# Patient Record
Sex: FEMALE | Race: WHITE | NOT HISPANIC OR LATINO | Employment: OTHER | URBAN - METROPOLITAN AREA
[De-identification: names, ages, dates, MRNs, and addresses within clinical notes are randomized per-mention and may not be internally consistent; named-entity substitution may affect disease eponyms.]

---

## 2019-11-03 ENCOUNTER — APPOINTMENT (OUTPATIENT)
Dept: RADIOLOGY | Facility: MEDICAL CENTER | Age: 84
DRG: 469 | End: 2019-11-03
Attending: EMERGENCY MEDICINE
Payer: MEDICARE

## 2019-11-03 ENCOUNTER — HOSPITAL ENCOUNTER (INPATIENT)
Facility: MEDICAL CENTER | Age: 84
LOS: 9 days | DRG: 469 | End: 2019-11-12
Attending: EMERGENCY MEDICINE | Admitting: INTERNAL MEDICINE
Payer: MEDICARE

## 2019-11-03 ENCOUNTER — OFFICE VISIT (OUTPATIENT)
Dept: URGENT CARE | Facility: MEDICAL CENTER | Age: 84
End: 2019-11-03
Payer: MEDICARE

## 2019-11-03 VITALS
TEMPERATURE: 97.9 F | HEART RATE: 95 BPM | OXYGEN SATURATION: 94 % | DIASTOLIC BLOOD PRESSURE: 80 MMHG | RESPIRATION RATE: 12 BRPM | SYSTOLIC BLOOD PRESSURE: 142 MMHG

## 2019-11-03 DIAGNOSIS — M25.551 HIP PAIN, CHRONIC, RIGHT: ICD-10-CM

## 2019-11-03 DIAGNOSIS — G89.29 HIP PAIN, CHRONIC, RIGHT: ICD-10-CM

## 2019-11-03 DIAGNOSIS — M25.551 RIGHT HIP PAIN: ICD-10-CM

## 2019-11-03 DIAGNOSIS — R60.0 BILATERAL LOWER EXTREMITY EDEMA: ICD-10-CM

## 2019-11-03 DIAGNOSIS — R60.9 PERIPHERAL EDEMA: ICD-10-CM

## 2019-11-03 PROBLEM — E86.0 DEHYDRATION: Status: ACTIVE | Noted: 2019-11-03

## 2019-11-03 PROBLEM — M16.11 DEGENERATIVE JOINT DISEASE OF RIGHT HIP: Status: ACTIVE | Noted: 2019-11-03

## 2019-11-03 PROBLEM — E87.1 HYPONATREMIA: Status: ACTIVE | Noted: 2019-11-03

## 2019-11-03 PROBLEM — N19 ACUTE PRERENAL AZOTEMIA: Status: ACTIVE | Noted: 2019-11-03

## 2019-11-03 PROBLEM — N39.0 UTI (URINARY TRACT INFECTION): Status: ACTIVE | Noted: 2019-11-03

## 2019-11-03 LAB
ALBUMIN SERPL BCP-MCNC: 4.4 G/DL (ref 3.2–4.9)
ALBUMIN/GLOB SERPL: 1.3 G/DL
ALP SERPL-CCNC: 107 U/L (ref 30–99)
ALT SERPL-CCNC: 11 U/L (ref 2–50)
ANION GAP SERPL CALC-SCNC: 13 MMOL/L (ref 0–11.9)
APPEARANCE UR: ABNORMAL
AST SERPL-CCNC: 15 U/L (ref 12–45)
BACTERIA #/AREA URNS HPF: ABNORMAL /HPF
BASOPHILS # BLD AUTO: 0.6 % (ref 0–1.8)
BASOPHILS # BLD: 0.03 K/UL (ref 0–0.12)
BILIRUB SERPL-MCNC: 0.6 MG/DL (ref 0.1–1.5)
BILIRUB UR QL STRIP.AUTO: NEGATIVE
BUN SERPL-MCNC: 40 MG/DL (ref 8–22)
CALCIUM SERPL-MCNC: 9.5 MG/DL (ref 8.4–10.2)
CHLORIDE SERPL-SCNC: 96 MMOL/L (ref 96–112)
CO2 SERPL-SCNC: 22 MMOL/L (ref 20–33)
COLOR UR: YELLOW
CREAT SERPL-MCNC: 1.1 MG/DL (ref 0.5–1.4)
EKG IMPRESSION: NORMAL
EOSINOPHIL # BLD AUTO: 0.16 K/UL (ref 0–0.51)
EOSINOPHIL NFR BLD: 3 % (ref 0–6.9)
EPI CELLS #/AREA URNS HPF: ABNORMAL /HPF
ERYTHROCYTE [DISTWIDTH] IN BLOOD BY AUTOMATED COUNT: 44.7 FL (ref 35.9–50)
GLOBULIN SER CALC-MCNC: 3.4 G/DL (ref 1.9–3.5)
GLUCOSE SERPL-MCNC: 104 MG/DL (ref 65–99)
GLUCOSE UR STRIP.AUTO-MCNC: NEGATIVE MG/DL
HCT VFR BLD AUTO: 39.2 % (ref 37–47)
HGB BLD-MCNC: 12.8 G/DL (ref 12–16)
IMM GRANULOCYTES # BLD AUTO: 0.02 K/UL (ref 0–0.11)
IMM GRANULOCYTES NFR BLD AUTO: 0.4 % (ref 0–0.9)
KETONES UR STRIP.AUTO-MCNC: NEGATIVE MG/DL
LEUKOCYTE ESTERASE UR QL STRIP.AUTO: ABNORMAL
LYMPHOCYTES # BLD AUTO: 1.08 K/UL (ref 1–4.8)
LYMPHOCYTES NFR BLD: 20.6 % (ref 22–41)
MCH RBC QN AUTO: 32 PG (ref 27–33)
MCHC RBC AUTO-ENTMCNC: 32.7 G/DL (ref 33.6–35)
MCV RBC AUTO: 98 FL (ref 81.4–97.8)
MICRO URNS: ABNORMAL
MONOCYTES # BLD AUTO: 0.53 K/UL (ref 0–0.85)
MONOCYTES NFR BLD AUTO: 10.1 % (ref 0–13.4)
MUCOUS THREADS #/AREA URNS HPF: ABNORMAL /HPF
NEUTROPHILS # BLD AUTO: 3.43 K/UL (ref 2–7.15)
NEUTROPHILS NFR BLD: 65.3 % (ref 44–72)
NITRITE UR QL STRIP.AUTO: NEGATIVE
NRBC # BLD AUTO: 0 K/UL
NRBC BLD-RTO: 0 /100 WBC
NT-PROBNP SERPL IA-MCNC: 114 PG/ML (ref 0–125)
PH UR STRIP.AUTO: 5 [PH] (ref 5–8)
PLATELET # BLD AUTO: 248 K/UL (ref 164–446)
PMV BLD AUTO: 9.6 FL (ref 9–12.9)
POTASSIUM SERPL-SCNC: 5.2 MMOL/L (ref 3.6–5.5)
PROT SERPL-MCNC: 7.8 G/DL (ref 6–8.2)
PROT UR QL STRIP: NEGATIVE MG/DL
RBC # BLD AUTO: 4 M/UL (ref 4.2–5.4)
RBC # URNS HPF: ABNORMAL /HPF
RBC UR QL AUTO: ABNORMAL
SODIUM SERPL-SCNC: 131 MMOL/L (ref 135–145)
SP GR UR STRIP.AUTO: 1.01
TROPONIN T SERPL-MCNC: 13 NG/L (ref 6–19)
TROPONIN T SERPL-MCNC: 16 NG/L (ref 6–19)
WBC # BLD AUTO: 5.3 K/UL (ref 4.8–10.8)
WBC #/AREA URNS HPF: ABNORMAL /HPF

## 2019-11-03 PROCEDURE — 93005 ELECTROCARDIOGRAM TRACING: CPT | Performed by: EMERGENCY MEDICINE

## 2019-11-03 PROCEDURE — 99358 PROLONG SERVICE W/O CONTACT: CPT | Performed by: INTERNAL MEDICINE

## 2019-11-03 PROCEDURE — 99223 1ST HOSP IP/OBS HIGH 75: CPT | Performed by: INTERNAL MEDICINE

## 2019-11-03 PROCEDURE — 81001 URINALYSIS AUTO W/SCOPE: CPT

## 2019-11-03 PROCEDURE — 99285 EMERGENCY DEPT VISIT HI MDM: CPT

## 2019-11-03 PROCEDURE — 770006 HCHG ROOM/CARE - MED/SURG/GYN SEMI*

## 2019-11-03 PROCEDURE — 85025 COMPLETE CBC W/AUTO DIFF WBC: CPT

## 2019-11-03 PROCEDURE — A9270 NON-COVERED ITEM OR SERVICE: HCPCS | Performed by: INTERNAL MEDICINE

## 2019-11-03 PROCEDURE — 700102 HCHG RX REV CODE 250 W/ 637 OVERRIDE(OP): Performed by: INTERNAL MEDICINE

## 2019-11-03 PROCEDURE — 99203 OFFICE O/P NEW LOW 30 MIN: CPT | Performed by: PHYSICIAN ASSISTANT

## 2019-11-03 PROCEDURE — 700111 HCHG RX REV CODE 636 W/ 250 OVERRIDE (IP): Performed by: EMERGENCY MEDICINE

## 2019-11-03 PROCEDURE — 93970 EXTREMITY STUDY: CPT

## 2019-11-03 PROCEDURE — 84484 ASSAY OF TROPONIN QUANT: CPT | Mod: 91

## 2019-11-03 PROCEDURE — 36415 COLL VENOUS BLD VENIPUNCTURE: CPT

## 2019-11-03 PROCEDURE — 73501 X-RAY EXAM HIP UNI 1 VIEW: CPT | Mod: RT

## 2019-11-03 PROCEDURE — 700105 HCHG RX REV CODE 258: Performed by: INTERNAL MEDICINE

## 2019-11-03 PROCEDURE — 96374 THER/PROPH/DIAG INJ IV PUSH: CPT

## 2019-11-03 PROCEDURE — 80053 COMPREHEN METABOLIC PANEL: CPT

## 2019-11-03 PROCEDURE — 83880 ASSAY OF NATRIURETIC PEPTIDE: CPT

## 2019-11-03 PROCEDURE — 700111 HCHG RX REV CODE 636 W/ 250 OVERRIDE (IP): Performed by: INTERNAL MEDICINE

## 2019-11-03 PROCEDURE — 71045 X-RAY EXAM CHEST 1 VIEW: CPT

## 2019-11-03 RX ORDER — OMEPRAZOLE 10 MG/1
10 CAPSULE, DELAYED RELEASE ORAL DAILY
Status: DISCONTINUED | OUTPATIENT
Start: 2019-11-04 | End: 2019-11-04

## 2019-11-03 RX ORDER — SODIUM CHLORIDE 9 MG/ML
INJECTION, SOLUTION INTRAVENOUS CONTINUOUS
Status: DISCONTINUED | OUTPATIENT
Start: 2019-11-03 | End: 2019-11-05

## 2019-11-03 RX ORDER — MORPHINE SULFATE 4 MG/ML
2 INJECTION, SOLUTION INTRAMUSCULAR; INTRAVENOUS
Status: DISCONTINUED | OUTPATIENT
Start: 2019-11-03 | End: 2019-11-12 | Stop reason: HOSPADM

## 2019-11-03 RX ORDER — AMOXICILLIN 250 MG
2 CAPSULE ORAL 2 TIMES DAILY
Status: DISCONTINUED | OUTPATIENT
Start: 2019-11-03 | End: 2019-11-12 | Stop reason: HOSPADM

## 2019-11-03 RX ORDER — CYCLOBENZAPRINE HCL 10 MG
5 TABLET ORAL 3 TIMES DAILY PRN
Status: DISCONTINUED | OUTPATIENT
Start: 2019-11-03 | End: 2019-11-12 | Stop reason: HOSPADM

## 2019-11-03 RX ORDER — CYCLOBENZAPRINE HCL 5 MG
5 TABLET ORAL 3 TIMES DAILY PRN
COMMUNITY
End: 2020-05-20

## 2019-11-03 RX ORDER — ONDANSETRON 4 MG/1
4 TABLET, ORALLY DISINTEGRATING ORAL EVERY 4 HOURS PRN
Status: DISCONTINUED | OUTPATIENT
Start: 2019-11-03 | End: 2019-11-12 | Stop reason: HOSPADM

## 2019-11-03 RX ORDER — CELECOXIB 200 MG/1
200 CAPSULE ORAL 2 TIMES DAILY
Status: DISCONTINUED | OUTPATIENT
Start: 2019-11-04 | End: 2019-11-12 | Stop reason: HOSPADM

## 2019-11-03 RX ORDER — MORPHINE SULFATE 4 MG/ML
4 INJECTION, SOLUTION INTRAMUSCULAR; INTRAVENOUS ONCE
Status: COMPLETED | OUTPATIENT
Start: 2019-11-03 | End: 2019-11-03

## 2019-11-03 RX ORDER — GABAPENTIN 100 MG/1
100 CAPSULE ORAL 3 TIMES DAILY
Status: DISCONTINUED | OUTPATIENT
Start: 2019-11-03 | End: 2019-11-12 | Stop reason: HOSPADM

## 2019-11-03 RX ORDER — IBUPROFEN 200 MG
600 TABLET ORAL EVERY 6 HOURS PRN
Status: ON HOLD | COMMUNITY
End: 2019-11-21

## 2019-11-03 RX ORDER — CELECOXIB 200 MG/1
200 CAPSULE ORAL 2 TIMES DAILY
Status: ON HOLD | COMMUNITY
End: 2019-11-21

## 2019-11-03 RX ORDER — POLYETHYLENE GLYCOL 3350 17 G/17G
1 POWDER, FOR SOLUTION ORAL
Status: DISCONTINUED | OUTPATIENT
Start: 2019-11-03 | End: 2019-11-12 | Stop reason: HOSPADM

## 2019-11-03 RX ORDER — ONDANSETRON 2 MG/ML
4 INJECTION INTRAMUSCULAR; INTRAVENOUS EVERY 4 HOURS PRN
Status: DISCONTINUED | OUTPATIENT
Start: 2019-11-03 | End: 2019-11-12 | Stop reason: HOSPADM

## 2019-11-03 RX ORDER — ACETAMINOPHEN 325 MG/1
650 TABLET ORAL EVERY 6 HOURS PRN
Status: DISCONTINUED | OUTPATIENT
Start: 2019-11-03 | End: 2019-11-12 | Stop reason: HOSPADM

## 2019-11-03 RX ORDER — LOSARTAN POTASSIUM 25 MG/1
25 TABLET ORAL DAILY
Status: DISCONTINUED | OUTPATIENT
Start: 2019-11-04 | End: 2019-11-12 | Stop reason: HOSPADM

## 2019-11-03 RX ORDER — ENALAPRILAT 1.25 MG/ML
1.25 INJECTION INTRAVENOUS EVERY 6 HOURS PRN
Status: DISCONTINUED | OUTPATIENT
Start: 2019-11-03 | End: 2019-11-12 | Stop reason: HOSPADM

## 2019-11-03 RX ORDER — BISACODYL 10 MG
10 SUPPOSITORY, RECTAL RECTAL
Status: DISCONTINUED | OUTPATIENT
Start: 2019-11-03 | End: 2019-11-12 | Stop reason: HOSPADM

## 2019-11-03 RX ORDER — LOSARTAN POTASSIUM 25 MG/1
25 TABLET ORAL EVERY MORNING
Status: ON HOLD | COMMUNITY
End: 2019-11-21

## 2019-11-03 RX ADMIN — CEFTRIAXONE SODIUM 2 G: 2 INJECTION, POWDER, FOR SOLUTION INTRAMUSCULAR; INTRAVENOUS at 23:55

## 2019-11-03 RX ADMIN — GABAPENTIN 100 MG: 100 CAPSULE ORAL at 23:55

## 2019-11-03 RX ADMIN — SODIUM CHLORIDE: 9 INJECTION, SOLUTION INTRAVENOUS at 23:55

## 2019-11-03 RX ADMIN — MORPHINE SULFATE 4 MG: 4 INJECTION INTRAVENOUS at 18:55

## 2019-11-03 RX ADMIN — POLYETHYLENE GLYCOL 3350 1 PACKET: 17 POWDER, FOR SOLUTION ORAL at 23:55

## 2019-11-03 SDOH — HEALTH STABILITY: MENTAL HEALTH: HOW OFTEN DO YOU HAVE 6 OR MORE DRINKS ON ONE OCCASION?: NEVER

## 2019-11-03 SDOH — HEALTH STABILITY: MENTAL HEALTH: HOW MANY STANDARD DRINKS CONTAINING ALCOHOL DO YOU HAVE ON A TYPICAL DAY?: 1 OR 2

## 2019-11-03 SDOH — ECONOMIC STABILITY: TRANSPORTATION INSECURITY
IN THE PAST 12 MONTHS, HAS THE LACK OF TRANSPORTATION KEPT YOU FROM MEDICAL APPOINTMENTS OR FROM GETTING MEDICATIONS?: NO

## 2019-11-03 SDOH — HEALTH STABILITY: MENTAL HEALTH: HOW OFTEN DO YOU HAVE A DRINK CONTAINING ALCOHOL?: 4 OR MORE TIMES A WEEK

## 2019-11-03 SDOH — ECONOMIC STABILITY: FOOD INSECURITY: WITHIN THE PAST 12 MONTHS, THE FOOD YOU BOUGHT JUST DIDN'T LAST AND YOU DIDN'T HAVE MONEY TO GET MORE.: NEVER TRUE

## 2019-11-03 SDOH — ECONOMIC STABILITY: FOOD INSECURITY: WITHIN THE PAST 12 MONTHS, YOU WORRIED THAT YOUR FOOD WOULD RUN OUT BEFORE YOU GOT MONEY TO BUY MORE.: NEVER TRUE

## 2019-11-03 SDOH — ECONOMIC STABILITY: TRANSPORTATION INSECURITY
IN THE PAST 12 MONTHS, HAS LACK OF TRANSPORTATION KEPT YOU FROM MEETINGS, WORK, OR FROM GETTING THINGS NEEDED FOR DAILY LIVING?: NO

## 2019-11-03 ASSESSMENT — ENCOUNTER SYMPTOMS
HEADACHES: 0
FLANK PAIN: 0
COUGH: 0
VOMITING: 0
PALPITATIONS: 0
SORE THROAT: 0
MYALGIAS: 1
EYE REDNESS: 0
STRIDOR: 0
DIAPHORESIS: 0
DIARRHEA: 0
CHILLS: 0
FALLS: 0
SHORTNESS OF BREATH: 0
LEG SWELLING: 1
EYE DISCHARGE: 0
NAUSEA: 0
ORTHOPNEA: 0
INSOMNIA: 1
FEVER: 0
FEVER: 0
CONSTIPATION: 1
NAUSEA: 0
WHEEZING: 0
HEADACHES: 0
DIZZINESS: 0
VOMITING: 0
COUGH: 0
SHORTNESS OF BREATH: 0

## 2019-11-03 ASSESSMENT — LIFESTYLE VARIABLES
TOTAL SCORE: 0
HOW MANY TIMES IN THE PAST YEAR HAVE YOU HAD 5 OR MORE DRINKS IN A DAY: 0
ON A TYPICAL DAY WHEN YOU DRINK ALCOHOL HOW MANY DRINKS DO YOU HAVE: 1
TOTAL SCORE: 0
HAVE YOU EVER FELT YOU SHOULD CUT DOWN ON YOUR DRINKING: NO
EVER_SMOKED: YES
CONSUMPTION TOTAL: NEGATIVE
EVER HAD A DRINK FIRST THING IN THE MORNING TO STEADY YOUR NERVES TO GET RID OF A HANGOVER: NO
HAVE PEOPLE ANNOYED YOU BY CRITICIZING YOUR DRINKING: NO
EVER FELT BAD OR GUILTY ABOUT YOUR DRINKING: NO
AVERAGE NUMBER OF DAYS PER WEEK YOU HAVE A DRINK CONTAINING ALCOHOL: 7
ALCOHOL_USE: YES
DOES PATIENT WANT TO STOP DRINKING: NO
TOTAL SCORE: 0

## 2019-11-03 NOTE — PROGRESS NOTES
Subjective:      Janessa Cain is a 84 y.o. female who presents with Leg Swelling (edema in both legs, x1week, worse in right leg)        Leg Swelling   This is a new problem. Episode onset: x 1 week. The problem occurs constantly. The problem has been gradually worsening. Pertinent negatives include no chest pain, congestion, coughing, fever, headaches, nausea, rash, sore throat or vomiting. The symptoms are aggravated by walking. She has tried NSAIDs and acetaminophen (Tylenol with Codeine and IBU) for the symptoms.     The patient presents to clinic c/o bilateral lower leg swelling, right > left, x 1 week. The patient states the swelling as continued to progress. The patient is also c/o right hip and knee pain. The patient reports a history of arthritis to her right hip. The patient states she is unable to walk at this time secondary to pain. The patient is requesting a refill of her pain medications at this time. The patient denies recent injury or trauma to her lower extremities. The patient has been taking Tylenol with Codeine and IBU for her current symptoms. The patient denies fever. No chest pain. No shortness of breath. No redness to her lower legs. No increased warmth.     The patient reports a sedentary lifestyle. The patient takes oral estrogen every 72 hours. The patient is a non-smoker.     PMH:  has no past medical history on file.  MEDS:   Current Outpatient Medications:   •  celecoxib (CELEBREX) 200 MG Cap, Take 200 mg by mouth 2 times a day., Disp: , Rfl:   •  cyclobenzaprine (FLEXERIL) 10 MG Tab, Take 5 mg by mouth 3 times a day as needed., Disp: , Rfl:   •  losartan (COZAAR) 25 MG Tab, Take 25 mg by mouth every day., Disp: , Rfl:   •  estrogens, conjugated (PREMARIN) 0.3 MG Tab, Take 0.3 mg by mouth every day., Disp: , Rfl:   •  ACETAMINOPHEN-CODEINE #3 PO, Take 300 mg by mouth 2 Times a Day., Disp: , Rfl:   •  ibuprofen (MOTRIN) 200 MG Tab, Take 200 mg by mouth every 6 hours as  needed., Disp: , Rfl:   •  omeprazole (PRILOSEC) 10 MG CAPSULE DELAYED RELEASE, Take 10 mg by mouth every day., Disp: , Rfl:   •  Calcium-Vitamin D 500-125 MG-UNIT Tab, Take 1 Tab by mouth every 24 hours., Disp: , Rfl:   ALLERGIES:   Allergies   Allergen Reactions   • Percocet [Apap-Fd&C Red #40 [place]-Oxycodone]    • Tramadol    • Vicodin [Hydrocodone-Acetaminophen]      SURGHX: History reviewed. No pertinent surgical history.  SOCHX:  reports that she has quit smoking. She smoked 0.00 packs per day. She has never used smokeless tobacco.  FH: Family history was reviewed, no pertinent findings to report        Review of Systems   Constitutional: Negative for fever.   HENT: Negative for congestion, ear pain and sore throat.    Eyes: Negative for discharge and redness.   Respiratory: Negative for cough and shortness of breath.    Cardiovascular: Positive for leg swelling. Negative for chest pain.   Gastrointestinal: Negative for nausea and vomiting.   Musculoskeletal: Positive for joint pain.        + right hip and knee   Skin: Negative for rash.   Neurological: Negative for headaches.   All other systems reviewed and are negative.         Objective:     /80 (BP Location: Left arm, Patient Position: Sitting, BP Cuff Size: Adult)   Pulse 95   Temp 36.6 °C (97.9 °F) (Temporal)   Resp 12   SpO2 94%      Physical Exam  Constitutional:       General: She is not in acute distress.     Appearance: Normal appearance.   HENT:      Head: Normocephalic and atraumatic.      Right Ear: External ear normal.      Left Ear: External ear normal.      Nose: Nose normal.   Eyes:      Extraocular Movements: Extraocular movements intact.      Conjunctiva/sclera: Conjunctivae normal.   Neck:      Musculoskeletal: Normal range of motion and neck supple.   Cardiovascular:      Rate and Rhythm: Normal rate and regular rhythm.      Heart sounds: Normal heart sounds.   Pulmonary:      Effort: Pulmonary effort is normal. No  respiratory distress.      Breath sounds: Normal breath sounds. No wheezing.   Musculoskeletal:         General: Swelling present.      Right lower leg: Edema present.      Left lower leg: Edema present.      Comments:   Right Hip:  Diffuse tenderness to the right lateral hip.  Decreased ROM - secondary to pain  Neurovascular intact  Gait: not assessed  Right Lower Extremity   Diffuse edema to the right lower extremity extending to the distal thigh. No tenderness to palpation. No erythema. No increased warmth. No skin changes.   No calf tenderness.   Left Lower Extremity:  Diffuse edema to the left lower extremity extending to the distal knee. No tenderness to palpation. No erythema. No increased warmth. No skin changes.   No calf tenderness.    Skin:     General: Skin is warm and dry.   Neurological:      Mental Status: She is alert and oriented to person, place, and time.            Progress:  Spoke with Dr. Huntley at the [facility] who agrees to see the patient.     Assessment/Plan:     1. Bilateral lower extremity edema    2. Hip pain, chronic, right    The patient's presenting symptoms and physical exam findings are consistent with bilateral lower extremity edema.  The patient also reports chronic right hip pain.  On physical exam, the patient had diffuse edema to her bilateral lower extremities, right greater than left, without tenderness to palpation, erythema, increased warmth, or skin changes.  No calf tenderness was appreciated.  Based on the patient's presenting symptoms and physical exam findings, I recommend the patient be transferred to the [facility] for further evaluation of her current symptoms.  The patient agrees to this plan.  The patient is stable for transfer via private vehicle at this time, and her family will take her to the ED.    Plan:  Transfer patient to the [facility] for further evaluation of her current symptoms.

## 2019-11-04 ENCOUNTER — APPOINTMENT (OUTPATIENT)
Dept: RADIOLOGY | Facility: MEDICAL CENTER | Age: 84
DRG: 469 | End: 2019-11-04
Attending: EMERGENCY MEDICINE
Payer: MEDICARE

## 2019-11-04 ENCOUNTER — APPOINTMENT (OUTPATIENT)
Dept: RADIOLOGY | Facility: MEDICAL CENTER | Age: 84
DRG: 469 | End: 2019-11-04
Attending: HOSPITALIST
Payer: MEDICARE

## 2019-11-04 ENCOUNTER — APPOINTMENT (OUTPATIENT)
Dept: RADIOLOGY | Facility: MEDICAL CENTER | Age: 84
DRG: 469 | End: 2019-11-04
Attending: ORTHOPAEDIC SURGERY
Payer: MEDICARE

## 2019-11-04 ENCOUNTER — APPOINTMENT (OUTPATIENT)
Dept: CARDIOLOGY | Facility: MEDICAL CENTER | Age: 84
DRG: 469 | End: 2019-11-04
Attending: INTERNAL MEDICINE
Payer: MEDICARE

## 2019-11-04 PROBLEM — M54.10 RADICULOPATHY: Status: ACTIVE | Noted: 2019-11-04

## 2019-11-04 LAB
ANION GAP SERPL CALC-SCNC: 12 MMOL/L (ref 0–11.9)
BASOPHILS # BLD AUTO: 0.6 % (ref 0–1.8)
BASOPHILS # BLD: 0.03 K/UL (ref 0–0.12)
BUN SERPL-MCNC: 35 MG/DL (ref 8–22)
CALCIUM SERPL-MCNC: 9.4 MG/DL (ref 8.4–10.2)
CHLORIDE SERPL-SCNC: 97 MMOL/L (ref 96–112)
CO2 SERPL-SCNC: 21 MMOL/L (ref 20–33)
CREAT SERPL-MCNC: 0.81 MG/DL (ref 0.5–1.4)
EOSINOPHIL # BLD AUTO: 0.17 K/UL (ref 0–0.51)
EOSINOPHIL NFR BLD: 3.3 % (ref 0–6.9)
ERYTHROCYTE [DISTWIDTH] IN BLOOD BY AUTOMATED COUNT: 45.8 FL (ref 35.9–50)
GLUCOSE SERPL-MCNC: 119 MG/DL (ref 65–99)
HCT VFR BLD AUTO: 39.8 % (ref 37–47)
HGB BLD-MCNC: 12.7 G/DL (ref 12–16)
IMM GRANULOCYTES # BLD AUTO: 0.01 K/UL (ref 0–0.11)
IMM GRANULOCYTES NFR BLD AUTO: 0.2 % (ref 0–0.9)
LV EJECT FRACT  99904: 65
LV EJECT FRACT MOD 2C 99903: 66.73
LV EJECT FRACT MOD 4C 99902: 51.96
LV EJECT FRACT MOD BP 99901: 52.67
LYMPHOCYTES # BLD AUTO: 0.79 K/UL (ref 1–4.8)
LYMPHOCYTES NFR BLD: 15.5 % (ref 22–41)
MCH RBC QN AUTO: 32.1 PG (ref 27–33)
MCHC RBC AUTO-ENTMCNC: 31.9 G/DL (ref 33.6–35)
MCV RBC AUTO: 100.5 FL (ref 81.4–97.8)
MONOCYTES # BLD AUTO: 0.59 K/UL (ref 0–0.85)
MONOCYTES NFR BLD AUTO: 11.6 % (ref 0–13.4)
NEUTROPHILS # BLD AUTO: 3.5 K/UL (ref 2–7.15)
NEUTROPHILS NFR BLD: 68.8 % (ref 44–72)
NRBC # BLD AUTO: 0 K/UL
NRBC BLD-RTO: 0 /100 WBC
PLATELET # BLD AUTO: 228 K/UL (ref 164–446)
PMV BLD AUTO: 10.1 FL (ref 9–12.9)
POTASSIUM SERPL-SCNC: 4.6 MMOL/L (ref 3.6–5.5)
RBC # BLD AUTO: 3.96 M/UL (ref 4.2–5.4)
SODIUM SERPL-SCNC: 130 MMOL/L (ref 135–145)
WBC # BLD AUTO: 5.1 K/UL (ref 4.8–10.8)

## 2019-11-04 PROCEDURE — 97161 PT EVAL LOW COMPLEX 20 MIN: CPT

## 2019-11-04 PROCEDURE — 73700 CT LOWER EXTREMITY W/O DYE: CPT | Mod: RT

## 2019-11-04 PROCEDURE — 93306 TTE W/DOPPLER COMPLETE: CPT | Mod: 26 | Performed by: INTERNAL MEDICINE

## 2019-11-04 PROCEDURE — A9270 NON-COVERED ITEM OR SERVICE: HCPCS

## 2019-11-04 PROCEDURE — 94760 N-INVAS EAR/PLS OXIMETRY 1: CPT

## 2019-11-04 PROCEDURE — 80048 BASIC METABOLIC PNL TOTAL CA: CPT

## 2019-11-04 PROCEDURE — C1751 CATH, INF, PER/CENT/MIDLINE: HCPCS

## 2019-11-04 PROCEDURE — 05HY33Z INSERTION OF INFUSION DEVICE INTO UPPER VEIN, PERCUTANEOUS APPROACH: ICD-10-PCS | Performed by: INTERNAL MEDICINE

## 2019-11-04 PROCEDURE — 36415 COLL VENOUS BLD VENIPUNCTURE: CPT

## 2019-11-04 PROCEDURE — 700102 HCHG RX REV CODE 250 W/ 637 OVERRIDE(OP)

## 2019-11-04 PROCEDURE — 93306 TTE W/DOPPLER COMPLETE: CPT

## 2019-11-04 PROCEDURE — 85025 COMPLETE CBC W/AUTO DIFF WBC: CPT

## 2019-11-04 PROCEDURE — 770006 HCHG ROOM/CARE - MED/SURG/GYN SEMI*

## 2019-11-04 PROCEDURE — 97165 OT EVAL LOW COMPLEX 30 MIN: CPT

## 2019-11-04 PROCEDURE — 700111 HCHG RX REV CODE 636 W/ 250 OVERRIDE (IP): Performed by: INTERNAL MEDICINE

## 2019-11-04 PROCEDURE — A9270 NON-COVERED ITEM OR SERVICE: HCPCS | Performed by: INTERNAL MEDICINE

## 2019-11-04 PROCEDURE — 99233 SBSQ HOSP IP/OBS HIGH 50: CPT | Performed by: HOSPITALIST

## 2019-11-04 PROCEDURE — 700102 HCHG RX REV CODE 250 W/ 637 OVERRIDE(OP): Performed by: INTERNAL MEDICINE

## 2019-11-04 RX ORDER — OMEPRAZOLE 20 MG/1
20 CAPSULE, DELAYED RELEASE ORAL DAILY
Status: DISCONTINUED | OUTPATIENT
Start: 2019-11-05 | End: 2019-11-12 | Stop reason: HOSPADM

## 2019-11-04 RX ADMIN — LOSARTAN POTASSIUM 25 MG: 25 TABLET ORAL at 06:04

## 2019-11-04 RX ADMIN — GABAPENTIN 100 MG: 100 CAPSULE ORAL at 06:03

## 2019-11-04 RX ADMIN — MAGNESIUM HYDROXIDE 30 ML: 400 SUSPENSION ORAL at 06:04

## 2019-11-04 RX ADMIN — MORPHINE SULFATE 2 MG: 4 INJECTION INTRAVENOUS at 18:36

## 2019-11-04 RX ADMIN — CALCIUM CARBONATE-CHOLECALCIFEROL TAB 250 MG-125 UNIT 1 TABLET: 250-125 TAB at 06:03

## 2019-11-04 RX ADMIN — SENNOSIDES, DOCUSATE SODIUM 2 TABLET: 50; 8.6 TABLET, FILM COATED ORAL at 18:35

## 2019-11-04 RX ADMIN — ENOXAPARIN SODIUM 40 MG: 100 INJECTION SUBCUTANEOUS at 06:04

## 2019-11-04 RX ADMIN — Medication 20 MG: at 06:14

## 2019-11-04 RX ADMIN — CELECOXIB 200 MG: 200 CAPSULE ORAL at 18:35

## 2019-11-04 RX ADMIN — GABAPENTIN 100 MG: 100 CAPSULE ORAL at 18:35

## 2019-11-04 RX ADMIN — CELECOXIB 200 MG: 200 CAPSULE ORAL at 06:03

## 2019-11-04 RX ADMIN — MORPHINE SULFATE 2 MG: 4 INJECTION INTRAVENOUS at 04:18

## 2019-11-04 RX ADMIN — MORPHINE SULFATE 2 MG: 4 INJECTION INTRAVENOUS at 00:41

## 2019-11-04 RX ADMIN — MORPHINE SULFATE 2 MG: 4 INJECTION INTRAVENOUS at 14:35

## 2019-11-04 RX ADMIN — GABAPENTIN 100 MG: 100 CAPSULE ORAL at 11:38

## 2019-11-04 RX ADMIN — OMEPRAZOLE 20 MG: KIT at 06:14

## 2019-11-04 RX ADMIN — MORPHINE SULFATE 2 MG: 4 INJECTION INTRAVENOUS at 08:26

## 2019-11-04 ASSESSMENT — COGNITIVE AND FUNCTIONAL STATUS - GENERAL
DRESSING REGULAR UPPER BODY CLOTHING: A LITTLE
SUGGESTED CMS G CODE MODIFIER DAILY ACTIVITY: CK
PERSONAL GROOMING: A LITTLE
EATING MEALS: A LITTLE
HELP NEEDED FOR BATHING: A LOT
DRESSING REGULAR LOWER BODY CLOTHING: A LOT
TOILETING: A LITTLE
DAILY ACTIVITIY SCORE: 16

## 2019-11-04 ASSESSMENT — PATIENT HEALTH QUESTIONNAIRE - PHQ9
1. LITTLE INTEREST OR PLEASURE IN DOING THINGS: NOT AT ALL
SUM OF ALL RESPONSES TO PHQ9 QUESTIONS 1 AND 2: 0
2. FEELING DOWN, DEPRESSED, IRRITABLE, OR HOPELESS: NOT AT ALL

## 2019-11-04 ASSESSMENT — ENCOUNTER SYMPTOMS
FEVER: 0
WEIGHT LOSS: 0
DIZZINESS: 0
COUGH: 0
VOMITING: 0
LOSS OF CONSCIOUSNESS: 0
ABDOMINAL PAIN: 0
PSYCHIATRIC NEGATIVE: 1
FLANK PAIN: 0
NAUSEA: 0
RESPIRATORY NEGATIVE: 1
NEUROLOGICAL NEGATIVE: 1
CHILLS: 0
SHORTNESS OF BREATH: 0
WEAKNESS: 0
GASTROINTESTINAL NEGATIVE: 1
BACK PAIN: 0
BRUISES/BLEEDS EASILY: 0
DEPRESSION: 0
NERVOUS/ANXIOUS: 0
MYALGIAS: 0

## 2019-11-04 ASSESSMENT — COPD QUESTIONNAIRES
DURING THE PAST 4 WEEKS HOW MUCH DID YOU FEEL SHORT OF BREATH: NONE/LITTLE OF THE TIME
HAVE YOU SMOKED AT LEAST 100 CIGARETTES IN YOUR ENTIRE LIFE: YES
COPD SCREENING SCORE: 4
DO YOU EVER COUGH UP ANY MUCUS OR PHLEGM?: NO/ONLY WITH OCCASIONAL COLDS OR INFECTIONS

## 2019-11-04 ASSESSMENT — GAIT ASSESSMENTS: GAIT LEVEL OF ASSIST: REFUSED

## 2019-11-04 ASSESSMENT — ACTIVITIES OF DAILY LIVING (ADL): TOILETING: INDEPENDENT

## 2019-11-04 NOTE — H&P
Hospital Medicine History & Physical Note    Date of Service  11/3/2019    Primary Care Physician  No primary care provider    Consultants  none    Code Status  full    Chief Complaint  Leg swelling    History of Presenting Illness  84 y.o. female who presented 11/3/2019 with degenerative disease of the hips and history of left hip surgery in 2008 who has known degenerative disease of the right hip and was seen by orthopedic surgery in [place] but wanted to wait until after she moved to [place] to have surgery. She moved to [place] one week ago and the last three days she is sleeping in a wheelchair at night as she cannot tolerate laying flat due to pain.  She uses a walker to ambulate during the day but is having increased difficulty ambulating due to pain. She describes the pain in her right leg as radiating down her right leg into the foot and burning in nature.  She has constipation and is not drinking much as it is difficult to get to the bathroom to void.    Review of Systems  Review of Systems   Constitutional: Positive for malaise/fatigue. Negative for chills, diaphoresis and fever.   HENT: Negative for congestion.    Respiratory: Negative for cough, shortness of breath, wheezing and stridor.    Cardiovascular: Positive for leg swelling. Negative for chest pain, palpitations and orthopnea.   Gastrointestinal: Positive for constipation. Negative for diarrhea, nausea and vomiting.   Genitourinary: Negative for dysuria, frequency and urgency.        Dark urine   Musculoskeletal: Positive for joint pain and myalgias.        Right hip pain  Pain radiating from upper right leg down to toes on right foot worse with laying flat   Skin: Negative for itching and rash.   Neurological: Negative for dizziness and headaches.   Psychiatric/Behavioral: The patient has insomnia.    All other systems reviewed and are negative.      Past Medical History   has a past medical history of Hypertension.degenerative disease of the hip  joints    Surgical History  Left hip resurfacing in 2008    Family History  Father had heart disease, aunt had ovarian cancer    Social History   reports that she has quit smoking. She smoked 0.00 packs per day. She has never used smokeless tobacco. She reports current alcohol use. She reports that she does not use drugs.she drinks one alcoholic drink nightly    Allergies  Allergies   Allergen Reactions   • Hydrocodone Vomiting   • Percocet [Apap-Fd&C Red #40 [place]-Oxycodone]    • Tramadol    • Vicodin [Hydrocodone-Acetaminophen]        Medications  Prior to Admission Medications   Prescriptions Last Dose Informant Patient Reported? Taking?   ACETAMINOPHEN-CODEINE #3 PO   Yes No   Sig: Take 300 mg by mouth 2 Times a Day.   Calcium-Vitamin D 500-125 MG-UNIT Tab   Yes No   Sig: Take 1 Tab by mouth every 24 hours.   celecoxib (CELEBREX) 200 MG Cap   Yes No   Sig: Take 200 mg by mouth 2 times a day.   cyclobenzaprine (FLEXERIL) 10 MG Tab   Yes No   Sig: Take 5 mg by mouth 3 times a day as needed.   estrogens, conjugated (PREMARIN) 0.3 MG Tab   Yes No   Sig: Take 0.3 mg by mouth every day.   ibuprofen (MOTRIN) 200 MG Tab   Yes No   Sig: Take 200 mg by mouth every 6 hours as needed.   losartan (COZAAR) 25 MG Tab   Yes No   Sig: Take 25 mg by mouth every day.   omeprazole (PRILOSEC) 10 MG CAPSULE DELAYED RELEASE   Yes No   Sig: Take 10 mg by mouth every day.      Facility-Administered Medications: None       Physical Exam  Temp:  [36.3 °C (97.4 °F)-36.5 °C (97.7 °F)] 36.5 °C (97.7 °F)  Pulse:  [91-98] 98  Resp:  [18] 18  BP: (144-147)/(87-98) 144/98  SpO2:  [91 %-93 %] 91 %    Physical Exam  Vitals signs and nursing note reviewed.   Constitutional:       General: She is not in acute distress.     Appearance: She is not diaphoretic.   HENT:      Head: Atraumatic.      Right Ear: External ear normal.      Left Ear: External ear normal.      Nose: No congestion or rhinorrhea.      Mouth/Throat:      Mouth: Mucous membranes  are moist.      Pharynx: No oropharyngeal exudate or posterior oropharyngeal erythema.   Eyes:      Extraocular Movements: Extraocular movements intact.      Pupils: Pupils are equal, round, and reactive to light.   Neck:      Musculoskeletal: Neck supple. No muscular tenderness.   Cardiovascular:      Rate and Rhythm: Normal rate and regular rhythm.      Pulses: Normal pulses.      Heart sounds: Murmur present.      Comments: Varicose veins over upper legs  Pulmonary:      Effort: No respiratory distress.      Breath sounds: No stridor. No rhonchi.   Musculoskeletal:         General: Swelling present. No tenderness.   Skin:     General: Skin is warm.      Capillary Refill: Capillary refill takes 2 to 3 seconds.      Coloration: Skin is not pale.      Findings: No erythema.   Neurological:      Mental Status: She is alert and oriented to person, place, and time.      Sensory: No sensory deficit.   Psychiatric:         Mood and Affect: Mood normal.         Behavior: Behavior normal.         Laboratory:  Recent Labs     11/03/19  1725   WBC 5.3   RBC 4.00*   HEMOGLOBIN 12.8   HEMATOCRIT 39.2   MCV 98.0*   MCH 32.0   MCHC 32.7*   RDW 44.7   PLATELETCT 248   MPV 9.6     Recent Labs     11/03/19  1725   SODIUM 131*   POTASSIUM 5.2   CHLORIDE 96   CO2 22   GLUCOSE 104*   BUN 40*   CREATININE 1.10   CALCIUM 9.5     Recent Labs     11/03/19  1725   ALTSGPT 11   ASTSGOT 15   ALKPHOSPHAT 107*   TBILIRUBIN 0.6   GLUCOSE 104*         Recent Labs     11/03/19  1725   NTPROBNP 114         Recent Labs     11/03/19  1725 11/03/19  1944   TROPONINT 16 13       Urinalysis:    Recent Labs     11/03/19  1911   SPECGRAVITY 1.010   GLUCOSEUR Negative   KETONES Negative   NITRITE Negative   LEUKESTERAS Large*   WBCURINE 10-20*   RBCURINE 2-5*   BACTERIA Many*   EPITHELCELL Many*        Imaging:  DX-HIP-UNILATERAL-WITH PELVIS-1 VIEW RIGHT   Final Result         1.  No radiographic evidence of acute traumatic injury.   2.  Severe  degenerative changes of the right hip with extensive bony remodeling of the femoral head and acetabula.      US-EXTREMITY VENOUS LOWER BILAT   Final Result      No gross deep venous thrombosis.      Severely limited evaluation due to subcutaneous fat edema, patient pain and mobility limitations      DX-CHEST-PORTABLE (1 VIEW)   Final Result      No acute cardiopulmonary findings.      CT-HIP W/O PLUS RECONS RIGHT    (Results Pending)         Assessment/Plan:  I anticipate this patient will require at least two midnights for appropriate medical management, necessitating inpatient admission.    Bilateral leg edema  Assessment & Plan  Due to legs down to gravity for prolonged periods of time  I will order for leg elevation as tolerated and compression stockings    Degenerative joint disease of right hip  Assessment & Plan  Patient was seen by an orthopedic surgeon in [place] but wanted to wait until she moved to [place] to have surgery, now she is having difficulty ambulating and laying flat due to pain  I will start gabapentin for nerve pain  Continue tylenol and motrin for pain and flexeril for muscle spasms    Acute prerenal azotemia  Assessment & Plan  I will treat with fluids and recheck her labs    Dehydration  Assessment & Plan  I will treat her with iv fluids and monitor her fluid intake      Hyponatremia  Assessment & Plan  Due to dehydration, I will recheck her labs after hydration    UTI (urinary tract infection)  Assessment & Plan  I will order urine culture and treat with iv rocephin    I spent an additional 52 minutes of time with the family and patient, from 8:58 until 9:50pm discussing insurance, the importance of primary care follow up, orthopedic surgery planning and follow up and physical therapy needs before and after surgery.  VTE prophylaxis: lovenox

## 2019-11-04 NOTE — ED NOTES
"Pt was evaluated by MD and orders rec'ed  SL placed and bld was drawn, sent to lab   Chest XR being done at [facility]   MD aware that pt's c/o hip pain and will not be able to \"lay flat\" for US of lower extremities because of c/o chronic pain from RA  MD aware, order for IV pain med rec'ed   "

## 2019-11-04 NOTE — ASSESSMENT & PLAN NOTE
Patient was seen by an orthopedic surgeon in [place] but wanted to wait until she moved to [place] to have surgery, now she is having difficulty ambulating and laying flat due to pain  S/p total hip arthoplasty 11/6/2019  Resume gabapentin  Continue with zanaflex.  Pain control perferably PO.

## 2019-11-04 NOTE — ED PROVIDER NOTES
"ED Provider Note    CHIEF COMPLAINT  Chief Complaint   Patient presents with   • Peripheral Edema       HPI  Janessa Cain is a 84 y.o. female with a history of hypertension and osteoarthritis. Patient reports that she has had right hip pain for \"a long time\" due to \"bone on bone\" osteoarthritis in the right hip.  Patient states that over the past 2 weeks she has noted significant swelling in the right leg.  She states that she has been slightly more immobile secondary to her pain in her hip and the swelling has been worsening over the past 2 weeks.  She noted that she began to have swelling in the left lower extremity approximately 5 days ago, which is not quite as severe as the right lower extremity.  Patient is currently sleeping in a wheelchair or at least sitting up secondary to pain in her lower extremities.  She states that she cannot lay down secondary to pain, but denies chest pain or shortness of breath.  Patient is currently taking ibuprofen, Tylenol with codeine, and Flexeril for her chronic pain without complete relief of her symptoms.  Patient does not currently have an appointment with orthopedic doctor to have the hip replaced, as she recently moved from [place] and is working to obtain a primary care doctor and orthopedic surgeon.  Patient denies any prior history of blood clots, and is not currently on blood thinners.  She reports that she has been unable to walk without a walker for quite some time and usually uses a wheelchair.    REVIEW OF SYSTEMS  Review of Systems   Constitutional: Negative for chills and fever.   Respiratory: Negative for cough and shortness of breath.    Cardiovascular: Positive for leg swelling. Negative for chest pain, palpitations and orthopnea.   Genitourinary: Negative for dysuria and flank pain.   Musculoskeletal: Positive for joint pain. Negative for falls.   Neurological: Negative for dizziness and headaches.   All other systems reviewed and are " "negative.      PAST MEDICAL HISTORY   has a past medical history of Hypertension.    SOCIAL HISTORY  Social History     Tobacco Use   • Smoking status: Former Smoker     Packs/day: 0.00   • Smokeless tobacco: Never Used   Substance and Sexual Activity   • Alcohol use: Yes     Comment: daily   • Drug use: Never   • Sexual activity: Not on file       SURGICAL HISTORY  patient denies any surgical history    CURRENT MEDICATIONS  Home Medications    **Home medications have not yet been reviewed for this encounter**         ALLERGIES  Allergies   Allergen Reactions   • Hydrocodone Vomiting   • Percocet [Apap-Fd&C Red #40 [place]-Oxycodone]    • Tramadol    • Vicodin [Hydrocodone-Acetaminophen]        PHYSICAL EXAM  VITAL SIGNS: /87   Pulse 91   Temp 36.3 °C (97.4 °F) (Temporal)   Resp 18   Ht 1.651 m (5' 5\")   Wt 90 kg (198 lb 6.6 oz)   SpO2 93%   BMI 33.02 kg/m²    Pulse ox interpretation: I interpret this pulse ox as low normal.    Physical Exam   Constitutional: She is oriented to person, place, and time and well-developed, well-nourished, and in no distress.   Sitting up on the side of the bed due to discomfort when laying down   HENT:   Head: Normocephalic and atraumatic.   Mouth/Throat: Oropharynx is clear and moist.   Eyes: Pupils are equal, round, and reactive to light. Conjunctivae are normal.   Neck: Normal range of motion. Neck supple.   Cardiovascular: Normal rate, regular rhythm and intact distal pulses.   Pulmonary/Chest: Effort normal. No respiratory distress. She has rales (bilateral bases).   Abdominal: Soft. Bowel sounds are normal. She exhibits no distension. There is no tenderness.   Musculoskeletal: Normal range of motion.         General: Edema present. No deformity.      Comments: 4+ pitting edema to bilateral lower extremities, Right>Left  Tender with palpation of the right hip and knee   Neurological: She is alert and oriented to person, place, and time. GCS score is 15.   Skin: Skin " is warm. She is not diaphoretic.   Psychiatric: Affect and judgment normal.   Nursing note and vitals reviewed.        DIAGNOSTIC STUDIES  Results for orders placed or performed during the hospital encounter of 11/03/19   CBC w/ Differential   Result Value Ref Range    WBC 5.3 4.8 - 10.8 K/uL    RBC 4.00 (L) 4.20 - 5.40 M/uL    Hemoglobin 12.8 12.0 - 16.0 g/dL    Hematocrit 39.2 37.0 - 47.0 %    MCV 98.0 (H) 81.4 - 97.8 fL    MCH 32.0 27.0 - 33.0 pg    MCHC 32.7 (L) 33.6 - 35.0 g/dL    RDW 44.7 35.9 - 50.0 fL    Platelet Count 248 164 - 446 K/uL    MPV 9.6 9.0 - 12.9 fL    Neutrophils-Polys 65.30 44.00 - 72.00 %    Lymphocytes 20.60 (L) 22.00 - 41.00 %    Monocytes 10.10 0.00 - 13.40 %    Eosinophils 3.00 0.00 - 6.90 %    Basophils 0.60 0.00 - 1.80 %    Immature Granulocytes 0.40 0.00 - 0.90 %    Nucleated RBC 0.00 /100 WBC    Neutrophils (Absolute) 3.43 2.00 - 7.15 K/uL    Lymphs (Absolute) 1.08 1.00 - 4.80 K/uL    Monos (Absolute) 0.53 0.00 - 0.85 K/uL    Eos (Absolute) 0.16 0.00 - 0.51 K/uL    Baso (Absolute) 0.03 0.00 - 0.12 K/uL    Immature Granulocytes (abs) 0.02 0.00 - 0.11 K/uL    NRBC (Absolute) 0.00 K/uL   Complete Metabolic Panel (CMP)   Result Value Ref Range    Sodium 131 (L) 135 - 145 mmol/L    Potassium 5.2 3.6 - 5.5 mmol/L    Chloride 96 96 - 112 mmol/L    Co2 22 20 - 33 mmol/L    Anion Gap 13.0 (H) 0.0 - 11.9    Glucose 104 (H) 65 - 99 mg/dL    Bun 40 (H) 8 - 22 mg/dL    Creatinine 1.10 0.50 - 1.40 mg/dL    Calcium 9.5 8.4 - 10.2 mg/dL    AST(SGOT) 15 12 - 45 U/L    ALT(SGPT) 11 2 - 50 U/L    Alkaline Phosphatase 107 (H) 30 - 99 U/L    Total Bilirubin 0.6 0.1 - 1.5 mg/dL    Albumin 4.4 3.2 - 4.9 g/dL    Total Protein 7.8 6.0 - 8.2 g/dL    Globulin 3.4 1.9 - 3.5 g/dL    A-G Ratio 1.3 g/dL   Troponin STAT   Result Value Ref Range    Troponin T 16 6 - 19 ng/L   proBrain Natriuretic Peptide, NT   Result Value Ref Range    NT-proBNP 114 0 - 125 pg/mL   Urinalysis, culture if indicated   Result Value Ref  Range    Color Yellow     Character Cloudy (A)     Specific Gravity 1.010 <1.035    Ph 5.0 5.0 - 8.0    Glucose Negative Negative mg/dL    Ketones Negative Negative mg/dL    Protein Negative Negative mg/dL    Bilirubin Negative Negative    Nitrite Negative Negative    Leukocyte Esterase Large (A) Negative    Occult Blood Trace (A) Negative    Micro Urine Req Microscopic    ESTIMATED GFR   Result Value Ref Range    GFR If  57 (A) >60 mL/min/1.73 m 2    GFR If Non  47 (A) >60 mL/min/1.73 m 2   TROPONIN   Result Value Ref Range    Troponin T 13 6 - 19 ng/L   URINE MICROSCOPIC (W/UA)   Result Value Ref Range    WBC 10-20 (A) /hpf    RBC 2-5 (A) /hpf    Bacteria Many (A) None /hpf    Epithelial Cells Many (A) Few /hpf    Mucous Threads Rare /hpf   EKG (NOW)   Result Value Ref Range    Report       [facility] Emergency Dept.    Test Date:  2019  Pt Name:    NIKOLAI MELÉNDEZ       Department: [facility]  MRN:        [id number]                      [facility]  Gender:     Female                       Technician: GT  :        [id number]                   Requested By:LIZBETH SHEPHERD  Order #:    [id number]                    Reading MD: Lizbeth Shepherd MD    Measurements  Intervals                                Axis  Rate:       97                           P:          71  IA:         176                          QRS:        -10  QRSD:       100                          T:          40  QT:         360  QTc:        458    Interpretive Statements  SINUS ARRHYTHMIA, RATE    LEFT VENTRICULAR HYPERTROPHY  BASELINE WANDER IN LEAD(S) V6  Left axis, normal intervals. No ectopy.  No ST elevation or T wave changes.  No previous ECG available for comparison  Electronically Signed On 11-3-2019 17:49:35 PST by Lizbeth Shepherd MD         DX-HIP-UNILATERAL-WITH PELVIS-1 VIEW RIGHT   Final Result         1.  No radiographic evidence of acute traumatic  injury.   2.  Severe degenerative changes of the right hip with extensive bony remodeling of the femoral head and acetabula.      US-EXTREMITY VENOUS LOWER BILAT   Final Result      No gross deep venous thrombosis.      Severely limited evaluation due to subcutaneous fat edema, patient pain and mobility limitations      DX-CHEST-PORTABLE (1 VIEW)   Final Result      No acute cardiopulmonary findings.      CT-HIP W/O PLUS RECONS RIGHT    (Results Pending)   EC-ECHOCARDIOGRAM COMPLETE W/O CONT    (Results Pending)         COURSE & MEDICAL DECISION MAKING  Pertinent Labs & Imaging studies reviewed. (See chart for details)  84-year-old female presents emergency department for evaluation of lower extremity edema.  On my examination, the patient had significant bilateral lower extremity edema, right greater than left.  Patient reported that she had severe osteoarthritis which had caused her to be unable to walk, worsening over the past 2-1/2 to 3 weeks.  Differential diagnosis includes but is not limited to DVT, dependent edema, renal insufficiency, electrolyte abnormality, dehydration, CHF, ACS, chronic pain exacerbation    EKG was obtained and was not concerning for acute ischemia or infarction, though did demonstrate left ventricular hypertrophy concerning for CHF as a cause of her edema.  IV access was obtained and laboratory studies were drawn.  These revealed no significant leukocytosis, anemia, or significant elevation in BNP.  Troponin testing was performed, and had no significant elevation with 2 hour delta testing being stable.  Patient had several minor electrolyte abnormalities including hyponatremia with a sodium of 131 and BUN of 40 concerning for dehydration.  Urinalysis showed no evidence of proteinuria, though did demonstrate pyuria and bacteria present concerning for an infection.  Ultrasound was obtained of the patient's bilateral lower extremities showing no evidence of deep vein thrombosis.    Chest  x-ray showed no acute cardiopulmonary findings.  On my repeat evaluation, the patient reported that her pain had been improved by morphine, but she was still unable to lay down secondary to pain.  Felt that her pain was causing her to have worsening dependent edema.  X-ray was obtained showing severe degenerative changes of the right hip with extensive bony remodeling of the femoral head and acetabulum.    Patient is currently unable to walk adequately secondary to severe worsening pain at the right hip.  I remain concerned that the patient may have developed a stress fracture in the right hip, and is in need of more immediate care.  Patient does not currently have a primary care doctor here in [facility], and feel it would be impossible for her to get into an orthopedic surgeon in the near future.  Given this I did discuss admission with the patient and her family and they were agreeable to this plan of care.    Case was discussed with Dr. Handley who kindly agreed to admit the patient. Patient will be admitted to the hospitalist service for further evaluation and observation. Patient was agreeable to the plan of care. Please see the admission, daily progress, and discharge notes for the ultimate disposition of this patient.     DISPOSITION  Patient will be admitted to the hospitalist service in guarded condition.     FINAL IMPRESSION  Visit Diagnoses     ICD-10-CM   1. Peripheral edema R60.9   2. Right hip pain M25.551              Electronically signed by: Lizbeth Redding, 11/3/2019 4:58 PM

## 2019-11-04 NOTE — PROGRESS NOTES
Vascular Access Team    Date of Insertion: 11/04/2019  Arm Circumference: 30.5  Internal length: 13  External Length: 0  Vein Occupancy: 8%  Reason for Midline: Venous access  Labs: Within midline parameters    Orders confirmed, vessel patency confirmed with ultrasound. Risks and benefits of procedure explained to patient and education regarding line associated bloodstream infections provided. Questions answered.     Power Midline placed in LUE per licensed provider order with ultrasound guidance. 4 Fr, single lumen Power Midline placed in Brachial vein after one attempt(s). 3 mL of 1% lidocaine injected intradermally, 21 gauge microintroducer needle and modified Seldinger technique used. 13 cm catheter inserted with good blood return. Secured at 0 cm marker. Each lumen flushed without resistance with 10 mL 0.9% normal saline. Midline secured with Biopatch and Tegaderm.     Midline placement is confirmed by nurse using ultrasound and ability to flush and draw blood. Midline is appropriate for use at this time.  No X-ray is needed for placement confirmation. Pt tolerated procedure well.  Patient condition relayed to unit RN or ordering physician via this post procedure note in the EMR.     Ultrasound images uploaded to PACS and viewable in the EMR - yes  Ultrasound imaged printed and placed in paper chart - no     BARD Power Midline ref #[id number] , Lot # [id number]

## 2019-11-04 NOTE — THERAPY
Occupational Therapy Evaluation completed.   Functional Status:  83 yo female admit with RLE swelling.  Pt reports that she and spouse have just sold their home and moved to [place], and are currently staying with daughter while they are having a house built.  She reports that she was initially walking with just a walking stick about 2 weeks ago, and R hip pain has progressed to the point where she has to use a FWW and she cannot tolerate laying flat so she has been sleeping sitting upright in her wheelchair with head leaning against wall.  When asked if she had access to a recliner, she said yes but felt that all the recliners were too low and she was afraid she'd not be able to get out of the chair by herself in the middle of the night.  Currently pt encountered seated in w/c at bedside, asking to use the bathroom.  She required cues to scoot to edge of chair, proper placement of hands, and Alyse to stand to walker.  Alyse to walk to bathroom, unsteady.  Alyse on and off of toilet, pt able to handle toilet hygiene supervised but RN cleansed coccyx further before placing Mepilex.  Alyse to walk back to EOB.  Pt sitting EOB until PICC nurse arrives to place specialized access line.  Pt was able to tolerate 8 min EOB talking to OT.  Currently d/c recs unclear- pt to have CT scan of hip and ortho consult.  Depending on progression of plan will determine d/c needs.  If pt is to have hip surgery, she may need SNF post acute as she has been deconditioned leading up to surgery.  If no surgery planned at this time, rec home with  nursing and therapy if family able to fill in to assist as needed as well.  OT will follow while in house.    Plan of Care: Will benefit from Occupational Therapy 3 times per week  Discharge Recommendations:  Equipment: Will Continue to Assess for Equipment Needs. Post-acute therapy Discharge to a transitional care facility for continued skilled therapy services VS Discharge to home with home health  "for additional skilled therapy services.    See \"Rehab Therapy-Acute\" Patient Summary Report for complete documentation.    "

## 2019-11-04 NOTE — ED NOTES
Med Rec complete per Pt at bedside    Allergies Reviewed and updated  Pt ok to have tylenol changed allergies from PERCOCET and VICODIN to HYDROCODONE and OXYCODONE      No ABX in the last 14 days    Confirmed pt has been taking CELEBREX and IBUPROFEN

## 2019-11-04 NOTE — ED TRIAGE NOTES
"This patient is referred to our facility by [facility] for further evaluation and management of bilateral leg swelling (edema is present on both legs, recurring for approximately a week, appearing to be worse in right leg).  She C/O moderate pain.   Chief Complaint   Patient presents with   • Peripheral Edema     /87   Pulse 91   Temp 36.3 °C (97.4 °F) (Temporal)   Resp 18   Ht 1.651 m (5' 5\")   Wt 90 kg (198 lb 6.6 oz)   SpO2 93%   BMI 33.02 kg/m²     "

## 2019-11-04 NOTE — PROGRESS NOTES
Ortho    Asked by Dr. Roberts to evaluate patient with severe destructive changes of right hip, pain radiating down to toes. Likely a candidate for a right SUNSHINE, but needs infection work up (ESR/CRP, and aspiration if elevated). Also could benefit from neurosurgical consultation given acute worsening of symptoms and radicular nature of complaints.   Ortho to continue to follow. CT of right hip was ordered yesterday, will follow up on imaging

## 2019-11-04 NOTE — PROGRESS NOTES
End stage arthritis right hip  Would benefit from arthroplasty  Have spoken with Dr Mo who will advise plan of care

## 2019-11-04 NOTE — CARE PLAN
Problem: Mobility  Goal: Risk for activity intolerance will decrease  Outcome: PROGRESSING AS EXPECTED  Note:   Pt educated on need to increase mobility d/t risk for pressure ulcers. Pt ambulated to bathroom this AM.     Problem: Skin Integrity  Goal: Risk for impaired skin integrity will decrease  Outcome: PROGRESSING AS EXPECTED  Note:   Pt educated on risk for skin breakdown. She agrees to try and increase activity as tolerated today.

## 2019-11-04 NOTE — ED NOTES
"Pt medicated for pain and is aware once she feels a little better we will need her laying down in bed with monitoring equipment applied. Pt had multiple questions and concerns with taking morphine due to hx of allergies. Pt thinks she may have had morphine in the past with a hysterectomy but cannot confidently state whether she tolerated or if she had morphine previously. No other IV opiate medications experience confirmed by pt. Common side effects and adverse reactions reviewed with pt. Pt encouraged to refuse medication if it is not her desired course of treatment. Pt states \"well I can't lay down with out it so I guess I have to take it.\"  Verified with pt that she wants to proceed with morphine to facilitate better pain control and ability of US to be done.    "

## 2019-11-04 NOTE — ASSESSMENT & PLAN NOTE
LE duplex neg for DVT  Echocardiogram did show preserved LVEF.   Changed IV lasix 20mg BID to PO 40mg, can re-evaluate to see if should go on further lasix.  Swelling overall regressed.

## 2019-11-04 NOTE — CARE PLAN
Problem: Safety  Goal: Will remain free from injury  Outcome: PROGRESSING AS EXPECTED    Non-skid socks in use. Call light in reach. Pt instructed to call for help. Hourly rounding complete. Bed locked and in low position. Pt verbalized understanding of safety care plan.

## 2019-11-05 ENCOUNTER — APPOINTMENT (OUTPATIENT)
Dept: RADIOLOGY | Facility: MEDICAL CENTER | Age: 84
DRG: 469 | End: 2019-11-05
Attending: HOSPITALIST
Payer: MEDICARE

## 2019-11-05 LAB
ANION GAP SERPL CALC-SCNC: 9 MMOL/L (ref 0–11.9)
BASOPHILS # BLD AUTO: 0.5 % (ref 0–1.8)
BASOPHILS # BLD: 0.02 K/UL (ref 0–0.12)
BUN SERPL-MCNC: 30 MG/DL (ref 8–22)
CALCIUM SERPL-MCNC: 9.1 MG/DL (ref 8.4–10.2)
CHLORIDE SERPL-SCNC: 101 MMOL/L (ref 96–112)
CO2 SERPL-SCNC: 23 MMOL/L (ref 20–33)
CREAT SERPL-MCNC: 0.63 MG/DL (ref 0.5–1.4)
CRP SERPL HS-MCNC: 0.63 MG/DL (ref 0–0.75)
EOSINOPHIL # BLD AUTO: 0.15 K/UL (ref 0–0.51)
EOSINOPHIL NFR BLD: 3.6 % (ref 0–6.9)
ERYTHROCYTE [DISTWIDTH] IN BLOOD BY AUTOMATED COUNT: 45 FL (ref 35.9–50)
ERYTHROCYTE [SEDIMENTATION RATE] IN BLOOD BY WESTERGREN METHOD: 12 MM/HOUR (ref 0–30)
GLUCOSE SERPL-MCNC: 114 MG/DL (ref 65–99)
HCT VFR BLD AUTO: 36 % (ref 37–47)
HGB BLD-MCNC: 12 G/DL (ref 12–16)
IMM GRANULOCYTES # BLD AUTO: 0.01 K/UL (ref 0–0.11)
IMM GRANULOCYTES NFR BLD AUTO: 0.2 % (ref 0–0.9)
LYMPHOCYTES # BLD AUTO: 0.85 K/UL (ref 1–4.8)
LYMPHOCYTES NFR BLD: 20.4 % (ref 22–41)
MCH RBC QN AUTO: 32.6 PG (ref 27–33)
MCHC RBC AUTO-ENTMCNC: 33.3 G/DL (ref 33.6–35)
MCV RBC AUTO: 97.8 FL (ref 81.4–97.8)
MONOCYTES # BLD AUTO: 0.39 K/UL (ref 0–0.85)
MONOCYTES NFR BLD AUTO: 9.4 % (ref 0–13.4)
NEUTROPHILS # BLD AUTO: 2.74 K/UL (ref 2–7.15)
NEUTROPHILS NFR BLD: 65.9 % (ref 44–72)
NRBC # BLD AUTO: 0 K/UL
NRBC BLD-RTO: 0 /100 WBC
PLATELET # BLD AUTO: 258 K/UL (ref 164–446)
PMV BLD AUTO: 9.7 FL (ref 9–12.9)
POTASSIUM SERPL-SCNC: 5.4 MMOL/L (ref 3.6–5.5)
RBC # BLD AUTO: 3.68 M/UL (ref 4.2–5.4)
SODIUM SERPL-SCNC: 133 MMOL/L (ref 135–145)
WBC # BLD AUTO: 4.2 K/UL (ref 4.8–10.8)

## 2019-11-05 PROCEDURE — 99232 SBSQ HOSP IP/OBS MODERATE 35: CPT | Performed by: HOSPITALIST

## 2019-11-05 PROCEDURE — 700111 HCHG RX REV CODE 636 W/ 250 OVERRIDE (IP): Performed by: HOSPITALIST

## 2019-11-05 PROCEDURE — 700111 HCHG RX REV CODE 636 W/ 250 OVERRIDE (IP): Performed by: INTERNAL MEDICINE

## 2019-11-05 PROCEDURE — 700105 HCHG RX REV CODE 258: Performed by: INTERNAL MEDICINE

## 2019-11-05 PROCEDURE — A9270 NON-COVERED ITEM OR SERVICE: HCPCS | Performed by: HOSPITALIST

## 2019-11-05 PROCEDURE — 700102 HCHG RX REV CODE 250 W/ 637 OVERRIDE(OP): Performed by: HOSPITALIST

## 2019-11-05 PROCEDURE — 700102 HCHG RX REV CODE 250 W/ 637 OVERRIDE(OP): Performed by: INTERNAL MEDICINE

## 2019-11-05 PROCEDURE — 86140 C-REACTIVE PROTEIN: CPT

## 2019-11-05 PROCEDURE — 770001 HCHG ROOM/CARE - MED/SURG/GYN PRIV*

## 2019-11-05 PROCEDURE — 85025 COMPLETE CBC W/AUTO DIFF WBC: CPT

## 2019-11-05 PROCEDURE — A9270 NON-COVERED ITEM OR SERVICE: HCPCS | Performed by: INTERNAL MEDICINE

## 2019-11-05 PROCEDURE — 80048 BASIC METABOLIC PNL TOTAL CA: CPT

## 2019-11-05 PROCEDURE — 36415 COLL VENOUS BLD VENIPUNCTURE: CPT

## 2019-11-05 PROCEDURE — 85652 RBC SED RATE AUTOMATED: CPT

## 2019-11-05 RX ORDER — FUROSEMIDE 10 MG/ML
20 INJECTION INTRAMUSCULAR; INTRAVENOUS
Status: DISCONTINUED | OUTPATIENT
Start: 2019-11-05 | End: 2019-11-11

## 2019-11-05 RX ADMIN — FUROSEMIDE 20 MG: 10 INJECTION, SOLUTION INTRAVENOUS at 15:58

## 2019-11-05 RX ADMIN — MORPHINE SULFATE 2 MG: 4 INJECTION INTRAVENOUS at 15:58

## 2019-11-05 RX ADMIN — LOSARTAN POTASSIUM 25 MG: 25 TABLET ORAL at 05:50

## 2019-11-05 RX ADMIN — SODIUM CHLORIDE: 9 INJECTION, SOLUTION INTRAVENOUS at 00:00

## 2019-11-05 RX ADMIN — ACETAMINOPHEN 650 MG: 325 TABLET, FILM COATED ORAL at 23:13

## 2019-11-05 RX ADMIN — MORPHINE SULFATE 2 MG: 4 INJECTION INTRAVENOUS at 23:08

## 2019-11-05 RX ADMIN — GABAPENTIN 100 MG: 100 CAPSULE ORAL at 18:26

## 2019-11-05 RX ADMIN — CYCLOBENZAPRINE HYDROCHLORIDE 5 MG: 10 TABLET, FILM COATED ORAL at 02:45

## 2019-11-05 RX ADMIN — MORPHINE SULFATE 2 MG: 4 INJECTION INTRAVENOUS at 03:47

## 2019-11-05 RX ADMIN — CELECOXIB 200 MG: 200 CAPSULE ORAL at 05:50

## 2019-11-05 RX ADMIN — CELECOXIB 200 MG: 200 CAPSULE ORAL at 18:26

## 2019-11-05 RX ADMIN — GABAPENTIN 100 MG: 100 CAPSULE ORAL at 12:28

## 2019-11-05 RX ADMIN — GABAPENTIN 100 MG: 100 CAPSULE ORAL at 05:50

## 2019-11-05 RX ADMIN — POLYETHYLENE GLYCOL 3350 1 PACKET: 17 POWDER, FOR SOLUTION ORAL at 14:53

## 2019-11-05 RX ADMIN — MAGNESIUM HYDROXIDE 30 ML: 400 SUSPENSION ORAL at 05:56

## 2019-11-05 RX ADMIN — MORPHINE SULFATE 2 MG: 4 INJECTION INTRAVENOUS at 10:07

## 2019-11-05 RX ADMIN — OMEPRAZOLE 20 MG: 20 CAPSULE, DELAYED RELEASE ORAL at 05:50

## 2019-11-05 RX ADMIN — SENNOSIDES, DOCUSATE SODIUM 2 TABLET: 50; 8.6 TABLET, FILM COATED ORAL at 18:27

## 2019-11-05 RX ADMIN — ENOXAPARIN SODIUM 40 MG: 100 INJECTION SUBCUTANEOUS at 05:50

## 2019-11-05 RX ADMIN — MORPHINE SULFATE 2 MG: 4 INJECTION INTRAVENOUS at 00:44

## 2019-11-05 RX ADMIN — CALCIUM CARBONATE-CHOLECALCIFEROL TAB 250 MG-125 UNIT 1 TABLET: 250-125 TAB at 05:50

## 2019-11-05 RX ADMIN — MORPHINE SULFATE 2 MG: 4 INJECTION INTRAVENOUS at 20:09

## 2019-11-05 ASSESSMENT — ENCOUNTER SYMPTOMS
DOUBLE VISION: 0
WEIGHT LOSS: 0
HEARTBURN: 0
PND: 0
CLAUDICATION: 0
VOMITING: 0
ABDOMINAL PAIN: 0
NECK PAIN: 0
GASTROINTESTINAL NEGATIVE: 1
NAUSEA: 0
HEADACHES: 0
BRUISES/BLEEDS EASILY: 0
SORE THROAT: 0
WEAKNESS: 0
BLOOD IN STOOL: 0
STRIDOR: 0
CHILLS: 0
NEUROLOGICAL NEGATIVE: 1
PHOTOPHOBIA: 0
ORTHOPNEA: 0
NERVOUS/ANXIOUS: 0
DEPRESSION: 0
DIZZINESS: 0
PALPITATIONS: 0
SPUTUM PRODUCTION: 0
HEMOPTYSIS: 0
FEVER: 0
EYE PAIN: 0
LOSS OF CONSCIOUSNESS: 0
MYALGIAS: 0
SHORTNESS OF BREATH: 0
BLURRED VISION: 0
COUGH: 0
RESPIRATORY NEGATIVE: 1
TREMORS: 0
MEMORY LOSS: 0
CONSTIPATION: 0
SENSORY CHANGE: 0
BACK PAIN: 0
TINGLING: 0
PSYCHIATRIC NEGATIVE: 1
FLANK PAIN: 0
SPEECH CHANGE: 0

## 2019-11-05 ASSESSMENT — COGNITIVE AND FUNCTIONAL STATUS - GENERAL
STANDING UP FROM CHAIR USING ARMS: A LITTLE
WALKING IN HOSPITAL ROOM: A LITTLE
TOILETING: A LITTLE
DAILY ACTIVITIY SCORE: 18
MOVING FROM LYING ON BACK TO SITTING ON SIDE OF FLAT BED: A LITTLE
MOVING TO AND FROM BED TO CHAIR: A LITTLE
CLIMB 3 TO 5 STEPS WITH RAILING: A LITTLE
PERSONAL GROOMING: A LITTLE
TURNING FROM BACK TO SIDE WHILE IN FLAT BAD: A LITTLE
SUGGESTED CMS G CODE MODIFIER MOBILITY: CK
DRESSING REGULAR UPPER BODY CLOTHING: A LITTLE
HELP NEEDED FOR BATHING: A LITTLE
DRESSING REGULAR LOWER BODY CLOTHING: A LITTLE
MOBILITY SCORE: 18
EATING MEALS: A LITTLE
SUGGESTED CMS G CODE MODIFIER DAILY ACTIVITY: CK

## 2019-11-05 NOTE — PROGRESS NOTES
Report received from AMARJIT Reardon. Pt denies needs at this time. Safety precautions in place. Call light within reach.

## 2019-11-05 NOTE — THERAPY
"Physical Therapy Evaluation completed.   Bed Mobility:  Supine to Sit: Moderate Assist  Transfers: Sit to Stand: Minimal Assist  Gait: Level Of Assist: Refused(due to pain)   Plan of Care: Will benefit from Physical Therapy 3 times per week  Discharge Recommendations: Equipment: Shower Chair. Post-acute therapy Discharge to home with outpatient or home health for additional skilled therapy services.    Pt is a 83 yo female with diagnosis of UTI and B LE swelling, progressive R hip pain that has worsened to the point where pt is minimally ambulatory with FWW, mostly using a w/c. Pt is presenting with R hip pain which is significantly impacting her mobility. Recommend continued acute PT to improve function, anticipate pt will be able to DC home with supportive family at DC.    See \"Rehab Therapy-Acute\" Patient Summary Report for complete documentation.     "

## 2019-11-05 NOTE — THERAPY
Occupational Therapy- Pt has been up in wheelchair with nursing, (unable to lie flat to sleep) and getting to bathroom as needed.  She is not tolerating much mobility at this time, and is scheduled for tentative hip replacement with Dr. Mo tomorrow 11/6.  OT will hold for now and resume therapy once post op orders rec'd.

## 2019-11-05 NOTE — CONSULTS
DATE OF SERVICE:  11/05/2019    INPATIENT ORTHOPEDIC CONSULTATION    REQUESTING PHYSICIAN:  Camille Mascorro MD and Keith Roberts MD    CHIEF COMPLAINT:  Severe right hip pain and inability to ambulate.    HISTORY OF PRESENT ILLNESS:  This patient is an 84-year-old female admitted   through the emergency department with complaints of progressive right hip pain   and inability to ambulate.  She was noted to have swelling distally and a DVT   study was negative.  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  Orthopedics   was consulted for evaluation of this issue.  I was asked by Dr. Roberts to   review her case for the possibility of moving forward with a right hip   replacement.  The patient reports she has a known history of right hip   arthritis and had been told she needed a hip replacement; however, she   recently moved to the [facility] and has not had time to establish   primary care physician or orthopedic followup.  She is currently interviewed   in her wheelchair seated, as she states that lying down for long periods of   time does cause pain in her groin that radiates down her leg and occasionally   into her back and buttocks and into her foot.  She denies fevers, chills,   nausea or vomiting.  She denies recent falls, but has recently increased her   activity secondary to the move.    PAST MEDICAL HISTORY:  Significant for hypertension, degenerative joint   disease, spinal stenosis.    PAST SURGICAL HISTORY:  The patient has had left hip resurfacing over 10 years   ago.    FAMILY HISTORY:  Reviewed and noncontributory to this admission.    SOCIAL HISTORY:  The patient used to smoke.  She drinks socially.  She denies   other illicit drugs.    ALLERGIES:  The patient's chart states an allergy to HYDROCODONE, PERCOCET,   TRAMADOL, AND VICODIN.  She has tolerated Tylenol No. 3 and oxycodone while in    inpatient.    REVIEW OF SYSTEMS:  Negative except for as mentioned with the addition of   malaise and fatigue and constipation.    PHYSICAL EXAMINATION:  GENERAL:  Reveals an 84-year-old female who appears her stated age.  She is   seated in a wheelchair.  She is alert.  She is oriented x3.  VITAL SIGNS:  She is afebrile.  Her vitals are within normal limits.  She is   satting 91% on room air.  MUSCULOSKELETAL:  Evaluation of her right hip shows pain with any attempted   range of motion of her hip.  She has weakness to hip flexion, weakness to hip   abduction, pain with active and passive motions throughout there.  She is able   to plantar and dorsiflex her ankles and extend her great toe.  Her sensation   is intact in L4, L5, and S1 distribution.  Her toes are warm and well   perfused.  Evaluation of her left side shows no significant pain with active   or passive range of motion of her hip.  She is able to plantar and dorsiflex   her ankle and extend her great toe.  Her sensation is intact to light touch in   the L4, L5, and S1 distribution.    X-RAYS:  Review of her x-rays from admission include an AP pelvis and an AP   and lateral of her right hip, which shows evidence of severe degenerative   changes including complete loss of joint space, fragmentation and fracture of   the femoral head.  I have also reviewed the images of a CT scan of her right   hip, which does not show any significant fluid collection, but similar   fragmentation of her femoral head.    LABORATORY VALUES:  She has a white blood cell count of 4.2, a normal sed rate   of 12, and a normal C-reactive protein.    IMAGING:  We have also reviewed the report from a vascular ultrasound that   shows no evidence of a DVT.    IMPRESSION AND PLAN:  This is an 84-year-old female with severe right hip   arthritis.  She has symptoms progressing to the point of being nonambulatory.    She has been admitted to the hospitalist service for pain control.  We  have   gone over various treatment options with her including the possibility of no   surgical intervention and palliative care and a wheelchair versus a steroid   injection, which I think would only offer slight temporary relief, versus a   surgical intervention, which for her would be a total hip arthroplasty.  We   have discussed the risks, benefits, and alternatives of a total hip   replacement including the risk of pain, bleeding, infection, need for further   surgery, lack of healing, fractures, dislocations, damage to surrounding   structures, heart attack, stroke, and death.  She does express an   understanding that these would be manmade implants with limited life span.    She would be at high-risk for dislocation given her history of spinal   arthritis and the radiographic appearance of her hip showing fragmentation of   her femoral head and shortening of her leg.  We will plan to have dual   mobility devices available.  We have gone over the risks of leg length   discrepancy, both apparent and actual, and she does express an understanding.    At this point, she states that she wishes to move forward with the surgical   intervention.  She is going to discuss with her family.  We tentatively have   her on the schedule for tomorrow for a right hip replacement.  There is a   possibility of requiring skilled nursing placement versus home health services   after the surgery.  We have recommended that she continue with her process of   finding a primary care provider.       ____________________________________     MD BROOKE Vilchis / DICK    DD:  11/05/2019 07:19:07  DT:  11/05/2019 09:53:43    D#:  [id number]  Job#:  [id number]

## 2019-11-05 NOTE — PROGRESS NOTES
[facility] Medicine Daily Progress Note    Date of Service  11/4/2019    Chief Complaint  84 y.o. female admitted 11/3/2019 with severe hip pain.    Hospital Course    History of Presenting Illness per Dr. Handley's H&P:  84 y.o. female who presented 11/3/2019 with degenerative disease of the hips and history of left hip surgery in 2008 who has known degenerative disease of the right hip and was seen by orthopedic surgery in [place] but wanted to wait until after she moved to [place] to have surgery. She moved to [place] one week ago and the last three days she is sleeping in a wheelchair at night as she cannot tolerate laying flat due to pain.  She uses a walker to ambulate during the day but is having increased difficulty ambulating due to pain. She describes the pain in her right leg as radiating down her right leg into the foot and burning in nature.  She has constipation and is not drinking much as it is difficult to get to the bathroom to void.      Interval Problem Update  11/4: she continues to sit up all day to alleviate the pain in her right hip as much as possible. She states pain is shooting down her leg as well. Up until 3 days ago she was ambulating with a cane. Denies fevers or chills.    Consultants/Specialty  Orthopedic surgery, Dr. Mo.    Code Status  Full    Disposition  TBD.    Review of Systems  Review of Systems   Constitutional: Positive for malaise/fatigue. Negative for chills, fever and weight loss.   HENT: Negative.    Respiratory: Negative.  Negative for cough and shortness of breath.    Cardiovascular: Positive for leg swelling. Negative for chest pain.   Gastrointestinal: Negative.  Negative for abdominal pain, nausea and vomiting.   Genitourinary: Negative.  Negative for dysuria and flank pain.   Musculoskeletal: Positive for joint pain. Negative for back pain and myalgias.   Neurological: Negative.  Negative for dizziness, loss of consciousness and weakness.   Endo/Heme/Allergies:  Negative.  Does not bruise/bleed easily.   Psychiatric/Behavioral: Negative.  Negative for depression. The patient is not nervous/anxious.    All other systems reviewed and are negative.       Physical Exam  Temp:  [36.3 °C (97.3 °F)-36.5 °C (97.7 °F)] 36.3 °C (97.4 °F)  Pulse:  [89-94] 93  Resp:  [16-18] 16  BP: (139-152)/(67-80) 139/80  SpO2:  [91 %-93 %] 92 %    Physical Exam  Vitals signs and nursing note reviewed.   Constitutional:       General: She is not in acute distress.     Appearance: She is well-developed. She is not diaphoretic.   HENT:      Right Ear: External ear normal.      Left Ear: External ear normal.      Nose: Nose normal.      Mouth/Throat:      Pharynx: No oropharyngeal exudate.   Eyes:      General: No scleral icterus.        Right eye: No discharge.         Left eye: No discharge.      Conjunctiva/sclera: Conjunctivae normal.   Neck:      Vascular: No JVD.      Trachea: No tracheal deviation.   Cardiovascular:      Rate and Rhythm: Normal rate and regular rhythm.      Heart sounds: Normal heart sounds.   Pulmonary:      Effort: Pulmonary effort is normal. No respiratory distress.      Breath sounds: Normal breath sounds. No stridor. No wheezing or rales.   Chest:      Chest wall: No tenderness.   Abdominal:      General: Bowel sounds are normal. There is no distension.      Palpations: Abdomen is soft.      Tenderness: There is no tenderness.   Musculoskeletal:         General: Tenderness (right hip) present.      Right lower leg: Edema present.      Left lower leg: Edema (greater than right) present.   Skin:     General: Skin is warm and dry.      Coloration: Skin is not pale.   Neurological:      Mental Status: She is alert and oriented to person, place, and time.      Cranial Nerves: No cranial nerve deficit.      Motor: No abnormal muscle tone.      Gait: Gait abnormal.   Psychiatric:         Mood and Affect: Mood normal.         Behavior: Behavior normal.         Thought Content:  Thought content normal.         Judgment: Judgment normal.         Fluids    Intake/Output Summary (Last 24 hours) at 11/4/2019 1844  Last data filed at 11/4/2019 0900  Gross per 24 hour   Intake 921.25 ml   Output --   Net 921.25 ml       Laboratory  Recent Labs     11/03/19  1725 11/04/19  0356   WBC 5.3 5.1   RBC 4.00* 3.96*   HEMOGLOBIN 12.8 12.7   HEMATOCRIT 39.2 39.8   MCV 98.0* 100.5*   MCH 32.0 32.1   MCHC 32.7* 31.9*   RDW 44.7 45.8   PLATELETCT 248 228   MPV 9.6 10.1     Recent Labs     11/03/19  1725 11/04/19  0356   SODIUM 131* 130*   POTASSIUM 5.2 4.6   CHLORIDE 96 97   CO2 22 21   GLUCOSE 104* 119*   BUN 40* 35*   CREATININE 1.10 0.81   CALCIUM 9.5 9.4                   Imaging  IR-MIDLINE CATHETER INSERTION WO GUIDANCE > AGE 3   Final Result                  Ultrasound-guided midline placement performed by qualified nursing staff    as above.          CT-HIP W/O PLUS RECONS RIGHT   Final Result      1.  Marked deformity of the right hip joint characterized by fragmentation of the femoral head with bony resorption and flattening of the femoral head. 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 Acuity of the fragmentation cannot be assessed. There is also secondary severe osteoarthritis present.      2.  Prior left hip resurfacing procedure.      EC-ECHOCARDIOGRAM COMPLETE W/O CONT   Final Result      DX-HIP-UNILATERAL-WITH PELVIS-1 VIEW RIGHT   Final Result         1.  No radiographic evidence of acute traumatic injury.   2.  Severe degenerative changes of the right hip with extensive bony remodeling of the femoral head and acetabula.      US-EXTREMITY VENOUS LOWER BILAT   Final Result      No gross deep venous thrombosis.      Severely limited evaluation due to subcutaneous fat edema, patient pain and mobility limitations      DX-CHEST-PORTABLE (1 VIEW)   Final Result      No acute cardiopulmonary findings.      MR-LUMBAR SPINE-W/O     (Results Pending)        Assessment/Plan  Radiculopathy  Assessment & Plan  Pain in right leg possibly radicular in nature?  Order MRI of lumbar spine advised by orthopedics    Bilateral leg edema  Assessment & Plan  Dependent edema by appearance due to immobility  SCDs  Ortho consult  No DVT on ultrasound    Degenerative joint disease of right hip  Assessment & Plan  Patient was seen by an orthopedic surgeon in [place] but wanted to wait until she moved to [place] to have surgery, now she is having difficulty ambulating and laying flat due to pain  Discussed with orthopedic surgery Dr. Roberts will consult  CT of hip pending  Continue gabapentin  Morphine IV 2mg q 3 hours for severe pain.  Continue tylenol and motrin for pain and flexeril for muscle spasms  ESR and CRP for evaluation of possible infectious cause advised by orthopedics. This is ordered.    Acute prerenal azotemia  Assessment & Plan  Improving continue fluids bmp in am      Dehydration  Assessment & Plan        Hyponatremia  Assessment & Plan  Still low continue fluids and monitor BMP  Appears to be due to dehydration.    UTI (urinary tract infection)  Assessment & Plan  Stop IV ceftriaxone, dirty catch  Repeat urinalysis with straight cath if patient has dysuria or fever.       VTE prophylaxis: lovenox.

## 2019-11-05 NOTE — PROGRESS NOTES
MRI called, asking if patient can be taken down.  Pt is refusing because she says she is claustrophobic.  MD Eugene called to ask for an order.

## 2019-11-05 NOTE — PROGRESS NOTES
[facility] Medicine Daily Progress Note    Date of Service  11/5/2019    Chief Complaint  84 y.o. female admitted 11/3/2019 with severe hip pain.    Hospital Course    History of Presenting Illness per Dr. Handley's H&P:  84 y.o. female who presented 11/3/2019 with degenerative disease of the hips and history of left hip surgery in 2008 who has known degenerative disease of the right hip and was seen by orthopedic surgery in [place] but wanted to wait until after she moved to [place] to have surgery. She moved to [place] one week ago and the last three days she is sleeping in a wheelchair at night as she cannot tolerate laying flat due to pain.  She uses a walker to ambulate during the day but is having increased difficulty ambulating due to pain. She describes the pain in her right leg as radiating down her right leg into the foot and burning in nature.  She has constipation and is not drinking much as it is difficult to get to the bathroom to void.      Interval Problem Update  11/4: she continues to sit up all day to alleviate the pain in her right hip as much as possible. She states pain is shooting down her leg as well. Up until 3 days ago she was ambulating with a cane. Denies fevers or chills.    11/5  Patient is still reporting 5-7/10 shooting pain in her right hip exacerbated by movement. Difficulty ambulating. Patient denies fevers/chills, chest pain, shortness of breath or nausea/vommiting.     Consultants/Specialty  Orthopedic surgery, Dr. Mo.    Code Status  Full Code    Disposition  TBD.    Review of Systems  Review of Systems   Constitutional: Positive for malaise/fatigue. Negative for chills, fever and weight loss.   HENT: Negative.  Negative for congestion, hearing loss, sore throat and tinnitus.    Eyes: Negative for blurred vision, double vision, photophobia and pain.   Respiratory: Negative.  Negative for cough, hemoptysis, sputum production, shortness of breath and stridor.    Cardiovascular:  Positive for leg swelling. Negative for chest pain, palpitations, orthopnea, claudication and PND.   Gastrointestinal: Negative.  Negative for abdominal pain, blood in stool, constipation, heartburn, melena, nausea and vomiting.   Genitourinary: Negative.  Negative for dysuria, flank pain, frequency and urgency.   Musculoskeletal: Positive for joint pain. Negative for back pain, myalgias and neck pain.   Neurological: Negative.  Negative for dizziness, tingling, tremors, sensory change, speech change, loss of consciousness, weakness and headaches.   Endo/Heme/Allergies: Negative.  Does not bruise/bleed easily.   Psychiatric/Behavioral: Negative.  Negative for depression, memory loss and suicidal ideas. The patient is not nervous/anxious.    All other systems reviewed and are negative.       Physical Exam  Temp:  [36.3 °C (97.3 °F)-36.4 °C (97.6 °F)] 36.4 °C (97.6 °F)  Pulse:  [82-95] 82  Resp:  [16-18] 16  BP: (139-155)/(63-80) 155/72  SpO2:  [92 %-94 %] 92 %    Physical Exam  Vitals signs and nursing note reviewed.   Constitutional:       General: She is not in acute distress.     Appearance: Normal appearance. She is well-developed. She is obese. She is not ill-appearing or diaphoretic.   HENT:      Head: Normocephalic and atraumatic.      Right Ear: External ear normal.      Left Ear: External ear normal.      Nose: Nose normal. No congestion.      Mouth/Throat:      Mouth: Mucous membranes are moist.      Pharynx: No oropharyngeal exudate or posterior oropharyngeal erythema.   Eyes:      General: No scleral icterus.        Right eye: No discharge.         Left eye: No discharge.      Extraocular Movements: Extraocular movements intact.      Conjunctiva/sclera: Conjunctivae normal.      Pupils: Pupils are equal, round, and reactive to light.   Neck:      Musculoskeletal: Normal range of motion and neck supple. No neck rigidity or muscular tenderness.      Vascular: No JVD.      Trachea: No tracheal deviation.    Cardiovascular:      Rate and Rhythm: Normal rate and regular rhythm.      Pulses: Normal pulses.      Heart sounds: Normal heart sounds. No murmur.   Pulmonary:      Effort: Pulmonary effort is normal. No respiratory distress.      Breath sounds: Normal breath sounds. No stridor. No wheezing or rales.   Chest:      Chest wall: No tenderness.   Abdominal:      General: Abdomen is flat. Bowel sounds are normal. There is no distension.      Palpations: Abdomen is soft. There is no mass.      Tenderness: There is no tenderness. There is no guarding or rebound.      Hernia: No hernia is present.   Musculoskeletal: Normal range of motion.         General: No swelling or tenderness (right hip).      Right lower leg: Edema (+3 pitting edema b/l L/E) present.      Left lower leg: Edema (greater than right) present.   Skin:     General: Skin is warm and dry.      Capillary Refill: Capillary refill takes less than 2 seconds.      Coloration: Skin is not pale.   Neurological:      General: No focal deficit present.      Mental Status: She is alert and oriented to person, place, and time. Mental status is at baseline.      Cranial Nerves: No cranial nerve deficit.      Motor: No abnormal muscle tone.      Gait: Gait abnormal.   Psychiatric:         Mood and Affect: Mood normal.         Behavior: Behavior normal.         Thought Content: Thought content normal.         Judgment: Judgment normal.         Fluids    Intake/Output Summary (Last 24 hours) at 11/5/2019 0734  Last data filed at 11/4/2019 1836  Gross per 24 hour   Intake 1202.5 ml   Output --   Net 1202.5 ml       Laboratory  Recent Labs     11/03/19  1725 11/04/19  0356 11/05/19  0324   WBC 5.3 5.1 4.2*   RBC 4.00* 3.96* 3.68*   HEMOGLOBIN 12.8 12.7 12.0   HEMATOCRIT 39.2 39.8 36.0*   MCV 98.0* 100.5* 97.8   MCH 32.0 32.1 32.6   MCHC 32.7* 31.9* 33.3*   RDW 44.7 45.8 45.0   PLATELETCT 248 228 258   MPV 9.6 10.1 9.7     Recent Labs     11/03/19  1725 11/04/19  0356  11/05/19  0324   SODIUM 131* 130* 133*   POTASSIUM 5.2 4.6 5.4   CHLORIDE 96 97 101   CO2 22 21 23   GLUCOSE 104* 119* 114*   BUN 40* 35* 30*   CREATININE 1.10 0.81 0.63   CALCIUM 9.5 9.4 9.1                   Imaging  IR-MIDLINE CATHETER INSERTION WO GUIDANCE > AGE 3   Final Result                  Ultrasound-guided midline placement performed by qualified nursing staff    as above.          CT-HIP W/O PLUS RECONS RIGHT   Final Result      1.  Marked deformity of the right hip joint characterized by fragmentation of the femoral head with bony resorption and flattening of the femoral head. 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 Acuity of the fragmentation cannot be assessed. There is also secondary severe osteoarthritis present.      2.  Prior left hip resurfacing procedure.      EC-ECHOCARDIOGRAM COMPLETE W/O CONT   Final Result      DX-HIP-UNILATERAL-WITH PELVIS-1 VIEW RIGHT   Final Result         1.  No radiographic evidence of acute traumatic injury.   2.  Severe degenerative changes of the right hip with extensive bony remodeling of the femoral head and acetabula.      US-EXTREMITY VENOUS LOWER BILAT   Final Result      No gross deep venous thrombosis.      Severely limited evaluation due to subcutaneous fat edema, patient pain and mobility limitations      DX-CHEST-PORTABLE (1 VIEW)   Final Result      No acute cardiopulmonary findings.      MR-LUMBAR SPINE-W/O    (Results Pending)        Assessment/Plan  * Degenerative joint disease of right hip  Assessment & Plan  Patient was seen by an orthopedic surgeon in [place] but wanted to wait until she moved to [place] to have surgery, now she is having difficulty ambulating and laying flat due to pain  Pain not improved        CT Hip shows Marked deformity of the right hip joint characterized by fragmentation of the femoral        head with bony resorption and flattening of the femoral head.  This appearance suggests presence      of chronic avascular necrosis with subchondral collapse of the   femoral head. Acuity of the fragmentation cannot be assessed. There is also secondary severe osteoarthritis present.  Awaiting for further recs from ortho. Most like total hip in the am.  Pain control IV/PO.  Resume gabapentin .  CRP WNL.  ESR pending.    Radiculopathy  Assessment & Plan  MRI lumbar spine pending.    Bilateral leg edema  Assessment & Plan  LE duplex neg for DVT  Elevated legs.  Echocardiogram shows preserved LVEF.     Acute prerenal azotemia  Assessment & Plan  Improved with IV fluids.     Dehydration  Assessment & Plan      Euvolemic currently    Hyponatremia  Assessment & Plan  2/2 to dehydration  Improving, Na 133<130    UTI (urinary tract infection)  Assessment & Plan  Repeat UA pending, abx held until confirmed.        Patient plan of care discussed at multidisplinary team rounds and with patient and R.N at beside.    VTE prophylaxis: lovenox.

## 2019-11-06 ENCOUNTER — ANESTHESIA EVENT (OUTPATIENT)
Dept: SURGERY | Facility: MEDICAL CENTER | Age: 84
DRG: 469 | End: 2019-11-06
Payer: MEDICARE

## 2019-11-06 ENCOUNTER — ANESTHESIA (OUTPATIENT)
Dept: SURGERY | Facility: MEDICAL CENTER | Age: 84
DRG: 469 | End: 2019-11-06
Payer: MEDICARE

## 2019-11-06 ENCOUNTER — APPOINTMENT (OUTPATIENT)
Dept: RADIOLOGY | Facility: MEDICAL CENTER | Age: 84
DRG: 469 | End: 2019-11-06
Attending: ORTHOPAEDIC SURGERY
Payer: MEDICARE

## 2019-11-06 LAB
ALBUMIN SERPL BCP-MCNC: 4 G/DL (ref 3.2–4.9)
ALBUMIN/GLOB SERPL: 1.2 G/DL
ALP SERPL-CCNC: 103 U/L (ref 30–99)
ALT SERPL-CCNC: 10 U/L (ref 2–50)
ANION GAP SERPL CALC-SCNC: 12 MMOL/L (ref 0–11.9)
AST SERPL-CCNC: 13 U/L (ref 12–45)
BILIRUB SERPL-MCNC: 0.6 MG/DL (ref 0.1–1.5)
BUN SERPL-MCNC: 29 MG/DL (ref 8–22)
CALCIUM SERPL-MCNC: 9.6 MG/DL (ref 8.4–10.2)
CHLORIDE SERPL-SCNC: 96 MMOL/L (ref 96–112)
CO2 SERPL-SCNC: 23 MMOL/L (ref 20–33)
CREAT SERPL-MCNC: 0.69 MG/DL (ref 0.5–1.4)
GLOBULIN SER CALC-MCNC: 3.3 G/DL (ref 1.9–3.5)
GLUCOSE SERPL-MCNC: 97 MG/DL (ref 65–99)
POTASSIUM SERPL-SCNC: 5 MMOL/L (ref 3.6–5.5)
PROT SERPL-MCNC: 7.3 G/DL (ref 6–8.2)
SODIUM SERPL-SCNC: 131 MMOL/L (ref 135–145)

## 2019-11-06 PROCEDURE — 700102 HCHG RX REV CODE 250 W/ 637 OVERRIDE(OP): Performed by: HOSPITALIST

## 2019-11-06 PROCEDURE — 87102 FUNGUS ISOLATION CULTURE: CPT

## 2019-11-06 PROCEDURE — 700111 HCHG RX REV CODE 636 W/ 250 OVERRIDE (IP): Performed by: ANESTHESIOLOGY

## 2019-11-06 PROCEDURE — 700101 HCHG RX REV CODE 250: Performed by: ANESTHESIOLOGY

## 2019-11-06 PROCEDURE — A9270 NON-COVERED ITEM OR SERVICE: HCPCS | Performed by: PHYSICIAN ASSISTANT

## 2019-11-06 PROCEDURE — 700105 HCHG RX REV CODE 258: Performed by: ORTHOPAEDIC SURGERY

## 2019-11-06 PROCEDURE — 700102 HCHG RX REV CODE 250 W/ 637 OVERRIDE(OP)

## 2019-11-06 PROCEDURE — 87076 CULTURE ANAEROBE IDENT EACH: CPT

## 2019-11-06 PROCEDURE — 160031 HCHG SURGERY MINUTES - 1ST 30 MINS LEVEL 5: Performed by: ORTHOPAEDIC SURGERY

## 2019-11-06 PROCEDURE — 502578 HCHG PACK, TOTAL HIP: Performed by: ORTHOPAEDIC SURGERY

## 2019-11-06 PROCEDURE — 700105 HCHG RX REV CODE 258: Performed by: ANESTHESIOLOGY

## 2019-11-06 PROCEDURE — 160036 HCHG PACU - EA ADDL 30 MINS PHASE I: Performed by: ORTHOPAEDIC SURGERY

## 2019-11-06 PROCEDURE — 160002 HCHG RECOVERY MINUTES (STAT): Performed by: ORTHOPAEDIC SURGERY

## 2019-11-06 PROCEDURE — A9270 NON-COVERED ITEM OR SERVICE: HCPCS | Performed by: INTERNAL MEDICINE

## 2019-11-06 PROCEDURE — 502000 HCHG MISC OR IMPLANTS RC 0278: Performed by: ORTHOPAEDIC SURGERY

## 2019-11-06 PROCEDURE — 700101 HCHG RX REV CODE 250: Performed by: ORTHOPAEDIC SURGERY

## 2019-11-06 PROCEDURE — 700111 HCHG RX REV CODE 636 W/ 250 OVERRIDE (IP): Performed by: ORTHOPAEDIC SURGERY

## 2019-11-06 PROCEDURE — 87015 SPECIMEN INFECT AGNT CONCNTJ: CPT | Mod: 91

## 2019-11-06 PROCEDURE — 700111 HCHG RX REV CODE 636 W/ 250 OVERRIDE (IP)

## 2019-11-06 PROCEDURE — 700111 HCHG RX REV CODE 636 W/ 250 OVERRIDE (IP): Performed by: HOSPITALIST

## 2019-11-06 PROCEDURE — 501838 HCHG SUTURE GENERAL: Performed by: ORTHOPAEDIC SURGERY

## 2019-11-06 PROCEDURE — 72170 X-RAY EXAM OF PELVIS: CPT

## 2019-11-06 PROCEDURE — 87205 SMEAR GRAM STAIN: CPT

## 2019-11-06 PROCEDURE — 36415 COLL VENOUS BLD VENIPUNCTURE: CPT

## 2019-11-06 PROCEDURE — 160009 HCHG ANES TIME/MIN: Performed by: ORTHOPAEDIC SURGERY

## 2019-11-06 PROCEDURE — 87206 SMEAR FLUORESCENT/ACID STAI: CPT

## 2019-11-06 PROCEDURE — 160048 HCHG OR STATISTICAL LEVEL 1-5: Performed by: ORTHOPAEDIC SURGERY

## 2019-11-06 PROCEDURE — 770001 HCHG ROOM/CARE - MED/SURG/GYN PRIV*

## 2019-11-06 PROCEDURE — 700102 HCHG RX REV CODE 250 W/ 637 OVERRIDE(OP): Performed by: PHYSICIAN ASSISTANT

## 2019-11-06 PROCEDURE — A9270 NON-COVERED ITEM OR SERVICE: HCPCS | Performed by: HOSPITALIST

## 2019-11-06 PROCEDURE — A9270 NON-COVERED ITEM OR SERVICE: HCPCS

## 2019-11-06 PROCEDURE — 160035 HCHG PACU - 1ST 60 MINS PHASE I: Performed by: ORTHOPAEDIC SURGERY

## 2019-11-06 PROCEDURE — 700111 HCHG RX REV CODE 636 W/ 250 OVERRIDE (IP): Performed by: INTERNAL MEDICINE

## 2019-11-06 PROCEDURE — 0SR90JA REPLACEMENT OF RIGHT HIP JOINT WITH SYNTHETIC SUBSTITUTE, UNCEMENTED, OPEN APPROACH: ICD-10-PCS | Performed by: ORTHOPAEDIC SURGERY

## 2019-11-06 PROCEDURE — 501411 HCHG SPONGE, BABY LAP W/O RINGS: Performed by: ORTHOPAEDIC SURGERY

## 2019-11-06 PROCEDURE — 87075 CULTR BACTERIA EXCEPT BLOOD: CPT

## 2019-11-06 PROCEDURE — 700102 HCHG RX REV CODE 250 W/ 637 OVERRIDE(OP): Performed by: INTERNAL MEDICINE

## 2019-11-06 PROCEDURE — 99232 SBSQ HOSP IP/OBS MODERATE 35: CPT | Performed by: HOSPITALIST

## 2019-11-06 PROCEDURE — 80053 COMPREHEN METABOLIC PANEL: CPT

## 2019-11-06 PROCEDURE — 87070 CULTURE OTHR SPECIMN AEROBIC: CPT

## 2019-11-06 PROCEDURE — 87116 MYCOBACTERIA CULTURE: CPT

## 2019-11-06 PROCEDURE — 160042 HCHG SURGERY MINUTES - EA ADDL 1 MIN LEVEL 5: Performed by: ORTHOPAEDIC SURGERY

## 2019-11-06 DEVICE — IMPLANTABLE DEVICE: Type: IMPLANTABLE DEVICE | Site: HIP | Status: FUNCTIONAL

## 2019-11-06 RX ORDER — PHENYLEPHRINE HCL IN 0.9% NACL 0.5 MG/5ML
SYRINGE (ML) INTRAVENOUS PRN
Status: DISCONTINUED | OUTPATIENT
Start: 2019-11-06 | End: 2019-11-06 | Stop reason: SURG

## 2019-11-06 RX ORDER — ROCURONIUM BROMIDE 10 MG/ML
INJECTION, SOLUTION INTRAVENOUS PRN
Status: DISCONTINUED | OUTPATIENT
Start: 2019-11-06 | End: 2019-11-06 | Stop reason: SURG

## 2019-11-06 RX ORDER — CELECOXIB 200 MG/1
200 CAPSULE ORAL ONCE
Status: COMPLETED | OUTPATIENT
Start: 2019-11-06 | End: 2019-11-06

## 2019-11-06 RX ORDER — MIDAZOLAM HYDROCHLORIDE 1 MG/ML
1 INJECTION INTRAMUSCULAR; INTRAVENOUS
Status: DISCONTINUED | OUTPATIENT
Start: 2019-11-06 | End: 2019-11-07

## 2019-11-06 RX ORDER — HYDRALAZINE HYDROCHLORIDE 20 MG/ML
5 INJECTION INTRAMUSCULAR; INTRAVENOUS
Status: DISCONTINUED | OUTPATIENT
Start: 2019-11-06 | End: 2019-11-07

## 2019-11-06 RX ORDER — HYDROMORPHONE HYDROCHLORIDE 1 MG/ML
0.5 INJECTION, SOLUTION INTRAMUSCULAR; INTRAVENOUS; SUBCUTANEOUS ONCE
Status: COMPLETED | OUTPATIENT
Start: 2019-11-06 | End: 2019-11-06

## 2019-11-06 RX ORDER — GABAPENTIN 100 MG/1
100 CAPSULE ORAL ONCE
Status: COMPLETED | OUTPATIENT
Start: 2019-11-06 | End: 2019-11-06

## 2019-11-06 RX ORDER — HYDROMORPHONE HYDROCHLORIDE 1 MG/ML
INJECTION, SOLUTION INTRAMUSCULAR; INTRAVENOUS; SUBCUTANEOUS
Status: COMPLETED
Start: 2019-11-06 | End: 2019-11-06

## 2019-11-06 RX ORDER — HYDROMORPHONE HYDROCHLORIDE 2 MG/ML
INJECTION, SOLUTION INTRAMUSCULAR; INTRAVENOUS; SUBCUTANEOUS PRN
Status: DISCONTINUED | OUTPATIENT
Start: 2019-11-06 | End: 2019-11-06 | Stop reason: SURG

## 2019-11-06 RX ORDER — ACETAMINOPHEN 500 MG
TABLET ORAL
Status: COMPLETED
Start: 2019-11-06 | End: 2019-11-06

## 2019-11-06 RX ORDER — GABAPENTIN 100 MG/1
CAPSULE ORAL
Status: COMPLETED
Start: 2019-11-06 | End: 2019-11-06

## 2019-11-06 RX ORDER — HALOPERIDOL 5 MG/ML
1 INJECTION INTRAMUSCULAR
Status: DISCONTINUED | OUTPATIENT
Start: 2019-11-06 | End: 2019-11-07

## 2019-11-06 RX ORDER — KETOROLAC TROMETHAMINE 30 MG/ML
INJECTION, SOLUTION INTRAMUSCULAR; INTRAVENOUS
Status: DISCONTINUED | OUTPATIENT
Start: 2019-11-06 | End: 2019-11-06 | Stop reason: HOSPADM

## 2019-11-06 RX ORDER — HYDROMORPHONE HYDROCHLORIDE 2 MG/ML
0.1 INJECTION, SOLUTION INTRAMUSCULAR; INTRAVENOUS; SUBCUTANEOUS
Status: DISCONTINUED | OUTPATIENT
Start: 2019-11-06 | End: 2019-11-07

## 2019-11-06 RX ORDER — DEXAMETHASONE SODIUM PHOSPHATE 4 MG/ML
INJECTION, SOLUTION INTRA-ARTICULAR; INTRALESIONAL; INTRAMUSCULAR; INTRAVENOUS; SOFT TISSUE PRN
Status: DISCONTINUED | OUTPATIENT
Start: 2019-11-06 | End: 2019-11-06 | Stop reason: SURG

## 2019-11-06 RX ORDER — BUPIVACAINE HYDROCHLORIDE AND EPINEPHRINE 2.5; 5 MG/ML; UG/ML
INJECTION, SOLUTION EPIDURAL; INFILTRATION; INTRACAUDAL; PERINEURAL
Status: DISCONTINUED | OUTPATIENT
Start: 2019-11-06 | End: 2019-11-06 | Stop reason: HOSPADM

## 2019-11-06 RX ORDER — ASPIRIN 81 MG/1
81 TABLET, CHEWABLE ORAL 2 TIMES DAILY
Status: DISCONTINUED | OUTPATIENT
Start: 2019-11-06 | End: 2019-11-12 | Stop reason: HOSPADM

## 2019-11-06 RX ORDER — VANCOMYCIN HYDROCHLORIDE 1 G/20ML
INJECTION, POWDER, LYOPHILIZED, FOR SOLUTION INTRAVENOUS
Status: COMPLETED | OUTPATIENT
Start: 2019-11-06 | End: 2019-11-06

## 2019-11-06 RX ORDER — HYDROMORPHONE HYDROCHLORIDE 2 MG/ML
0.4 INJECTION, SOLUTION INTRAMUSCULAR; INTRAVENOUS; SUBCUTANEOUS
Status: DISCONTINUED | OUTPATIENT
Start: 2019-11-06 | End: 2019-11-07

## 2019-11-06 RX ORDER — TRANEXAMIC ACID 100 MG/ML
INJECTION, SOLUTION INTRAVENOUS
Status: COMPLETED | OUTPATIENT
Start: 2019-11-06 | End: 2019-11-06

## 2019-11-06 RX ORDER — SODIUM CHLORIDE, SODIUM LACTATE, POTASSIUM CHLORIDE, CALCIUM CHLORIDE 600; 310; 30; 20 MG/100ML; MG/100ML; MG/100ML; MG/100ML
INJECTION, SOLUTION INTRAVENOUS CONTINUOUS
Status: DISCONTINUED | OUTPATIENT
Start: 2019-11-06 | End: 2019-11-08

## 2019-11-06 RX ORDER — KETAMINE HYDROCHLORIDE 50 MG/ML
INJECTION, SOLUTION INTRAMUSCULAR; INTRAVENOUS PRN
Status: DISCONTINUED | OUTPATIENT
Start: 2019-11-06 | End: 2019-11-06 | Stop reason: SURG

## 2019-11-06 RX ORDER — CELECOXIB 200 MG/1
CAPSULE ORAL
Status: COMPLETED
Start: 2019-11-06 | End: 2019-11-06

## 2019-11-06 RX ORDER — SODIUM CHLORIDE, SODIUM LACTATE, POTASSIUM CHLORIDE, CALCIUM CHLORIDE 600; 310; 30; 20 MG/100ML; MG/100ML; MG/100ML; MG/100ML
INJECTION, SOLUTION INTRAVENOUS
Status: DISCONTINUED | OUTPATIENT
Start: 2019-11-06 | End: 2019-11-06 | Stop reason: SURG

## 2019-11-06 RX ORDER — MIDAZOLAM HYDROCHLORIDE 1 MG/ML
INJECTION INTRAMUSCULAR; INTRAVENOUS PRN
Status: DISCONTINUED | OUTPATIENT
Start: 2019-11-06 | End: 2019-11-06 | Stop reason: SURG

## 2019-11-06 RX ORDER — TRANEXAMIC ACID 100 MG/ML
INJECTION, SOLUTION INTRAVENOUS
Status: DISPENSED
Start: 2019-11-06 | End: 2019-11-07

## 2019-11-06 RX ORDER — ONDANSETRON 2 MG/ML
4 INJECTION INTRAMUSCULAR; INTRAVENOUS
Status: DISCONTINUED | OUTPATIENT
Start: 2019-11-06 | End: 2019-11-07

## 2019-11-06 RX ORDER — ACETAMINOPHEN 500 MG
1000 TABLET ORAL ONCE
Status: COMPLETED | OUTPATIENT
Start: 2019-11-06 | End: 2019-11-06

## 2019-11-06 RX ORDER — HYDROMORPHONE HYDROCHLORIDE 2 MG/ML
INJECTION, SOLUTION INTRAMUSCULAR; INTRAVENOUS; SUBCUTANEOUS
Status: COMPLETED
Start: 2019-11-06 | End: 2019-11-06

## 2019-11-06 RX ORDER — CEFAZOLIN SODIUM 1 G/3ML
INJECTION, POWDER, FOR SOLUTION INTRAMUSCULAR; INTRAVENOUS PRN
Status: DISCONTINUED | OUTPATIENT
Start: 2019-11-06 | End: 2019-11-06 | Stop reason: SURG

## 2019-11-06 RX ORDER — LIDOCAINE HYDROCHLORIDE 20 MG/ML
INJECTION, SOLUTION EPIDURAL; INFILTRATION; INTRACAUDAL; PERINEURAL PRN
Status: DISCONTINUED | OUTPATIENT
Start: 2019-11-06 | End: 2019-11-06 | Stop reason: SURG

## 2019-11-06 RX ORDER — ONDANSETRON 2 MG/ML
INJECTION INTRAMUSCULAR; INTRAVENOUS PRN
Status: DISCONTINUED | OUTPATIENT
Start: 2019-11-06 | End: 2019-11-06 | Stop reason: SURG

## 2019-11-06 RX ORDER — CEFAZOLIN SODIUM 1 G/3ML
1 INJECTION, POWDER, FOR SOLUTION INTRAMUSCULAR; INTRAVENOUS EVERY 8 HOURS
Status: DISCONTINUED | OUTPATIENT
Start: 2019-11-06 | End: 2019-11-06

## 2019-11-06 RX ORDER — TRANEXAMIC ACID 100 MG/ML
INJECTION, SOLUTION INTRAVENOUS PRN
Status: DISCONTINUED | OUTPATIENT
Start: 2019-11-06 | End: 2019-11-06 | Stop reason: SURG

## 2019-11-06 RX ORDER — DIPHENHYDRAMINE HYDROCHLORIDE 50 MG/ML
12.5 INJECTION INTRAMUSCULAR; INTRAVENOUS
Status: DISCONTINUED | OUTPATIENT
Start: 2019-11-06 | End: 2019-11-07

## 2019-11-06 RX ORDER — HYDROMORPHONE HYDROCHLORIDE 2 MG/ML
0.2 INJECTION, SOLUTION INTRAMUSCULAR; INTRAVENOUS; SUBCUTANEOUS
Status: DISCONTINUED | OUTPATIENT
Start: 2019-11-06 | End: 2019-11-07

## 2019-11-06 RX ADMIN — GABAPENTIN 100 MG: 100 CAPSULE ORAL at 11:14

## 2019-11-06 RX ADMIN — HYDROMORPHONE HYDROCHLORIDE 1 MG: 2 INJECTION, SOLUTION INTRAMUSCULAR; INTRAVENOUS; SUBCUTANEOUS at 13:44

## 2019-11-06 RX ADMIN — Medication 50 MCG: at 15:45

## 2019-11-06 RX ADMIN — MORPHINE SULFATE 2 MG: 4 INJECTION INTRAVENOUS at 18:06

## 2019-11-06 RX ADMIN — SODIUM CHLORIDE 1 G: 900 INJECTION INTRAVENOUS at 21:15

## 2019-11-06 RX ADMIN — SUGAMMADEX 200 MG: 100 INJECTION, SOLUTION INTRAVENOUS at 16:00

## 2019-11-06 RX ADMIN — GABAPENTIN 100 MG: 100 CAPSULE ORAL at 13:50

## 2019-11-06 RX ADMIN — ONDANSETRON 4 MG: 2 INJECTION INTRAMUSCULAR; INTRAVENOUS at 15:44

## 2019-11-06 RX ADMIN — MORPHINE SULFATE 2 MG: 4 INJECTION INTRAVENOUS at 11:14

## 2019-11-06 RX ADMIN — CELECOXIB 200 MG: 200 CAPSULE ORAL at 13:50

## 2019-11-06 RX ADMIN — GABAPENTIN 100 MG: 100 CAPSULE ORAL at 05:46

## 2019-11-06 RX ADMIN — ACETAMINOPHEN 1000 MG: 500 TABLET, FILM COATED ORAL at 13:49

## 2019-11-06 RX ADMIN — SENNOSIDES, DOCUSATE SODIUM 2 TABLET: 50; 8.6 TABLET, FILM COATED ORAL at 21:16

## 2019-11-06 RX ADMIN — LIDOCAINE HYDROCHLORIDE 60 MG: 20 INJECTION, SOLUTION EPIDURAL; INFILTRATION; INTRACAUDAL; PERINEURAL at 14:30

## 2019-11-06 RX ADMIN — Medication 50 MCG: at 14:44

## 2019-11-06 RX ADMIN — ROCURONIUM BROMIDE 50 MG: 10 INJECTION, SOLUTION INTRAVENOUS at 14:30

## 2019-11-06 RX ADMIN — HYDROMORPHONE HYDROCHLORIDE 0.2 MG: 1 INJECTION, SOLUTION INTRAMUSCULAR; INTRAVENOUS; SUBCUTANEOUS at 16:00

## 2019-11-06 RX ADMIN — PROPOFOL 175 MG: 10 INJECTION, EMULSION INTRAVENOUS at 14:30

## 2019-11-06 RX ADMIN — MORPHINE SULFATE 2 MG: 4 INJECTION INTRAVENOUS at 02:18

## 2019-11-06 RX ADMIN — MIDAZOLAM HYDROCHLORIDE 2 MG: 1 INJECTION, SOLUTION INTRAMUSCULAR; INTRAVENOUS at 14:24

## 2019-11-06 RX ADMIN — HYDROMORPHONE HYDROCHLORIDE 0.4 MG: 1 INJECTION, SOLUTION INTRAMUSCULAR; INTRAVENOUS; SUBCUTANEOUS at 16:29

## 2019-11-06 RX ADMIN — KETAMINE HYDROCHLORIDE 25 MG: 50 INJECTION, SOLUTION, CONCENTRATE INTRAMUSCULAR; INTRAVENOUS at 14:30

## 2019-11-06 RX ADMIN — DEXAMETHASONE SODIUM PHOSPHATE 8 MG: 4 INJECTION, SOLUTION INTRAMUSCULAR; INTRAVENOUS at 14:30

## 2019-11-06 RX ADMIN — HYDROMORPHONE HYDROCHLORIDE 0.4 MG: 1 INJECTION, SOLUTION INTRAMUSCULAR; INTRAVENOUS; SUBCUTANEOUS at 16:36

## 2019-11-06 RX ADMIN — TRANEXAMIC ACID 1000 MG: 100 INJECTION, SOLUTION INTRAVENOUS at 14:30

## 2019-11-06 RX ADMIN — CALCIUM CARBONATE-CHOLECALCIFEROL TAB 250 MG-125 UNIT 1 TABLET: 250-125 TAB at 05:47

## 2019-11-06 RX ADMIN — CEFAZOLIN 2 G: 1 INJECTION, POWDER, FOR SOLUTION INTRAVENOUS at 14:30

## 2019-11-06 RX ADMIN — HYDROMORPHONE HYDROCHLORIDE 0.4 MG: 2 INJECTION, SOLUTION INTRAMUSCULAR; INTRAVENOUS; SUBCUTANEOUS at 15:51

## 2019-11-06 RX ADMIN — ASPIRIN 81 MG CHEWABLE TABLET 81 MG: 81 TABLET CHEWABLE at 21:16

## 2019-11-06 RX ADMIN — SODIUM CHLORIDE, POTASSIUM CHLORIDE, SODIUM LACTATE AND CALCIUM CHLORIDE: 600; 310; 30; 20 INJECTION, SOLUTION INTRAVENOUS at 18:06

## 2019-11-06 RX ADMIN — SODIUM CHLORIDE, POTASSIUM CHLORIDE, SODIUM LACTATE AND CALCIUM CHLORIDE: 600; 310; 30; 20 INJECTION, SOLUTION INTRAVENOUS at 14:23

## 2019-11-06 RX ADMIN — Medication 50 MCG: at 15:41

## 2019-11-06 RX ADMIN — Medication 1000 MG: at 13:49

## 2019-11-06 RX ADMIN — SENNOSIDES, DOCUSATE SODIUM 2 TABLET: 50; 8.6 TABLET, FILM COATED ORAL at 05:46

## 2019-11-06 RX ADMIN — CELECOXIB 200 MG: 200 CAPSULE ORAL at 21:16

## 2019-11-06 RX ADMIN — OMEPRAZOLE 20 MG: 20 CAPSULE, DELAYED RELEASE ORAL at 05:48

## 2019-11-06 RX ADMIN — GABAPENTIN 100 MG: 100 CAPSULE ORAL at 21:16

## 2019-11-06 RX ADMIN — Medication 50 MCG: at 15:15

## 2019-11-06 RX ADMIN — HYDROMORPHONE HYDROCHLORIDE 0.5 MG: 1 INJECTION, SOLUTION INTRAMUSCULAR; INTRAVENOUS; SUBCUTANEOUS at 03:41

## 2019-11-06 ASSESSMENT — ENCOUNTER SYMPTOMS
ORTHOPNEA: 0
SORE THROAT: 0
EYE PAIN: 0
WEIGHT LOSS: 0
DOUBLE VISION: 0
NERVOUS/ANXIOUS: 0
ABDOMINAL PAIN: 0
VOMITING: 0
NAUSEA: 0
STRIDOR: 0
HEMOPTYSIS: 0
DIZZINESS: 0
TREMORS: 0
WEAKNESS: 0
FEVER: 0
BACK PAIN: 0
SPEECH CHANGE: 0
FLANK PAIN: 0
PND: 0
CHILLS: 0
BLOOD IN STOOL: 0
PSYCHIATRIC NEGATIVE: 1
SHORTNESS OF BREATH: 0
RESPIRATORY NEGATIVE: 1
TINGLING: 0
BLURRED VISION: 0
COUGH: 0
NEUROLOGICAL NEGATIVE: 1
MEMORY LOSS: 0
CLAUDICATION: 0
HEARTBURN: 0
MYALGIAS: 0
PHOTOPHOBIA: 0
LOSS OF CONSCIOUSNESS: 0
BRUISES/BLEEDS EASILY: 0
DEPRESSION: 0
GASTROINTESTINAL NEGATIVE: 1
SENSORY CHANGE: 0
CONSTIPATION: 0
HEADACHES: 0
SPUTUM PRODUCTION: 0
NECK PAIN: 0
PALPITATIONS: 0

## 2019-11-06 ASSESSMENT — PAIN SCALES - GENERAL: PAIN_LEVEL: 3

## 2019-11-06 NOTE — PROGRESS NOTES
Pt states pain is 9/10 and the morphine isn't working any more. Hospitalist paged. Awaiting return page from Dr. Guaman.

## 2019-11-06 NOTE — DISCHARGE PLANNING
"Anticipated Discharge Disposition: TBD, pt is interested in [facility] vs SNF following surgery.     Action: Reviewed pt's chart and met with pt and multiple family members at bedside. Pt is a very pleasant 84 y.o. who will be undergoing a hip replacement this afternoon. Pt and her  recently moved from [place] to [place] (about 5 days ago) to live with pt's dtr Matthew #[phone]. Pt states she has been anticipating having hip surgery for months now. Pt wanted to have surgery in [place] but her surgeon was too far out and pt was moving to [place]. Pt states she was IPTA, has a WC, FWW, cane, toilet seat riser and shower chair at home. Pt has a PCP apt. Scheduled for January 14, 2020 to establish with a PCP. Pt is interested in getting rehab following surgery. Discussed SNF LOC and dtr inquired about [facility] and states this is their preference. LSW discussed needing a rehab consult placed by MD and will discuss with MD in rounds today. Provided family with a SNF choice form to review and family will chose once they have discussed and searched out facilities. Discussed PT/OT evals will be completed following surgery. Family inquired about AD/DPOA paperwork. Family states that pt has these documents completed (no on file and family does not have a copy with them at this time) but would like to complete a new one as pt's  \"does not remember things\" and struggles with memory issues. Provided copies of AD/DPOA packet and family inquired about a mobile notary as they may want further documents notarized. Provided family with names/numbers of mobile notaries. Family will decide if they are going to want to use [facility] designated notary or if they will call in a mobile notary of their own and let  know. Provided community resource packet to pt as she is new to the area.     Barriers to Discharge: N/A.     Plan: As above, pt will be going to the OR today for hip replacement. Await PT/OT evals following " surgery and recommendations. Pt and family are interested in [facility], will request consult from attending MD in MDT rounds. Family has SNF choice form and are reviewing. Unit RN CM/LSW to f/u with family in regards to SNF choice form and AD/DPOA paperwork (family maybe calling in a mobile notary).     Care Transition Team Assessment    Information Source  Orientation : Oriented x 4  Information Given By: Patient, Spouse, Relative  Informant's Name: Janessa and melly Castro  Who is responsible for making decisions for patient? : Patient    Readmission Evaluation  Is this a readmission?: No    Elopement Risk  Legal Hold: No  Ambulatory or Self Mobile in Wheelchair: No-Not an Elopement Risk  Elopement Risk: Not at Risk for Elopement    Interdisciplinary Discharge Planning  Does Admitting Nurse Feel This Could be a Complex Discharge?: No  Primary Care Physician: Pt recently moved to [place] from [place], has scheduled a NP PCP at. for January 14, 2020  Lives with - Patient's Self Care Capacity: Adult Children, Spouse  Patient or legal guardian wants to designate a caregiver (see row info): No  Support Systems: Family Member(s), Friends / Neighbors, Children  Housing / Facility: [address] [facility]  Do You Take your Prescribed Medications Regularly: Yes  Able to Return to Previous ADL's: Future Time w/Therapy  Mobility Issues: Yes  Prior Services: None  Assistance Needed: Yes  Durable Medical Equipment: Walker, Other - Specify(cane, WC, shower chair and toilet seat riser)    Discharge Preparedness  What is your plan after discharge?: Uncertain - pending medical team collaboration(Rehab vs SNF)  What are your discharge supports?: Child, Spouse, Other (comment)(a lot of support from family )  Prior Functional Level: Ambulatory, Drives Self, Independent with Activities of Daily Living, Independent with Medication Management  Difficulity with ADLs: Walking(prior to surgery, pt was having a lot of pain)  Difficulity with IADLs:  None(family will assist)    Functional Assesment  Prior Functional Level: Ambulatory, Drives Self, Independent with Activities of Daily Living, Independent with Medication Management    Finances  Financial Barriers to Discharge: No  Prescription Coverage: Yes    Vision / Hearing Impairment  Vision Impairment : No  Hearing Impairment : No         Advance Directive  Advance Directive?: (Yes, but pt and family would like to complete a new one)  Durable Power of  Name and Contact : Pt states she has AD/DPOA but not on file as she recently moved from [place]. Pt is interested in completing a new one and listing her dtr as DPOA as  struggles with memory issues.     Domestic Abuse  Have you ever been the victim of abuse or violence?: No  Physical Abuse or Sexual Abuse: No  Verbal Abuse or Emotional Abuse: No  Possible Abuse Reported to:: Not Applicable              Anticipated Discharge Information  Anticipated discharge disposition: Acute rehab, SNF, C

## 2019-11-06 NOTE — CARE PLAN
Problem: Safety  Goal: Will remain free from injury  Outcome: PROGRESSING AS EXPECTED    Pt is sitting up in chair and uses call light for assistance. No unsafe behaviors noted.  Call light within reach. Hourly rounds in place.     Problem: Fluid Volume:  Goal: Will maintain balanced intake and output  Outcome: PROGRESSING AS EXPECTED     Pt has been NPO since midnight. Surgery is scheduled for 1430.     Problem: Pain Management  Goal: Pain level will decrease to patient's comfort goal  Outcome: PROGRESSING AS EXPECTED     Pt reports tolerable pain at this time. Prns are available when needed.

## 2019-11-06 NOTE — OR SURGEON
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    Anesthesiologist/Type of Anesthesia:  Anesthesiologist: Monroe Minor M.D./General    Surgical Staff:  Circulator: Tito Sheets R.N.; Leatha Hutton R.N.  Scrub Person: Sher Ram  First Assist: Julio Gomez II P.A.-JAYDE    Specimens removed if any:  ID Type Source Tests Collected by Time Destination   1 : right proximal femoral head Bone Bone AFB CULTURE, FUNGAL CULTURE, AEROBIC/ANAEROBIC CULTURE (SURGERY) Pauline Mo M.D. 11/6/2019  3:18 PM        Estimated Blood Loss: 400 cc    Findings: destructive changes to right hip    Complications: none noted        11/6/2019 3:57 PM Pauline Mo M.D.

## 2019-11-06 NOTE — CARE PLAN
Problem: Safety  Goal: Will remain free from falls  Outcome: PROGRESSING AS EXPECTED     Problem: Skin Integrity  Goal: Risk for impaired skin integrity will decrease  Outcome: PROGRESSING SLOWER THAN EXPECTED

## 2019-11-06 NOTE — PROGRESS NOTES
Received bedside shift report regarding patient and assumed care. Patient is awake, calm and stable, currently positioned in chair for comfort and safety; call light within reach. Denies any pain or discomfort at this time, prns available when needed. Pt is scheduled for surgery at 1430 with Dr. Mo and pt made aware. Pt is currently NPO at this time.

## 2019-11-06 NOTE — PROGRESS NOTES
[facility] Medicine Daily Progress Note    Date of Service  11/6/2019    Chief Complaint  84 y.o. female admitted 11/3/2019 with severe hip pain.    Hospital Course    History of Presenting Illness per Dr. Handley's H&P:  84 y.o. female who presented 11/3/2019 with degenerative disease of the hips and history of left hip surgery in 2008 who has known degenerative disease of the right hip and was seen by orthopedic surgery in [place] but wanted to wait until after she moved to [place] to have surgery. She moved to [place] one week ago and the last three days she is sleeping in a wheelchair at night as she cannot tolerate laying flat due to pain.  She uses a walker to ambulate during the day but is having increased difficulty ambulating due to pain. She describes the pain in her right leg as radiating down her right leg into the foot and burning in nature.  She has constipation and is not drinking much as it is difficult to get to the bathroom to void.      Interval Problem Update  11/4: she continues to sit up all day to alleviate the pain in her right hip as much as possible. She states pain is shooting down her leg as well. Up until 3 days ago she was ambulating with a cane. Denies fevers or chills.    11/5  Patient is still reporting 5-7/10 shooting pain in her right hip exacerbated by movement. Difficulty ambulating. Patient denies fevers/chills, chest pain, shortness of breath or nausea/vommiting.     11/6  No acute issues overnight, patient says the swelling in her legs have improved, pain in her hip still 7-9 out of 10 in severity at times exacerbated by movement.  awaiting for ORIF today    Consultants/Specialty  Orthopedic surgery, Dr. Mo.    Code Status  Full Code    Disposition  TBD.    Review of Systems  Review of Systems   Constitutional: Positive for malaise/fatigue. Negative for chills, fever and weight loss.   HENT: Negative.  Negative for congestion, hearing loss, sore throat and tinnitus.    Eyes:  Negative for blurred vision, double vision, photophobia and pain.   Respiratory: Negative.  Negative for cough, hemoptysis, sputum production, shortness of breath and stridor.    Cardiovascular: Positive for leg swelling. Negative for chest pain, palpitations, orthopnea, claudication and PND.   Gastrointestinal: Negative.  Negative for abdominal pain, blood in stool, constipation, heartburn, melena, nausea and vomiting.   Genitourinary: Negative.  Negative for dysuria, flank pain, frequency and urgency.   Musculoskeletal: Positive for joint pain (unchanged from yesterday). Negative for back pain, myalgias and neck pain.   Neurological: Negative.  Negative for dizziness, tingling, tremors, sensory change, speech change, loss of consciousness, weakness and headaches.   Endo/Heme/Allergies: Negative.  Does not bruise/bleed easily.   Psychiatric/Behavioral: Negative.  Negative for depression, memory loss and suicidal ideas. The patient is not nervous/anxious.    All other systems reviewed and are negative.       Physical Exam  Temp:  [36.4 °C (97.5 °F)-36.7 °C (98.1 °F)] 36.4 °C (97.5 °F)  Pulse:  [84-94] 84  Resp:  [18-20] 20  BP: (137-164)/(70-88) 137/70  SpO2:  [90 %-97 %] 90 %    Physical Exam  Vitals signs and nursing note reviewed.   Constitutional:       General: She is not in acute distress.     Appearance: Normal appearance. She is well-developed. She is obese. She is not ill-appearing or diaphoretic.   HENT:      Head: Normocephalic and atraumatic.      Right Ear: External ear normal.      Left Ear: External ear normal.      Nose: Nose normal. No congestion.      Mouth/Throat:      Mouth: Mucous membranes are moist.      Pharynx: No oropharyngeal exudate or posterior oropharyngeal erythema.   Eyes:      General: No scleral icterus.        Right eye: No discharge.         Left eye: No discharge.      Extraocular Movements: Extraocular movements intact.      Conjunctiva/sclera: Conjunctivae normal.      Pupils:  Pupils are equal, round, and reactive to light.   Neck:      Musculoskeletal: Normal range of motion and neck supple. No neck rigidity or muscular tenderness.      Vascular: No JVD.      Trachea: No tracheal deviation.   Cardiovascular:      Rate and Rhythm: Normal rate and regular rhythm.      Pulses: Normal pulses.      Heart sounds: Normal heart sounds. No murmur.   Pulmonary:      Effort: Pulmonary effort is normal. No respiratory distress.      Breath sounds: Normal breath sounds. No stridor. No wheezing or rales.   Chest:      Chest wall: No tenderness.   Abdominal:      General: Abdomen is flat. Bowel sounds are normal. There is no distension.      Palpations: Abdomen is soft. There is no mass.      Tenderness: There is no tenderness. There is no guarding or rebound.      Hernia: No hernia is present.   Musculoskeletal: Normal range of motion.         General: No swelling or tenderness (right hip).      Right lower leg: Edema (+3 pitting edema b/l L/E) present.      Left lower leg: Edema (greater than right) present.   Skin:     General: Skin is warm and dry.      Capillary Refill: Capillary refill takes less than 2 seconds.      Coloration: Skin is not pale.   Neurological:      General: No focal deficit present.      Mental Status: She is alert and oriented to person, place, and time. Mental status is at baseline.      Cranial Nerves: No cranial nerve deficit.      Motor: No abnormal muscle tone.      Gait: Gait abnormal.   Psychiatric:         Mood and Affect: Mood normal.         Behavior: Behavior normal.         Thought Content: Thought content normal.         Judgment: Judgment normal.         Fluids    Intake/Output Summary (Last 24 hours) at 11/6/2019 0816  Last data filed at 11/6/2019 0500  Gross per 24 hour   Intake 720 ml   Output 550 ml   Net 170 ml       Laboratory  Recent Labs     11/03/19  1725 11/04/19  0356 11/05/19  0324   WBC 5.3 5.1 4.2*   RBC 4.00* 3.96* 3.68*   HEMOGLOBIN 12.8 12.7 12.0    HEMATOCRIT 39.2 39.8 36.0*   MCV 98.0* 100.5* 97.8   MCH 32.0 32.1 32.6   MCHC 32.7* 31.9* 33.3*   RDW 44.7 45.8 45.0   PLATELETCT 248 228 258   MPV 9.6 10.1 9.7     Recent Labs     11/03/19  1725 11/04/19  0356 11/05/19  0324   SODIUM 131* 130* 133*   POTASSIUM 5.2 4.6 5.4   CHLORIDE 96 97 101   CO2 22 21 23   GLUCOSE 104* 119* 114*   BUN 40* 35* 30*   CREATININE 1.10 0.81 0.63   CALCIUM 9.5 9.4 9.1                   Imaging  IR-MIDLINE CATHETER INSERTION WO GUIDANCE > AGE 3   Final Result                  Ultrasound-guided midline placement performed by qualified nursing staff    as above.          CT-HIP W/O PLUS RECONS RIGHT   Final Result      1.  Marked deformity of the right hip joint characterized by fragmentation of the femoral head with bony resorption and flattening of the femoral head. 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 Acuity of the fragmentation cannot be assessed. There is also secondary severe osteoarthritis present.      2.  Prior left hip resurfacing procedure.      EC-ECHOCARDIOGRAM COMPLETE W/O CONT   Final Result      DX-HIP-UNILATERAL-WITH PELVIS-1 VIEW RIGHT   Final Result         1.  No radiographic evidence of acute traumatic injury.   2.  Severe degenerative changes of the right hip with extensive bony remodeling of the femoral head and acetabula.      US-EXTREMITY VENOUS LOWER BILAT   Final Result      No gross deep venous thrombosis.      Severely limited evaluation due to subcutaneous fat edema, patient pain and mobility limitations      DX-CHEST-PORTABLE (1 VIEW)   Final Result      No acute cardiopulmonary findings.      MR-LUMBAR SPINE-W/O    (Results Pending)        Assessment/Plan  * Degenerative joint disease of right hip  Assessment & Plan  Patient was seen by an orthopedic surgeon in [place] but wanted to wait until she moved to [place] to have surgery, now she is having difficulty ambulating and  laying flat due to pain  Pain not improved        CT Hip shows Marked deformity of the right hip joint characterized by fragmentation of the femoral        head with bony resorption and flattening of the femoral head. This appearance suggests presence      of chronic avascular necrosis with subchondral collapse of the   femoral head. Acuity of the fragmentation cannot be assessed. There is also secondary severe osteoarthritis present.  Pain control IV/PO.  Resume gabapentin .  CRP WNL.  ORIF today.    Radiculopathy  Assessment & Plan  MRI lumbar spine, patient unable to tolerate due to anxiety.    Bilateral leg edema  Assessment & Plan  LE duplex neg for DVT  Elevated legs.  Echocardiogram shows preserved LVEF.     Acute prerenal azotemia  Assessment & Plan  Resolved.  CTM    Dehydration  Assessment & Plan      Euvolemic currently    Hyponatremia  Assessment & Plan  2/2 to dehydration  Improving    UTI (urinary tract infection)  Assessment & Plan  Repeat UA pending, abx held until confirmed.        Patient plan of care discussed at multidisplinary team rounds and with patient and R.N at beside.    VTE prophylaxis: lovenox.

## 2019-11-06 NOTE — DISCHARGE PLANNING
[facility] Rehabilitation Transitional Care Coordination     Referral from:  Dr. Eugene   Facesheet indicates: Medicare /   Potential Rehab Diagnosis: degenerative disease of the R hip.    Chart review indicates patient does have on going medical management and may have  therapy needs to possibly meet inpatient rehab facility criteria with the goal of returning to community. PT having R hip replacement today.       D/C support:  Pt just moved to [place] last week; lives with Dtr Matthew Oneal  and spouse Almas Rose  who live in [place].     Physiatry consultation Pended per protocol.      Anticipate PT/OT to follow when appropriate.        Thank you for the referral.    Will continue to follow

## 2019-11-06 NOTE — ANESTHESIA PROCEDURE NOTES
Airway  Date/Time: 11/6/2019 2:33 PM  Performed by: Monroe Minor M.D.  Authorized by: Monroe Minor M.D.     Location:  OR  Urgency:  Elective  Difficult Airway: No    Indications for Airway Management:  Anesthesia  Spontaneous Ventilation: absent    Sedation Level:  Deep  Preoxygenated: Yes    Patient Position:  Sniffing  Mask Difficulty Assessment:  1 - vent by mask  Final Airway Type:  Endotracheal airway  Final Endotracheal Airway:  ETT  Cuffed: Yes    Technique Used for Successful ETT Placement:  Direct laryngoscopy  Insertion Site:  Oral  Blade Type:  Crane  Laryngoscope Blade/Videolaryngoscope Blade Size:  2  ETT Size (mm):  7.0  Measured from:  Teeth  ETT to Teeth (cm):  22  Placement Verified by: auscultation and capnometry    Cormack-Lehane Classification:  Grade IIa - partial view of glottis  Number of Attempts at Approach:  1   Cuffed tube

## 2019-11-06 NOTE — ANESTHESIA PREPROCEDURE EVALUATION
85 yo female for right SUNSHINE    HTN  Lower ext edema   LVH on EKG  Relevant Problems   Other   (+) Acute prerenal azotemia   (+) Bilateral leg edema   (+) Degenerative joint disease of right hip   (+) Hyponatremia   (+) Radiculopathy   (+) UTI (urinary tract infection)       Physical Exam    Airway   Mallampati: II  TM distance: >3 FB  Neck ROM: full       Cardiovascular - normal exam  Rhythm: regular  Rate: normal  (-) murmur     Dental    Pulmonary - normal exam  Breath sounds clear to auscultation     Abdominal    Neurological - abnormal exam                 Anesthesia Plan    ASA 2       Plan - general       Airway plan will be ETT        Induction: intravenous    Postoperative Plan: Postoperative administration of opioids is intended.    Pertinent diagnostic labs and testing reviewed    Informed Consent:    Anesthetic plan and risks discussed with patient.    Use of blood products discussed with: patient whom consented to blood products.

## 2019-11-07 LAB
ALBUMIN SERPL BCP-MCNC: 3 G/DL (ref 3.2–4.9)
ALBUMIN/GLOB SERPL: 1.3 G/DL
ALP SERPL-CCNC: 69 U/L (ref 30–99)
ALT SERPL-CCNC: 8 U/L (ref 2–50)
ANION GAP SERPL CALC-SCNC: 9 MMOL/L (ref 0–11.9)
APPEARANCE UR: ABNORMAL
AST SERPL-CCNC: 14 U/L (ref 12–45)
BACTERIA #/AREA URNS HPF: ABNORMAL /HPF
BILIRUB SERPL-MCNC: 0.3 MG/DL (ref 0.1–1.5)
BILIRUB UR QL STRIP.AUTO: NEGATIVE
BUN SERPL-MCNC: 26 MG/DL (ref 8–22)
CALCIUM SERPL-MCNC: 8.5 MG/DL (ref 8.4–10.2)
CHLORIDE SERPL-SCNC: 99 MMOL/L (ref 96–112)
CO2 SERPL-SCNC: 27 MMOL/L (ref 20–33)
COLOR UR: YELLOW
CREAT SERPL-MCNC: 0.67 MG/DL (ref 0.5–1.4)
EPI CELLS #/AREA URNS HPF: ABNORMAL /HPF
ERYTHROCYTE [DISTWIDTH] IN BLOOD BY AUTOMATED COUNT: 44 FL (ref 35.9–50)
GLOBULIN SER CALC-MCNC: 2.3 G/DL (ref 1.9–3.5)
GLUCOSE SERPL-MCNC: 128 MG/DL (ref 65–99)
GLUCOSE UR STRIP.AUTO-MCNC: NEGATIVE MG/DL
GRAM STN SPEC: NORMAL
HCT VFR BLD AUTO: 30.7 % (ref 37–47)
HGB BLD-MCNC: 9.9 G/DL (ref 12–16)
KETONES UR STRIP.AUTO-MCNC: NEGATIVE MG/DL
LEUKOCYTE ESTERASE UR QL STRIP.AUTO: ABNORMAL
MCH RBC QN AUTO: 31.5 PG (ref 27–33)
MCHC RBC AUTO-ENTMCNC: 32.2 G/DL (ref 33.6–35)
MCV RBC AUTO: 97.8 FL (ref 81.4–97.8)
MICRO URNS: ABNORMAL
NITRITE UR QL STRIP.AUTO: NEGATIVE
PH UR STRIP.AUTO: 5 [PH] (ref 5–8)
PLATELET # BLD AUTO: 207 K/UL (ref 164–446)
PMV BLD AUTO: 10.2 FL (ref 9–12.9)
POTASSIUM SERPL-SCNC: 5.1 MMOL/L (ref 3.6–5.5)
POTASSIUM SERPL-SCNC: 5.6 MMOL/L (ref 3.6–5.5)
PROT SERPL-MCNC: 5.3 G/DL (ref 6–8.2)
PROT UR QL STRIP: NEGATIVE MG/DL
RBC # BLD AUTO: 3.14 M/UL (ref 4.2–5.4)
RBC # URNS HPF: ABNORMAL /HPF
RBC UR QL AUTO: ABNORMAL
RHODAMINE-AURAMINE STN SPEC: NORMAL
SIGNIFICANT IND 70042: NORMAL
SIGNIFICANT IND 70042: NORMAL
SITE SITE: NORMAL
SITE SITE: NORMAL
SODIUM SERPL-SCNC: 135 MMOL/L (ref 135–145)
SOURCE SOURCE: NORMAL
SOURCE SOURCE: NORMAL
SP GR UR STRIP.AUTO: 1.01
WBC # BLD AUTO: 7.3 K/UL (ref 4.8–10.8)
WBC #/AREA URNS HPF: ABNORMAL /HPF

## 2019-11-07 PROCEDURE — 97116 GAIT TRAINING THERAPY: CPT

## 2019-11-07 PROCEDURE — 700105 HCHG RX REV CODE 258: Performed by: ORTHOPAEDIC SURGERY

## 2019-11-07 PROCEDURE — A9270 NON-COVERED ITEM OR SERVICE: HCPCS | Performed by: PHYSICIAN ASSISTANT

## 2019-11-07 PROCEDURE — 97530 THERAPEUTIC ACTIVITIES: CPT

## 2019-11-07 PROCEDURE — 700111 HCHG RX REV CODE 636 W/ 250 OVERRIDE (IP): Performed by: HOSPITALIST

## 2019-11-07 PROCEDURE — 85027 COMPLETE CBC AUTOMATED: CPT

## 2019-11-07 PROCEDURE — 700105 HCHG RX REV CODE 258: Performed by: ANESTHESIOLOGY

## 2019-11-07 PROCEDURE — 97110 THERAPEUTIC EXERCISES: CPT

## 2019-11-07 PROCEDURE — A9270 NON-COVERED ITEM OR SERVICE: HCPCS | Performed by: INTERNAL MEDICINE

## 2019-11-07 PROCEDURE — 81001 URINALYSIS AUTO W/SCOPE: CPT

## 2019-11-07 PROCEDURE — 700111 HCHG RX REV CODE 636 W/ 250 OVERRIDE (IP): Performed by: ORTHOPAEDIC SURGERY

## 2019-11-07 PROCEDURE — 84132 ASSAY OF SERUM POTASSIUM: CPT

## 2019-11-07 PROCEDURE — 700102 HCHG RX REV CODE 250 W/ 637 OVERRIDE(OP): Performed by: PHYSICIAN ASSISTANT

## 2019-11-07 PROCEDURE — 700102 HCHG RX REV CODE 250 W/ 637 OVERRIDE(OP): Performed by: INTERNAL MEDICINE

## 2019-11-07 PROCEDURE — 700102 HCHG RX REV CODE 250 W/ 637 OVERRIDE(OP): Performed by: HOSPITALIST

## 2019-11-07 PROCEDURE — 80053 COMPREHEN METABOLIC PANEL: CPT

## 2019-11-07 PROCEDURE — 36415 COLL VENOUS BLD VENIPUNCTURE: CPT

## 2019-11-07 PROCEDURE — 99232 SBSQ HOSP IP/OBS MODERATE 35: CPT | Performed by: HOSPITALIST

## 2019-11-07 PROCEDURE — 97166 OT EVAL MOD COMPLEX 45 MIN: CPT

## 2019-11-07 PROCEDURE — A9270 NON-COVERED ITEM OR SERVICE: HCPCS | Performed by: HOSPITALIST

## 2019-11-07 PROCEDURE — 700111 HCHG RX REV CODE 636 W/ 250 OVERRIDE (IP): Performed by: INTERNAL MEDICINE

## 2019-11-07 PROCEDURE — 770001 HCHG ROOM/CARE - MED/SURG/GYN PRIV*

## 2019-11-07 RX ORDER — DIAZEPAM 5 MG/1
5 TABLET ORAL 3 TIMES DAILY PRN
Status: DISCONTINUED | OUTPATIENT
Start: 2019-11-07 | End: 2019-11-12 | Stop reason: HOSPADM

## 2019-11-07 RX ADMIN — OMEPRAZOLE 20 MG: 20 CAPSULE, DELAYED RELEASE ORAL at 06:20

## 2019-11-07 RX ADMIN — FUROSEMIDE 20 MG: 10 INJECTION, SOLUTION INTRAVENOUS at 06:20

## 2019-11-07 RX ADMIN — SODIUM CHLORIDE 1 G: 900 INJECTION INTRAVENOUS at 06:22

## 2019-11-07 RX ADMIN — SODIUM CHLORIDE 1 G: 900 INJECTION INTRAVENOUS at 16:00

## 2019-11-07 RX ADMIN — ASPIRIN 81 MG CHEWABLE TABLET 81 MG: 81 TABLET CHEWABLE at 06:20

## 2019-11-07 RX ADMIN — CYCLOBENZAPRINE HYDROCHLORIDE 5 MG: 10 TABLET, FILM COATED ORAL at 15:53

## 2019-11-07 RX ADMIN — LOSARTAN POTASSIUM 25 MG: 25 TABLET ORAL at 06:20

## 2019-11-07 RX ADMIN — CYCLOBENZAPRINE HYDROCHLORIDE 5 MG: 10 TABLET, FILM COATED ORAL at 06:18

## 2019-11-07 RX ADMIN — ASPIRIN 81 MG CHEWABLE TABLET 81 MG: 81 TABLET CHEWABLE at 18:05

## 2019-11-07 RX ADMIN — GABAPENTIN 100 MG: 100 CAPSULE ORAL at 18:05

## 2019-11-07 RX ADMIN — CYCLOBENZAPRINE HYDROCHLORIDE 5 MG: 10 TABLET, FILM COATED ORAL at 20:11

## 2019-11-07 RX ADMIN — FUROSEMIDE 20 MG: 10 INJECTION, SOLUTION INTRAVENOUS at 18:04

## 2019-11-07 RX ADMIN — SODIUM CHLORIDE, POTASSIUM CHLORIDE, SODIUM LACTATE AND CALCIUM CHLORIDE: 600; 310; 30; 20 INJECTION, SOLUTION INTRAVENOUS at 12:10

## 2019-11-07 RX ADMIN — MORPHINE SULFATE 2 MG: 4 INJECTION INTRAVENOUS at 16:36

## 2019-11-07 RX ADMIN — SENNOSIDES, DOCUSATE SODIUM 2 TABLET: 50; 8.6 TABLET, FILM COATED ORAL at 06:18

## 2019-11-07 RX ADMIN — DIAZEPAM 5 MG: 5 TABLET ORAL at 23:20

## 2019-11-07 RX ADMIN — CALCIUM CARBONATE-CHOLECALCIFEROL TAB 250 MG-125 UNIT 1 TABLET: 250-125 TAB at 06:20

## 2019-11-07 RX ADMIN — DIAZEPAM 5 MG: 5 TABLET ORAL at 10:49

## 2019-11-07 RX ADMIN — CELECOXIB 200 MG: 200 CAPSULE ORAL at 06:18

## 2019-11-07 RX ADMIN — GABAPENTIN 100 MG: 100 CAPSULE ORAL at 12:10

## 2019-11-07 RX ADMIN — SENNOSIDES, DOCUSATE SODIUM 2 TABLET: 50; 8.6 TABLET, FILM COATED ORAL at 18:05

## 2019-11-07 RX ADMIN — GABAPENTIN 100 MG: 100 CAPSULE ORAL at 06:19

## 2019-11-07 RX ADMIN — DIAZEPAM 5 MG: 5 TABLET ORAL at 18:52

## 2019-11-07 RX ADMIN — MORPHINE SULFATE 2 MG: 4 INJECTION INTRAVENOUS at 12:16

## 2019-11-07 RX ADMIN — MORPHINE SULFATE 2 MG: 4 INJECTION INTRAVENOUS at 08:12

## 2019-11-07 RX ADMIN — CELECOXIB 200 MG: 200 CAPSULE ORAL at 18:05

## 2019-11-07 ASSESSMENT — ENCOUNTER SYMPTOMS
DOUBLE VISION: 0
BACK PAIN: 0
PHOTOPHOBIA: 0
FLANK PAIN: 0
SPUTUM PRODUCTION: 0
EYE PAIN: 0
HEMOPTYSIS: 0
PSYCHIATRIC NEGATIVE: 1
NERVOUS/ANXIOUS: 0
NAUSEA: 0
NECK PAIN: 0
HEADACHES: 0
VOMITING: 0
SHORTNESS OF BREATH: 0
TREMORS: 0
DIZZINESS: 0
HEARTBURN: 0
TINGLING: 0
CLAUDICATION: 0
BLURRED VISION: 0
GASTROINTESTINAL NEGATIVE: 1
FEVER: 0
COUGH: 0
MYALGIAS: 0
CONSTIPATION: 0
RESPIRATORY NEGATIVE: 1
BLOOD IN STOOL: 0
SENSORY CHANGE: 0
SORE THROAT: 0
MEMORY LOSS: 0
CHILLS: 0
PALPITATIONS: 0
LOSS OF CONSCIOUSNESS: 0
PND: 0
WEAKNESS: 0
DEPRESSION: 0
NEUROLOGICAL NEGATIVE: 1
STRIDOR: 0
ORTHOPNEA: 0
BRUISES/BLEEDS EASILY: 0
SPEECH CHANGE: 0
ABDOMINAL PAIN: 0
WEIGHT LOSS: 0

## 2019-11-07 ASSESSMENT — COGNITIVE AND FUNCTIONAL STATUS - GENERAL
MOVING FROM LYING ON BACK TO SITTING ON SIDE OF FLAT BED: A LITTLE
SUGGESTED CMS G CODE MODIFIER MOBILITY: CK
HELP NEEDED FOR BATHING: A LOT
DAILY ACTIVITIY SCORE: 17
PERSONAL GROOMING: A LITTLE
STANDING UP FROM CHAIR USING ARMS: A LITTLE
WALKING IN HOSPITAL ROOM: A LITTLE
DRESSING REGULAR UPPER BODY CLOTHING: A LITTLE
SUGGESTED CMS G CODE MODIFIER DAILY ACTIVITY: CK
TOILETING: A LITTLE
DRESSING REGULAR LOWER BODY CLOTHING: A LOT
MOVING TO AND FROM BED TO CHAIR: A LITTLE
TURNING FROM BACK TO SIDE WHILE IN FLAT BAD: A LITTLE
CLIMB 3 TO 5 STEPS WITH RAILING: TOTAL
MOBILITY SCORE: 16

## 2019-11-07 ASSESSMENT — GAIT ASSESSMENTS
ASSISTIVE DEVICE: FRONT WHEEL WALKER
GAIT LEVEL OF ASSIST: MINIMAL ASSIST
DISTANCE (FEET): 50
DEVIATION: ANTALGIC

## 2019-11-07 ASSESSMENT — ACTIVITIES OF DAILY LIVING (ADL): TOILETING: INDEPENDENT

## 2019-11-07 NOTE — PROGRESS NOTES
[facility] Medicine Daily Progress Note    Date of Service  11/7/2019    Chief Complaint  84 y.o. female admitted 11/3/2019 with severe hip pain.    Hospital Course    History of Presenting Illness per Dr. Handley's H&P:  84 y.o. female who presented 11/3/2019 with degenerative disease of the hips and history of left hip surgery in 2008 who has known degenerative disease of the right hip and was seen by orthopedic surgery in [place] but wanted to wait until after she moved to [place] to have surgery. She moved to [place] one week ago and the last three days she is sleeping in a wheelchair at night as she cannot tolerate laying flat due to pain.  She uses a walker to ambulate during the day but is having increased difficulty ambulating due to pain. She describes the pain in her right leg as radiating down her right leg into the foot and burning in nature.  She has constipation and is not drinking much as it is difficult to get to the bathroom to void.      Interval Problem Update  11/4: she continues to sit up all day to alleviate the pain in her right hip as much as possible. She states pain is shooting down her leg as well. Up until 3 days ago she was ambulating with a cane. Denies fevers or chills.    11/5  Patient is still reporting 5-7/10 shooting pain in her right hip exacerbated by movement. Difficulty ambulating. Patient denies fevers/chills, chest pain, shortness of breath or nausea/vommiting.     11/6  No acute issues overnight, patient says the swelling in her legs have improved, pain in her hip still 7-9 out of 10 in severity at times exacerbated by movement.  awaiting for ORIF today    11/7  No acute overnight events, patient's only complaint right now is right hip pain especially when she moves her place a slight weight.  Otherwise doing well. Patient denies fevers/chills, chest pain, shortness of breath or nausea/vommiting.   Pain control  PT/OT when cleared by surgery    Consultants/Specialty  Orthopedic  surgery, Dr. Mo.    Code Status  Full Code    Disposition  TBD.    Review of Systems  Review of Systems   Constitutional: Positive for malaise/fatigue. Negative for chills, fever and weight loss.   HENT: Negative.  Negative for congestion, hearing loss, sore throat and tinnitus.    Eyes: Negative for blurred vision, double vision, photophobia and pain.   Respiratory: Negative.  Negative for cough, hemoptysis, sputum production, shortness of breath and stridor.    Cardiovascular: Positive for leg swelling (right leg). Negative for chest pain, palpitations, orthopnea, claudication and PND.   Gastrointestinal: Negative.  Negative for abdominal pain, blood in stool, constipation, heartburn, melena, nausea and vomiting.   Genitourinary: Negative.  Negative for dysuria, flank pain, frequency and urgency.   Musculoskeletal: Positive for joint pain (right hip more than yesterady). Negative for back pain, myalgias and neck pain.   Neurological: Negative.  Negative for dizziness, tingling, tremors, sensory change, speech change, loss of consciousness, weakness and headaches.   Endo/Heme/Allergies: Negative.  Does not bruise/bleed easily.   Psychiatric/Behavioral: Negative.  Negative for depression, memory loss and suicidal ideas. The patient is not nervous/anxious.    All other systems reviewed and are negative.       Physical Exam  Temp:  [36.1 °C (97 °F)-37 °C (98.6 °F)] 36.3 °C (97.3 °F)  Pulse:  [] 101  Resp:  [16-18] 16  BP: (121-155)/(55-82) 123/69  SpO2:  [88 %-98 %] 98 %    Physical Exam  Vitals signs and nursing note reviewed.   Constitutional:       General: She is not in acute distress.     Appearance: Normal appearance. She is well-developed. She is obese. She is not ill-appearing or diaphoretic.   HENT:      Head: Normocephalic and atraumatic.      Right Ear: External ear normal.      Left Ear: External ear normal.      Nose: Nose normal. No congestion.      Mouth/Throat:      Mouth: Mucous membranes  are moist.      Pharynx: No oropharyngeal exudate or posterior oropharyngeal erythema.   Eyes:      General: No scleral icterus.        Right eye: No discharge.         Left eye: No discharge.      Extraocular Movements: Extraocular movements intact.      Conjunctiva/sclera: Conjunctivae normal.      Pupils: Pupils are equal, round, and reactive to light.   Neck:      Musculoskeletal: Normal range of motion and neck supple. No neck rigidity or muscular tenderness.      Vascular: No JVD.      Trachea: No tracheal deviation.   Cardiovascular:      Rate and Rhythm: Normal rate and regular rhythm.      Pulses: Normal pulses.      Heart sounds: Normal heart sounds. No murmur.   Pulmonary:      Effort: Pulmonary effort is normal. No respiratory distress.      Breath sounds: Normal breath sounds. No stridor. No wheezing or rales.   Chest:      Chest wall: No tenderness.   Abdominal:      General: Abdomen is flat. Bowel sounds are normal. There is no distension.      Palpations: Abdomen is soft. There is no mass.      Tenderness: There is no tenderness. There is no guarding or rebound.      Hernia: No hernia is present.   Musculoskeletal: Normal range of motion.         General: No swelling or tenderness (right hip).      Right lower leg: Edema (+2 pitting edema b/l L/E) present.      Left lower leg: Edema present.   Skin:     General: Skin is warm and dry.      Capillary Refill: Capillary refill takes less than 2 seconds.      Coloration: Skin is not pale.   Neurological:      General: No focal deficit present.      Mental Status: She is alert and oriented to person, place, and time. Mental status is at baseline.      Cranial Nerves: No cranial nerve deficit.      Motor: No abnormal muscle tone.      Gait: Gait normal.   Psychiatric:         Mood and Affect: Mood normal.         Behavior: Behavior normal.         Thought Content: Thought content normal.         Judgment: Judgment normal.         Fluids    Intake/Output  Summary (Last 24 hours) at 11/7/2019 0753  Last data filed at 11/7/2019 0600  Gross per 24 hour   Intake 1460 ml   Output 400 ml   Net 1060 ml       Laboratory  Recent Labs     11/05/19  0324 11/07/19  0414   WBC 4.2* 7.3   RBC 3.68* 3.14*   HEMOGLOBIN 12.0 9.9*   HEMATOCRIT 36.0* 30.7*   MCV 97.8 97.8   MCH 32.6 31.5   MCHC 33.3* 32.2*   RDW 45.0 44.0   PLATELETCT 258 207   MPV 9.7 10.2     Recent Labs     11/05/19  0324 11/06/19  0938 11/07/19  0414   SODIUM 133* 131* 135   POTASSIUM 5.4 5.0 5.6*   CHLORIDE 101 96 99   CO2 23 23 27   GLUCOSE 114* 97 128*   BUN 30* 29* 26*   CREATININE 0.63 0.69 0.67   CALCIUM 9.1 9.6 8.5                   Imaging  DX-PELVIS-1 OR 2 VIEWS   Final Result      Interval right hip arthroplasty without evident complication.      IR-MIDLINE CATHETER INSERTION WO GUIDANCE > AGE 3   Final Result                  Ultrasound-guided midline placement performed by qualified nursing staff    as above.          CT-HIP W/O PLUS RECONS RIGHT   Final Result      1.  Marked deformity of the right hip joint characterized by fragmentation of the femoral head with bony resorption and flattening of the femoral head. 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 Acuity of the fragmentation cannot be assessed. There is also secondary severe osteoarthritis present.      2.  Prior left hip resurfacing procedure.      EC-ECHOCARDIOGRAM COMPLETE W/O CONT   Final Result      DX-HIP-UNILATERAL-WITH PELVIS-1 VIEW RIGHT   Final Result         1.  No radiographic evidence of acute traumatic injury.   2.  Severe degenerative changes of the right hip with extensive bony remodeling of the femoral head and acetabula.      US-EXTREMITY VENOUS LOWER BILAT   Final Result      No gross deep venous thrombosis.      Severely limited evaluation due to subcutaneous fat edema, patient pain and mobility limitations      DX-CHEST-PORTABLE (1 VIEW)   Final Result       No acute cardiopulmonary findings.      MR-LUMBAR SPINE-W/O    (Results Pending)        Assessment/Plan  * Degenerative joint disease of right hip  Assessment & Plan  Patient was seen by an orthopedic surgeon in [place] but wanted to wait until she moved to [place] to have surgery, now she is having difficulty ambulating and laying flat due to pain  Pain not improved   Pain control IV/PO.  Resume gabapentin .  S/p total hip arthoplasty 11/6/2019  Pain control  PT/OT when okay from surgery     Radiculopathy  Assessment & Plan  MRI lumbar spine, patient unable to tolerate due to anxiety.  No acute issues, denies back pain.     Bilateral leg edema  Assessment & Plan  LE duplex neg for DVT  Improving with lasix, continue.  I/Os  Echocardiogram shows preserved LVEF.     Acute prerenal azotemia  Assessment & Plan  Resolved.  CTM    Dehydration  Assessment & Plan      Euvolemic currently    Hyperkalemia  Assessment & Plan      K: 5.6 today,       continue with lasix, repeat bmp in several hours.     Hyponatremia  Assessment & Plan  2/2 to dehydration  Resolved, patient is euvolemic.     UTI (urinary tract infection)  Assessment & Plan  Repeat UA still pending.    Anemia  Assessment & Plan  2/2 to post op blood loss  Hgb: 9.9<12.0  continue to monitor, otherwise no signs of gross bleeding currently.        Patient plan of care discussed at multidisplinary team rounds and with patient and R.N at beside.    VTE prophylaxis: lovenox.

## 2019-11-07 NOTE — PROGRESS NOTES
S:  s/p Right SUNSHINE POD #1    Having muscle spasms not relieved with flexeril.  Has issues with these at home.  Pain otherwise controlled.  No fever or chills.  No CP/HA/SOB.  No N/V/D.  Working with PT/OT.  Tolerating regular diet    AVSS    Exam:    NAD A& O x3  Breathing Non-labored  RLE: +DF/PF/EHL SILT L4/L5/S1, Soft Calf Compartments without signs of DVT.  2 + pitting edema.  Good DP Pulse.  Prevena over right hip with good suction and no output.  Soft thigh compartments  LLE:  +DF/PF/EHL SILT L4/L5/S1Soft Calf Compartments without signs of DVT.  2 + pitting edema.  Good DP Pulse.    Impression    1) Severe AVN/Right Hip Fracture s/p Right SUNSHINE POD #1    Plan:  Continue standard plan of care  Weight bearing: WBAT with walker and posterior hip precautions  DVT prophylaxis: SCD/Teds + ASA 81 mg PO BID x 30 days for DVT Prophylaxis  Dispo: SNF vs Rehab when medically stable.    F/U in office in 2 weeks for dressing removal and X-ray  Call [facility] for appointment time and questions.

## 2019-11-07 NOTE — OR NURSING
1606 Patient to recovery via cart. Placed on monitors. Patient sleeping Oral airway remains in place.  1611 Oral airway discontinued. Patient continues to sleep.  1635 Patients bedding changed due to urination. Patient gowned and blankets given. Patient medicated for pain intravenously.   1655 Patient resting comfortably.  1719 Xray at bedside to take films of patient. Patient very uncomfortable. Took a lot of positioning to get picture.  1724 Patients family updated on patients status.  1735 Report to Mary Anne OCASIO on GSU.

## 2019-11-07 NOTE — ANESTHESIA POSTPROCEDURE EVALUATION
Patient: Janessa Cain    Procedure Summary     Date:  11/06/19 Room / Location:   OR  / SURGERY [facility]    Anesthesia Start:  1423 Anesthesia Stop:  1610    Procedure:  ARTHROPLASTY, HIP, TOTAL (Right Hip) Diagnosis:  (avascular necrosis)    Surgeon:  Pauline Mo M.D. Responsible Provider:  Monroe Minor M.D.    Anesthesia Type:  general ASA Status:  2          Final Anesthesia Type: general  Last vitals  BP   Blood Pressure : 137/70    Temp   36.4 °C (97.5 °F)    Pulse   Pulse: 84   Resp   20    SpO2   90 %      Anesthesia Post Evaluation    Patient location during evaluation: PACU  Patient participation: complete - patient participated  Level of consciousness: awake  Pain score: 3    Airway patency: patent  Anesthetic complications: no  Cardiovascular status: hemodynamically stable  Respiratory status: acceptable  Hydration status: euvolemic    PONV: none           Nurse Pain Score: 10 (NPRS)

## 2019-11-07 NOTE — THERAPY
"Occupational Therapy Evaluation completed.   Functional Status:  Pt was seen for an OT re evaluation following a R THP- Pt with a long standing h/o increasing pain with AVN. Pt lives with her , who will assist with care after d/c from rehab. PLOF- I to Supervised for ADLS with the use of AE/ DME- until recently. Pt presents with c/o pain with ADLS and functional mobility. Pt requires Supervision for UB seated Self care, Mod A for LB- with AE, Min A for functional transfers - once OOB. Mod A for bed mobility and to go from Supine to EOB. Pt will benefit from Acute OT services to increase functional I. Pt and her  were educated regarding THP, DME, AE and techniques for ADLS and functional mobility.   Plan of Care: Will benefit from Occupational Therapy 3 times per week  Discharge Recommendations:  Equipment: Will Continue to Assess for Equipment Needs. Post-acute therapy Discharge to a transitional care facility for continued skilled therapy services.    See \"Rehab Therapy-Acute\" Patient Summary Report for complete documentation.    "

## 2019-11-07 NOTE — THERAPY
"Physical Therapy Treatment completed.   Bed Mobility:  Supine to Sit: Moderate Assist  Transfers: Sit to Stand: Minimal Assist  Gait: Level Of Assist: Minimal Assist with Front-Wheel Walker  X 50 feet     Plan of Care: Will benefit from Physical Therapy 7 times per week  Discharge Recommendations: Equipment: No Equipment Needed. Post-acute therapy Discharge to a transitional care facility for continued skilled therapy services.   Pt had R THR yesterday post hip precautions,WBAT  Pt ambulated 50 feet with FWW  but is quite unsteady will need SNF stay  See \"Rehab Therapy-Acute\" Patient Summary Report for complete documentation.       "

## 2019-11-07 NOTE — OR SURGEON
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    Anesthesiologist/Type of Anesthesia:  Anesthesiologist: Monroe Mnior M.D./General    Surgical Staff:  Circulator: Tito Sheets R.N.; Leatha Hutton R.N.  Scrub Person: Sher Rivera Assist: Julio Gomez II P.A.-CDean    Specimens removed if any:  ID Type Source Tests Collected by Time Destination   1 : right proximal femoral head Bone Bone AFB CULTURE, FUNGAL CULTURE, AEROBIC/ANAEROBIC CULTURE (SURGERY) Pauline Mo M.D. 11/6/2019  3:18 PM        Estimated Blood Loss: 500 cc    Findings: acetabular discontinuity    Complications: none noted        11/6/2019 6:13 PM Pauline Mo M.D.

## 2019-11-07 NOTE — CARE PLAN
Problem: Safety  Goal: Will remain free from injury  Outcome: PROGRESSING AS EXPECTED  Note:   Pt educated to call prior to ambulating.      Problem: Pain Management  Goal: Pain level will decrease to patient's comfort goal  Outcome: PROGRESSING AS EXPECTED  Flowsheets (Taken 11/7/2019 0303)  Non Verbal Scale : Calm; Sleeping; Unlabored Breathing

## 2019-11-07 NOTE — OP REPORT
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    PROCEDURE PERFORMED:  Right total hip arthroplasty.    IMPLANTS USED:  Nicho dual mobility total hip arthroplasty system with the   following components:  1.  A Trident Tritanium hemispherical shell, size 54, alpha code E.  2.  An Accolade II femoral stem size 5 with a 132-degree neck angle.  3.  An MDM dual mobility head system with a 28-mm Biolox delta ceramic head   with a -4 neck length and a 28x48 X3 outer head.  4.  Three acetabular bone screws were placed for adjunctive fixation.  5.  The appropriate MDM cementless liner was used, alpha code E.    SURGEON:  Pauline Mo MD    ANESTHESIOLOGIST:  Monroe Minor MD.    ASSISTANT:  MORGAN Cherry    ESTIMATED BLOOD LOSS:  400 mL.    FINDINGS:  Include severe destructive changes with chondrolysis of the right   femoral head and posterior superior acetabular deficiency.    SPECIMENS:  Include the right femoral head sent for aerobic, anaerobic,   fungal, and AFB cultures.    INDICATIONS FOR PROCEDURE:  This patient is an 84-year-old female who had a   history of right hip arthritis.  Her pain had become so severe and intractable   that she presented to the emergency room and had to be admitted for pain   control.  Orthopedics was consulted.  The risks, benefits and alternatives of   right hip replacement were explained to her including the increased risk of   infection and dislocation.  She expressed an understanding, but stated that   she could not proceed living as she currently was and wished to proceed.  She   expressed an understanding of the risks of pain, bleeding, infection, need for   further surgery, lack of healing, fractures, dislocations, damage to   surrounding structures, heart attack, stroke, and death.   She was cleared by   the hospitalist and taken to the operating room on the day of surgery for the   above named procedure.  Please note the multimodal and perioperative cleansing   techniques were used in an effort to reduce the risk of infection.    DESCRIPTION OF PROCEDURE:  The patient was met in the preoperative holding   area.  The right hip was marked as the appropriate operative site with   indelible ink.  She was taken to the operating room where the anesthesia   department started a general anesthetic for intraoperative and postoperative   pain relief.  She was placed on the OR table.  She was turned on her side with   the right hip facing up.  All bony prominences were padded appropriately.    The right hip was prepped and draped in a standard sterile surgical fashion.    A time-out procedure was called to verify the side and site of surgery as well   as the administration of preoperative antibiotics.  After successful   completion of the time-out procedure, attention was turned to the right hip.    A straight incision was made centered on the greater trochanter.  This was   carried down through the subcutaneous tissue with the assistance of the Bovie   electrocautery.  The fascia was identified and cleared and then incised in   line with its fibers.  A Charnley retractor was placed.  The hip was   internally rotated.  She was noted to have atrophy throughout her abductor   musculature.  This was debrided.  Her posterior capsule was able to be   identified.  This was cleared and then incised in a T capsulotomy.  Each limb   of the T was tagged for later repair.  She was noted to have destructive   changes throughout the posterior and superior acetabulum.  The hip was able to   be dislocated.  The femoral head had collapsed.  We did clear the femoral   neck and made our neck cut using an osteotomy guide and her previously   templated images.  We removed the femoral head, which was sent away for   culture.   We then elevated the femur out of the wound.  It was prepared first   with a box osteotome and then with a canal finder followed by a lateralizing   reamer and broached up to a size 5, at which point we noted an excellent   calcar fit and rotational stability.  The broach was removed.  The femoral   canal was packed with baby lap sponge.  The femur was retracted anteriorly.    We turned our attention to the acetabulum, which was exposed in a 360-degree   fashion.  We did remove several fragments of the femoral head, which were sent   away with the previous specimen.  We cleared soft tissue from around the rim   of the acetabulum.  We began our reaming first in the medial direction and   then in the proper amount of abduction and anteversion up to a size 53, at   which point we noted excellent bony bleeding bed for implantation.  The wound   was thoroughly irrigated and a 54-mm cup was opened and impacted into place in   the proper amount of abduction and anteversion.  Three acetabular bone screws   were placed for adjunctive fixation, the cup was noted to be solidly fixed   and did not move with a pelvic lift-off test.  Therefore, the MDM cementless   liner was impacted into place and tested with the removal tool to ensure   proper seating.    At this point, we turned our attention back to the femur.  A trial stem was   assembled.  We trialed both the +0 and the -4 heads.  With the +0 head, there   was inappropriate leg lengths and tension, but with the -4 head, we noted   excellent restoration of leg length, offset and stability.  She was stable   past 90 degrees of flexion, past 60 degrees of internal rotation, and did not   dislocate with extension and external rotation.  Therefore, the hip was   dislocated with the assistance of a bone hook.  The wound was thoroughly   irrigated.  The broach was removed.  The real stem was opened and impacted   into place.  It sat at the same level as the broach.  Another trial  was   performed with the -4 head and again we noted excellent restoration of leg   length, offset and stability.  Therefore, the real dual mobility head was   opened in a sterile fashion on the back table and assembled.  The inner head   was noted to be moving freely within the outer head.  It was impacted onto the   taper, which had been cleaned and dried thoroughly.  Another reduction was   made and again we noted excellent restoration of leg length, offset and   stability.  Therefore, a dilute Betadine solution was instilled for 3 minutes   before being pulse lavaged out.  Remnants of the posterior capsule were closed   with #5 Ethibond.  The fascial layer was closed with #2 Quill.  The deep   subcutaneous layer was closed with a running #1 PDS, deep dermal layer was   closed with 2-0 Vicryl in a buried interrupted fashion.  The skin was closed   with staples and a sterile Prevena negative pressure wound management system   was placed and is holding excellent suction at the time of this dictation.    The patient is currently in the operating room awaiting reversal of   anesthesia.       ____________________________________     MD BROOKE Vilchis / DICK    DD:  11/06/2019 16:04:13  DT:  11/06/2019 17:09:29    D#:  [id number]  Job#:  [id number]

## 2019-11-07 NOTE — ANESTHESIA TIME REPORT
Anesthesia Start and Stop Event Times     Date Time Event    11/6/2019 1333 Ready for Procedure     1423 Anesthesia Start     1610 Anesthesia Stop        Responsible Staff  11/06/19    Name Role Begin End    Monroe Minor M.D. Anesth 1423 1610        Preop Diagnosis (Free Text):  Pre-op Diagnosis     avascular necrosis        Preop Diagnosis (Codes):    Post op Diagnosis  Right hip pain      Premium Reason  A. 3PM - 7AM    Comments:

## 2019-11-07 NOTE — PROGRESS NOTES
Received report from Luiza OCASIO. Assumed care. This pt is AOx4, reports moderate pain in R hip, will medicate per MAR, Patient and RN discussed plan of care including pain management, IS use, Pt/OT: questions answered. Chart reviewed. Call light in place, fall precautions in place, patient educated on importance of calling for assistance. No additional needs at this time.

## 2019-11-08 LAB
ANION GAP SERPL CALC-SCNC: 10 MMOL/L (ref 0–11.9)
BUN SERPL-MCNC: 30 MG/DL (ref 8–22)
CALCIUM SERPL-MCNC: 8.4 MG/DL (ref 8.4–10.2)
CHLORIDE SERPL-SCNC: 100 MMOL/L (ref 96–112)
CO2 SERPL-SCNC: 26 MMOL/L (ref 20–33)
CREAT SERPL-MCNC: 0.69 MG/DL (ref 0.5–1.4)
ERYTHROCYTE [DISTWIDTH] IN BLOOD BY AUTOMATED COUNT: 45.1 FL (ref 35.9–50)
GLUCOSE SERPL-MCNC: 105 MG/DL (ref 65–99)
HCT VFR BLD AUTO: 28.1 % (ref 37–47)
HGB BLD-MCNC: 9.2 G/DL (ref 12–16)
MCH RBC QN AUTO: 32.3 PG (ref 27–33)
MCHC RBC AUTO-ENTMCNC: 32.7 G/DL (ref 33.6–35)
MCV RBC AUTO: 98.6 FL (ref 81.4–97.8)
PLATELET # BLD AUTO: 192 K/UL (ref 164–446)
PMV BLD AUTO: 10 FL (ref 9–12.9)
POTASSIUM SERPL-SCNC: 5 MMOL/L (ref 3.6–5.5)
RBC # BLD AUTO: 2.85 M/UL (ref 4.2–5.4)
SODIUM SERPL-SCNC: 136 MMOL/L (ref 135–145)
WBC # BLD AUTO: 5 K/UL (ref 4.8–10.8)

## 2019-11-08 PROCEDURE — A9270 NON-COVERED ITEM OR SERVICE: HCPCS | Performed by: PHYSICIAN ASSISTANT

## 2019-11-08 PROCEDURE — A9270 NON-COVERED ITEM OR SERVICE: HCPCS | Performed by: INTERNAL MEDICINE

## 2019-11-08 PROCEDURE — 700102 HCHG RX REV CODE 250 W/ 637 OVERRIDE(OP): Performed by: HOSPITALIST

## 2019-11-08 PROCEDURE — 700102 HCHG RX REV CODE 250 W/ 637 OVERRIDE(OP): Performed by: INTERNAL MEDICINE

## 2019-11-08 PROCEDURE — 36415 COLL VENOUS BLD VENIPUNCTURE: CPT

## 2019-11-08 PROCEDURE — 700111 HCHG RX REV CODE 636 W/ 250 OVERRIDE (IP): Performed by: INTERNAL MEDICINE

## 2019-11-08 PROCEDURE — 99232 SBSQ HOSP IP/OBS MODERATE 35: CPT | Performed by: HOSPITALIST

## 2019-11-08 PROCEDURE — 700111 HCHG RX REV CODE 636 W/ 250 OVERRIDE (IP): Performed by: HOSPITALIST

## 2019-11-08 PROCEDURE — 80048 BASIC METABOLIC PNL TOTAL CA: CPT

## 2019-11-08 PROCEDURE — A9270 NON-COVERED ITEM OR SERVICE: HCPCS | Performed by: HOSPITALIST

## 2019-11-08 PROCEDURE — 770001 HCHG ROOM/CARE - MED/SURG/GYN PRIV*

## 2019-11-08 PROCEDURE — 700102 HCHG RX REV CODE 250 W/ 637 OVERRIDE(OP): Performed by: PHYSICIAN ASSISTANT

## 2019-11-08 PROCEDURE — 85027 COMPLETE CBC AUTOMATED: CPT

## 2019-11-08 RX ORDER — KETOROLAC TROMETHAMINE 30 MG/ML
15 INJECTION, SOLUTION INTRAMUSCULAR; INTRAVENOUS ONCE
Status: COMPLETED | OUTPATIENT
Start: 2019-11-08 | End: 2019-11-08

## 2019-11-08 RX ORDER — SULFAMETHOXAZOLE AND TRIMETHOPRIM 800; 160 MG/1; MG/1
1 TABLET ORAL EVERY 12 HOURS
Status: DISCONTINUED | OUTPATIENT
Start: 2019-11-08 | End: 2019-11-11

## 2019-11-08 RX ORDER — KETOROLAC TROMETHAMINE 30 MG/ML
15 INJECTION, SOLUTION INTRAMUSCULAR; INTRAVENOUS EVERY 6 HOURS PRN
Status: DISCONTINUED | OUTPATIENT
Start: 2019-11-08 | End: 2019-11-12 | Stop reason: HOSPADM

## 2019-11-08 RX ADMIN — SENNOSIDES, DOCUSATE SODIUM 2 TABLET: 50; 8.6 TABLET, FILM COATED ORAL at 18:16

## 2019-11-08 RX ADMIN — CYCLOBENZAPRINE HYDROCHLORIDE 5 MG: 10 TABLET, FILM COATED ORAL at 07:05

## 2019-11-08 RX ADMIN — SENNOSIDES, DOCUSATE SODIUM 2 TABLET: 50; 8.6 TABLET, FILM COATED ORAL at 06:00

## 2019-11-08 RX ADMIN — FUROSEMIDE 20 MG: 10 INJECTION, SOLUTION INTRAVENOUS at 20:57

## 2019-11-08 RX ADMIN — OMEPRAZOLE 20 MG: 20 CAPSULE, DELAYED RELEASE ORAL at 06:00

## 2019-11-08 RX ADMIN — GABAPENTIN 100 MG: 100 CAPSULE ORAL at 18:16

## 2019-11-08 RX ADMIN — CYCLOBENZAPRINE HYDROCHLORIDE 5 MG: 10 TABLET, FILM COATED ORAL at 13:37

## 2019-11-08 RX ADMIN — CELECOXIB 200 MG: 200 CAPSULE ORAL at 06:00

## 2019-11-08 RX ADMIN — GABAPENTIN 100 MG: 100 CAPSULE ORAL at 06:00

## 2019-11-08 RX ADMIN — DIAZEPAM 5 MG: 5 TABLET ORAL at 18:16

## 2019-11-08 RX ADMIN — SULFAMETHOXAZOLE AND TRIMETHOPRIM 1 TABLET: 800; 160 TABLET ORAL at 18:16

## 2019-11-08 RX ADMIN — CYCLOBENZAPRINE HYDROCHLORIDE 5 MG: 10 TABLET, FILM COATED ORAL at 20:57

## 2019-11-08 RX ADMIN — KETOROLAC TROMETHAMINE 15 MG: 30 INJECTION, SOLUTION INTRAMUSCULAR at 15:35

## 2019-11-08 RX ADMIN — CALCIUM CARBONATE-CHOLECALCIFEROL TAB 250 MG-125 UNIT 1 TABLET: 250-125 TAB at 06:00

## 2019-11-08 RX ADMIN — ASPIRIN 81 MG CHEWABLE TABLET 81 MG: 81 TABLET CHEWABLE at 06:00

## 2019-11-08 RX ADMIN — MORPHINE SULFATE 2 MG: 4 INJECTION INTRAVENOUS at 18:32

## 2019-11-08 RX ADMIN — LOSARTAN POTASSIUM 25 MG: 25 TABLET ORAL at 06:00

## 2019-11-08 RX ADMIN — DIAZEPAM 5 MG: 5 TABLET ORAL at 11:25

## 2019-11-08 RX ADMIN — FUROSEMIDE 20 MG: 10 INJECTION, SOLUTION INTRAVENOUS at 06:00

## 2019-11-08 RX ADMIN — MORPHINE SULFATE 2 MG: 4 INJECTION INTRAVENOUS at 07:19

## 2019-11-08 RX ADMIN — ASPIRIN 81 MG CHEWABLE TABLET 81 MG: 81 TABLET CHEWABLE at 18:15

## 2019-11-08 RX ADMIN — KETOROLAC TROMETHAMINE 15 MG: 30 INJECTION, SOLUTION INTRAMUSCULAR at 11:25

## 2019-11-08 RX ADMIN — MORPHINE SULFATE 2 MG: 4 INJECTION INTRAVENOUS at 04:09

## 2019-11-08 RX ADMIN — GABAPENTIN 100 MG: 100 CAPSULE ORAL at 11:25

## 2019-11-08 RX ADMIN — KETOROLAC TROMETHAMINE 15 MG: 30 INJECTION, SOLUTION INTRAMUSCULAR at 20:57

## 2019-11-08 ASSESSMENT — ENCOUNTER SYMPTOMS
ORTHOPNEA: 0
HEMOPTYSIS: 0
BACK PAIN: 0
CHILLS: 0
PALPITATIONS: 0
HEADACHES: 0
STRIDOR: 0
SORE THROAT: 0
MYALGIAS: 0
SHORTNESS OF BREATH: 0
FLANK PAIN: 0
MEMORY LOSS: 0
CLAUDICATION: 0
WEIGHT LOSS: 0
BLURRED VISION: 0
DEPRESSION: 0
SENSORY CHANGE: 0
ABDOMINAL PAIN: 0
DIZZINESS: 0
PHOTOPHOBIA: 0
VOMITING: 0
NERVOUS/ANXIOUS: 0
TINGLING: 0
BRUISES/BLEEDS EASILY: 0
PND: 0
LOSS OF CONSCIOUSNESS: 0
TREMORS: 0
NECK PAIN: 0
COUGH: 0
FEVER: 0
DOUBLE VISION: 0
WEAKNESS: 0
HEARTBURN: 0
SPUTUM PRODUCTION: 0
BLOOD IN STOOL: 0
NAUSEA: 0
CONSTIPATION: 0
EYE PAIN: 0
SPEECH CHANGE: 0

## 2019-11-08 NOTE — PROGRESS NOTES
[facility] Medicine Daily Progress Note    Date of Service  11/8/2019    Chief Complaint  84 y.o. female admitted 11/3/2019 with severe hip pain.    Hospital Course    History of Presenting Illness per Dr. Handley's H&P:  84 y.o. female who presented 11/3/2019 with degenerative disease of the hips and history of left hip surgery in 2008 who has known degenerative disease of the right hip and was seen by orthopedic surgery in [place] but wanted to wait until after she moved to [place] to have surgery. She moved to [place] one week ago and the last three days she is sleeping in a wheelchair at night as she cannot tolerate laying flat due to pain.  She uses a walker to ambulate during the day but is having increased difficulty ambulating due to pain. She describes the pain in her right leg as radiating down her right leg into the foot and burning in nature.  She has constipation and is not drinking much as it is difficult to get to the bathroom to void.      Interval Problem Update  11/7  No acute overnight events, patient's only complaint right now is right hip pain especially when she moves her place a slight weight.  Otherwise doing well. Patient denies fevers/chills, chest pain, shortness of breath or nausea/vommiting.   Pain control  PT/OT when cleared by surgery    11/8  Patient is having mild hip pain but overall 4-6/10 pain, exacerbated by movement. Otherwise doing well. Patient denies fevers/chills, chest pain, shortness of breath or nausea/vommiting.   Continue with pain control, preferably with PO.  PT/OT, placement options.    Consultants/Specialty  Orthopedic surgery, Dr. Mo.    Code Status  Full Code    Disposition  TBD.    Review of Systems  Review of Systems   Constitutional: Positive for malaise/fatigue. Negative for chills, fever and weight loss.   HENT: Negative for congestion, hearing loss, sore throat and tinnitus.    Eyes: Negative for blurred vision, double vision, photophobia and pain.    Respiratory: Negative for cough, hemoptysis, sputum production, shortness of breath and stridor.    Cardiovascular: Negative for chest pain, palpitations, orthopnea, claudication, leg swelling and PND.   Gastrointestinal: Negative for abdominal pain, blood in stool, constipation, heartburn, melena, nausea and vomiting.   Genitourinary: Negative for dysuria, flank pain, frequency and urgency.   Musculoskeletal: Positive for joint pain (unchanged right hip pain). Negative for back pain, myalgias and neck pain.   Neurological: Negative for dizziness, tingling, tremors, sensory change, speech change, loss of consciousness, weakness and headaches.   Endo/Heme/Allergies: Does not bruise/bleed easily.   Psychiatric/Behavioral: Negative for depression, memory loss and suicidal ideas. The patient is not nervous/anxious.    All other systems reviewed and are negative.       Physical Exam  Temp:  [36.6 °C (97.9 °F)-36.9 °C (98.5 °F)] 36.6 °C (97.9 °F)  Pulse:  [] 89  Resp:  [16-18] 18  BP: (112-135)/(40-58) 128/57  SpO2:  [92 %-97 %] 92 %    Physical Exam  Vitals signs and nursing note reviewed.   Constitutional:       General: She is not in acute distress.     Appearance: Normal appearance. She is well-developed. She is obese. She is not ill-appearing or diaphoretic.   HENT:      Head: Normocephalic and atraumatic.      Right Ear: External ear normal.      Left Ear: External ear normal.      Nose: Nose normal. No congestion.      Mouth/Throat:      Mouth: Mucous membranes are moist.      Pharynx: No oropharyngeal exudate or posterior oropharyngeal erythema.   Eyes:      General: No scleral icterus.        Right eye: No discharge.         Left eye: No discharge.      Extraocular Movements: Extraocular movements intact.      Conjunctiva/sclera: Conjunctivae normal.      Pupils: Pupils are equal, round, and reactive to light.   Neck:      Musculoskeletal: Normal range of motion and neck supple. No neck rigidity or  muscular tenderness.      Vascular: No JVD.      Trachea: No tracheal deviation.   Cardiovascular:      Rate and Rhythm: Normal rate and regular rhythm.      Pulses: Normal pulses.      Heart sounds: Normal heart sounds. No murmur.   Pulmonary:      Effort: Pulmonary effort is normal. No respiratory distress.      Breath sounds: Normal breath sounds. No stridor. No wheezing or rales.   Chest:      Chest wall: No tenderness.   Abdominal:      General: Abdomen is flat. Bowel sounds are normal. There is no distension.      Palpations: Abdomen is soft. There is no mass.      Tenderness: There is no tenderness. There is no guarding or rebound.      Hernia: No hernia is present.   Musculoskeletal:         General: Tenderness (right hip ) present. No swelling.      Right lower leg: Edema (+1 pitting edema b/l L/E) present.      Left lower leg: Edema (+1 pitting edema b/l L/E) present.   Skin:     General: Skin is warm and dry.      Capillary Refill: Capillary refill takes less than 2 seconds.      Coloration: Skin is not pale.   Neurological:      General: No focal deficit present.      Mental Status: She is alert and oriented to person, place, and time. Mental status is at baseline.      Cranial Nerves: No cranial nerve deficit.      Motor: No abnormal muscle tone.      Gait: Gait normal.   Psychiatric:         Mood and Affect: Mood normal.         Behavior: Behavior normal.         Thought Content: Thought content normal.         Judgment: Judgment normal.         Fluids    Intake/Output Summary (Last 24 hours) at 11/8/2019 0826  Last data filed at 11/8/2019 0745  Gross per 24 hour   Intake 2250 ml   Output 1000 ml   Net 1250 ml       Laboratory  Recent Labs     11/07/19  0414 11/08/19  0423   WBC 7.3 5.0   RBC 3.14* 2.85*   HEMOGLOBIN 9.9* 9.2*   HEMATOCRIT 30.7* 28.1*   MCV 97.8 98.6*   MCH 31.5 32.3   MCHC 32.2* 32.7*   RDW 44.0 45.1   PLATELETCT 207 192   MPV 10.2 10.0     Recent Labs     11/06/19  0978  11/07/19  0414 11/07/19  1441 11/08/19  0423   SODIUM 131* 135  --  136   POTASSIUM 5.0 5.6* 5.1 5.0   CHLORIDE 96 99  --  100   CO2 23 27  --  26   GLUCOSE 97 128*  --  105*   BUN 29* 26*  --  30*   CREATININE 0.69 0.67  --  0.69   CALCIUM 9.6 8.5  --  8.4                   Imaging  DX-PELVIS-1 OR 2 VIEWS   Final Result      Interval right hip arthroplasty without evident complication.      IR-MIDLINE CATHETER INSERTION WO GUIDANCE > AGE 3   Final Result                  Ultrasound-guided midline placement performed by qualified nursing staff    as above.          CT-HIP W/O PLUS RECONS RIGHT   Final Result      1.  Marked deformity of the right hip joint characterized by fragmentation of the femoral head with bony resorption and flattening of the femoral head. 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 Acuity of the fragmentation cannot be assessed. There is also secondary severe osteoarthritis present.      2.  Prior left hip resurfacing procedure.      EC-ECHOCARDIOGRAM COMPLETE W/O CONT   Final Result      DX-HIP-UNILATERAL-WITH PELVIS-1 VIEW RIGHT   Final Result         1.  No radiographic evidence of acute traumatic injury.   2.  Severe degenerative changes of the right hip with extensive bony remodeling of the femoral head and acetabula.      US-EXTREMITY VENOUS LOWER BILAT   Final Result      No gross deep venous thrombosis.      Severely limited evaluation due to subcutaneous fat edema, patient pain and mobility limitations      DX-CHEST-PORTABLE (1 VIEW)   Final Result      No acute cardiopulmonary findings.      MR-LUMBAR SPINE-W/O    (Results Pending)        Assessment/Plan  * Degenerative joint disease of right hip  Assessment & Plan  Patient was seen by an orthopedic surgeon in [place] but wanted to wait until she moved to [place] to have surgery, now she is having difficulty ambulating and laying flat due to pain  S/p total hip  arthoplasty 11/6/2019  Resume gabapentin .  continue with pain control  Placement options pending     Radiculopathy  Assessment & Plan  MRI lumbar spine, patient unable to tolerate due to anxiety. Patient declined at this time.  No acute issues, denies back pain.   Consider outpatient if reoccurs.    Bilateral leg edema  Assessment & Plan  LE duplex neg for DVT  Improving overall  Resume lasix as tolerated.  Echocardiogram did show preserved LVEF.     Acute prerenal azotemia  Assessment & Plan  Resolved.  CTM    Dehydration  Assessment & Plan      Euvolemic currently    Hyperkalemia  Assessment & Plan      K: 5.0, resolved from 5.6 yesterday.  CTM closely.    Hyponatremia  Assessment & Plan  2/2 to dehydration  Resolved, patient is euvolemic.     UTI (urinary tract infection)  Assessment & Plan  Repeat UA shows small LE, 20-50 WBCs  Will start bactrim,    Anemia  Assessment & Plan  2/2 to operative blood loss   Hgb: 9.2<9.9<12.0  No signs of gross bleeding otherwise  Continue to repeat cbcs in the am       Patient plan of care discussed at multidisplinary team rounds and with patient and R.N at [facility].    VTE prophylaxis: lovenox.

## 2019-11-08 NOTE — DISCHARGE PLANNING
Physiatry Dr. Park recommending pt appropriate for inpatient rehab. IV morphine is barrier to IRF. Pt will need to have pain managed on oral regimen prior to transition to acute rehab. Will follow for medical readiness/bed availability [facility].  update to d/c planner [phone]. TCC remains following.

## 2019-11-08 NOTE — PROGRESS NOTES
Hospitalist at bedside, educated pt on trying to wean off IV morphine in order to discharge. Pt will not take oxycodone or tramadol due to side effects and allergic reactions. MD notified. New orders received to stop continuous IV fluids and give one time dose of toradol 15mg instead of morphine

## 2019-11-08 NOTE — PROGRESS NOTES
Spoke with Dr. Eugene, pt stated that toradol was helpful for pain as an alternative to morphine, new orders received.

## 2019-11-08 NOTE — CONSULTS
Physical Medicine and Rehabilitation   Chart Review Consult     Date of Consultation: 11/8/2019  Consulting provider: Crystal Eugene MD  Reason for consultation: assess for acute inpatient rehab appropriateness  LOS: 5 Day(s)    Chief complaint: Right hip avascular necrosis    HPI: The patient is a 84 y.o. 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  Patient was seen by orthopedic surgeon in [place] but wanted to wait until she moved to [place] to have surgery.  She moved to [place] 1 week ago the prior 3 days to admission has been sleeping in a wheelchair at night because she cannot tolerate laying flat secondary to pain.  She is a walker to ambulate with much pain.  Pain is described as burning, location is leg and foot on the right side.  On presentation she is constipated and dehydrated due to not drinking water because it is difficult for her to get to the bathroom to void.  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  Orthopedics was consulted, and patient underwent total hip arthroplasty on 11/6/2019 by Dr. Cinthya Mo MD.     OT: Mod assist for lower body dressing and care, mod assist for bed mobility  PT: Mod assist supine to sit, min assist sit to stand, min assist with front wheel walker for gait for 50 feet    Medical complexity   Anemia with hemoglobin at 9.2, pain control, muscle spasms, prerenal azotemia, hyperkalemia, hyponatremia, questionable UTI, questionable radiculopathy-patient cannot tolerate MRI due to anxiety.     she does apparently continue to have moderately severe functional impairment with mobility and activities of daily living. This patient is a very good candidate for acute inpatient rehabilitation, both reasonable and necessary, including 24 hr Physician supervision and rehab nursing care, evaluation  and treatment by physical therapy, occupational therapy, and respiratory therapy/dietary. she appears to be able to tolerate therapy 3 hours per day 5 days per week or a minimum of 15 hours per week.     her precautions include: Fall/Safety precautions.     DVT PPX: SCDs    PMH:  Past Medical History:   Diagnosis Date   • Hypertension        PSH:  Past Surgical History:   Procedure Laterality Date   • PB TOTAL HIP ARTHROPLASTY Right 11/6/2019    Procedure: ARTHROPLASTY, HIP, TOTAL;  Surgeon: Pauline Mo M.D.;  Location: SURGERY [facility];  Service: Orthopedics       FAMILY HISTORY:  Family History   Problem Relation Age of Onset   • Alcohol abuse Mother    • Heart Disease Mother    • Heart Disease Brother    • Alcohol abuse Brother    • Cancer Maternal Aunt        MEDICATIONS:  Current Facility-Administered Medications   Medication Dose   • diazePAM (VALIUM) tablet 5 mg  5 mg   • aspirin (ASA) chewable tab 81 mg  81 mg   • lactated ringers infusion     • furosemide (LASIX) injection 20 mg  20 mg   • oyster shell calcium/vitamin D 250-125 MG-UNIT tablet 1 Tab  1 Tab   • omeprazole (PRILOSEC) capsule 20 mg  20 mg   • celecoxib (CELEBREX) capsule 200 mg  200 mg   • cyclobenzaprine (FLEXERIL) tablet 5 mg  5 mg   • losartan (COZAAR) tablet 25 mg  25 mg   • senna-docusate (PERICOLACE or SENOKOT S) 8.6-50 MG per tablet 2 Tab  2 Tab    And   • polyethylene glycol/lytes (MIRALAX) PACKET 1 Packet  1 Packet    And   • magnesium hydroxide (MILK OF MAGNESIA) suspension 30 mL  30 mL    And   • bisacodyl (DULCOLAX) suppository 10 mg  10 mg   • gabapentin (NEURONTIN) capsule 100 mg  100 mg   • Respiratory Care per Protocol     • acetaminophen (TYLENOL) tablet 650 mg  650 mg   • enalaprilat (VASOTEC) injection 1.25 mg  1.25 mg   • ondansetron (ZOFRAN) syringe/vial injection 4 mg  4 mg   • ondansetron (ZOFRAN ODT) dispertab 4 mg  4 mg   • morphine (pf) 4 MG/ML injection 2 mg  2 mg       ALLERGIES:  Hydrocodone; Oxycodone;  and Tramadol    PSYCHOSOCIAL HISTORY:  Living Site:  Home  Living With:  Family, Adult children.   Number of stairs:  1  Substance use history:  Unknown       Living with daughter while her house is being built.     Estimated length of stay is approximately 10-14 days with good rehabilitation potential.   her disposition is to discharge to premorbid independent living setting with the supportive care of her spouse/family and community resources.     We will proceed with transfer to acute inpatient rehabilitation when appropriate as per her attending physician.  Thank for allowing us to participate in her care.      Yash Park, DO   Physical Medicine and Rehabilitation   11/8/2019

## 2019-11-08 NOTE — PROGRESS NOTES
Pt is A&Ox4. Vss. Pt c/o 6/10 pain in R hip. Medicated per MAR. No n/v. Dressing is clean, dry, and intact. CMS is intact. Pt has no other needs at this time. Fall precautions in place.

## 2019-11-09 LAB
ALBUMIN SERPL BCP-MCNC: 2.9 G/DL (ref 3.2–4.9)
ALBUMIN/GLOB SERPL: 1.1 G/DL
ALP SERPL-CCNC: 69 U/L (ref 30–99)
ALT SERPL-CCNC: 7 U/L (ref 2–50)
ANION GAP SERPL CALC-SCNC: 9 MMOL/L (ref 0–11.9)
AST SERPL-CCNC: 16 U/L (ref 12–45)
BILIRUB SERPL-MCNC: 0.5 MG/DL (ref 0.1–1.5)
BUN SERPL-MCNC: 34 MG/DL (ref 8–22)
CALCIUM SERPL-MCNC: 8.5 MG/DL (ref 8.4–10.2)
CHLORIDE SERPL-SCNC: 100 MMOL/L (ref 96–112)
CO2 SERPL-SCNC: 27 MMOL/L (ref 20–33)
CREAT SERPL-MCNC: 0.69 MG/DL (ref 0.5–1.4)
ERYTHROCYTE [DISTWIDTH] IN BLOOD BY AUTOMATED COUNT: 46.1 FL (ref 35.9–50)
GLOBULIN SER CALC-MCNC: 2.7 G/DL (ref 1.9–3.5)
GLUCOSE SERPL-MCNC: 117 MG/DL (ref 65–99)
HCT VFR BLD AUTO: 29.1 % (ref 37–47)
HGB BLD-MCNC: 9.5 G/DL (ref 12–16)
MCH RBC QN AUTO: 32.5 PG (ref 27–33)
MCHC RBC AUTO-ENTMCNC: 32.6 G/DL (ref 33.6–35)
MCV RBC AUTO: 99.7 FL (ref 81.4–97.8)
PLATELET # BLD AUTO: 195 K/UL (ref 164–446)
PMV BLD AUTO: 9.5 FL (ref 9–12.9)
POTASSIUM SERPL-SCNC: 5 MMOL/L (ref 3.6–5.5)
PROT SERPL-MCNC: 5.6 G/DL (ref 6–8.2)
RBC # BLD AUTO: 2.92 M/UL (ref 4.2–5.4)
SODIUM SERPL-SCNC: 136 MMOL/L (ref 135–145)
WBC # BLD AUTO: 5.4 K/UL (ref 4.8–10.8)

## 2019-11-09 PROCEDURE — 700111 HCHG RX REV CODE 636 W/ 250 OVERRIDE (IP): Performed by: HOSPITALIST

## 2019-11-09 PROCEDURE — 85027 COMPLETE CBC AUTOMATED: CPT

## 2019-11-09 PROCEDURE — A9270 NON-COVERED ITEM OR SERVICE: HCPCS | Performed by: HOSPITALIST

## 2019-11-09 PROCEDURE — 97116 GAIT TRAINING THERAPY: CPT

## 2019-11-09 PROCEDURE — 700102 HCHG RX REV CODE 250 W/ 637 OVERRIDE(OP): Performed by: HOSPITALIST

## 2019-11-09 PROCEDURE — A9270 NON-COVERED ITEM OR SERVICE: HCPCS | Performed by: INTERNAL MEDICINE

## 2019-11-09 PROCEDURE — A9270 NON-COVERED ITEM OR SERVICE: HCPCS | Performed by: PHYSICIAN ASSISTANT

## 2019-11-09 PROCEDURE — 99232 SBSQ HOSP IP/OBS MODERATE 35: CPT | Performed by: HOSPITALIST

## 2019-11-09 PROCEDURE — 97530 THERAPEUTIC ACTIVITIES: CPT

## 2019-11-09 PROCEDURE — 770001 HCHG ROOM/CARE - MED/SURG/GYN PRIV*

## 2019-11-09 PROCEDURE — 700111 HCHG RX REV CODE 636 W/ 250 OVERRIDE (IP): Performed by: INTERNAL MEDICINE

## 2019-11-09 PROCEDURE — 700102 HCHG RX REV CODE 250 W/ 637 OVERRIDE(OP): Performed by: PHYSICIAN ASSISTANT

## 2019-11-09 PROCEDURE — 700102 HCHG RX REV CODE 250 W/ 637 OVERRIDE(OP): Performed by: INTERNAL MEDICINE

## 2019-11-09 PROCEDURE — 80053 COMPREHEN METABOLIC PANEL: CPT

## 2019-11-09 PROCEDURE — 97535 SELF CARE MNGMENT TRAINING: CPT

## 2019-11-09 RX ORDER — ECHINACEA PURPUREA EXTRACT 125 MG
2 TABLET ORAL
Status: DISCONTINUED | OUTPATIENT
Start: 2019-11-09 | End: 2019-11-12 | Stop reason: HOSPADM

## 2019-11-09 RX ADMIN — SULFAMETHOXAZOLE AND TRIMETHOPRIM 1 TABLET: 800; 160 TABLET ORAL at 18:32

## 2019-11-09 RX ADMIN — SENNOSIDES, DOCUSATE SODIUM 2 TABLET: 50; 8.6 TABLET, FILM COATED ORAL at 05:46

## 2019-11-09 RX ADMIN — CALCIUM CARBONATE-CHOLECALCIFEROL TAB 250 MG-125 UNIT 1 TABLET: 250-125 TAB at 05:47

## 2019-11-09 RX ADMIN — CELECOXIB 200 MG: 200 CAPSULE ORAL at 05:46

## 2019-11-09 RX ADMIN — CELECOXIB 200 MG: 200 CAPSULE ORAL at 18:32

## 2019-11-09 RX ADMIN — DIAZEPAM 5 MG: 5 TABLET ORAL at 11:39

## 2019-11-09 RX ADMIN — KETOROLAC TROMETHAMINE 15 MG: 30 INJECTION, SOLUTION INTRAMUSCULAR at 11:40

## 2019-11-09 RX ADMIN — OMEPRAZOLE 20 MG: 20 CAPSULE, DELAYED RELEASE ORAL at 05:47

## 2019-11-09 RX ADMIN — DIAZEPAM 5 MG: 5 TABLET ORAL at 02:01

## 2019-11-09 RX ADMIN — LOSARTAN POTASSIUM 25 MG: 25 TABLET ORAL at 05:46

## 2019-11-09 RX ADMIN — MAGNESIUM HYDROXIDE 30 ML: 400 SUSPENSION ORAL at 18:33

## 2019-11-09 RX ADMIN — MORPHINE SULFATE 2 MG: 4 INJECTION INTRAVENOUS at 00:43

## 2019-11-09 RX ADMIN — SULFAMETHOXAZOLE AND TRIMETHOPRIM 1 TABLET: 800; 160 TABLET ORAL at 05:46

## 2019-11-09 RX ADMIN — SENNOSIDES, DOCUSATE SODIUM 2 TABLET: 50; 8.6 TABLET, FILM COATED ORAL at 18:33

## 2019-11-09 RX ADMIN — KETOROLAC TROMETHAMINE 15 MG: 30 INJECTION, SOLUTION INTRAMUSCULAR at 18:39

## 2019-11-09 RX ADMIN — FUROSEMIDE 20 MG: 10 INJECTION, SOLUTION INTRAVENOUS at 05:47

## 2019-11-09 RX ADMIN — GABAPENTIN 100 MG: 100 CAPSULE ORAL at 05:47

## 2019-11-09 RX ADMIN — GABAPENTIN 100 MG: 100 CAPSULE ORAL at 18:32

## 2019-11-09 RX ADMIN — SALINE NASAL SPRAY 2 SPRAY: 1.5 SOLUTION NASAL at 19:44

## 2019-11-09 RX ADMIN — DIAZEPAM 5 MG: 5 TABLET ORAL at 18:32

## 2019-11-09 RX ADMIN — CYCLOBENZAPRINE HYDROCHLORIDE 5 MG: 10 TABLET, FILM COATED ORAL at 19:40

## 2019-11-09 RX ADMIN — ASPIRIN 81 MG CHEWABLE TABLET 81 MG: 81 TABLET CHEWABLE at 18:32

## 2019-11-09 RX ADMIN — ASPIRIN 81 MG CHEWABLE TABLET 81 MG: 81 TABLET CHEWABLE at 05:46

## 2019-11-09 RX ADMIN — SALINE NASAL SPRAY 2 SPRAY: 1.5 SOLUTION NASAL at 13:33

## 2019-11-09 RX ADMIN — GABAPENTIN 100 MG: 100 CAPSULE ORAL at 13:33

## 2019-11-09 RX ADMIN — MORPHINE SULFATE 2 MG: 4 INJECTION INTRAVENOUS at 05:49

## 2019-11-09 RX ADMIN — FUROSEMIDE 20 MG: 10 INJECTION, SOLUTION INTRAVENOUS at 18:33

## 2019-11-09 ASSESSMENT — ENCOUNTER SYMPTOMS
BLURRED VISION: 0
MYALGIAS: 0
VOMITING: 0
HEMOPTYSIS: 0
BLOOD IN STOOL: 0
CHILLS: 0
NAUSEA: 0
PHOTOPHOBIA: 0
CLAUDICATION: 0
SHORTNESS OF BREATH: 0
SENSORY CHANGE: 0
LOSS OF CONSCIOUSNESS: 0
FEVER: 0
NECK PAIN: 0
DIZZINESS: 0
BACK PAIN: 0
HEADACHES: 0
DOUBLE VISION: 0
NERVOUS/ANXIOUS: 0
FLANK PAIN: 0
DEPRESSION: 0
COUGH: 0
BRUISES/BLEEDS EASILY: 0
EYE PAIN: 0
SPUTUM PRODUCTION: 0
STRIDOR: 0
PND: 0
PALPITATIONS: 0
MEMORY LOSS: 0
WEIGHT LOSS: 0
CONSTIPATION: 0
ORTHOPNEA: 0
HEARTBURN: 0
SPEECH CHANGE: 0
TINGLING: 0
WEAKNESS: 0
ABDOMINAL PAIN: 0
TREMORS: 0
SORE THROAT: 0

## 2019-11-09 ASSESSMENT — GAIT ASSESSMENTS
DEVIATION: ANTALGIC;STEP TO
GAIT LEVEL OF ASSIST: SUPERVISED
DISTANCE (FEET): 100
ASSISTIVE DEVICE: FRONT WHEEL WALKER

## 2019-11-09 ASSESSMENT — COGNITIVE AND FUNCTIONAL STATUS - GENERAL
DRESSING REGULAR UPPER BODY CLOTHING: A LITTLE
DAILY ACTIVITIY SCORE: 16
HELP NEEDED FOR BATHING: A LOT
PERSONAL GROOMING: A LITTLE
DRESSING REGULAR LOWER BODY CLOTHING: A LOT
TOILETING: A LOT
SUGGESTED CMS G CODE MODIFIER DAILY ACTIVITY: CK

## 2019-11-09 NOTE — CARE PLAN
Problem: Discharge Barriers/Planning  Goal: Patient's continuum of care needs will be met  Outcome: NOT MET  Note:   Pt still required IV- morphine for cramping to surgical site/ R hip; prn Flexeril, Valium and Toradol also given   Pending SNF vs rehab placement

## 2019-11-09 NOTE — PROGRESS NOTES
Received report, assumed pt care. Discussed POC. Encouraged CDB and leg exercises while in bed. Pt continues to have spasms in RLE. Pt medicated, stretched and massaged calf and hamstring. Pt states improvement.  VSS. Will cont to mon.

## 2019-11-09 NOTE — PROGRESS NOTES
Pt in 10/10 with constant muscle spasms, will need to give morphine as flexeril and valium arent helping. Pt given morphine and placed on  on 2L NC

## 2019-11-09 NOTE — THERAPY
"Physical Therapy Treatment completed.   Bed Mobility:  Supine to Sit: Minimal Assist  Transfers: Sit to Stand: Minimal Assist  Gait: Level Of Assist: Supervised with Front-Wheel Walker   X 100 feet    Plan of Care: Will benefit from Physical Therapy 7 times per week  Discharge Recommendations: Equipment: No Equipment Needed. Post-acute therapy Discharge to a transitional care facility for continued skilled therapy services.   Bed mob,transfers and ambulation all improving  See \"Rehab Therapy-Acute\" Patient Summary Report for complete documentation.       "

## 2019-11-09 NOTE — PROGRESS NOTES
[facility] Medicine Daily Progress Note    Date of Service  11/9/2019    Chief Complaint  84 y.o. female admitted 11/3/2019 with severe hip pain.    Hospital Course    History of Presenting Illness per Dr. Handley's H&P:  84 y.o. female who presented 11/3/2019 with degenerative disease of the hips and history of left hip surgery in 2008 who has known degenerative disease of the right hip and was seen by orthopedic surgery in [place] but wanted to wait until after she moved to [place] to have surgery. She moved to [place] one week ago and the last three days she is sleeping in a wheelchair at night as she cannot tolerate laying flat due to pain.  She uses a walker to ambulate during the day but is having increased difficulty ambulating due to pain. She describes the pain in her right leg as radiating down her right leg into the foot and burning in nature.  She has constipation and is not drinking much as it is difficult to get to the bathroom to void.      Interval Problem Update  11/7  No acute overnight events, patient's only complaint right now is right hip pain especially when she moves her place a slight weight.  Otherwise doing well. Patient denies fevers/chills, chest pain, shortness of breath or nausea/vommiting.   Pain control  PT/OT when cleared by surgery    11/8  Patient is having mild hip pain but overall 4-6/10 pain, exacerbated by movement. Otherwise doing well. Patient denies fevers/chills, chest pain, shortness of breath or nausea/vommiting.   Continue with pain control, preferably with PO.  PT/OT, placement options.    11/9  Patient has been having intermittent severe pain in her hip, 7 out of 10 in severity at times exacerbated by movement.  But overall patient says that for prior days she feels better overall.  We will try to have her work with physical therapy today, will try to de-escalate her IV pain medications.    Consultants/Specialty  Orthopedic surgery, Dr. Mo.    Code Status  Full  Code    Disposition  TBD.    Review of Systems  Review of Systems   Constitutional: Negative for chills, fever, malaise/fatigue and weight loss.   HENT: Negative for congestion, hearing loss, sore throat and tinnitus.    Eyes: Negative for blurred vision, double vision, photophobia and pain.   Respiratory: Negative for cough, hemoptysis, sputum production, shortness of breath and stridor.    Cardiovascular: Negative for chest pain, palpitations, orthopnea, claudication, leg swelling and PND.   Gastrointestinal: Negative for abdominal pain, blood in stool, constipation, heartburn, melena, nausea and vomiting.   Genitourinary: Negative for dysuria, flank pain, frequency and urgency.   Musculoskeletal: Positive for joint pain (Intermittent, but overall improving). Negative for back pain, myalgias and neck pain.   Neurological: Negative for dizziness, tingling, tremors, sensory change, speech change, loss of consciousness, weakness and headaches.   Endo/Heme/Allergies: Does not bruise/bleed easily.   Psychiatric/Behavioral: Negative for depression, memory loss and suicidal ideas. The patient is not nervous/anxious.    All other systems reviewed and are negative.       Physical Exam  Temp:  [36.8 °C (98.3 °F)-37.2 °C (98.9 °F)] 37.2 °C (98.9 °F)  Pulse:  [] 98  Resp:  [18-19] 19  BP: (108-132)/(50-64) 132/64  SpO2:  [91 %-96 %] 96 %    Physical Exam  Vitals signs and nursing note reviewed.   Constitutional:       General: She is not in acute distress.     Appearance: Normal appearance. She is well-developed. She is obese. She is not ill-appearing, toxic-appearing or diaphoretic.   HENT:      Head: Normocephalic and atraumatic.      Right Ear: External ear normal.      Left Ear: External ear normal.      Nose: Nose normal. No congestion.      Mouth/Throat:      Mouth: Mucous membranes are moist.      Pharynx: Oropharynx is clear. No oropharyngeal exudate or posterior oropharyngeal erythema.   Eyes:      General: No  scleral icterus.        Right eye: No discharge.         Left eye: No discharge.      Extraocular Movements: Extraocular movements intact.      Conjunctiva/sclera: Conjunctivae normal.      Pupils: Pupils are equal, round, and reactive to light.   Neck:      Musculoskeletal: Normal range of motion and neck supple. No neck rigidity or muscular tenderness.      Vascular: No JVD.      Trachea: No tracheal deviation.   Cardiovascular:      Rate and Rhythm: Normal rate and regular rhythm.      Pulses: Normal pulses.      Heart sounds: Normal heart sounds. No murmur.   Pulmonary:      Effort: Pulmonary effort is normal. No respiratory distress.      Breath sounds: Normal breath sounds. No stridor. No wheezing, rhonchi or rales.   Chest:      Chest wall: No tenderness.   Abdominal:      General: Abdomen is flat. Bowel sounds are normal. There is no distension.      Palpations: Abdomen is soft. There is no mass.      Tenderness: There is no tenderness. There is no guarding or rebound.      Hernia: No hernia is present.   Musculoskeletal:         General: Tenderness (right hip ) present. No swelling.      Right lower leg: Edema (trace pitting edema b/l L/E) present.      Left lower leg: Edema (Minimal edema) present.   Skin:     General: Skin is warm and dry.      Capillary Refill: Capillary refill takes less than 2 seconds.      Coloration: Skin is not jaundiced or pale.   Neurological:      General: No focal deficit present.      Mental Status: She is alert and oriented to person, place, and time. Mental status is at baseline.      Cranial Nerves: No cranial nerve deficit.      Motor: No abnormal muscle tone.      Gait: Gait normal.   Psychiatric:         Mood and Affect: Mood normal.         Behavior: Behavior normal.         Thought Content: Thought content normal.         Judgment: Judgment normal.         Fluids    Intake/Output Summary (Last 24 hours) at 11/9/2019 0816  Last data filed at 11/9/2019 4804  Gross per 24  hour   Intake 1080 ml   Output 1350 ml   Net -270 ml       Laboratory  Recent Labs     11/07/19 0414 11/08/19  0423 11/09/19  0600   WBC 7.3 5.0 5.4   RBC 3.14* 2.85* 2.92*   HEMOGLOBIN 9.9* 9.2* 9.5*   HEMATOCRIT 30.7* 28.1* 29.1*   MCV 97.8 98.6* 99.7*   MCH 31.5 32.3 32.5   MCHC 32.2* 32.7* 32.6*   RDW 44.0 45.1 46.1   PLATELETCT 207 192 195   MPV 10.2 10.0 9.5     Recent Labs     11/07/19 0414 11/07/19  1441 11/08/19  0423 11/09/19  0600   SODIUM 135  --  136 136   POTASSIUM 5.6* 5.1 5.0 5.0   CHLORIDE 99  --  100 100   CO2 27  --  26 27   GLUCOSE 128*  --  105* 117*   BUN 26*  --  30* 34*   CREATININE 0.67  --  0.69 0.69   CALCIUM 8.5  --  8.4 8.5                   Imaging  DX-PELVIS-1 OR 2 VIEWS   Final Result      Interval right hip arthroplasty without evident complication.      IR-MIDLINE CATHETER INSERTION WO GUIDANCE > AGE 3   Final Result                  Ultrasound-guided midline placement performed by qualified nursing staff    as above.          CT-HIP W/O PLUS RECONS RIGHT   Final Result      1.  Marked deformity of the right hip joint characterized by fragmentation of the femoral head with bony resorption and flattening of the femoral head. 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 Acuity of the fragmentation cannot be assessed. There is also secondary severe osteoarthritis present.      2.  Prior left hip resurfacing procedure.      EC-ECHOCARDIOGRAM COMPLETE W/O CONT   Final Result      DX-HIP-UNILATERAL-WITH PELVIS-1 VIEW RIGHT   Final Result         1.  No radiographic evidence of acute traumatic injury.   2.  Severe degenerative changes of the right hip with extensive bony remodeling of the femoral head and acetabula.      US-EXTREMITY VENOUS LOWER BILAT   Final Result      No gross deep venous thrombosis.      Severely limited evaluation due to subcutaneous fat edema, patient pain and mobility limitations       DX-CHEST-PORTABLE (1 VIEW)   Final Result      No acute cardiopulmonary findings.      MR-LUMBAR SPINE-W/O    (Results Pending)        Assessment/Plan  * Degenerative joint disease of right hip  Assessment & Plan  Patient was seen by an orthopedic surgeon in [place] but wanted to wait until she moved to [place] to have surgery, now she is having difficulty ambulating and laying flat due to pain       S/p total hip arthoplasty 11/6/2019  Patient's pain is controlled with Toradol, and continue PO pain control.  Gabapentin continued.  PT/OT in house.  Awaiting for placement    Radiculopathy  Assessment & Plan  MRI lumbar spine, patient unable to tolerate due to anxiety. Patient declined at this time.  No acute issues, denies back pain.   Consider outpatient if reoccurs.  No complaints     Bilateral leg edema  Assessment & Plan  LE duplex neg for DVT  Echocardiogram did show preserved LVEF.   Improving overall  Resume diuresis as tolerated.    Acute prerenal azotemia  Assessment & Plan  Resolved.  CTM    Dehydration  Assessment & Plan      Euvolemic currently    Hyperkalemia  Assessment & Plan      K: 5.0, resolved from 5.6 yesterday.  CTM closely.    Hyponatremia  Assessment & Plan  2/2 to dehydration  Resolved, patient is euvolemic.     UTI (urinary tract infection)  Assessment & Plan  Repeat UA shows small LE, 20-50 WBCs  Will start bactrim,    Anemia  Assessment & Plan  2/2 to operative blood loss   Hgb: 9.5<9.2<9.9<12.0~ slowly started to improve.   No signs of gross bleeding otherwise  CTM     Hypoalbuminemia  Assessment & Plan  Dietary was consulted, continue with boost TID, nutritional support.          Patient plan of care discussed at multidisplinary team rounds and with patient and R.N at beside.    VTE prophylaxis: lovenox.

## 2019-11-09 NOTE — THERAPY
"Occupational Therapy Treatment completed with focus on ADLs and ADL transfers.  Functional Status:  Pt was in bed at start of session, seen with PT as a co treat, due to decreased activity tolerance and needs. Pt was able to go from Supine to EOB with Min A. LB dressing with Min A with the use of AE. Pt was agitated during session, was agreeable to AMB in borrero with PT/OT with FWW. Pt was able to stay up in chair at end of session for seated grooming and hygiene. PT and NSG were present for the majority of session,  was in room at end of session. Will continue to benefit from Acute OT services.  Plan of Care: Will benefit from Occupational Therapy 3 times per week  Discharge Recommendations:  Equipment Will Continue to Assess for Equipment Needs. Post-acute therapy Discharge to a transitional care facility for continued skilled therapy services.    See \"Rehab Therapy-Acute\" Patient Summary Report for complete documentation.   "

## 2019-11-09 NOTE — PROGRESS NOTES
Received report from day shift nurse.   Assumed pt care.  Pt is A&Ox4, resting comfortably in bed with family at bedside.   Pt on 2L of oxygen via nasal cannula.   No signs of SOB/respiratory distress.  Pt c/o muscle spasm to surgical extremity/ R hip; prn Flexeril 5mg and Toradol 15mg given per MAR  Assessment completed, Labs noted, VSS.   Surgical dressing & Prevena wound vac to R hip in place CDI, +CMS, + pulse, able to wiggle toes and dorsi/plantar flex.  Denied numbness/tingling  Polar ice & SCDs on.    Educated pt the importance to call for assistance.   Fall precautions in place.   Call light and personal belongings within reach.   Continue to monitor.

## 2019-11-10 LAB
BACTERIA TISS AEROBE CULT: NORMAL
GRAM STN SPEC: NORMAL
SIGNIFICANT IND 70042: NORMAL
SITE SITE: NORMAL
SOURCE SOURCE: NORMAL

## 2019-11-10 PROCEDURE — 700102 HCHG RX REV CODE 250 W/ 637 OVERRIDE(OP): Performed by: INTERNAL MEDICINE

## 2019-11-10 PROCEDURE — 99232 SBSQ HOSP IP/OBS MODERATE 35: CPT | Performed by: HOSPITALIST

## 2019-11-10 PROCEDURE — A9270 NON-COVERED ITEM OR SERVICE: HCPCS | Performed by: HOSPITALIST

## 2019-11-10 PROCEDURE — 700111 HCHG RX REV CODE 636 W/ 250 OVERRIDE (IP): Performed by: HOSPITALIST

## 2019-11-10 PROCEDURE — 770001 HCHG ROOM/CARE - MED/SURG/GYN PRIV*

## 2019-11-10 PROCEDURE — A9270 NON-COVERED ITEM OR SERVICE: HCPCS | Performed by: INTERNAL MEDICINE

## 2019-11-10 PROCEDURE — 97110 THERAPEUTIC EXERCISES: CPT

## 2019-11-10 PROCEDURE — 97116 GAIT TRAINING THERAPY: CPT

## 2019-11-10 PROCEDURE — 97530 THERAPEUTIC ACTIVITIES: CPT

## 2019-11-10 PROCEDURE — 700102 HCHG RX REV CODE 250 W/ 637 OVERRIDE(OP): Performed by: HOSPITALIST

## 2019-11-10 PROCEDURE — 700102 HCHG RX REV CODE 250 W/ 637 OVERRIDE(OP): Performed by: PHYSICIAN ASSISTANT

## 2019-11-10 PROCEDURE — A9270 NON-COVERED ITEM OR SERVICE: HCPCS | Performed by: PHYSICIAN ASSISTANT

## 2019-11-10 RX ADMIN — SULFAMETHOXAZOLE AND TRIMETHOPRIM 1 TABLET: 800; 160 TABLET ORAL at 16:49

## 2019-11-10 RX ADMIN — ASPIRIN 81 MG CHEWABLE TABLET 81 MG: 81 TABLET CHEWABLE at 16:49

## 2019-11-10 RX ADMIN — CELECOXIB 200 MG: 200 CAPSULE ORAL at 05:12

## 2019-11-10 RX ADMIN — FUROSEMIDE 20 MG: 10 INJECTION, SOLUTION INTRAVENOUS at 21:16

## 2019-11-10 RX ADMIN — GABAPENTIN 100 MG: 100 CAPSULE ORAL at 05:12

## 2019-11-10 RX ADMIN — OMEPRAZOLE 20 MG: 20 CAPSULE, DELAYED RELEASE ORAL at 05:12

## 2019-11-10 RX ADMIN — DIAZEPAM 5 MG: 5 TABLET ORAL at 05:13

## 2019-11-10 RX ADMIN — CYCLOBENZAPRINE HYDROCHLORIDE 5 MG: 10 TABLET, FILM COATED ORAL at 12:33

## 2019-11-10 RX ADMIN — CYCLOBENZAPRINE HYDROCHLORIDE 5 MG: 10 TABLET, FILM COATED ORAL at 05:12

## 2019-11-10 RX ADMIN — CELECOXIB 200 MG: 200 CAPSULE ORAL at 16:49

## 2019-11-10 RX ADMIN — SENNOSIDES, DOCUSATE SODIUM 2 TABLET: 50; 8.6 TABLET, FILM COATED ORAL at 16:49

## 2019-11-10 RX ADMIN — SENNOSIDES, DOCUSATE SODIUM 2 TABLET: 50; 8.6 TABLET, FILM COATED ORAL at 05:12

## 2019-11-10 RX ADMIN — GABAPENTIN 100 MG: 100 CAPSULE ORAL at 16:49

## 2019-11-10 RX ADMIN — SULFAMETHOXAZOLE AND TRIMETHOPRIM 1 TABLET: 800; 160 TABLET ORAL at 05:12

## 2019-11-10 RX ADMIN — CYCLOBENZAPRINE HYDROCHLORIDE 5 MG: 10 TABLET, FILM COATED ORAL at 21:15

## 2019-11-10 RX ADMIN — ASPIRIN 81 MG CHEWABLE TABLET 81 MG: 81 TABLET CHEWABLE at 05:13

## 2019-11-10 RX ADMIN — GABAPENTIN 100 MG: 100 CAPSULE ORAL at 11:38

## 2019-11-10 RX ADMIN — DIAZEPAM 5 MG: 5 TABLET ORAL at 14:26

## 2019-11-10 RX ADMIN — CALCIUM CARBONATE-CHOLECALCIFEROL TAB 250 MG-125 UNIT 1 TABLET: 250-125 TAB at 05:12

## 2019-11-10 RX ADMIN — FUROSEMIDE 20 MG: 10 INJECTION, SOLUTION INTRAVENOUS at 05:13

## 2019-11-10 ASSESSMENT — ENCOUNTER SYMPTOMS
CLAUDICATION: 0
SORE THROAT: 0
VOMITING: 0
PALPITATIONS: 0
SENSORY CHANGE: 0
NERVOUS/ANXIOUS: 0
FLANK PAIN: 0
SPUTUM PRODUCTION: 0
BRUISES/BLEEDS EASILY: 0
PND: 0
WEAKNESS: 0
DEPRESSION: 0
FEVER: 0
BACK PAIN: 0
SPEECH CHANGE: 0
STRIDOR: 0
MEMORY LOSS: 0
ORTHOPNEA: 0
HEADACHES: 0
COUGH: 0
TINGLING: 0
ABDOMINAL PAIN: 0
TREMORS: 0
PHOTOPHOBIA: 0
DIZZINESS: 0
CHILLS: 0
WEIGHT LOSS: 0
BLURRED VISION: 0
LOSS OF CONSCIOUSNESS: 0
HEARTBURN: 0
HEMOPTYSIS: 0
MYALGIAS: 1
BLOOD IN STOOL: 0
CONSTIPATION: 0
NAUSEA: 0
SHORTNESS OF BREATH: 0
NECK PAIN: 0
DOUBLE VISION: 0
EYE PAIN: 0

## 2019-11-10 ASSESSMENT — COGNITIVE AND FUNCTIONAL STATUS - GENERAL
WALKING IN HOSPITAL ROOM: A LITTLE
TURNING FROM BACK TO SIDE WHILE IN FLAT BAD: A LITTLE
CLIMB 3 TO 5 STEPS WITH RAILING: TOTAL
MOVING FROM LYING ON BACK TO SITTING ON SIDE OF FLAT BED: A LOT
STANDING UP FROM CHAIR USING ARMS: A LITTLE
MOVING TO AND FROM BED TO CHAIR: A LITTLE
SUGGESTED CMS G CODE MODIFIER MOBILITY: CK
MOBILITY SCORE: 15

## 2019-11-10 ASSESSMENT — GAIT ASSESSMENTS
DISTANCE (FEET): 30
DEVIATION: STEP TO;DECREASED BASE OF SUPPORT;BRADYKINETIC
ASSISTIVE DEVICE: FRONT WHEEL WALKER
GAIT LEVEL OF ASSIST: MINIMAL ASSIST

## 2019-11-10 ASSESSMENT — PATIENT HEALTH QUESTIONNAIRE - PHQ9
2. FEELING DOWN, DEPRESSED, IRRITABLE, OR HOPELESS: NOT AT ALL
1. LITTLE INTEREST OR PLEASURE IN DOING THINGS: NOT AT ALL
SUM OF ALL RESPONSES TO PHQ9 QUESTIONS 1 AND 2: 0

## 2019-11-10 NOTE — PROGRESS NOTES
[facility] Medicine Daily Progress Note    Date of Service  11/10/2019    Chief Complaint  84 y.o. female admitted 11/3/2019 with severe hip pain.    Hospital Course    History of Presenting Illness per Dr. Handley's H&P:  84 y.o. female who presented 11/3/2019 with degenerative disease of the hips and history of left hip surgery in 2008 who has known degenerative disease of the right hip and was seen by orthopedic surgery in [place] but wanted to wait until after she moved to [place] to have surgery. She moved to [place] one week ago and the last three days she is sleeping in a wheelchair at night as she cannot tolerate laying flat due to pain.  She uses a walker to ambulate during the day but is having increased difficulty ambulating due to pain. She describes the pain in her right leg as radiating down her right leg into the foot and burning in nature.  She has constipation and is not drinking much as it is difficult to get to the bathroom to void.      Interval Problem Update  11/7  No acute overnight events, patient's only complaint right now is right hip pain especially when she moves her place a slight weight.  Otherwise doing well. Patient denies fevers/chills, chest pain, shortness of breath or nausea/vommiting.   Pain control  PT/OT when cleared by surgery    11/8  Patient is having mild hip pain but overall 4-6/10 pain, exacerbated by movement. Otherwise doing well. Patient denies fevers/chills, chest pain, shortness of breath or nausea/vommiting.   Continue with pain control, preferably with PO.  PT/OT, placement options.    11/9  Patient has been having intermittent severe pain in her hip, 7 out of 10 in severity at times exacerbated by movement.  But overall patient says that for prior days she feels better overall.  We will try to have her work with physical therapy today, will try to de-escalate her IV pain medications.    11/10  No acute issues overnight, patient is still complaining of 7-8/10 pain in her  hip again during movement. She has been utilizing IV pain medications      Consultants/Specialty  Orthopedic surgery, Dr. Mo.    Code Status  Full Code    Disposition  TBD.    Review of Systems  Review of Systems   Constitutional: Negative for chills, fever, malaise/fatigue and weight loss.   HENT: Negative for congestion, hearing loss, sore throat and tinnitus.    Eyes: Negative for blurred vision, double vision, photophobia and pain.   Respiratory: Negative for cough, hemoptysis, sputum production, shortness of breath and stridor.    Cardiovascular: Negative for chest pain, palpitations, orthopnea, claudication, leg swelling and PND.   Gastrointestinal: Negative for abdominal pain, blood in stool, constipation, heartburn, melena, nausea and vomiting.   Genitourinary: Negative for dysuria, flank pain, frequency and urgency.   Musculoskeletal: Positive for joint pain (Intermittent, but overall improving) and myalgias. Negative for back pain and neck pain.   Neurological: Negative for dizziness, tingling, tremors, sensory change, speech change, loss of consciousness, weakness and headaches.   Endo/Heme/Allergies: Does not bruise/bleed easily.   Psychiatric/Behavioral: Negative for depression, memory loss and suicidal ideas. The patient is not nervous/anxious.    All other systems reviewed and are negative.       Physical Exam  Temp:  [36.8 °C (98.2 °F)-36.9 °C (98.5 °F)] 36.8 °C (98.2 °F)  Pulse:  [] 92  Resp:  [17-19] 17  BP: (105-136)/(59-75) 105/59  SpO2:  [91 %-95 %] 95 %    Physical Exam  Vitals signs and nursing note reviewed.   Constitutional:       General: She is not in acute distress.     Appearance: Normal appearance. She is well-developed. She is obese. She is not ill-appearing, toxic-appearing or diaphoretic.   HENT:      Head: Normocephalic and atraumatic.      Right Ear: External ear normal.      Left Ear: External ear normal.      Nose: Nose normal. No congestion.      Mouth/Throat:       Mouth: Mucous membranes are dry.      Pharynx: Oropharynx is clear. No oropharyngeal exudate or posterior oropharyngeal erythema.   Eyes:      General: No scleral icterus.        Right eye: No discharge.         Left eye: No discharge.      Extraocular Movements: Extraocular movements intact.      Conjunctiva/sclera: Conjunctivae normal.      Pupils: Pupils are equal, round, and reactive to light.   Neck:      Musculoskeletal: Normal range of motion and neck supple. No neck rigidity or muscular tenderness.      Vascular: No JVD.      Trachea: No tracheal deviation.   Cardiovascular:      Rate and Rhythm: Normal rate and regular rhythm.      Pulses: Normal pulses.      Heart sounds: Normal heart sounds. No murmur. No friction rub.   Pulmonary:      Effort: Pulmonary effort is normal. No respiratory distress.      Breath sounds: Normal breath sounds. No stridor. No wheezing, rhonchi or rales.   Chest:      Chest wall: No tenderness.   Abdominal:      General: Abdomen is flat. Bowel sounds are normal. There is no distension.      Palpations: Abdomen is soft. There is no mass.      Tenderness: There is no tenderness. There is no guarding or rebound.      Hernia: No hernia is present.   Musculoskeletal:         General: No swelling or tenderness.      Right lower leg: Edema (trace pitting edema b/l L/E, >Left) present.      Left lower leg: Edema (Minimal edema) present.   Skin:     General: Skin is warm and dry.      Capillary Refill: Capillary refill takes less than 2 seconds.      Coloration: Skin is not jaundiced or pale.   Neurological:      General: No focal deficit present.      Mental Status: She is alert and oriented to person, place, and time. Mental status is at baseline.      Cranial Nerves: No cranial nerve deficit.      Motor: No abnormal muscle tone.      Gait: Gait normal.   Psychiatric:         Mood and Affect: Mood normal.         Behavior: Behavior normal.         Thought Content: Thought content  normal.         Judgment: Judgment normal.         Fluids    Intake/Output Summary (Last 24 hours) at 11/10/2019 0817  Last data filed at 11/10/2019 0528  Gross per 24 hour   Intake 690 ml   Output 400 ml   Net 290 ml       Laboratory  Recent Labs     11/08/19  0423 11/09/19  0600   WBC 5.0 5.4   RBC 2.85* 2.92*   HEMOGLOBIN 9.2* 9.5*   HEMATOCRIT 28.1* 29.1*   MCV 98.6* 99.7*   MCH 32.3 32.5   MCHC 32.7* 32.6*   RDW 45.1 46.1   PLATELETCT 192 195   MPV 10.0 9.5     Recent Labs     11/07/19  1441 11/08/19  0423 11/09/19  0600   SODIUM  --  136 136   POTASSIUM 5.1 5.0 5.0   CHLORIDE  --  100 100   CO2  --  26 27   GLUCOSE  --  105* 117*   BUN  --  30* 34*   CREATININE  --  0.69 0.69   CALCIUM  --  8.4 8.5                   Imaging  DX-PELVIS-1 OR 2 VIEWS   Final Result      Interval right hip arthroplasty without evident complication.      IR-MIDLINE CATHETER INSERTION WO GUIDANCE > AGE 3   Final Result                  Ultrasound-guided midline placement performed by qualified nursing staff    as above.          CT-HIP W/O PLUS RECONS RIGHT   Final Result      1.  Marked deformity of the right hip joint characterized by fragmentation of the femoral head with bony resorption and flattening of the femoral head. 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 Acuity of the fragmentation cannot be assessed. There is also secondary severe osteoarthritis present.      2.  Prior left hip resurfacing procedure.      EC-ECHOCARDIOGRAM COMPLETE W/O CONT   Final Result      DX-HIP-UNILATERAL-WITH PELVIS-1 VIEW RIGHT   Final Result         1.  No radiographic evidence of acute traumatic injury.   2.  Severe degenerative changes of the right hip with extensive bony remodeling of the femoral head and acetabula.      US-EXTREMITY VENOUS LOWER BILAT   Final Result      No gross deep venous thrombosis.      Severely limited evaluation due to subcutaneous fat edema,  patient pain and mobility limitations      DX-CHEST-PORTABLE (1 VIEW)   Final Result      No acute cardiopulmonary findings.      MR-LUMBAR SPINE-W/O    (Results Pending)        Assessment/Plan  * Degenerative joint disease of right hip  Assessment & Plan  Patient was seen by an orthopedic surgeon in [place] but wanted to wait until she moved to [place] to have surgery, now she is having difficulty ambulating and laying flat due to pain       S/p total hip arthoplasty 11/6/2019  Continue with pain control, appears that the patient is needing IV medications, had a discussion, we are trying to wean her oral only  Resume gabapentin  Continue with physical therapy as tolerated      Radiculopathy  Assessment & Plan  MRI lumbar spine, patient unable to tolerate due to anxiety. Patient declined at this time.  No acute issues, denies back pain.   Consider outpatient if reoccurs.  No complaints     Bilateral leg edema  Assessment & Plan  LE duplex neg for DVT  Echocardiogram did show preserved LVEF.   Stable, gradual improvement with diuresis    Acute prerenal azotemia  Assessment & Plan  Resolved.  CTM    Dehydration  Assessment & Plan      Euvolemic currently    Hyperkalemia  Assessment & Plan      K: 5.0, resolved from 5.6 yesterday.  CTM closely.    Hyponatremia  Assessment & Plan  2/2 to dehydration  Resolved, patient is euvolemic.     UTI (urinary tract infection)  Assessment & Plan  Repeat UA shows small LE, 20-50 WBCs  Continue with Bactrim for 5 days    Anemia  Assessment & Plan  2/2 to operative blood loss   Stabilized  No signs of gross bleeding otherwise  CTM     Hypoalbuminemia  Assessment & Plan  Dietary following, continue with boost 3 times daily with meals         Patient plan of care discussed at multidisplinary team rounds and with patient and R.N at beside.    VTE prophylaxis: lovenox.

## 2019-11-10 NOTE — THERAPY
"Physical Therapy Treatment completed.   Bed Mobility:  Supine to Sit: Minimal Assist  Transfers: Sit to Stand: Minimal Assist(to mod due to LOB)  Gait: Level Of Assist: Minimal Assist with Front-Wheel Walker       Plan of Care: Will benefit from Physical Therapy 7 times per week  Discharge Recommendations: Equipment: Will Continue to Assess for Equipment Needs. Post-acute therapy Discharge to a transitional care facility for continued skilled therapy services.    Pt demo's LOB posterior when first standing EOB and when intiating amb. Pt at risk for falls and would benefit from acute and post acute PT services to maximize functional potential and return to home. Pt able to amb 30 feet x 2 with FWW and min assist. min/mod assist to stand due to LOB. Will continue to follow during acute care. LK      See \"Rehab Therapy-Acute\" Patient Summary Report for complete documentation.       "

## 2019-11-10 NOTE — PROGRESS NOTES
Report received from NOC RN, assumed care of pt.   POC and medications reviewed with pt. Pt verbalized understanding.   AOx4  C/o some muscle spasms at this time.  Denies pain, SOB, or dizziness at this time.   Safety measures in place.  Hourly rounding in place.

## 2019-11-10 NOTE — CARE PLAN
Problem: Safety  Goal: Will remain free from injury  Outcome: PROGRESSING AS EXPECTED  Goal: Will remain free from falls  Outcome: PROGRESSING AS EXPECTED     Problem: Knowledge Deficit  Goal: Knowledge of disease process/condition, treatment plan, diagnostic tests, and medications will improve  Outcome: PROGRESSING AS EXPECTED  Goal: Knowledge of the prescribed therapeutic regimen will improve  Outcome: PROGRESSING AS EXPECTED

## 2019-11-10 NOTE — PROGRESS NOTES
Received report from day shift nurse.   Assumed pt care.  Pt is A&Ox4, resting comfortably in bed with family at bedside.   Pt on 3L of oxygen via nasal cannula.   No signs of SOB/respiratory distress.  Pt c/o muscle spasm to surgical extremity/ R hip; prn Flexeril 5mg given per MAR by charge RN   Assessment completed, Labs noted, VSS.   Surgical dressing & Prevena wound vac to R hip in place CDI, +CMS, + pulse, able to wiggle toes and dorsi/plantar flex.  Denied numbness/tingling  Polar ice & SCDs on.    Educated pt the importance to call for assistance.   Fall precautions in place.   Call light and personal belongings within reach.   Continue to monitor.

## 2019-11-11 PROBLEM — E88.09 HYPOALBUMINEMIA: Status: ACTIVE | Noted: 2019-11-11

## 2019-11-11 PROBLEM — E87.5 HYPERKALEMIA: Status: ACTIVE | Noted: 2019-11-11

## 2019-11-11 PROCEDURE — 99232 SBSQ HOSP IP/OBS MODERATE 35: CPT | Performed by: HOSPITALIST

## 2019-11-11 PROCEDURE — 700102 HCHG RX REV CODE 250 W/ 637 OVERRIDE(OP): Performed by: INTERNAL MEDICINE

## 2019-11-11 PROCEDURE — A9270 NON-COVERED ITEM OR SERVICE: HCPCS | Performed by: PHYSICIAN ASSISTANT

## 2019-11-11 PROCEDURE — A9270 NON-COVERED ITEM OR SERVICE: HCPCS | Performed by: HOSPITALIST

## 2019-11-11 PROCEDURE — 700102 HCHG RX REV CODE 250 W/ 637 OVERRIDE(OP): Performed by: HOSPITALIST

## 2019-11-11 PROCEDURE — A9270 NON-COVERED ITEM OR SERVICE: HCPCS | Performed by: INTERNAL MEDICINE

## 2019-11-11 PROCEDURE — 770001 HCHG ROOM/CARE - MED/SURG/GYN PRIV*

## 2019-11-11 PROCEDURE — 700111 HCHG RX REV CODE 636 W/ 250 OVERRIDE (IP): Performed by: HOSPITALIST

## 2019-11-11 PROCEDURE — 97530 THERAPEUTIC ACTIVITIES: CPT

## 2019-11-11 PROCEDURE — 700102 HCHG RX REV CODE 250 W/ 637 OVERRIDE(OP): Performed by: PHYSICIAN ASSISTANT

## 2019-11-11 PROCEDURE — 97116 GAIT TRAINING THERAPY: CPT

## 2019-11-11 RX ORDER — FUROSEMIDE 40 MG/1
40 TABLET ORAL
Status: DISCONTINUED | OUTPATIENT
Start: 2019-11-11 | End: 2019-11-12 | Stop reason: HOSPADM

## 2019-11-11 RX ADMIN — LOSARTAN POTASSIUM 25 MG: 25 TABLET ORAL at 05:20

## 2019-11-11 RX ADMIN — SULFAMETHOXAZOLE AND TRIMETHOPRIM 1 TABLET: 800; 160 TABLET ORAL at 05:20

## 2019-11-11 RX ADMIN — GABAPENTIN 100 MG: 100 CAPSULE ORAL at 17:36

## 2019-11-11 RX ADMIN — CELECOXIB 200 MG: 200 CAPSULE ORAL at 05:19

## 2019-11-11 RX ADMIN — GABAPENTIN 100 MG: 100 CAPSULE ORAL at 13:10

## 2019-11-11 RX ADMIN — FUROSEMIDE 20 MG: 10 INJECTION, SOLUTION INTRAVENOUS at 05:20

## 2019-11-11 RX ADMIN — CALCIUM CARBONATE-CHOLECALCIFEROL TAB 250 MG-125 UNIT 1 TABLET: 250-125 TAB at 05:19

## 2019-11-11 RX ADMIN — CYCLOBENZAPRINE HYDROCHLORIDE 5 MG: 10 TABLET, FILM COATED ORAL at 19:14

## 2019-11-11 RX ADMIN — SENNOSIDES, DOCUSATE SODIUM 2 TABLET: 50; 8.6 TABLET, FILM COATED ORAL at 05:19

## 2019-11-11 RX ADMIN — SENNOSIDES, DOCUSATE SODIUM 2 TABLET: 50; 8.6 TABLET, FILM COATED ORAL at 17:36

## 2019-11-11 RX ADMIN — KETOROLAC TROMETHAMINE 15 MG: 30 INJECTION, SOLUTION INTRAMUSCULAR at 03:54

## 2019-11-11 RX ADMIN — DIAZEPAM 5 MG: 5 TABLET ORAL at 03:50

## 2019-11-11 RX ADMIN — CYCLOBENZAPRINE HYDROCHLORIDE 5 MG: 10 TABLET, FILM COATED ORAL at 05:18

## 2019-11-11 RX ADMIN — ASPIRIN 81 MG CHEWABLE TABLET 81 MG: 81 TABLET CHEWABLE at 05:18

## 2019-11-11 RX ADMIN — GABAPENTIN 100 MG: 100 CAPSULE ORAL at 05:18

## 2019-11-11 RX ADMIN — OMEPRAZOLE 20 MG: 20 CAPSULE, DELAYED RELEASE ORAL at 05:20

## 2019-11-11 RX ADMIN — FUROSEMIDE 40 MG: 40 TABLET ORAL at 17:36

## 2019-11-11 RX ADMIN — CYCLOBENZAPRINE HYDROCHLORIDE 5 MG: 10 TABLET, FILM COATED ORAL at 13:12

## 2019-11-11 RX ADMIN — CELECOXIB 200 MG: 200 CAPSULE ORAL at 17:36

## 2019-11-11 RX ADMIN — ASPIRIN 81 MG CHEWABLE TABLET 81 MG: 81 TABLET CHEWABLE at 17:36

## 2019-11-11 ASSESSMENT — ENCOUNTER SYMPTOMS
WEAKNESS: 0
SPUTUM PRODUCTION: 0
NECK PAIN: 0
SENSORY CHANGE: 0
WEIGHT LOSS: 0
SPEECH CHANGE: 0
ABDOMINAL PAIN: 0
NAUSEA: 0
STRIDOR: 0
PND: 0
BRUISES/BLEEDS EASILY: 0
BLOOD IN STOOL: 0
SHORTNESS OF BREATH: 0
CLAUDICATION: 0
HEADACHES: 0
TREMORS: 0
VOMITING: 0
CONSTIPATION: 0
FEVER: 0
TINGLING: 0
MYALGIAS: 1
ORTHOPNEA: 0
FLANK PAIN: 0
LOSS OF CONSCIOUSNESS: 0
HEMOPTYSIS: 0
DIZZINESS: 0
MEMORY LOSS: 0
HEARTBURN: 0
NERVOUS/ANXIOUS: 0
BLURRED VISION: 0
DOUBLE VISION: 0
DEPRESSION: 0
EYE PAIN: 0
BACK PAIN: 0
SORE THROAT: 0
CHILLS: 0
PALPITATIONS: 0
PHOTOPHOBIA: 0
COUGH: 0

## 2019-11-11 ASSESSMENT — GAIT ASSESSMENTS
GAIT LEVEL OF ASSIST: MINIMAL ASSIST
ASSISTIVE DEVICE: FRONT WHEEL WALKER
DISTANCE (FEET): 75
DEVIATION: ANTALGIC;STEP TO

## 2019-11-11 NOTE — CARE PLAN
Problem: Urinary Elimination:  Goal: Ability to reestablish a normal urinary elimination pattern will improve  Outcome: PROGRESSING AS EXPECTED  Note:   Pt incontinent at times. Purewick external catheter in place.     Problem: Pain Management  Goal: Pain level will decrease to patient's comfort goal  11/11/2019 0410 by Em Crane R.N.  Note:   Pt continuing to have muscle spasms to the LLE. Medicating with Flexeril and Valium with improvement in comfort. Pt also medicated with Toradol for pain. Polar ice in place.  11/11/2019 0410 by Em Crane R.N.  Reactivated

## 2019-11-11 NOTE — PREADMISSION SCREENING NOTE
Pre-Admission Screening Form    Patient Information:   Name: Janessa Cain     MRN: [id number]       : 1935      Age: 84 y.o.   Gender: female      Race: White [7]       Marital Status:  [2]  Family Contact: Kimmy Matthew  CainAlmas        Relationship: Daughter [2]  Spouse [17]  Home Phone: [phone]             Cell Phone: [phone]  Advanced Directives: None  Code Status:  FULL  Current Attending Provider: Crystal Eugene M.D.  Referring Physician: Dr. Eugene   Physiatrist Consult: Dr. Park    Referral Date: 19  Primary Payor Source:  MEDICARE  Secondary Payor Source:  [facility]    Medical Information:   Date of Admission to Acute Care Sett[place]/3/2019  Room Number: 2215/01  Rehabilitation Diagnosis: Right total hip arthroplasty.  There is no immunization history on file for this patient.  Allergies   Allergen Reactions   • Hydrocodone Vomiting   • Oxycodone    • Tramadol      Past Medical History:   Diagnosis Date   • Hypertension      Past Surgical History:   Procedure Laterality Date   • PB TOTAL HIP ARTHROPLASTY Right 2019    Procedure: ARTHROPLASTY, HIP, TOTAL;  Surgeon: Pauline Mo M.D.;  Location: SURGERY [facility];  Service: Orthopedics       History Leading to Admission, Conditions that Caused the Need for Rehab (CMS):     Dr. Handley H&P:  Chief Complaint  Leg swelling     History of Presenting Illness  84 y.o. female who presented 11/3/2019 with degenerative disease of the hips and history of left hip surgery in  who has known degenerative disease of the right hip and was seen by orthopedic surgery in [place] but wanted to wait until after she moved to [place] to have surgery. She moved to [place] one week ago and the last three days she is sleeping in a wheelchair at night as she cannot tolerate laying flat due to pain.  She uses a walker to ambulate during the day but is having increased difficulty ambulating due to  pain. She describes the pain in her right leg as radiating down her right leg into the foot and burning in nature.  She has constipation and is not drinking much as it is difficult to get to the bathroom to void.  Assessment/Plan:  I anticipate this patient will require at least two midnights for appropriate medical management, necessitating inpatient admission.     Bilateral leg edema  Assessment & Plan  Due to legs down to gravity for prolonged periods of time  I will order for leg elevation as tolerated and compression stockings     Degenerative joint disease of right hip  Assessment & Plan  Patient was seen by an orthopedic surgeon in [place] but wanted to wait until she moved to [place] to have surgery, now she is having difficulty ambulating and laying flat due to pain  I will start gabapentin for nerve pain  Continue tylenol and motrin for pain and flexeril for muscle spasms     Acute prerenal azotemia  Assessment & Plan  I will treat with fluids and recheck her labs     Dehydration  Assessment & Plan  I will treat her with iv fluids and monitor her fluid intake        Hyponatremia  Assessment & Plan  Due to dehydration, I will recheck her labs after hydration     UTI (urinary tract infection)  Assessment & Plan  I will order urine culture and treat with iv rocephin    Dr. Park (Physiatry) recommendations:  Living with daughter while her house is being built.      Estimated length of stay is approximately 10-14 days with good rehabilitation potential.   her disposition is to discharge to premorbid independent living setting with the supportive care of her spouse/family and community resources.      We will proceed with transfer to acute inpatient rehabilitation when appropriate as per her attending physician.  Thank for allowing us to participate in her care.    Dr. Mo (Surgery Orthopedic) recommendations:  IMPRESSION AND PLAN:  This is an 84-year-old female with severe right hip   arthritis.  She has  symptoms progressing to the point of being nonambulatory.    She has been admitted to the hospitalist service for pain control.  We have   gone over various treatment options with her including the possibility of no   surgical intervention and palliative care and a wheelchair versus a steroid   injection, which I think would only offer slight temporary relief, versus a   surgical intervention, which for her would be a total hip arthroplasty.  We   have discussed the risks, benefits, and alternatives of a total hip   replacement including the risk of pain, bleeding, infection, need for further   surgery, lack of healing, fractures, dislocations, damage to surrounding   structures, heart attack, stroke, and death.  She does express an   understanding that these would be manmade implants with limited life span.    She would be at high-risk for dislocation given her history of spinal   arthritis and the radiographic appearance of her hip showing fragmentation of   her femoral head and shortening of her leg.  We will plan to have dual   mobility devices available.  We have gone over the risks of leg length   discrepancy, both apparent and actual, and she does express an understanding.    At this point, she states that she wishes to move forward with the surgical   intervention.  She is going to discuss with her family.  We tentatively have   her on the schedule for tomorrow for a right hip replacement.  There is a   possibility of requiring skilled nursing placement versus home health services   after the surgery.  We have recommended that she continue with her process of   finding a primary care provider.    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     PROCEDURE PERFORMED:  Right total hip arthroplasty.     IMPLANTS USED:  Nicho  dual mobility total hip arthroplasty system with the   following components:  1.  A Trident Tritanium hemispherical shell, size 54, alpha code E.  2.  An Accolade II femoral stem size 5 with a 132-degree neck angle.  3.  An MDM dual mobility head system with a 28-mm Biolox delta ceramic head   with a -4 neck length and a 28x48 X3 outer head.  4.  Three acetabular bone screws were placed for adjunctive fixation.  5.  The appropriate MDM cementless liner was used, alpha code E.     SURGEON:  Pauline Mo MD     ANESTHESIOLOGIST:  Monroe Minor MD.     ASSISTANT:  MORGAN Cherry     ESTIMATED BLOOD LOSS:  400 mL.     FINDINGS:  Include severe destructive changes with chondrolysis of the right   femoral head and posterior superior acetabular deficiency.     SPECIMENS:  Include the right femoral head sent for aerobic, anaerobic,   fungal, and AFB cultures.     INDICATIONS FOR PROCEDURE:  This patient is an 84-year-old female who had a   history of right hip arthritis.  Her pain had become so severe and intractable   that she presented to the emergency room and had to be admitted for pain   control.  Orthopedics was consulted.  The risks, benefits and alternatives of   right hip replacement were explained to her including the increased risk of   infection and dislocation.  She expressed an understanding, but stated that   she could not proceed living as she currently was and wished to proceed.  She   expressed an understanding of the risks of pain, bleeding, infection, need for   further surgery, lack of healing, fractures, dislocations, damage to   surrounding structures, heart attack, stroke, and death.  She was cleared by   the hospitalist and taken to the operating room on the day of surgery for the   above named procedure.  Please note the multimodal and perioperative cleansing   techniques were used in an effort to reduce the risk of infection.    Co-morbidities: See PMH  Potential Risk - Complications:  "Contractures, Deep Vein Thrombosis, Incontinence, Malnutrition, Pain, Pneumonia, Pressure Ulcer and Urinary Tract Infection  Level of Risk: High    Ongoing Medical Management Needed (Medical/Nursing Needs):   Patient Active Problem List    Diagnosis Date Noted   • Degenerative joint disease of right hip 11/03/2019     Priority: High   • Radiculopathy 11/04/2019   • UTI (urinary tract infection) 11/03/2019   • Hyponatremia 11/03/2019   • Dehydration 11/03/2019   • Acute prerenal azotemia 11/03/2019   • Bilateral leg edema 11/03/2019     A & O    Current Vital Signs:   Temperature: 36.4 °C (97.5 °F) Pulse: 77 Respiration: 17 Blood Pressure : 136/59  Weight: 90 kg (198 lb 6.6 oz) Height: 165.1 cm (5' 5\")  Pulse Oximetry: 100 % O2 (LPM): 2      Completed Laboratory Reports:  Recent Labs     11/09/19  0600   WBC 5.4   HEMOGLOBIN 9.5*   HEMATOCRIT 29.1*   PLATELETCT 195   SODIUM 136   POTASSIUM 5.0   BUN 34*   CREATININE 0.69   ALBUMIN 2.9*   GLUCOSE 117*     Additional Labs: Not Applicable    Prior Living Situation:   Housing / Facility: [address] [facility]  Steps Into Home: 1  Lives with - Patient's Self Care Capacity: Adult Children, Spouse  Equipment Owned: Front-Wheel Walker, Wheelchair, Bed Side Commode, Hand Held Shower, Reacher    Prior Level of Function / Living Situation:   Physical Therapy: Prior Services: None  Housing / Facility: [address] [facility]  Steps Into Home: 1  Bathroom Set up: Walk In Shower  Equipment Owned: Front-Wheel Walker, Wheelchair, Bed Side Commode, Hand Held Shower, Reacher  Lives with - Patient's Self Care Capacity: Adult Children, Spouse  Bed Mobility: Required Assist  Transfer Status: Required Assist  Ambulation: Required Assist  Assistive Devices Used: Front-Wheel Walker  Wheelchair: Independent  Current Level of Function:   Level Of Assist: Minimal Assist  Assistive Device: Front Wheel Walker  Distance (Feet): 30  Deviation: Step To, Decreased Base Of Support, Bradykinetic  # of Stairs Climbed: " 0  Weight Bearing Status: wbat R  Skilled Intervention: Verbal Cuing, Tactile Cuing, Postural Facilitation, Compensatory Strategies  Supine to Sit: Minimal Assist  Sit to Supine: (NT_ refused to go supine until last min with PICC nurse)  Scooting: Supervised  Skilled Intervention: Verbal Cuing, Compensatory Strategies, Postural Facilitation  Sit to Stand: Minimal Assist(to mod due to LOB)  Bed, Chair, Wheelchair Transfer: Minimal Assist  Toilet Transfers: Minimal Assist  Transfer Method: Stand Pivot  Skilled Intervention: Verbal Cuing, Postural Facilitation, Compensatory Strategies  Sitting in Chair: >1 hr  Sitting Edge of Bed: 10mins  Standing: 10 mins  Occupational Therapy:   Self Feeding: Independent  Grooming / Hygiene: Independent  Bathing: Independent  Dressing: Independent  Toileting: Independent  Medication Management: Independent  Laundry: Requires Assist  Kitchen Mobility: Independent  Finances: Unable To Determine At This Time  Home Management: Requires Assist  Shopping: Requires Assist  Prior Level Of Mobility: Supervision With Device in Community  Driving / Transportation: Relatives / Others Provide Transportation  Prior Services: None  Housing / Facility: [address] [facility]  Occupation (Pre-Hospital Vocational): Retired Due To Age  Leisure Interests: Hobbies  Current Level of Function:   Eating: Independent  Bathing: Not Tested  Upper Body Dressing: Supervision  Lower Body Dressing: Moderate Assist  Toileting: Not Tested  Skilled Intervention: Verbal Cuing  Speech Language Pathology:      Rehabilitation Prognosis/Potential: Good  Estimated Length of Stay: 10-14 days    Nursing:   Orientation : Oriented x 4  Incontinent    Scope/Intensity of Services Recommended:  Physical Therapy: 1.5 hr / day  5 days / week. Therapeutic Interventions Required: Maximize Endurance, Mobility, Strength and Safety  Occupational Therapy: 1.5 hr / day 5 days / week. Therapeutic Interventions Required: Maximize Self Care, ADLs,  IADLs and Energy Conservation  Rehabilitation Nursin/7. Therapeutic Interventions Required: Monitor Pain, Skin, Wound(s), Vital Signs, Intake and Output, Labs, Safety and Family Training  Rehabilitation Physician: 3 - 5 days / week. Therapeutic Interventions Required: Medical Management  Respiratory Care: Pulmonary Toileting. Therapeutic Interventions Required: Pulmonary Toileting and O2 Weaning    Rehabilitation Goals and Plan (Expected frequency & duration of treatment in the IRF):   Return to the Community, Modified Independent Level of Care and Family Able to Provide  Assistance  Anticipated Date of Rehabilitation Admission: 19  Patient/Family oriented IRF level of care/facility/plan: Yes  Patient/Family willing to participate in IRF care/facility/plan: Yes  Patient able to tolerate IRF level of care proposed: Yes  Patient has potential to benefit IRF level of care proposed: Yes  Comments: Not Applicable    Special Needs or Precautions - Medical Necessity:  Safety Concerns/Precautions:  Fall Risk / High Risk for Falls, Balance and Bed / Chair Alarm  Complex Wound Care: Surgical  Pain Management  IV Site: Midline  Requires Oxygen  Total Hip Precaution: Posterior  Weight-bearing Status: WBAT RLE  Current Medications:    Current Facility-Administered Medications Ordered in Epic   Medication Dose Route Frequency Provider Last Rate Last Dose   • sodium chloride (OCEAN) 0.65 % nasal spray 2 Spray  2 Spray Nasal Q2HRS PRN Crystal Eugene M.D.   2 [place] at 19   • sulfamethoxazole-trimethoprim (BACTRIM DS) 800-160 MG tablet 1 Tab  1 Tab Oral Q12HRS Crystal Eugene M.D.   1 Tab at 19 0520   • ketorolac (TORADOL) injection 15 mg  15 mg Intravenous Q6HRS PRN Crystal Eugene M.D.   15 mg at 19 035   • diazePAM (VALIUM) tablet 5 mg  5 mg Oral TID PRN Julio Gomez II, P.A.-C.   5 mg at 19 0350   • aspirin (ASA) chewable tab 81 mg  81 mg Oral BID Julio Gomez II,  P.A.-C.   81 mg at 11/11/19 0518   • furosemide (LASIX) injection 20 mg  20 mg Intravenous BID DIURETIC Crystal Eugene M.D.   20 mg at 11/11/19 0520   • oyster shell calcium/vitamin D 250-125 MG-UNIT tablet 1 Tab  1 Tab Oral DAILY Ange Handley M.D.   1 Tab at 11/11/19 0519   • omeprazole (PRILOSEC) capsule 20 mg  20 mg Oral DAILY Camille Mascorro M.D.   20 mg at 11/11/19 0520   • celecoxib (CELEBREX) capsule 200 mg  200 mg Oral BID Ange Handley M.D.   200 mg at 11/11/19 0519   • cyclobenzaprine (FLEXERIL) tablet 5 mg  5 mg Oral TID PRN Ange Handley M.D.   5 mg at 11/11/19 0518   • losartan (COZAAR) tablet 25 mg  25 mg Oral DAILY Ange Handley M.D.   25 mg at 11/11/19 0520   • senna-docusate (PERICOLACE or SENOKOT S) 8.6-50 MG per tablet 2 Tab  2 Tab Oral BID Ange Handley M.D.   2 Tab at 11/11/19 0519    And   • polyethylene glycol/lytes (MIRALAX) PACKET 1 Packet  1 Packet Oral QDAY PRBEV Handley M.D.   1 Packet at 11/05/19 1453    And   • magnesium hydroxide (MILK OF MAGNESIA) suspension 30 mL  30 mL Oral QDAY DINH Handley M.D.   30 mL at 11/09/19 1833    And   • bisacodyl (DULCOLAX) suppository 10 mg  10 mg Rectal QDAY PRN Ange Handley M.D.       • gabapentin (NEURONTIN) capsule 100 mg  100 mg Oral TID Ange Handley M.D.   100 mg at 11/11/19 0518   • Respiratory Care per Protocol   Nebulization Continuous RT Ange Handley M.D.       • acetaminophen (TYLENOL) tablet 650 mg  650 mg Oral Q6HRS PRN Ange Handley M.D.   650 mg at 11/05/19 2313   • enalaprilat (VASOTEC) injection 1.25 mg  1.25 mg Intravenous Q6HRS PRN Ange Handley M.D.       • ondansetron (ZOFRAN) syringe/vial injection 4 mg  4 mg Intravenous Q4HRS PRN Ange Handley M.D.       • ondansetron (ZOFRAN ODT) dispertab 4 mg  4 mg Oral Q4HRS PRN Ange Handley M.D.       • morphine (pf) 4 MG/ML injection 2 mg  2 mg Intravenous Q3HRS PRN Ange Handley M.D.   2 mg at 11/09/19 0549     No current Epic-ordered  outpatient medications on file.     Diet:   DIET ORDERS (From admission to next 24h)     Start     Ordered    11/06/19 1753  Diet Order Regular  ALL MEALS     Question:  Diet:  Answer:  Regular    11/06/19 1752                Anticipated Discharge Destination / Patient/Family Goal:  Destination: Home with Assistance Support System: Daughter  Anticipated home health services: OT and PT  Previously used HH service/ provider: Not Applicable  Anticipated DME Needs: [employer]  Outpatient Services: OT and PT  Alternative resources to address additional identified needs:     Pre-Screen Completed: 11/11/2019 11:37 AM Cam Burroughs L.P.N.

## 2019-11-11 NOTE — PROGRESS NOTES
Bedside report received from Jessica OCASIO. Pt resting comfortably in bed with  at bedside, denies any needs at this time. Updated on POC.

## 2019-11-11 NOTE — PROGRESS NOTES
Pt is AAO x4.  Denies pain or discomfort at this time.   R hip dressing in place, CDI.   Prevena in place, functioning properly.  Midline patent, saline locked.   VS WNL.  SCDs in place.   POC discussed.  All needs met at this time.  Bed in low position.  Call light within reach.  Rounding in place.

## 2019-11-11 NOTE — THERAPY
"Physical Therapy treatment completed.   Bed Mobility:  Supine to Sit: Moderate Assist  Transfers: Sit to Stand: Minimal Assist  Gait: Level Of Assist: Minimal Assist x 75 ft with FWW (fatigued and required modA for last 10 ft of gait)  Plan of Care: Will benefit from Physical Therapy 7 times per week  Discharge Recommendations: Equipment: Will Continue to Assess for Equipment Needs. Post-acute therapy Discharge to a transitional care facility for continued skilled therapy services.    Pt with decreased activity tolerance, limited by weakness and deconditioning. Recommend continued PT at transitional care facility prior to DC home.    See \"Rehab Therapy-Acute\" Patient Summary Report for complete documentation.     "

## 2019-11-11 NOTE — CARE PLAN
Problem: Mobility  Goal: Risk for activity intolerance will decrease  Note:   Pt understands she will get into chair for every meal and ambulate today.     Problem: Skin Integrity  Goal: Risk for impaired skin integrity will decrease  Note:   Purewick in place. Will ensure pt is kept clean and dry.

## 2019-11-11 NOTE — DISCHARGE PLANNING
Dr. Ramirez has accepted Janessa.  Transport has been arranged for tomorrow @ 1100.  Msg placed to Dr. Eugene & Pawel (CM).  Antionette (BSN) & Matthew (daughter)  are aware.

## 2019-11-11 NOTE — DISCHARGE PLANNING
[facility] PAR to verify benefits.  Anticipate an admission pending bed availability.  Hopeful for tomorrow.  TCC remains monitoring.

## 2019-11-12 ENCOUNTER — HOSPITAL ENCOUNTER (INPATIENT)
Facility: REHABILITATION | Age: 84
LOS: 9 days | DRG: 560 | End: 2019-11-21
Attending: PHYSICAL MEDICINE & REHABILITATION | Admitting: PHYSICAL MEDICINE & REHABILITATION
Payer: MEDICARE

## 2019-11-12 VITALS
HEART RATE: 86 BPM | RESPIRATION RATE: 20 BRPM | BODY MASS INDEX: 33.06 KG/M2 | DIASTOLIC BLOOD PRESSURE: 61 MMHG | OXYGEN SATURATION: 93 % | SYSTOLIC BLOOD PRESSURE: 137 MMHG | WEIGHT: 198.41 LBS | TEMPERATURE: 97.8 F | HEIGHT: 65 IN

## 2019-11-12 PROBLEM — Z74.09 IMPAIRED MOBILITY AND ADLS: Status: ACTIVE | Noted: 2019-11-12

## 2019-11-12 PROBLEM — Z78.9 IMPAIRED MOBILITY AND ADLS: Status: ACTIVE | Noted: 2019-11-12

## 2019-11-12 PROBLEM — N39.0 UTI (URINARY TRACT INFECTION): Status: RESOLVED | Noted: 2019-11-03 | Resolved: 2019-11-12

## 2019-11-12 PROBLEM — E87.1 HYPONATREMIA: Status: RESOLVED | Noted: 2019-11-03 | Resolved: 2019-11-12

## 2019-11-12 PROBLEM — E87.5 HYPERKALEMIA: Status: RESOLVED | Noted: 2019-11-11 | Resolved: 2019-11-12

## 2019-11-12 PROBLEM — D64.9 ANEMIA: Status: ACTIVE | Noted: 2019-11-12

## 2019-11-12 PROBLEM — N19 ACUTE PRERENAL AZOTEMIA: Status: RESOLVED | Noted: 2019-11-03 | Resolved: 2019-11-12

## 2019-11-12 PROBLEM — Z96.641 STATUS POST TOTAL REPLACEMENT OF RIGHT HIP: Status: ACTIVE | Noted: 2019-11-12

## 2019-11-12 PROBLEM — M87.00 AVN (AVASCULAR NECROSIS OF BONE) (HCC): Status: ACTIVE | Noted: 2019-11-12

## 2019-11-12 PROBLEM — I10 HYPERTENSION: Status: ACTIVE | Noted: 2019-11-12

## 2019-11-12 LAB
BACTERIA SPEC ANAEROBE CULT: ABNORMAL
BACTERIA SPEC ANAEROBE CULT: ABNORMAL
SIGNIFICANT IND 70042: ABNORMAL
SITE SITE: ABNORMAL
SOURCE SOURCE: ABNORMAL

## 2019-11-12 PROCEDURE — A9270 NON-COVERED ITEM OR SERVICE: HCPCS | Performed by: HOSPITALIST

## 2019-11-12 PROCEDURE — A9270 NON-COVERED ITEM OR SERVICE: HCPCS | Performed by: PHYSICAL MEDICINE & REHABILITATION

## 2019-11-12 PROCEDURE — A9270 NON-COVERED ITEM OR SERVICE: HCPCS | Performed by: INTERNAL MEDICINE

## 2019-11-12 PROCEDURE — 700102 HCHG RX REV CODE 250 W/ 637 OVERRIDE(OP): Performed by: HOSPITALIST

## 2019-11-12 PROCEDURE — 99239 HOSP IP/OBS DSCHRG MGMT >30: CPT | Performed by: INTERNAL MEDICINE

## 2019-11-12 PROCEDURE — 700102 HCHG RX REV CODE 250 W/ 637 OVERRIDE(OP): Performed by: INTERNAL MEDICINE

## 2019-11-12 PROCEDURE — 99223 1ST HOSP IP/OBS HIGH 75: CPT | Mod: AI | Performed by: PHYSICAL MEDICINE & REHABILITATION

## 2019-11-12 PROCEDURE — 770010 HCHG ROOM/CARE - REHAB SEMI PRIVAT*

## 2019-11-12 PROCEDURE — 302196 LINEN, HYPOALLERGENIC: Performed by: PHYSICAL MEDICINE & REHABILITATION

## 2019-11-12 PROCEDURE — 94760 N-INVAS EAR/PLS OXIMETRY 1: CPT

## 2019-11-12 PROCEDURE — A9270 NON-COVERED ITEM OR SERVICE: HCPCS | Performed by: PHYSICIAN ASSISTANT

## 2019-11-12 PROCEDURE — 700102 HCHG RX REV CODE 250 W/ 637 OVERRIDE(OP): Performed by: PHYSICIAN ASSISTANT

## 2019-11-12 PROCEDURE — 700102 HCHG RX REV CODE 250 W/ 637 OVERRIDE(OP): Performed by: PHYSICAL MEDICINE & REHABILITATION

## 2019-11-12 PROCEDURE — 700111 HCHG RX REV CODE 636 W/ 250 OVERRIDE (IP): Performed by: HOSPITALIST

## 2019-11-12 RX ORDER — ASPIRIN 81 MG/1
81 TABLET, CHEWABLE ORAL 2 TIMES DAILY
Qty: 100 TAB
Start: 2019-11-12 | End: 2020-05-20

## 2019-11-12 RX ORDER — DIAZEPAM 5 MG/1
5 TABLET ORAL 3 TIMES DAILY PRN
Status: DISCONTINUED | OUTPATIENT
Start: 2019-11-12 | End: 2019-11-21 | Stop reason: HOSPADM

## 2019-11-12 RX ORDER — ONDANSETRON 2 MG/ML
4 INJECTION INTRAMUSCULAR; INTRAVENOUS 4 TIMES DAILY PRN
Status: DISCONTINUED | OUTPATIENT
Start: 2019-11-12 | End: 2019-11-21 | Stop reason: HOSPADM

## 2019-11-12 RX ORDER — DIAZEPAM 5 MG/1
5 TABLET ORAL 3 TIMES DAILY PRN
Status: CANCELLED | OUTPATIENT
Start: 2019-11-12

## 2019-11-12 RX ORDER — FUROSEMIDE 40 MG/1
40 TABLET ORAL
Status: CANCELLED | OUTPATIENT
Start: 2019-11-13

## 2019-11-12 RX ORDER — GABAPENTIN 100 MG/1
100 CAPSULE ORAL 3 TIMES DAILY
Status: CANCELLED | OUTPATIENT
Start: 2019-11-12

## 2019-11-12 RX ORDER — AMOXICILLIN 250 MG
2 CAPSULE ORAL 2 TIMES DAILY
Status: DISCONTINUED | OUTPATIENT
Start: 2019-11-12 | End: 2019-11-21 | Stop reason: HOSPADM

## 2019-11-12 RX ORDER — ASPIRIN 81 MG/1
81 TABLET, CHEWABLE ORAL 2 TIMES DAILY
Status: CANCELLED | OUTPATIENT
Start: 2019-11-12

## 2019-11-12 RX ORDER — ASPIRIN 81 MG/1
81 TABLET, CHEWABLE ORAL 2 TIMES DAILY
Status: DISCONTINUED | OUTPATIENT
Start: 2019-11-12 | End: 2019-11-21 | Stop reason: HOSPADM

## 2019-11-12 RX ORDER — CYCLOBENZAPRINE HCL 10 MG
5 TABLET ORAL 3 TIMES DAILY PRN
Status: DISCONTINUED | OUTPATIENT
Start: 2019-11-12 | End: 2019-11-12

## 2019-11-12 RX ORDER — ALUMINA, MAGNESIA, AND SIMETHICONE 2400; 2400; 240 MG/30ML; MG/30ML; MG/30ML
20 SUSPENSION ORAL
Status: DISCONTINUED | OUTPATIENT
Start: 2019-11-12 | End: 2019-11-21 | Stop reason: HOSPADM

## 2019-11-12 RX ORDER — AMOXICILLIN 250 MG
2 CAPSULE ORAL 2 TIMES DAILY
Status: CANCELLED | OUTPATIENT
Start: 2019-11-12

## 2019-11-12 RX ORDER — CYCLOBENZAPRINE HCL 10 MG
5 TABLET ORAL 3 TIMES DAILY PRN
Status: CANCELLED | OUTPATIENT
Start: 2019-11-12

## 2019-11-12 RX ORDER — ACETAMINOPHEN 500 MG
1000 TABLET ORAL 3 TIMES DAILY
Status: DISCONTINUED | OUTPATIENT
Start: 2019-11-12 | End: 2019-11-21 | Stop reason: HOSPADM

## 2019-11-12 RX ORDER — LOSARTAN POTASSIUM 25 MG/1
25 TABLET ORAL DAILY
Status: DISCONTINUED | OUTPATIENT
Start: 2019-11-13 | End: 2019-11-13

## 2019-11-12 RX ORDER — CELECOXIB 100 MG/1
200 CAPSULE ORAL 2 TIMES DAILY
Status: DISCONTINUED | OUTPATIENT
Start: 2019-11-12 | End: 2019-11-13

## 2019-11-12 RX ORDER — ACETAMINOPHEN 325 MG/1
650 TABLET ORAL EVERY 4 HOURS PRN
Status: DISCONTINUED | OUTPATIENT
Start: 2019-11-12 | End: 2019-11-21 | Stop reason: HOSPADM

## 2019-11-12 RX ORDER — FUROSEMIDE 40 MG/1
40 TABLET ORAL
Status: DISCONTINUED | OUTPATIENT
Start: 2019-11-13 | End: 2019-11-13

## 2019-11-12 RX ORDER — CELECOXIB 200 MG/1
200 CAPSULE ORAL 2 TIMES DAILY
Status: CANCELLED | OUTPATIENT
Start: 2019-11-12

## 2019-11-12 RX ORDER — LOSARTAN POTASSIUM 25 MG/1
25 TABLET ORAL DAILY
Status: CANCELLED | OUTPATIENT
Start: 2019-11-13

## 2019-11-12 RX ORDER — BENZOCAINE/MENTHOL 6 MG-10 MG
LOZENGE MUCOUS MEMBRANE 2 TIMES DAILY
Status: DISCONTINUED | OUTPATIENT
Start: 2019-11-12 | End: 2019-11-21 | Stop reason: HOSPADM

## 2019-11-12 RX ORDER — ENEMA 19; 7 G/133ML; G/133ML
1 ENEMA RECTAL
Status: DISCONTINUED | OUTPATIENT
Start: 2019-11-12 | End: 2019-11-21 | Stop reason: HOSPADM

## 2019-11-12 RX ORDER — BISACODYL 10 MG
10 SUPPOSITORY, RECTAL RECTAL
Status: CANCELLED | OUTPATIENT
Start: 2019-11-12

## 2019-11-12 RX ORDER — OMEPRAZOLE 20 MG/1
20 CAPSULE, DELAYED RELEASE ORAL DAILY
Status: DISCONTINUED | OUTPATIENT
Start: 2019-11-13 | End: 2019-11-21 | Stop reason: HOSPADM

## 2019-11-12 RX ORDER — POLYETHYLENE GLYCOL 3350 17 G/17G
1 POWDER, FOR SOLUTION ORAL
Status: DISCONTINUED | OUTPATIENT
Start: 2019-11-12 | End: 2019-11-21 | Stop reason: HOSPADM

## 2019-11-12 RX ORDER — LANOLIN ALCOHOL/MO/W.PET/CERES
3 CREAM (GRAM) TOPICAL NIGHTLY PRN
Status: DISCONTINUED | OUTPATIENT
Start: 2019-11-12 | End: 2019-11-21 | Stop reason: HOSPADM

## 2019-11-12 RX ORDER — POLYETHYLENE GLYCOL 3350 17 G/17G
1 POWDER, FOR SOLUTION ORAL
Status: CANCELLED | OUTPATIENT
Start: 2019-11-12

## 2019-11-12 RX ORDER — ONDANSETRON 4 MG/1
4 TABLET, ORALLY DISINTEGRATING ORAL 4 TIMES DAILY PRN
Status: DISCONTINUED | OUTPATIENT
Start: 2019-11-12 | End: 2019-11-21 | Stop reason: HOSPADM

## 2019-11-12 RX ORDER — OMEPRAZOLE 20 MG/1
20 CAPSULE, DELAYED RELEASE ORAL DAILY
Status: CANCELLED | OUTPATIENT
Start: 2019-11-13

## 2019-11-12 RX ORDER — POLYVINYL ALCOHOL 14 MG/ML
1 SOLUTION/ DROPS OPHTHALMIC PRN
Status: DISCONTINUED | OUTPATIENT
Start: 2019-11-12 | End: 2019-11-21 | Stop reason: HOSPADM

## 2019-11-12 RX ORDER — CYCLOBENZAPRINE HCL 10 MG
5 TABLET ORAL 3 TIMES DAILY
Status: DISCONTINUED | OUTPATIENT
Start: 2019-11-12 | End: 2019-11-18

## 2019-11-12 RX ORDER — TRAZODONE HYDROCHLORIDE 50 MG/1
50 TABLET ORAL
Status: DISCONTINUED | OUTPATIENT
Start: 2019-11-12 | End: 2019-11-21 | Stop reason: HOSPADM

## 2019-11-12 RX ORDER — LACTULOSE 20 G/30ML
30 SOLUTION ORAL
Status: DISCONTINUED | OUTPATIENT
Start: 2019-11-12 | End: 2019-11-21 | Stop reason: HOSPADM

## 2019-11-12 RX ORDER — ACETAMINOPHEN 500 MG
1000 TABLET ORAL EVERY 6 HOURS
Status: DISCONTINUED | OUTPATIENT
Start: 2019-11-12 | End: 2019-11-12

## 2019-11-12 RX ORDER — HYDROXYZINE HYDROCHLORIDE 25 MG/1
50 TABLET, FILM COATED ORAL EVERY 6 HOURS PRN
Status: DISCONTINUED | OUTPATIENT
Start: 2019-11-12 | End: 2019-11-21 | Stop reason: HOSPADM

## 2019-11-12 RX ORDER — BISACODYL 10 MG
10 SUPPOSITORY, RECTAL RECTAL
Status: DISCONTINUED | OUTPATIENT
Start: 2019-11-12 | End: 2019-11-21 | Stop reason: HOSPADM

## 2019-11-12 RX ORDER — ECHINACEA PURPUREA EXTRACT 125 MG
2 TABLET ORAL PRN
Status: DISCONTINUED | OUTPATIENT
Start: 2019-11-12 | End: 2019-11-21 | Stop reason: HOSPADM

## 2019-11-12 RX ORDER — GABAPENTIN 100 MG/1
100 CAPSULE ORAL 3 TIMES DAILY
Status: DISCONTINUED | OUTPATIENT
Start: 2019-11-12 | End: 2019-11-21 | Stop reason: HOSPADM

## 2019-11-12 RX ADMIN — ASPIRIN 81 MG 81 MG: 81 TABLET ORAL at 20:51

## 2019-11-12 RX ADMIN — SENNOSIDES AND DOCUSATE SODIUM 2 TABLET: 8.6; 5 TABLET ORAL at 20:49

## 2019-11-12 RX ADMIN — CYCLOBENZAPRINE 5 MG: 10 TABLET, FILM COATED ORAL at 14:55

## 2019-11-12 RX ADMIN — SENNOSIDES, DOCUSATE SODIUM 2 TABLET: 50; 8.6 TABLET, FILM COATED ORAL at 05:25

## 2019-11-12 RX ADMIN — HYDROCORTISONE: 1 CREAM TOPICAL at 20:49

## 2019-11-12 RX ADMIN — CALCIUM CARBONATE-CHOLECALCIFEROL TAB 250 MG-125 UNIT 1 TABLET: 250-125 TAB at 05:24

## 2019-11-12 RX ADMIN — LOSARTAN POTASSIUM 25 MG: 25 TABLET ORAL at 05:25

## 2019-11-12 RX ADMIN — CELECOXIB 200 MG: 200 CAPSULE ORAL at 05:24

## 2019-11-12 RX ADMIN — GABAPENTIN 100 MG: 100 CAPSULE ORAL at 14:55

## 2019-11-12 RX ADMIN — CELECOXIB 200 MG: 100 CAPSULE ORAL at 20:49

## 2019-11-12 RX ADMIN — ACETAMINOPHEN 1000 MG: 500 TABLET, FILM COATED ORAL at 13:50

## 2019-11-12 RX ADMIN — ASPIRIN 81 MG CHEWABLE TABLET 81 MG: 81 TABLET CHEWABLE at 05:25

## 2019-11-12 RX ADMIN — ACETAMINOPHEN 1000 MG: 500 TABLET, FILM COATED ORAL at 20:51

## 2019-11-12 RX ADMIN — OMEPRAZOLE 20 MG: 20 CAPSULE, DELAYED RELEASE ORAL at 06:00

## 2019-11-12 RX ADMIN — GABAPENTIN 100 MG: 100 CAPSULE ORAL at 20:49

## 2019-11-12 RX ADMIN — KETOROLAC TROMETHAMINE 15 MG: 30 INJECTION, SOLUTION INTRAMUSCULAR at 01:01

## 2019-11-12 RX ADMIN — CYCLOBENZAPRINE HYDROCHLORIDE 5 MG: 10 TABLET, FILM COATED ORAL at 07:31

## 2019-11-12 RX ADMIN — CYCLOBENZAPRINE 5 MG: 10 TABLET, FILM COATED ORAL at 20:50

## 2019-11-12 RX ADMIN — GABAPENTIN 100 MG: 100 CAPSULE ORAL at 05:25

## 2019-11-12 RX ADMIN — DIAZEPAM 5 MG: 5 TABLET ORAL at 01:00

## 2019-11-12 RX ADMIN — HYDROCORTISONE: 1 CREAM TOPICAL at 14:55

## 2019-11-12 RX ADMIN — FUROSEMIDE 40 MG: 40 TABLET ORAL at 05:25

## 2019-11-12 ASSESSMENT — LIFESTYLE VARIABLES
TOTAL SCORE: 0
CONSUMPTION TOTAL: NEGATIVE
ALCOHOL_USE: NO
DOES PATIENT WANT TO STOP DRINKING: NO
EVER FELT BAD OR GUILTY ABOUT YOUR DRINKING: NO
EVER_SMOKED: YES
HAVE PEOPLE ANNOYED YOU BY CRITICIZING YOUR DRINKING: NO
TOTAL SCORE: 0
AVERAGE NUMBER OF DAYS PER WEEK YOU HAVE A DRINK CONTAINING ALCOHOL: 5
TOTAL SCORE: 0
EVER HAD A DRINK FIRST THING IN THE MORNING TO STEADY YOUR NERVES TO GET RID OF A HANGOVER: NO
HAVE YOU EVER FELT YOU SHOULD CUT DOWN ON YOUR DRINKING: NO
ON A TYPICAL DAY WHEN YOU DRINK ALCOHOL HOW MANY DRINKS DO YOU HAVE: 1
HOW MANY TIMES IN THE PAST YEAR HAVE YOU HAD 5 OR MORE DRINKS IN A DAY: 0

## 2019-11-12 ASSESSMENT — PATIENT HEALTH QUESTIONNAIRE - PHQ9
1. LITTLE INTEREST OR PLEASURE IN DOING THINGS: NOT AT ALL
1. LITTLE INTEREST OR PLEASURE IN DOING THINGS: NOT AT ALL
SUM OF ALL RESPONSES TO PHQ9 QUESTIONS 1 AND 2: 0
2. FEELING DOWN, DEPRESSED, IRRITABLE, OR HOPELESS: NOT AT ALL
2. FEELING DOWN, DEPRESSED, IRRITABLE, OR HOPELESS: NOT AT ALL
SUM OF ALL RESPONSES TO PHQ9 QUESTIONS 1 AND 2: 0

## 2019-11-12 NOTE — CARE PLAN
Problem: Urinary Elimination:  Goal: Ability to reestablish a normal urinary elimination pattern will improve  Outcome: PROGRESSING AS EXPECTED  Patient given fresh water and snacks and is tolerating without N/V. Patient mildly incontinent. Purewick is in place for patient safety. Patient is able communicate needs effectively. Call light within reach.     Problem: Respiratory:  Goal: Respiratory status will improve  11/12/2019 0219 by Terra Figueroa R.N.  Outcome: PROGRESSING SLOWER THAN EXPECTED  Patient does not wear oxygen at home and desats to the low 80's when on room air. Patient wearing 2 Liters of oxygen via nasal cannula while asleep. Patient saturations low to mid 90's on 2 liters of oxygen. Respirations are even and unlabored.     Problem: Pain Management  Goal: Pain level will decrease to patient's comfort goal  11/12/2019 0221 by Terra Figueroa R.N.  Outcome: PROGRESSING AS EXPECTED  Patient states pain is a 2/10 while at rest but an 8/10 when spasms occur. See MAR for medications given. Ice in place and SCD's on. Will continue to monitor pain and give medications as needed.

## 2019-11-12 NOTE — H&P
"REHABILITATION HISTORY AND PHYSICAL/POST ADMISSION EVALUATION    11/12/2019  1:41 PM  Janessa Cain  [id number]  Admission  11/12/2019 11:26 AM  [facility] Code/Reason for admission: 08.51 Unilateral Hip Replacement   Etiologic diagnosis/problem: Status post total replacement of right hip  Chief Complaint; right leg pain, edema    HPI:  Per Dr. Park's consult on 11/8: \"The patient is a 84 y.o. 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 Patient was seen by orthopedic surgeon in [place] but wanted to wait until she moved to [place] to have surgery. She moved to [place] 1 week ago the prior 3 days to admission has been sleeping in a wheelchair at night because she cannot tolerate laying flat secondary to pain.  She is a walker to ambulate with much pain.  Pain is described as burning, location is leg and foot on the right side.  On presentation she is constipated and dehydrated due to not drinking water because it is difficult for her to get to the bathroom to void.  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 Orthopedics was consulted, and patient underwent total hip arthroplasty on 11/6/2019 by Dr. Cinthya Mo MD.      OT: Mod assist for lower body dressing and care, mod assist for bed mobility  PT: Mod assist supine to sit, min assist sit to stand, min assist with front wheel walker for gait for 50 feet     Medical complexity   Anemia with hemoglobin at 9.2, pain control, muscle spasms, prerenal azotemia, hyperkalemia, hyponatremia, questionable UTI, questionable radiculopathy-patient cannot tolerate MRI due to anxiety.\"    Patient current reports her hip pain is much improved since her surgery.  She is also very concerned about the edema she has in her legs which was actually what prompted her to present to urgent care.  We " "discussed the results of her echocardiogram as well as her ultrasound which were negative for any clots in her legs.  Currently on oxygen and denies a history of being on oxygen at home.   is at bedside.  He has moved her bowels since surgery, and reports frequency of urination since being on the Lasix.    Patient was evaluated by Rehab Medicine physician and Physical Therapy and Occupational Therapy and determined to be appropriate for acute inpatient rehab and was transferred to [facility] on 11/12/2019 11:26 AM.    With this acute therapeutic intervention, this patient hopes to improve her functional status, and return to independent living with the supportive care of family.    REVIEW OF SYSTEMS:     A complete review of systems was performed and was negative in detail with the exception of items mentioned elsewhere in this document.    PMH:  Past Medical History:   Diagnosis Date   • Hypertension        PSH:  Past Surgical History:   Procedure Laterality Date   • PB TOTAL HIP ARTHROPLASTY Right 11/6/2019    Procedure: ARTHROPLASTY, HIP, TOTAL;  Surgeon: Pauline Mo M.D.;  Location: SURGERY [facility];  Service: Orthopedics   • HIP ARTHROPLASTY TOTAL Left     \"resurfacing\"   • TONSILLECTOMY         Family History   Problem Relation Age of Onset   • Alcohol abuse Mother    • Heart Disease Mother    • Heart Disease Brother    • Alcohol abuse Brother    • Cancer Maternal Aunt         MEDICATIONS:  Current Facility-Administered Medications   Medication Dose   • Respiratory Care per Protocol     • Pharmacy Consult Request ...Pain Management Review 1 Each  1 Each   • acetaminophen (TYLENOL) tablet 650 mg  650 mg   • artificial tears ophthalmic solution 1 Drop  1 Drop   • benzocaine-menthol (CEPACOL) lozenge 1 Lozenge  1 Lozenge   • mag hydrox-al hydrox-simeth (MAALOX PLUS ES or MYLANTA DS) suspension 20 mL  20 mL   • ondansetron (ZOFRAN ODT) dispertab 4 mg  4 mg    Or   • " ondansetron (ZOFRAN) syringe/vial injection 4 mg  4 mg   • traZODone (DESYREL) tablet 50 mg  50 mg   • sodium chloride (OCEAN) 0.65 % nasal spray 2 Spray  2 Spray   • hydrOXYzine HCl (ATARAX) tablet 50 mg  50 mg   • melatonin tablet 3 mg  3 mg   • acetaminophen (TYLENOL) tablet 1,000 mg  1,000 mg   • lactulose 20 GM/30ML solution 30 mL  30 mL   • fleet enema 133 mL  1 Each   • senna-docusate (PERICOLACE or SENOKOT S) 8.6-50 MG per tablet 2 Tab  2 Tab    And   • polyethylene glycol/lytes (MIRALAX) PACKET 1 Packet  1 Packet    And   • magnesium hydroxide (MILK OF MAGNESIA) suspension 30 mL  30 mL    And   • bisacodyl (DULCOLAX) suppository 10 mg  10 mg   • aspirin (ASA) chewable tab 81 mg  81 mg   • celecoxib (CELEBREX) capsule 200 mg  200 mg   • cyclobenzaprine (FLEXERIL) tablet 5 mg  5 mg   • diazePAM (VALIUM) tablet 5 mg  5 mg   • [START ON 11/13/2019] furosemide (LASIX) tablet 40 mg  40 mg   • gabapentin (NEURONTIN) capsule 100 mg  100 mg   • [START ON 11/13/2019] losartan (COZAAR) tablet 25 mg  25 mg   • [START ON 11/13/2019] omeprazole (PRILOSEC) capsule 20 mg  20 mg   • [START ON 11/13/2019] oyster shell calcium/vitamin D 250-125 MG-UNIT tablet 1 Tab  1 Tab       ALLERGIES:  Hydrocodone; Oxycodone; and Tramadol    PSYCHOSOCIAL HISTORY:  Currently patient is living in a 1 level home with one-step to enter with her daughter while her home is being built.  She recently moved here from [place].  She has been  for 35 years, has 4 children.  Quit tobacco 37 years ago, has 1 genin tonic nightly with her , denies drugs.    LEVEL OF FUNCTION PRIOR TO DISABILTY:  Impaired mobility due to severe hip pain, was unable to lie flat at night to sleep    LEVEL OF FUNCTION PRIOR TO ADMISSION to [facility]:  Min to mod assist for ambulation and ADLs    CURRENT LEVEL OF FUNCTION:   Same as level of function prior to admission to [facility]    PHYSICAL EXAM:     VITAL SIGNS:   " height is 1.651 m (5' 5\") and weight is 89.4 kg (197 lb). Her oral temperature is 36.7 °C (98 °F). Her blood pressure is 120/64 and her pulse is 94. Her respiration is 18 and oxygen saturation is 96%.     GENERAL: No apparent distress, obese  HEENT: Normocephalic/atraumatic  CARDIAC: Regular rate and rhythm, normal S1, S2, no murmurs, no peripheral edema   LUNGS: Clear to auscultation, normal respiratory effort, on room air   ABDOMINAL: bowel sounds present, soft, nontender and nondistended    EXTREMITIES: edema, right lower leg > left, blisters on tops of feet    NEURO:    Mental status:  A&Ox4 (person, place, date, situation)    Motor:  Shoulder flexors:  Right -  5/5, Left -  5/5  Elbow flexors:  Right -  5/5, Left -  5/5  Elbow extensors:  Right -  5/5, Left -  5/5  Symmetrical   Hip flexors:  Right -  5/5, Left -  2/5 pain limited  Dorsiflexors:  Right -  5/5, Left -  5/5  Plantar flexors:  Right -  5/5, Left -  5/5         LABS:  Lab Results   Component Value Date/Time    SODIUM 136 11/09/2019 06:00 AM    POTASSIUM 5.0 11/09/2019 06:00 AM    CHLORIDE 100 11/09/2019 06:00 AM    CO2 27 11/09/2019 06:00 AM    GLUCOSE 117 (H) 11/09/2019 06:00 AM    BUN 34 (H) 11/09/2019 06:00 AM    CREATININE 0.69 11/09/2019 06:00 AM      Lab Results   Component Value Date/Time    WBC 5.4 11/09/2019 06:00 AM    RBC 2.92 (L) 11/09/2019 06:00 AM    HEMOGLOBIN 9.5 (L) 11/09/2019 06:00 AM    HEMATOCRIT 29.1 (L) 11/09/2019 06:00 AM    MCV 99.7 (H) 11/09/2019 06:00 AM    MCH 32.5 11/09/2019 06:00 AM    MCHC 32.6 (L) 11/09/2019 06:00 AM    MPV 9.5 11/09/2019 06:00 AM    NEUTSPOLYS 65.90 11/05/2019 03:24 AM    LYMPHOCYTES 20.40 (L) 11/05/2019 03:24 AM    MONOCYTES 9.40 11/05/2019 03:24 AM    EOSINOPHILS 3.60 11/05/2019 03:24 AM    BASOPHILS 0.50 11/05/2019 03:24 AM        PRIMARY REHAB DIAGNOSIS:    This patient is a 84 y.o. female admitted for acute inpatient rehabilitation with Status post total replacement of right " hip.    IMPAIRMENTS:   ADLs/IADLs  Mobility    SECONDARY DIAGNOSIS/MEDICAL CO-MORBIDITIES AFFECTING FUNCTION:    Hypertension  Peripheral edema  Pulmonary hypertension  Respiratory insufficiency  Rash   Anemia  Hypoalbuminemia    RELEVANT CHANGES SINCE PREADMISSION EVALUATION:    Status unchanged    The patient's rehabilitation potential is Excellent  The patient's medical prognosis is excellent    PLAN:   Discussion and Recommendations, discussed with the patient and/or family:   1. The patient requires an acute inpatient rehabilitation program with a coordinated program of care at an intensity and frequency not available at a lower level of care. This recommendation is substantiated by the patient's medical physicians who recommend that the patient's intervention and assessment of medical issues needs to be done at an acute level of care for patient's safety and maximum outcome.     2. A coordinated program of care will be supplied by an interdisciplinary team of physical therapy, occupational therapy, rehab physician, rehab nursing, and, if needed, speech therapy and rehab psychology. Rehab team presents a patient-specific rehabilitation and education program concentrating on prevention of future problems related to accessibility, mobility, skin, bowel, bladder, sexuality, and psychosocial and medical/surgical problems.     3. Need for Rehabilitation Physician: The rehab physician will be evaluating the patient on a multi-weekly basis to help coordinate the program of care. The rehab physician communicates between medical physicians, therapists, and nurses to maximize the patient's potential outcome. Specific areas in which the rehab physician will be providing daily assessment include the following:   A. Assessing the patient's heart rate and blood pressure response (vitals monitoring) to activity and making adjustments in medications or conservative measures as needed.   B. The rehab physician will be assessing  the frequency at which the program can be increased to allow the patient to reach optimal functional outcome.   C. The rehab physician will also provide assessments in daily skin care, especially in light of patient's impairments in mobility.   D. The rehab physician will provide special expertise in understanding how to work with functional impairment and recommend appropriate interventions, compensatory techniques, and education that will facilitate the patient's outcome.     4. Rehab R.N.   The rehab RN will be working with patient to carry over in room mobility and activities of daily living when the patient is not in 3 hours of skilled therapy. Rehab nursing will be working in conjunction with rehab physician to address all the medical issues above and continue to assess laboratory work and discuss abnormalities with the treating physicians, assess vitals, and response to activity, and discuss and report abnormalities with the rehab physician. Rehab RN will also continue daily skin care, supervise bladder/bowel program, instruct in medication administration, and ensure patient safety.     5. Therapies to treat at intensity and frequency of (may change after completion of evaluation by all therapeutic disciplines):       PT:  Physical therapy to address mobility, transfer, gait training and evaluation for adaptive equipment needs 1.5hour/day at least 5 days/week for the duration of the ELOS (see below)       OT:  Occupational therapy to address ADLs, self-care, home management training, functional mobility/transfers and assistive device evaluation, and community re-integration 1.5hour/day at least 5 days/week for the duration of the ELOS (see below).          6. Medical management / Rehabilitation Issues/Adverse Potential affecting function as part of rehabilitation plan.    Appreciate the assistance of the hospitalist with her medical comorbidities:    Hypertension  Peripheral edema  Pulmonary  hypertension  Respiratory insufficiency  Rash   Anemia  Hypoalbuminemia    I performed a complete drug regimen review and did not identify any potential clinically significant medication issues.    The patient's CODE STATUS was confirmed as FULL CODE on admission, with the patient and/or family at bedside.    REHABILITATION ISSUES/ADVERSE POTENTIAL:  1.  S/p total hip arthroplasty: Patient demonstrates functional deficits in strength, balance, coordination, and ADL's. Patient is admitted to [facility] for comprehensive rehabilitation therapy as described below.   Rehabilitation nursing monitors bowel and bladder control, educates on medication administration, co-morbidities and monitors patient safety.    2.  DVT prophylaxis:  Patient is on aspirin 80 mg BID per surgery for 30 days upon transfer. Encourage OOB. Monitor daily for signs and symptoms of DVT including but not limited to swelling and pain to prevent the development of DVT that may interfere with therapies.    3.  Pain: Continue current regimen, will make adjustments as patient improves her mobility.     4.  Nutrition/Dysphagia: Dietician monitors nutrient intake, recommend supplements prn and provide nutrition education to pt/family to promote optimal nutrition for wound healing/recovery.     5.  Bladder/bowel:  Start bowel and bladder program as described below, to prevent constipation, urinary retention (which may lead to UTI), and urinary incontinence (which will impact upon pt's functional independence).   - TV Q3h while awake with post void bladder scans, I&O cath for PVRs >400  - up to commode after meal     6.  Skin/dermal ulcer prophylaxis: Monitor for new skin conditions with q.2 h. turns as required to prevent the development of skin breakdown.     7.  Cognition/Behavior:  Psychologist Dr. Islsa provides adjustment counseling to illness and psychosocial barriers that may be potential barriers to rehabilitation.     8.  Respiratory therapy: RT performs O2 management prn, breathing retraining, pulmonary hygiene and bronchospasm management prn to optimize participation in therapies.    Pt was seen today for 72 min, and entire time spent in face-to-face contact was >50% in counseling and coordination of care as detailed in A/P above.        GOALS/EXPECTED LEVEL OF FUNCTION BASED ON CURRENT MEDICAL AND FUNCTIONAL STATUS (may change based on patient's medical status and rate of impairment recovery):  Transfers:   Modified Independent  Mobility/Gait:   Modified Independent  ADL's:   Modified Independent    DISPOSITION: Discharge to pre-morbid independent living setting with the supportive care of patient's spouse.      ELOS: 10-14 days    Justina Ramirez M.D.  Physical Medicine and Rehabilitation

## 2019-11-12 NOTE — PROGRESS NOTES
Went over DC instructions with pt, midline DC'ed, pt [facility] to [facility] at 1100 with spouse and all belongings

## 2019-11-12 NOTE — DISCHARGE INSTRUCTIONS
Discharge Instructions per Ange Handley M.D.    Follow up with orthopedic surgery as needed    DIET: regular    ACTIVITY: as recommended with rehab, posterior hip precautions    DIAGNOSIS: right hip degenerative joint disease    Return to ER if pain worsens or fever develops      Discharge Instructions    Discharged to [facility] with escort. Discharged via wheelchair, hospital escort: Yes.  Special equipment needed: Oxygen and Wheelchair, prevena wound vac    Be sure to schedule a follow-up appointment with your primary care doctor or any specialists as instructed.     Discharge Plan:   Influenza Vaccine Indication: Not indicated: Previously immunized this influenza season and > 8 years of age    I understand that a diet low in cholesterol, fat, and sodium is recommended for good health. Unless I have been given specific instructions below for another diet, I accept this instruction as my diet prescription.   Other diet: Regular    Special Instructions: Discharge instructions for the Orthopedic Patient    Follow up with Primary Care Physician within 2 weeks of discharge to home, regarding:  Review of medications and diagnostic testing.  Surveillance for medical complications.  Workup and treatment of osteoporosis, if appropriate.     -Is this a Joint Replacement patient? Yes   Total Joint Hip Replacement Discharge Instructions    Pain  - The goal is to slowly wean off the prescription pain medicine.  - Ice can be used for pain control.  20 minutes at a time is recommended, and never directly against your skin or incision.  - Most patients are off the pain pills by 3 weeks; others may require a low level of pain medications for many months. If your pain continues to be severe, follow up with your physician.  Infection  Deep hip joint infections that require removal of the prostheses occur in less than 0.1% of patients. Lesser infections in the skin (cellulites) are more common and much more  easily treated.  - Keep the incision as clean and dry as possible.  - Always wash your hands before touching your incision.  - Skin infections tend to develop around 7-10 days after surgery, most can be treated with oral antibiotics.  - Dental Care should be delayed for 3 months after surgery, your surgeon recommends taking a dose of antibiotics 1 hour prior to any dental procedure.  After 2 years, most surgeons recommend antibiotics only before an extensive procedure.  Ask your surgeon what he recommends.  - Signs and symptoms of infection can include:  low grade fever, redness, pain, swelling and drainage from your incision.  Notify your surgeon immediately if you develop any of these symptoms.  Post op Disturbances  - Bowel habits - constipation is extremely common and is caused by a combination of anesthesia, lack of mobility and pain medicine.  Use stool softeners or laxatives if necessary. It is important not to ignore this problem, as bowel obstructions can be a serious complication after joint replacement surgery.  - Mood/Energy Level - Many patients experience a lack of energy and endurance for up to 2-3 months after surgery.  Some may also feel down and can even become depressed.  This is likely due to the postoperative anemia, change in activity level, lack of sleep, pain medicine and just the emotional reaction to the surgery itself that is a big disruption in a person’s life.  This usually passes.  If symptoms persist, follow up with your primary physician.  - Returning to work - Your surgeon will give you more specific instructions.  Generally, if you work a sedentary job requiring little standing or walking, most patients may return within 2-6 weeks.  Manual labor jobs involving walking, lifting and standing may take 3-4 months.  Your surgeon’s office can provide a release to part-time or light duty work early on in your recovery and progress you to full duty as able.  - Driving - You can begin  driving an automatic shift car in 4 to 8 weeks, provided you are no longer taking narcotic pain medication. If you have a stick-shift car and your right hip was replaced, do not begin driving until your doctor says you can.   - Avoiding falls -  throw rugs and tack down loose carpeting.  Be aware of floor hazards such as pets, small objects or uneven surfaces.   -  Airport Metal Detectors - The sensitivity of metal detectors varies and it is likely that your prosthesis will cause an alarm. Inform the  that you have an artificial joint.  Diet  - Resume your normal diet as tolerated.  - It is important to achieve a healthy nutritional status by eating a well balanced diet on a regular basis.  - Your physician may recommend that you take iron and vitamin supplements.   - Continue to drink plenty of fluids.  Shower/Bathing  - You may shower as soon as you get home from the hospital unless otherwise instructed.  - Keep your incision out of water.  To keep the incision dry when showering, cover it with a plastic bag or plastic wrap.  - Pat incision dry if it gets wet.  Don’t rub.  - Do not submerge in a bath until staples are out and the incision is completely healed. (Approximately 6-8 weeks after surgery).  Dressing Change:  Procedure (if recommended by your physician)  - Wash hands.  - Open all dressing change materials.  - Remove old dressing and discard.  - Inspect incision for redness, increase in clear drainage, yellow/green drainage, odor and surrounding skin hot to touch.  -  ABD (large gauze) pad by one corner and lay over the incision.  Be careful not to touch the inside of the dressing that will lay over the incision.  - Secure in place as instructed (Ace wrap or tape).    Swelling/Bruising  - Swelling is normal after hip replacement and can involve the thigh, knee, calf and foot.  - Swelling can last from 3-6 months.  - Elevate your leg higher than your heart while reclining.   The first week you are home you should elevate your leg an equal amount of time, as you are active.    - Anti-inflammatory pills can be taken once you have stopped the blood thinners.  - The swelling is usually worse after you go home since you are upright for longer periods of time.  - Bruising is common and can involve the entire leg including the thigh, calf and even foot.  Bruising often does not appear until after you arrive home and it can be quite dramatic- purple, black, green.  The bruising you can see is not usually concerning and will subside without any treatment.      Blood Clot Prevention  Blood clots in the legs and the less common, but frightening, clots that travel to the lungs are a real focus of our preventative. Most patients are at standard risk for them, but those patients who are at higher risk include people who have had previous clots, a family history of clotting, smoking, diabetes, obesity, advanced age, use of estrogen and a sedentary lifestyle.    - Signs of blood clots in legs - Swelling in thigh, calf or ankle that does not go down with elevation.  Pain, heat and tenderness in calf, back of calf or groin area.  NOTE: blood clots can occur in either leg.  - You have been receiving anticoagulant therapy (blood thinners) in the hospital and you may be instructed to continue at home depending on your risk factors.  - Your risk for developing a clot continues for up to 2-3 months after surgery.  You should avoid prolonged sitting and dehydration during that time (long air trips and car trips).  If you do take a trip during this time, please get up and move around every 1- 1.5 hours.  - If you are prescribed blood thinning medication for home, follow instructions as directed. (Handouts provided if applicable).      Activity    Once you get home, you should stay active. The key is not to overdo it! While you can expect some good days and some bad days, you should notice a gradual improvement  over time you should notice a gradual improvement and a gradual increase in your endurance over the next 6 to 12 months.    - Weight Bearing - If you have undergone cemented or hybrid hip replacement, you can put some weight on the leg immediately using a cane or walker, and you should continue to use some support for 4 to 6 weeks to help the muscles recover.   - Sleeping Positions - Sleep on your back with your legs slightly apart or on your side with a regular pillow between your knees. Be sure to use the pillow for at least 6 weeks, or until your doctor says you can do without it. Sleeping on your stomach should be all right  - Sitting - For at least the first 3 months, sit only in chairs that have arms. Do not sit on low chairs, low stools, or reclining chairs. Do not cross your legs at the knees. The physical therapist will show you how to sit and stand from a chair, keeping your affected leg out in front of you. Get up and move around on a regular basis--at least once every hour.  - Walking - Walk as much as you like once your doctor gives you the go-ahead, but remember that walking is no substitute for your prescribed exercises. Walking with a pair of trekking poles is helpful and adds as much as 40% to the exercise you get when you walk  - Therapy may be needed in some cases, to strengthen your muscles and improve your gait (walking pattern).  This decision will be made at your post-operative appointment.  Follow your therapist recommended post-operative exercises (handout provided by Therapist).  - Swimming is also recommended; you can begin as soon as the sutures have been removed and the wound is healed, approximately 6 to 8 weeks after surgery. Using a pair of training fins may make swimming a more enjoyable and effective exercise.  - Other activities - Lower impact activities are preferred.  If you have specific questions, consult your Surgeon.    - Sexual activity - Your surgeon can tell you when it  should be safe to resume sexual activity.      When to Call the Doctor   Call the physician if:   - Fever over 100.5? F  - Increased pain, drainage, redness, odor or heat around the incision area  - Shaking chills  - Increased knee pain with activity and rest  - Increased pain in calf, tenderness or redness above or below the knee  - Increased swelling of calf, ankle, foot  - Sudden increased shortness of breath, sudden onset of chest pain, localized chest pain with coughing  - Incision opening  Or, if there are any questions or concerns about medications or care.       -Is this patient being discharged with medication to prevent blood clots?  Yes, Aspirin Aspirin, ASA oral tablets  What is this medicine?  ASPIRIN (AS pir in) is a pain reliever. It is used to treat mild pain and fever. This medicine is also used as directed by a doctor to prevent and to treat heart attacks, to prevent strokes, and to treat arthritis or inflammation.  This medicine may be used for other purposes; ask your health care provider or pharmacist if you have questions.  COMMON BRAND NAME(S): Aspir-Low, Aspir-Akilah, Aspirtab, Coco Controller Advanced Aspirin, John Aspirin, Coco Controller Aspirin Extra Strength, John Aspirin Plus, John Extra Strength, John Extra Strength Plus, Coco Controller Genuine Aspirin, Coco Controller Womens Aspirin, Bufferin, Bufferin Extra Strength, Bufferin Low Dose  What should I tell my health care provider before I take this medicine?  They need to know if you have any of these conditions:  -anemia  -asthma  -bleeding problems  -child with chickenpox, the flu, or other viral infection  -diabetes  -gout  -if you frequently drink alcohol containing drinks  -kidney disease  -liver disease  -low level of vitamin K  -lupus  -smoke tobacco  -stomach ulcers or other problems  -an unusual or allergic reaction to aspirin, tartrazine dye, other medicines, dyes, or preservatives  -pregnant or trying to get pregnant  -breast-feeding  How should I use this  medicine?  Take this medicine by mouth with a glass of water. Follow the directions on the package or prescription label. You can take this medicine with or without food. If it upsets your stomach, take it with food. Do not take your medicine more often than directed.  Talk to your pediatrician regarding the use of this medicine in children. While this drug may be prescribed for children as young as 12 years of age for selected conditions, precautions do apply. Children and teenagers should not use this medicine to treat chicken pox or flu symptoms unless directed by a doctor.  Patients over 65 years old may have a stronger reaction and need a smaller dose.  Overdosage: If you think you have taken too much of this medicine contact a poison control center or emergency room at once.  NOTE: This medicine is only for you. Do not share this medicine with others.  What if I miss a dose?  If you are taking this medicine on a regular schedule and miss a dose, take it as soon as you can. If it is almost time for your next dose, take only that dose. Do not take double or extra doses.  What may interact with this medicine?  Do not take this medicine with any of the following medications:  -cidofovir  -ketorolac  -probenecid  This medicine may also interact with the following medications:  -alcohol  -alendronate  -bismuth subsalicylate  -flavocoxid  -herbal supplements like feverfew, garlic, luis armando, ginkgo biloba, horse chestnut  -medicines for diabetes or glaucoma like acetazolamide, methazolamide  -medicines for gout  -medicines that treat or prevent blood clots like enoxaparin, heparin, ticlopidine, warfarin  -other aspirin and aspirin-like medicines  -NSAIDs, medicines for pain and inflammation, like ibuprofen or naproxen  -pemetrexed  -sulfinpyrazone  -varicella live vaccine  This list may not describe all possible interactions. Give your health care provider a list of all the medicines, herbs, non-prescription drugs, or  dietary supplements you use. Also tell them if you smoke, drink alcohol, or use illegal drugs. Some items may interact with your medicine.  What should I watch for while using this medicine?  If you are treating yourself for pain, tell your doctor or health care professional if the pain lasts more than 10 days, if it gets worse, or if there is a new or different kind of pain. Tell your doctor if you see redness or swelling. Also, check with your doctor if you have a fever that lasts for more than 3 days. Only take this medicine to prevent heart attacks or blood clotting if prescribed by your doctor or health care professional.  Do not take aspirin or aspirin-like medicines with this medicine. Too much aspirin can be dangerous. Always read the labels carefully.  This medicine can irritate your stomach or cause bleeding problems. Do not smoke cigarettes or drink alcohol while taking this medicine. Do not lie down for 30 minutes after taking this medicine to prevent irritation to your throat.  If you are scheduled for any medical or dental procedure, tell your healthcare provider that you are taking this medicine. You may need to stop taking this medicine before the procedure.  This medicine may be used to treat migraines. If you take migraine medicines for 10 or more days a month, your migraines may get worse. Keep a diary of headache days and medicine use. Contact your healthcare professional if your migraine attacks occur more frequently.  What side effects may I notice from receiving this medicine?  Side effects that you should report to your doctor or health care professional as soon as possible:  -allergic reactions like skin rash, itching or hives, swelling of the face, lips, or tongue  -breathing problems  -changes in hearing, ringing in the ears  -confusion  -general ill feeling or flu-like symptoms  -pain on swallowing  -redness, blistering, peeling or loosening of the skin, including inside the mouth or  nose  -signs and symptoms of bleeding such as bloody or black, tarry stools; red or dark-brown urine; spitting up blood or brown material that looks like coffee grounds; red spots on the skin; unusual bruising or bleeding from the eye, gums, or nose  -trouble passing urine or change in the amount of urine  -unusually weak or tired  -yellowing of the eyes or skin  Side effects that usually do not require medical attention (report to your doctor or health care professional if they continue or are bothersome):  -diarrhea or constipation  -headache  -nausea, vomiting  -stomach gas, heartburn  This list may not describe all possible side effects. Call your doctor for medical advice about side effects. You may report side effects to FDA at 3-051-FDA-6206.  Where should I keep my medicine?  Keep out of the reach of children.  Store at room temperature between 15 and 30 degrees C (59 and 86 degrees F). Protect from heat and moisture. Do not use this medicine if it has a strong vinegar smell. Throw away any unused medicine after the expiration date.  NOTE: This sheet is a summary. It may not cover all possible information. If you have questions about this medicine, talk to your doctor, pharmacist, or health care provider.  © 2018 [employer]/Gold Standard (2014-08-19 11:30:31)      · Is patient discharged on Warfarin / Coumadin?   No     Depression / Suicide Risk    As you are discharged from this [facility] facility, it is important to learn how to keep safe from harming yourself.    Recognize the warning signs:  · Abrupt changes in personality, positive or negative- including increase in energy   · Giving away possessions  · Change in eating patterns- significant weight changes-  positive or negative  · Change in sleeping patterns- unable to sleep or sleeping all the time   · Unwillingness or inability to communicate  · Depression  · Unusual sadness, discouragement and loneliness  · Talk of wanting to die  · Neglect of  personal appearance   · Rebelliousness- reckless behavior  · Withdrawal from people/activities they love  · Confusion- inability to concentrate     If you or a loved one observes any of these behaviors or has concerns about self-harm, here's what you can do:  · Talk about it- your feelings and reasons for harming yourself  · Remove any means that you might use to hurt yourself (examples: pills, rope, extension cords, firearm)  · Get professional help from the community (Mental Health, Substance Abuse, psychological counseling)  · Do not be alone:Call your Safe Contact- someone whom you trust who will be there for you.  · Call your local CRISIS HOTLINE 063-7132 or 509-332-4664  · Call your local Children's Mobile Crisis Response Team [place] (214) 443-4833 or www.BillShrink  · Call the toll free National Suicide Prevention Hotlines   · National Suicide Prevention Lifeline 888-150-RVLL (1089)  · National Hope Line Network 800-SUICIDE (911-0946)

## 2019-11-12 NOTE — DISCHARGE SUMMARY
Discharge Summary    CHIEF COMPLAINT ON ADMISSION  Chief Complaint   Patient presents with   • Peripheral Edema       Reason for Admission  Right hip dislocation    CODE STATUS  Full Code    HPI & HOSPITAL COURSE   is a very pleasant 84 y.o female admitted on 11/3/2019 for right-sided hip pain.  Apparently patient has had left hip surgery 2008 in [place] and she had significant degenerative disease in her right hip.  The patient was to undergo a procedure but she wanted to wait until she moved to [place].  However over the past 3 days prior to admission patient was basically sleeping in her wheelchair because she was unable to tolerate any movement due to pain.  Before that she was able to walk using a walker to ambulate.  Given the nature of her discomfort and her performance status orthopedic surgery was consulted, patient underwent a right total hip arthroplasty.  Postoperatively patient did have significant pain as well as spasms which thus far have been better controlled.    Patient was been feeling well otherwise, denies any fevers chills chest pain shortness of breath or nausea vomiting per    Therefore, she is discharged in good and stable condition to an inpatient rehabilitation hospital.    The patient met 2-midnight criteria for an inpatient stay at the time of discharge.      FOLLOW UP ITEMS POST DISCHARGE  PCP and orthopaedics  in 1-2 weeks post discharge    DISCHARGE DIAGNOSES  Principal Problem:    Degenerative joint disease of right hip POA: Unknown  Active Problems:    UTI (urinary tract infection) POA: Unknown    Hyponatremia POA: Unknown    Acute prerenal azotemia POA: Unknown    Bilateral leg edema POA: Unknown    Radiculopathy POA: Unknown    Hypoalbuminemia POA: Unknown    Hyperkalemia POA: Unknown  Resolved Problems:    * No resolved hospital problems. *      FOLLOW UP    No follow-up provider specified.    MEDICATIONS ON DISCHARGE     Medication List      START taking these  medications      Instructions   aspirin 81 MG Chew chewable tablet  Commonly known as:  ASA   Take 1 Tab by mouth 2 Times a Day.  Dose:  81 mg        CONTINUE taking these medications      Instructions   acetaminophen-codeine #3 300-30 MG Tabs  Commonly known as:  TYLENOL #3   Take 2 Tabs by mouth every 6 hours as needed (Pain).  Dose:  2 Tab     Calcium-Vitamin D 500-125 MG-UNIT Tabs   Take 1 Tab by mouth every 48 hours.  Dose:  1 Tab     celecoxib 200 MG Caps  Commonly known as:  CELEBREX   Take 200 mg by mouth 2 times a day.  Dose:  200 mg     cyclobenzaprine 5 MG tablet  Commonly known as:  FLEXERIL   Take 5 mg by mouth 3 times a day as needed for Muscle Spasms.  Dose:  5 mg     estrogens, conjugated 0.3 MG Tabs  Commonly known as:  PREMARIN   Take 0.3 mg by mouth every 72 hours.  Dose:  0.3 mg     ibuprofen 200 MG Tabs  Commonly known as:  MOTRIN   Take 600 mg by mouth every 6 hours as needed for Mild Pain.  Dose:  600 mg     losartan 25 MG Tabs  Commonly known as:  COZAAR   Take 25 mg by mouth every morning.  Dose:  25 mg     OMEPRAZOLE PO   Take 1 Tab by mouth every evening. OTC - UNKNOWN STRENGTH  Dose:  1 Tab            Allergies  Allergies   Allergen Reactions   • Hydrocodone Vomiting   • Oxycodone    • Tramadol        DIET  Orders Placed This Encounter   Procedures   • Diet Order Regular     Standing Status:   Standing     Number of Occurrences:   1     Order Specific Question:   Diet:     Answer:   Regular [1]       ACTIVITY  As tolerated.  Weight bearing as tolerated    LINES, DRAINS, AND WOUNDS  This is an automated list. Peripheral IVs will be removed prior to discharge.  Midline IV 11/04/19 Anterior;Left;Proximal Upper arm (Active)   Site Assessment Clean;Dry;Intact 11/11/2019  7:00 AM   Dressing Type Occlusive;Transparent 11/11/2019  7:00 AM   Line Status Saline locked 11/11/2019  7:00 AM   Dressing Status Clean;Dry;Intact 11/11/2019  7:00 AM   Dressing Intervention N/A 11/11/2019  7:00 AM    Dressing Change Due 11/09/19 11/10/2019  9:00 PM   Date Primary Tubing Changed 11/07/19 11/8/2019  8:00 AM   Date Secondary Tubing Changed 11/07/19 11/8/2019  8:00 AM   NEXT Primary Tubing Change  11/09/19 11/8/2019  8:00 AM   NEXT Secondary Tubing Change  11/08/19 11/8/2019  8:00 AM   Infiltration Grading ([facility]) 0 11/11/2019  7:00 AM   Phlebitis Scale ([facility] Only) 0 11/11/2019  7:00 AM                     MENTAL STATUS ON TRANSFER  Level of Consciousness: Alert  Orientation : Oriented x 4  Speech: Speech Clear    CONSULTATIONS   ortho    PROCEDURES  Right total hip arthroplasty     LABORATORY  Lab Results   Component Value Date    SODIUM 136 11/09/2019    POTASSIUM 5.0 11/09/2019    CHLORIDE 100 11/09/2019    CO2 27 11/09/2019    GLUCOSE 117 (H) 11/09/2019    BUN 34 (H) 11/09/2019    CREATININE 0.69 11/09/2019        Lab Results   Component Value Date    WBC 5.4 11/09/2019    HEMOGLOBIN 9.5 (L) 11/09/2019    HEMATOCRIT 29.1 (L) 11/09/2019    PLATELETCT 195 11/09/2019        Total time of the discharge process exceeds 33 minutes.

## 2019-11-12 NOTE — PROGRESS NOTES
Patient admitted to facility at 1126 via 2wheelchair; accompanied by hospital transport.  Patient assisted to room and positioned in bed for comfort and safety; call light within reach.  Patient assisted with stowing belongings and oriented to room and facility.  Admission assessment performed and documented in computer.  Admission paperwork completed; signed copies placed in chart. 2 RN skin check by this RN and Marika OAKES RN.  Will continue to monitor.

## 2019-11-12 NOTE — FLOWSHEET NOTE
11/12/19 1318   Type of Assessment   Assessment Yes   Patient History   Pulmonary Diagnosis no   Surgical Procedures R hip arthrplasty   Home O2 No   Home Treatments/Frequency No   COPD Risk Screening   Do you have a history of COPD? No   Smoking History   Have you ever smoked Yes   Have you smoked in the last 12 months No   Confirm Quit Date 11/12/79   Level Of Consciousness   Level of Consciousness Alert   Respiratory WDL   Respiratory (WDL) X   Chest Exam   Respiration 18   Pulse 94   Oximetry   #Pulse Oximetry (Single Determination) Yes   Oxygen   Home O2 Use Prior To Admission? No   Pulse Oximetry 96 %   O2 (LPM) 2   O2 Daily Delivery Respiratory  Silicone Nasal Cannula   Protocol Pathways   Protocol Pathways Yes   O2 Protocol   O2 Protocol Indications Room Air SpO2 Less Than 90%   Continuous oxygen initiated for: SpO2 Less Than 90% by ABG or Oximetry in Last 24 hrs   PRN oxygen initiated for: SpO2 Greater Than 92% with Documented Desaturation During Activity of Dyspnea   O2 Protocol Goals/Outcome SpO2 greater than 90% with exertion

## 2019-11-12 NOTE — PROGRESS NOTES
[facility] at [facility], all questions answered, pt plan for transfer at 1100

## 2019-11-12 NOTE — PROGRESS NOTES
S:  s/p right SUNSHINE  Rehab placement pending  Cultures at time of surgery NGTD  Pain controlled, c/o muscle spasms  No N/V  No Chest Pain/SOB  Afebrile  Exam:    NAD A& O x3    RLE: +DF/PF/EHL SILT L4/L5/S1  LLE:  +DF/PF/EHL SILT L4/L5/S1    Plan:  Continue standard plan of care  Weight bearing: as tolerated with walker at all times. Posterior hip precautions  DVT prophylaxis: SCD/Teds + ASA  Dispo: to rehab when bed available    Wound Care:   Keep Prevena on x 10 days. Staples out in 14 days, replace with steri strips  PT:  Use walker at all times to prevent fall. Strict posterior hip precautions  Follow Up:  With Dr. Mo @ [facility] in 2-4 weeks

## 2019-11-12 NOTE — PROGRESS NOTES
Pt is alert and oriented, medicated per mar with flexeril for R hip spasms  Denies nausea  Up with 1x assist and FWW to wheelchair this morning for hygiene  prevena in place, no drainage   Back with rash, intact  RLE 2+ edema, LLE 1+, blisters healing to feet  midline to LUE SL  Treaded socks on, hourly rounding, scds on, call light within reach

## 2019-11-12 NOTE — PROGRESS NOTES
[facility] Medicine Daily Progress Note    Date of Service  11/11/2019    Chief Complaint  84 y.o. female admitted 11/3/2019 with severe hip pain.    Hospital Course    History of Presenting Illness per Dr. Handley's H&P:  84 y.o. female who presented 11/3/2019 with degenerative disease of the hips and history of left hip surgery in 2008 who has known degenerative disease of the right hip and was seen by orthopedic surgery in [place] but wanted to wait until after she moved to [place] to have surgery. She moved to [place] one week ago and the last three days she is sleeping in a wheelchair at night as she cannot tolerate laying flat due to pain.  She uses a walker to ambulate during the day but is having increased difficulty ambulating due to pain. She describes the pain in her right leg as radiating down her right leg into the foot and burning in nature.  She has constipation and is not drinking much as it is difficult to get to the bathroom to void.      Interval Problem Update  11/7  No acute overnight events, patient's only complaint right now is right hip pain especially when she moves her place a slight weight.  Otherwise doing well. Patient denies fevers/chills, chest pain, shortness of breath or nausea/vommiting.   Pain control  PT/OT when cleared by surgery    11/8  Patient is having mild hip pain but overall 4-6/10 pain, exacerbated by movement. Otherwise doing well. Patient denies fevers/chills, chest pain, shortness of breath or nausea/vommiting.   Continue with pain control, preferably with PO.  PT/OT, placement options.    11/9  Patient has been having intermittent severe pain in her hip, 7 out of 10 in severity at times exacerbated by movement.  But overall patient says that for prior days she feels better overall.  We will try to have her work with physical therapy today, will try to de-escalate her IV pain medications.    11/10  No acute issues overnight, patient is still complaining of 7-8/10 pain in her  hip again during movement. She has been utilizing IV pain medications    11/11  Patient actually doing much better today, she was up in a chair sitting.  She still is having occasional cramping, spasms in her hip but overall pain reduced to about 5 out of 10 at its peak.  Denies fevers chills.      Consultants/Specialty  Orthopedic surgery, Dr. Mo.    Code Status  Full Code    Disposition  TBD.    Review of Systems  Review of Systems   Constitutional: Negative for chills, fever, malaise/fatigue and weight loss.   HENT: Negative for congestion, hearing loss, sore throat and tinnitus.    Eyes: Negative for blurred vision, double vision, photophobia and pain.   Respiratory: Negative for cough, hemoptysis, sputum production, shortness of breath and stridor.    Cardiovascular: Negative for chest pain, palpitations, orthopnea, claudication, leg swelling and PND.   Gastrointestinal: Negative for abdominal pain, blood in stool, constipation, heartburn, melena, nausea and vomiting.   Genitourinary: Negative for dysuria, flank pain, frequency and urgency.   Musculoskeletal: Positive for joint pain (Intermittent, but overall improvinghip) and myalgias (hp spasms). Negative for back pain and neck pain.   Neurological: Negative for dizziness, tingling, tremors, sensory change, speech change, loss of consciousness, weakness and headaches.   Endo/Heme/Allergies: Does not bruise/bleed easily.   Psychiatric/Behavioral: Negative for depression, memory loss and suicidal ideas. The patient is not nervous/anxious.    All other systems reviewed and are negative.       Physical Exam  Temp:  [36.4 °C (97.5 °F)-36.8 °C (98.3 °F)] 36.8 °C (98.2 °F)  Pulse:  [65-96] 65  Resp:  [16-17] 16  BP: (111-136)/(53-64) 111/53  SpO2:  [86 %-100 %] 100 %    Physical Exam  Vitals signs and nursing note reviewed.   Constitutional:       General: She is not in acute distress.     Appearance: Normal appearance. She is well-developed. She is obese. She  is not ill-appearing, toxic-appearing or diaphoretic.   HENT:      Head: Normocephalic and atraumatic.      Right Ear: External ear normal.      Left Ear: External ear normal.      Nose: Nose normal. No congestion.      Mouth/Throat:      Mouth: Mucous membranes are dry.      Pharynx: Oropharynx is clear. No oropharyngeal exudate or posterior oropharyngeal erythema.   Eyes:      General: No scleral icterus.        Right eye: No discharge.         Left eye: No discharge.      Extraocular Movements: Extraocular movements intact.      Conjunctiva/sclera: Conjunctivae normal.      Pupils: Pupils are equal, round, and reactive to light.   Neck:      Musculoskeletal: Normal range of motion and neck supple. No neck rigidity or muscular tenderness.      Vascular: No JVD.      Trachea: No tracheal deviation.   Cardiovascular:      Rate and Rhythm: Normal rate and regular rhythm.      Pulses: Normal pulses.      Heart sounds: Normal heart sounds. No murmur. No friction rub.   Pulmonary:      Effort: Pulmonary effort is normal. No respiratory distress.      Breath sounds: Normal breath sounds. No stridor. No wheezing, rhonchi or rales.   Chest:      Chest wall: No tenderness.   Abdominal:      General: Abdomen is flat. Bowel sounds are normal. There is no distension.      Palpations: Abdomen is soft. There is no mass.      Tenderness: There is no tenderness. There is no guarding or rebound.      Hernia: No hernia is present.   Musculoskeletal:         General: No swelling or tenderness.      Right lower leg: Edema (trace pitting edema b/l L/E, >Left, overall same as yesteray) present.      Left lower leg: Edema (Minimal ) present.   Skin:     General: Skin is warm and dry.      Capillary Refill: Capillary refill takes less than 2 seconds.      Coloration: Skin is not jaundiced or pale.   Neurological:      General: No focal deficit present.      Mental Status: She is alert and oriented to person, place, and time. Mental status  is at baseline.      Cranial Nerves: No cranial nerve deficit.      Sensory: No sensory deficit.      Motor: No weakness or abnormal muscle tone.      Gait: Gait normal.   Psychiatric:         Mood and Affect: Mood normal.         Behavior: Behavior normal.         Thought Content: Thought content normal.         Judgment: Judgment normal.         Fluids    Intake/Output Summary (Last 24 hours) at 11/11/2019 1710  Last data filed at 11/11/2019 1504  Gross per 24 hour   Intake 1200 ml   Output 1500 ml   Net -300 ml       Laboratory  Recent Labs     11/09/19  0600   WBC 5.4   RBC 2.92*   HEMOGLOBIN 9.5*   HEMATOCRIT 29.1*   MCV 99.7*   MCH 32.5   MCHC 32.6*   RDW 46.1   PLATELETCT 195   MPV 9.5     Recent Labs     11/09/19  0600   SODIUM 136   POTASSIUM 5.0   CHLORIDE 100   CO2 27   GLUCOSE 117*   BUN 34*   CREATININE 0.69   CALCIUM 8.5                   Imaging  DX-PELVIS-1 OR 2 VIEWS   Final Result      Interval right hip arthroplasty without evident complication.      IR-MIDLINE CATHETER INSERTION WO GUIDANCE > AGE 3   Final Result                  Ultrasound-guided midline placement performed by qualified nursing staff    as above.          CT-HIP W/O PLUS RECONS RIGHT   Final Result      1.  Marked deformity of the right hip joint characterized by fragmentation of the femoral head with bony resorption and flattening of the femoral head. 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 Acuity of the fragmentation cannot be assessed. There is also secondary severe osteoarthritis present.      2.  Prior left hip resurfacing procedure.      EC-ECHOCARDIOGRAM COMPLETE W/O CONT   Final Result      DX-HIP-UNILATERAL-WITH PELVIS-1 VIEW RIGHT   Final Result         1.  No radiographic evidence of acute traumatic injury.   2.  Severe degenerative changes of the right hip with extensive bony remodeling of the femoral head and acetabula.      US-EXTREMITY  VENOUS LOWER BILAT   Final Result      No gross deep venous thrombosis.      Severely limited evaluation due to subcutaneous fat edema, patient pain and mobility limitations      DX-CHEST-PORTABLE (1 VIEW)   Final Result      No acute cardiopulmonary findings.      MR-LUMBAR SPINE-W/O    (Results Pending)        Assessment/Plan  * Degenerative joint disease of right hip  Assessment & Plan  Patient was seen by an orthopedic surgeon in [place] but wanted to wait until she moved to [place] to have surgery, now she is having difficulty ambulating and laying flat due to pain  S/p total hip arthoplasty 11/6/2019  Resume gabapentin  Continue with zanaflex.  Pain control perferably PO.     Hypoalbuminemia  Assessment & Plan  Continue with Boost TID  Dietary following.     Radiculopathy  Assessment & Plan  Pain in right leg possibly radicular in nature?  Order MRI of lumbar spine advised by orthopedics    Bilateral leg edema  Assessment & Plan  LE duplex neg for DVT  Echocardiogram did show preserved LVEF.   Changed IV lasix 20mg BID to PO 40mg, can re-evaluate to see if should go on further lasix.  Swelling overall regressed.    Acute prerenal azotemia  Assessment & Plan  Resolved, resume PO intake.    Dehydration  Assessment & Plan        Hyponatremia  Assessment & Plan  2/2 to dehydration  Resolved      UTI (urinary tract infection)  Assessment & Plan  Complete 5 total day of abx.             Patient plan of care discussed at multidisplinary team rounds and with patient and R.N at beside.    VTE prophylaxis: lovenox.

## 2019-11-13 PROBLEM — E55.9 VITAMIN D INSUFFICIENCY: Status: ACTIVE | Noted: 2019-11-13

## 2019-11-13 PROBLEM — D72.819 LEUKOPENIA: Status: ACTIVE | Noted: 2019-11-13

## 2019-11-13 PROBLEM — N17.9 ACUTE KIDNEY INJURY (HCC): Status: ACTIVE | Noted: 2019-11-13

## 2019-11-13 PROBLEM — R33.9 URINARY RETENTION: Status: ACTIVE | Noted: 2019-11-13

## 2019-11-13 LAB
25(OH)D3 SERPL-MCNC: 22 NG/ML (ref 30–100)
ALBUMIN SERPL BCP-MCNC: 2.8 G/DL (ref 3.2–4.9)
ALBUMIN/GLOB SERPL: 1.2 G/DL
ALP SERPL-CCNC: 67 U/L (ref 30–99)
ALT SERPL-CCNC: 9 U/L (ref 2–50)
ANION GAP SERPL CALC-SCNC: 5 MMOL/L (ref 0–11.9)
AST SERPL-CCNC: 15 U/L (ref 12–45)
BASOPHILS # BLD AUTO: 0.3 % (ref 0–1.8)
BASOPHILS # BLD: 0.01 K/UL (ref 0–0.12)
BILIRUB SERPL-MCNC: 0.6 MG/DL (ref 0.1–1.5)
BUN SERPL-MCNC: 50 MG/DL (ref 8–22)
CALCIUM SERPL-MCNC: 8.4 MG/DL (ref 8.5–10.5)
CHLORIDE SERPL-SCNC: 99 MMOL/L (ref 96–112)
CO2 SERPL-SCNC: 32 MMOL/L (ref 20–33)
CREAT SERPL-MCNC: 1.53 MG/DL (ref 0.5–1.4)
EOSINOPHIL # BLD AUTO: 0.52 K/UL (ref 0–0.51)
EOSINOPHIL NFR BLD: 14 % (ref 0–6.9)
ERYTHROCYTE [DISTWIDTH] IN BLOOD BY AUTOMATED COUNT: 45.9 FL (ref 35.9–50)
GLOBULIN SER CALC-MCNC: 2.4 G/DL (ref 1.9–3.5)
GLUCOSE SERPL-MCNC: 91 MG/DL (ref 65–99)
HCT VFR BLD AUTO: 28.2 % (ref 37–47)
HGB BLD-MCNC: 9.1 G/DL (ref 12–16)
IMM GRANULOCYTES # BLD AUTO: 0.02 K/UL (ref 0–0.11)
IMM GRANULOCYTES NFR BLD AUTO: 0.5 % (ref 0–0.9)
LYMPHOCYTES # BLD AUTO: 0.68 K/UL (ref 1–4.8)
LYMPHOCYTES NFR BLD: 18.3 % (ref 22–41)
MAGNESIUM SERPL-MCNC: 2.4 MG/DL (ref 1.5–2.5)
MCH RBC QN AUTO: 32.9 PG (ref 27–33)
MCHC RBC AUTO-ENTMCNC: 32.3 G/DL (ref 33.6–35)
MCV RBC AUTO: 101.8 FL (ref 81.4–97.8)
MONOCYTES # BLD AUTO: 0.53 K/UL (ref 0–0.85)
MONOCYTES NFR BLD AUTO: 14.2 % (ref 0–13.4)
NEUTROPHILS # BLD AUTO: 1.96 K/UL (ref 2–7.15)
NEUTROPHILS NFR BLD: 52.7 % (ref 44–72)
NRBC # BLD AUTO: 0 K/UL
NRBC BLD-RTO: 0 /100 WBC
NT-PROBNP SERPL IA-MCNC: 89 PG/ML (ref 0–125)
PLATELET # BLD AUTO: 219 K/UL (ref 164–446)
PMV BLD AUTO: 9.8 FL (ref 9–12.9)
POTASSIUM SERPL-SCNC: 5.3 MMOL/L (ref 3.6–5.5)
PROT SERPL-MCNC: 5.2 G/DL (ref 6–8.2)
RBC # BLD AUTO: 2.77 M/UL (ref 4.2–5.4)
SODIUM SERPL-SCNC: 136 MMOL/L (ref 135–145)
WBC # BLD AUTO: 3.7 K/UL (ref 4.8–10.8)

## 2019-11-13 PROCEDURE — 97165 OT EVAL LOW COMPLEX 30 MIN: CPT

## 2019-11-13 PROCEDURE — 85025 COMPLETE CBC W/AUTO DIFF WBC: CPT

## 2019-11-13 PROCEDURE — 97530 THERAPEUTIC ACTIVITIES: CPT

## 2019-11-13 PROCEDURE — 94760 N-INVAS EAR/PLS OXIMETRY 1: CPT

## 2019-11-13 PROCEDURE — 700105 HCHG RX REV CODE 258: Performed by: HOSPITALIST

## 2019-11-13 PROCEDURE — 82306 VITAMIN D 25 HYDROXY: CPT

## 2019-11-13 PROCEDURE — 83880 ASSAY OF NATRIURETIC PEPTIDE: CPT

## 2019-11-13 PROCEDURE — 97535 SELF CARE MNGMENT TRAINING: CPT

## 2019-11-13 PROCEDURE — 770010 HCHG ROOM/CARE - REHAB SEMI PRIVAT*

## 2019-11-13 PROCEDURE — A9270 NON-COVERED ITEM OR SERVICE: HCPCS | Performed by: PHYSICAL MEDICINE & REHABILITATION

## 2019-11-13 PROCEDURE — 36415 COLL VENOUS BLD VENIPUNCTURE: CPT

## 2019-11-13 PROCEDURE — 97161 PT EVAL LOW COMPLEX 20 MIN: CPT

## 2019-11-13 PROCEDURE — 700102 HCHG RX REV CODE 250 W/ 637 OVERRIDE(OP): Performed by: PHYSICAL MEDICINE & REHABILITATION

## 2019-11-13 PROCEDURE — 99233 SBSQ HOSP IP/OBS HIGH 50: CPT | Performed by: PHYSICAL MEDICINE & REHABILITATION

## 2019-11-13 PROCEDURE — 83735 ASSAY OF MAGNESIUM: CPT

## 2019-11-13 PROCEDURE — 99222 1ST HOSP IP/OBS MODERATE 55: CPT | Mod: AI | Performed by: HOSPITALIST

## 2019-11-13 PROCEDURE — 80053 COMPREHEN METABOLIC PANEL: CPT

## 2019-11-13 RX ORDER — AMLODIPINE BESYLATE 5 MG/1
5 TABLET ORAL
Status: DISCONTINUED | OUTPATIENT
Start: 2019-11-14 | End: 2019-11-21 | Stop reason: HOSPADM

## 2019-11-13 RX ORDER — TAMSULOSIN HYDROCHLORIDE 0.4 MG/1
0.4 CAPSULE ORAL
Status: DISCONTINUED | OUTPATIENT
Start: 2019-11-13 | End: 2019-11-21 | Stop reason: HOSPADM

## 2019-11-13 RX ORDER — SODIUM CHLORIDE 9 MG/ML
1000 INJECTION, SOLUTION INTRAVENOUS ONCE
Status: COMPLETED | OUTPATIENT
Start: 2019-11-13 | End: 2019-11-13

## 2019-11-13 RX ADMIN — GABAPENTIN 100 MG: 100 CAPSULE ORAL at 15:22

## 2019-11-13 RX ADMIN — HYDROXYZINE HYDROCHLORIDE 50 MG: 25 TABLET, FILM COATED ORAL at 20:25

## 2019-11-13 RX ADMIN — CYCLOBENZAPRINE 5 MG: 10 TABLET, FILM COATED ORAL at 15:22

## 2019-11-13 RX ADMIN — ASPIRIN 81 MG 81 MG: 81 TABLET ORAL at 20:15

## 2019-11-13 RX ADMIN — VITAMIN D, TAB 1000IU (100/BT) 2000 UNITS: 25 TAB at 10:29

## 2019-11-13 RX ADMIN — GABAPENTIN 100 MG: 100 CAPSULE ORAL at 20:16

## 2019-11-13 RX ADMIN — TAMSULOSIN HYDROCHLORIDE 0.4 MG: 0.4 CAPSULE ORAL at 20:19

## 2019-11-13 RX ADMIN — OMEPRAZOLE 20 MG: 20 CAPSULE, DELAYED RELEASE ORAL at 08:10

## 2019-11-13 RX ADMIN — CYCLOBENZAPRINE 5 MG: 10 TABLET, FILM COATED ORAL at 08:12

## 2019-11-13 RX ADMIN — ACETAMINOPHEN 1000 MG: 500 TABLET, FILM COATED ORAL at 08:11

## 2019-11-13 RX ADMIN — SENNOSIDES AND DOCUSATE SODIUM 2 TABLET: 8.6; 5 TABLET ORAL at 08:10

## 2019-11-13 RX ADMIN — CELECOXIB 200 MG: 100 CAPSULE ORAL at 08:11

## 2019-11-13 RX ADMIN — HYDROCORTISONE: 1 CREAM TOPICAL at 08:16

## 2019-11-13 RX ADMIN — HYDROCORTISONE: 1 CREAM TOPICAL at 20:27

## 2019-11-13 RX ADMIN — SODIUM CHLORIDE 1000 ML: 9 INJECTION, SOLUTION INTRAVENOUS at 16:52

## 2019-11-13 RX ADMIN — CALCIUM CARBONATE-CHOLECALCIFEROL TAB 250 MG-125 UNIT 1 TABLET: 250-125 TAB at 08:11

## 2019-11-13 RX ADMIN — ACETAMINOPHEN 1000 MG: 500 TABLET, FILM COATED ORAL at 15:22

## 2019-11-13 RX ADMIN — MAGNESIUM HYDROXIDE 30 ML: 400 SUSPENSION ORAL at 10:29

## 2019-11-13 RX ADMIN — LOSARTAN POTASSIUM 25 MG: 25 TABLET ORAL at 08:11

## 2019-11-13 RX ADMIN — CYCLOBENZAPRINE 5 MG: 10 TABLET, FILM COATED ORAL at 20:15

## 2019-11-13 RX ADMIN — ASPIRIN 81 MG 81 MG: 81 TABLET ORAL at 08:11

## 2019-11-13 RX ADMIN — FUROSEMIDE 40 MG: 40 TABLET ORAL at 05:36

## 2019-11-13 RX ADMIN — ACETAMINOPHEN 1000 MG: 500 TABLET, FILM COATED ORAL at 20:15

## 2019-11-13 RX ADMIN — GABAPENTIN 100 MG: 100 CAPSULE ORAL at 08:11

## 2019-11-13 ASSESSMENT — BRIEF INTERVIEW FOR MENTAL STATUS (BIMS)
ASKED TO RECALL SOCK: YES, NO CUE REQUIRED
INITIAL REPETITION OF BED BLUE SOCK - FIRST ATTEMPT: 3
ASKED TO RECALL BLUE: YES, NO CUE REQUIRED
BIMS SUMMARY SCORE: 13
ASKED TO RECALL BED: NO, COULD NOT RECALL
WHAT MONTH IS IT: ACCURATE WITHIN 5 DAYS
WHAT DAY OF THE WEEK IS IT: CORRECT
WHAT YEAR IS IT: CORRECT

## 2019-11-13 ASSESSMENT — ENCOUNTER SYMPTOMS
EYES NEGATIVE: 1
ABDOMINAL PAIN: 0
CHILLS: 0
PALPITATIONS: 0
SHORTNESS OF BREATH: 0
VOMITING: 0
FEVER: 0
COUGH: 0
NAUSEA: 0

## 2019-11-13 ASSESSMENT — ACTIVITIES OF DAILY LIVING (ADL): TOILETING: INDEPENDENT

## 2019-11-13 NOTE — FLOWSHEET NOTE
11/13/19 0830   Events/Summary/Plan   Events/Summary/Plan Pt sitting in wheelchair, denies SOB   Respiratory WDL   Respiratory (WDL) X   Chest Exam   Respiration 18   Pulse 78   Oximetry   #Pulse Oximetry (Single Determination) Yes   Oxygen   Home O2 Use Prior To Admission? No   Pulse Oximetry 90 %   O2 (LPM) 2   O2 Daily Delivery Respiratory  Silicone Nasal Cannula

## 2019-11-13 NOTE — CONSULTS
HOSPITAL MEDICINE CONSULTATION    Requesting Physician:  Dr. Ramirez    Reason for Consult:  Hypertension, Acute Kidney Injury    History of Present Illness:  The patient is an 84-year-old  female with past medical history significant for hypertension and right hip osteoarthritis.  She was admitted to [facility] on 11/3/19 for progressively worsening right hip pain.  She was diagnosed with avascular necrosis and underwent right total hip arthroplasty on 11/6/19 by Dr. Mo.  Due to her ongoing functional debility, the patient was transferred to [facility] on 11/12/19.  Hospital Medicine consultation is requested to assist in the management of this patient's HTN and ALEK.  It is noted that she has been on Celebrex, Lasix, and Losartan.    Review of Systems:  Review of Systems   Constitutional: Negative for chills and fever.   HENT: Negative.    Eyes: Negative.    Respiratory: Negative for cough and shortness of breath.    Cardiovascular: Negative for chest pain and palpitations.   Gastrointestinal: Negative for abdominal pain, nausea and vomiting.   Genitourinary:        Urinary retention   Musculoskeletal: Positive for joint pain.        Wound pain   Skin: Negative for itching and rash.       Allergies:  Allergies   Allergen Reactions   • Hydrocodone Vomiting   • Oxycodone    • Tramadol        Medications:    Current Facility-Administered Medications:   •  vitamin D (cholecalciferol) tablet 2,000 Units, 2,000 Units, Oral, DAILY, Justina Ramirez M.D., 2,000 Units at 11/13/19 1029  •  tamsulosin (FLOMAX) capsule 0.4 mg, 0.4 mg, Oral, QHS, Justina Ramirez M.D.  •  [START ON 11/14/2019] amLODIPine (NORVASC) tablet 5 mg, 5 mg, Oral, Q DAY, Oumou Mooney M.D.  •  NS infusion 1,000 mL, 1,000 mL, Intravenous, Once, Oumou Mooney M.D.  •  Respiratory Care per Protocol, , Nebulization, Continuous RT, Justina Ramirez M.D.  •  Pharmacy Consult Request ...Pain Management Review 1 Each, 1  Each, Other, PHARMACY TO DOSE, Justina Ramirez M.D.  •  acetaminophen (TYLENOL) tablet 650 mg, 650 mg, Oral, Q4HRS PRN, Justina Ramirez M.D.  •  artificial tears ophthalmic solution 1 Drop, 1 Drop, Both Eyes, PRN, Justina Ramirez M.D.  •  benzocaine-menthol (CEPACOL) lozenge 1 Lozenge, 1 Lozenge, Mouth/Throat, Q2HRS PRN, Justina Ramirez M.D.  •  mag hydrox-al hydrox-simeth (MAALOX PLUS ES or MYLANTA DS) suspension 20 mL, 20 mL, Oral, Q2HRS PRN, Justina Ramirez M.D.  •  ondansetron (ZOFRAN ODT) dispertab 4 mg, 4 mg, Oral, 4X/DAY PRN **OR** ondansetron (ZOFRAN) syringe/vial injection 4 mg, 4 mg, Intramuscular, 4X/DAY PRN, Justina Ramirez M.D.  •  traZODone (DESYREL) tablet 50 mg, 50 mg, Oral, QHS PRN, Justina Ramirez M.D.  •  sodium chloride (OCEAN) 0.65 % nasal spray 2 Spray, 2 Spray, Nasal, PRN, Justina Ramirez M.D.  •  hydrOXYzine HCl (ATARAX) tablet 50 mg, 50 mg, Oral, Q6HRS PRN, Justina Ramirez M.D.  •  melatonin tablet 3 mg, 3 mg, Oral, HS PRN, Justina Ramirez M.D.  •  lactulose 20 GM/30ML solution 30 mL, 30 mL, Oral, QDAY PRN, Justina Ramirez M.D.  •  fleet enema 133 mL, 1 Each, Rectal, QDAY PRN, Justina Ramirez M.D.  •  senna-docusate (PERICOLACE or SENOKOT S) 8.6-50 MG per tablet 2 Tab, 2 Tab, Oral, BID, 2 Tab at 11/13/19 0810 **AND** polyethylene glycol/lytes (MIRALAX) PACKET 1 Packet, 1 Packet, Oral, QDAY PRN **AND** magnesium hydroxide (MILK OF MAGNESIA) suspension 30 mL, 30 mL, Oral, QDAY PRN, 30 mL at 11/13/19 1029 **AND** bisacodyl (DULCOLAX) suppository 10 mg, 10 mg, Rectal, QDAY PRN, Justina Ramirez M.D.  •  aspirin (ASA) chewable tab 81 mg, 81 mg, Oral, BID, Justina Ramirez M.D., 81 mg at 11/13/19 0811  •  diazePAM (VALIUM) tablet 5 mg, 5 mg, Oral, TID PRN, Justina Ramirez M.D.  •  gabapentin (NEURONTIN) capsule 100 mg, 100 mg, Oral, TID, Justina Ramirez M.D., 100 mg at 11/13/19 1522  •  omeprazole (PRILOSEC) capsule 20 mg, 20 mg, Oral, DAILY, Justina  "GATO Ramirez M.D., 20 mg at 11/13/19 0810  •  oyster shell calcium/vitamin D 250-125 MG-UNIT tablet 1 Tab, 1 Tab, Oral, DAILY, Justina Ramirez M.D., 1 Tab at 11/13/19 0811  •  cyclobenzaprine (FLEXERIL) tablet 5 mg, 5 mg, Oral, TID, Justina Ramirez M.D., 5 mg at 11/13/19 1522  •  acetaminophen (TYLENOL) tablet 1,000 mg, 1,000 mg, Oral, TID, Justina Ramirez M.D., 1,000 mg at 11/13/19 1522  •  hydrocortisone 1 % cream, , Topical, BID, Justina Ramirez M.D.    Past Medical/Surgical History:  Past Medical History:   Diagnosis Date   • Hypertension      Past Surgical History:   Procedure Laterality Date   • PB TOTAL HIP ARTHROPLASTY Right 11/6/2019    Procedure: ARTHROPLASTY, HIP, TOTAL;  Surgeon: Pauline Mo M.D.;  Location: SURGERY [facility];  Service: Orthopedics   • HIP ARTHROPLASTY TOTAL Left     \"resurfacing\"   • TONSILLECTOMY         Social History:  Social History     Socioeconomic History   • Marital status:      Spouse name: Not on file   • Number of children: Not on file   • Years of education: Not on file   • Highest education level: Not on file   Occupational History   • Not on file   Social Needs   • Financial resource strain: Not on file   • Food insecurity:     Worry: Never true     Inability: Never true   • Transportation needs:     Medical: No     Non-medical: No   Tobacco Use   • Smoking status: Former Smoker     Packs/day: 0.00   • Smokeless tobacco: Never Used   Substance and Sexual Activity   • Alcohol use: Yes     Alcohol/week: 0.6 oz     Types: 1 Glasses of wine per week     Frequency: 4 or more times a week     Drinks per session: 1 or 2     Binge frequency: Never     Comment: daily   • Drug use: Never   • Sexual activity: Not on file   Lifestyle   • Physical activity:     Days per week: Not on file     Minutes per session: Not on file   • Stress: Not on file   Relationships   • Social connections:     Talks on phone: Not on file     Gets together: Not on file     " Attends Confucianist service: Not on file     Active member of club or organization: Not on file     Attends meetings of clubs or organizations: Not on file     Relationship status: Not on file   • Intimate partner violence:     Fear of current or ex partner: Not on file     Emotionally abused: Not on file     Physically abused: Not on file     Forced sexual activity: Not on file   Other Topics Concern   • Not on file   Social History Narrative   • Not on file       Family History  Family History   Problem Relation Age of Onset   • Alcohol abuse Mother    • Heart Disease Mother    • Heart Disease Brother    • Alcohol abuse Brother    • Cancer Maternal Aunt        Physical Examination:   Vitals:    11/13/19 0830 11/13/19 0901 11/13/19 0930 11/13/19 1400   BP:  108/54 131/70 122/78   Pulse: 78 87  88   Resp: 18   18   Temp:    36.3 °C (97.4 °F)   TempSrc:    Oral   SpO2: 90% 99%  94%   Weight:       Height:           Physical Exam   Constitutional: She is oriented to person, place, and time. No distress.   HENT:   Head: Normocephalic and atraumatic.   Right Ear: External ear normal.   Left Ear: External ear normal.   Eyes: Conjunctivae and EOM are normal. Right eye exhibits no discharge. Left eye exhibits no discharge.   Neck: Normal range of motion. Neck supple. No tracheal deviation present.   Cardiovascular: Normal rate and regular rhythm.   Pulmonary/Chest: Effort normal and breath sounds normal. No stridor. No respiratory distress. She has no wheezes.   Abdominal: Soft. Bowel sounds are normal. She exhibits no distension. There is no tenderness.   Musculoskeletal:         General: Tenderness and edema present.      Comments: Right hip Marisabel wound vac   Neurological: She is alert and oriented to person, place, and time.   Skin: Skin is warm and dry. She is not diaphoretic.   Vitals reviewed.      Laboratory Data:  Recent Labs     11/13/19  0533   WBC 3.7*   RBC 2.77*   HEMOGLOBIN 9.1*   HEMATOCRIT 28.2*   .8*    MCH 32.9   MCHC 32.3*   RDW 45.9   PLATELETCT 219   MPV 9.8     Recent Labs     11/13/19  0533   SODIUM 136   POTASSIUM 5.3   CHLORIDE 99   CO2 32   GLUCOSE 91   BUN 50*   CREATININE 1.53*   CALCIUM 8.4*       Imaging:  No orders to display       Impressions/Recommendations:  Bilateral leg edema  Echo EF 65-70% w/ mod PHTN  BNP 89    AVN (avascular necrosis of bone) (HCC)  S/P right SUNSHINE on 11/6/19 per Dr. Mo  Wound care and pain control  U/S 11/3/19 negative for DVT    Anemia  Will check Vit B12, Folate, and TSH for macrocytosis  Follow H/H    Acute kidney injury (HCC)  Will discontinue Celebrex, Lasix, and Losartan  Give IVF hydration  Follow renal function    Vitamin D insufficiency  Vit D level 22  On supplementation    Hypertension  Discontinue Losartan for ALEK and high trending K+  Start low dose Norvasc  Observe blood pressure trends  Monitor for orthostatic hypotension on Flomax    Leukopenia  Follow WBC and ANC  Monitor need for G-CSF    Full Code    Reviewed with patient, RN, and Dr. Ramirez.  Thank you for the opportunity to assist in this patient's care.  We will continue to follow along with you.

## 2019-11-13 NOTE — PROGRESS NOTES
Pt with unstageable wound to the left and right dorsal foot , wet to dry dressing with kerlix applied . For wound consult in am. Right hip with wound vac. , no drainage noted. Pt rataining urine, bladder scanned and drained 1,000 ml yellow colored . Linens changed to an anti allergenic linen, due hydrocortisone cream applied to the back with scattered rashes.

## 2019-11-13 NOTE — PROGRESS NOTES
"Rehab Progress Note     Date of Service: 11/13/2019  Chief Complaint: Follow-up total hip arthroplasty    Interval Events (Subjective)  Patient seen and examined in her room this morning.  She has been unable to urinate since she arrived and has required a catheterization twice.  In addition her labs show acute kidney injury.  Patient thinks her edema in her legs is about the same or may be better compared to prior to her surgery.  She currently is denying any pain if she lays in bed.  Discussed the plan of care with the hospitalist in terms of her kidney injury.    Objective:  VITAL SIGNS: /78   Pulse 88   Temp 36.3 °C (97.4 °F) (Oral)   Resp 18   Ht 1.651 m (5' 5\")   Wt 89.4 kg (197 lb)   SpO2 94%   BMI 32.78 kg/m²   Gen: alert, no apparent distress  CV: regular rate and rhythm, no murmurs, right> left lower leg peripheral edema  Resp: clear to ascultation bilaterally, normal respiratory effort  GI: soft, non-tender abdomen, bowel sounds present    Recent Results (from the past 72 hour(s))   CBC with Differential    Collection Time: 11/13/19  5:33 AM   Result Value Ref Range    WBC 3.7 (L) 4.8 - 10.8 K/uL    RBC 2.77 (L) 4.20 - 5.40 M/uL    Hemoglobin 9.1 (L) 12.0 - 16.0 g/dL    Hematocrit 28.2 (L) 37.0 - 47.0 %    .8 (H) 81.4 - 97.8 fL    MCH 32.9 27.0 - 33.0 pg    MCHC 32.3 (L) 33.6 - 35.0 g/dL    RDW 45.9 35.9 - 50.0 fL    Platelet Count 219 164 - 446 K/uL    MPV 9.8 9.0 - 12.9 fL    Neutrophils-Polys 52.70 44.00 - 72.00 %    Lymphocytes 18.30 (L) 22.00 - 41.00 %    Monocytes 14.20 (H) 0.00 - 13.40 %    Eosinophils 14.00 (H) 0.00 - 6.90 %    Basophils 0.30 0.00 - 1.80 %    Immature Granulocytes 0.50 0.00 - 0.90 %    Nucleated RBC 0.00 /100 WBC    Neutrophils (Absolute) 1.96 (L) 2.00 - 7.15 K/uL    Lymphs (Absolute) 0.68 (L) 1.00 - 4.80 K/uL    Monos (Absolute) 0.53 0.00 - 0.85 K/uL    Eos (Absolute) 0.52 (H) 0.00 - 0.51 K/uL    Baso (Absolute) 0.01 0.00 - 0.12 K/uL    Immature Granulocytes " (abs) 0.02 0.00 - 0.11 K/uL    NRBC (Absolute) 0.00 K/uL   Comp Metabolic Panel (CMP)    Collection Time: 11/13/19  5:33 AM   Result Value Ref Range    Sodium 136 135 - 145 mmol/L    Potassium 5.3 3.6 - 5.5 mmol/L    Chloride 99 96 - 112 mmol/L    Co2 32 20 - 33 mmol/L    Anion Gap 5.0 0.0 - 11.9    Glucose 91 65 - 99 mg/dL    Bun 50 (H) 8 - 22 mg/dL    Creatinine 1.53 (H) 0.50 - 1.40 mg/dL    Calcium 8.4 (L) 8.5 - 10.5 mg/dL    AST(SGOT) 15 12 - 45 U/L    ALT(SGPT) 9 2 - 50 U/L    Alkaline Phosphatase 67 30 - 99 U/L    Total Bilirubin 0.6 0.1 - 1.5 mg/dL    Albumin 2.8 (L) 3.2 - 4.9 g/dL    Total Protein 5.2 (L) 6.0 - 8.2 g/dL    Globulin 2.4 1.9 - 3.5 g/dL    A-G Ratio 1.2 g/dL   Magnesium    Collection Time: 11/13/19  5:33 AM   Result Value Ref Range    Magnesium 2.4 1.5 - 2.5 mg/dL   Vitamin D, 25-hydroxy (blood)    Collection Time: 11/13/19  5:33 AM   Result Value Ref Range    25-Hydroxy   Vitamin D 25 22 (L) 30 - 100 ng/mL   proBrain Natriuretic Peptide, NT    Collection Time: 11/13/19  5:33 AM   Result Value Ref Range    NT-proBNP 89 0 - 125 pg/mL   ESTIMATED GFR    Collection Time: 11/13/19  5:33 AM   Result Value Ref Range    GFR If  39 (A) >60 mL/min/1.73 m 2    GFR If Non  32 (A) >60 mL/min/1.73 m 2       Current Facility-Administered Medications   Medication Frequency   • vitamin D (cholecalciferol) tablet 2,000 Units DAILY   • tamsulosin (FLOMAX) capsule 0.4 mg QHS   • Respiratory Care per Protocol Continuous RT   • Pharmacy Consult Request ...Pain Management Review 1 Each PHARMACY TO DOSE   • acetaminophen (TYLENOL) tablet 650 mg Q4HRS PRN   • artificial tears ophthalmic solution 1 Drop PRN   • benzocaine-menthol (CEPACOL) lozenge 1 Lozenge Q2HRS PRN   • mag hydrox-al hydrox-simeth (MAALOX PLUS ES or MYLANTA DS) suspension 20 mL Q2HRS PRN   • ondansetron (ZOFRAN ODT) dispertab 4 mg 4X/DAY PRN    Or   • ondansetron (ZOFRAN) syringe/vial injection 4 mg 4X/DAY PRN   •  traZODone (DESYREL) tablet 50 mg QHS PRN   • sodium chloride (OCEAN) 0.65 % nasal spray 2 Spray PRN   • hydrOXYzine HCl (ATARAX) tablet 50 mg Q6HRS PRN   • melatonin tablet 3 mg HS PRN   • lactulose 20 GM/30ML solution 30 mL QDAY PRN   • fleet enema 133 mL QDAY PRN   • senna-docusate (PERICOLACE or SENOKOT S) 8.6-50 MG per tablet 2 Tab BID    And   • polyethylene glycol/lytes (MIRALAX) PACKET 1 Packet QDAY PRN    And   • magnesium hydroxide (MILK OF MAGNESIA) suspension 30 mL QDAY PRN    And   • bisacodyl (DULCOLAX) suppository 10 mg QDAY PRN   • aspirin (ASA) chewable tab 81 mg BID   • diazePAM (VALIUM) tablet 5 mg TID PRN   • gabapentin (NEURONTIN) capsule 100 mg TID   • omeprazole (PRILOSEC) capsule 20 mg DAILY   • oyster shell calcium/vitamin D 250-125 MG-UNIT tablet 1 Tab DAILY   • cyclobenzaprine (FLEXERIL) tablet 5 mg TID   • acetaminophen (TYLENOL) tablet 1,000 mg TID   • hydrocortisone 1 % cream BID       Orders Placed This Encounter   Procedures   • Diet Order Regular     Standing Status:   Standing     Number of Occurrences:   1     Order Specific Question:   Diet:     Answer:   Regular [1]       Assessment:  Active Hospital Problems    Diagnosis   • *Status post total replacement of right hip   • Degenerative joint disease of right hip   • Acute kidney injury (HCC)   • Urinary retention   • Vitamin D insufficiency   • AVN (avascular necrosis of bone) (HCC)   • Anemia   • Impaired mobility and ADLs   • Hypertension   • Hypoalbuminemia   • Radiculopathy   • Bilateral leg edema       Medical Decision Making and Plan:  Right AVN of femur  S/p SUNSHINE Dr. Bocanegra 11/6  PT/OT, 1.5 hr each discipline, 5 days per week    Pain management  Scheduled tylenol  D/c Celebrex due to ALEK, see below  Scheduled gabapentin  Scheduled Flexeril    Bowel  Meds as needed  Last BM 11/13    Bladder  Acute urinary retention  Place Weathers  Start Flomax  Voiding trial next week    DVT prophylaxis  Per surgery ASA 81 mg BID    Appreciate  assistance of hospitalist with her medical co-morbidities:    Acute kidney injury  Hypertension  Peripheral edema  Pulmonary hypertension  Respiratory insufficiency  Rash  Anemia  Hypoalbuminemia  Vit D insufficiency    Total time:  37 minutes.  I spent greater than 50% of the time for patient care, counseling, and coordination on this date, including patient face-to face time, unit/floor time with review of records/pertinent lab data and studies, as well as discussing diagnostic evaluation/work up, planned therapeutic interventions, and future disposition of care, as per the interval events/subjective and the assessment and plan as noted above.      Justina Ramirez M.D.   Physical Medicine and Rehabilitation

## 2019-11-13 NOTE — THERAPY
"Physical Therapy   Daily Treatment     Patient Name: Janessa Cain  Age:  84 y.o., Sex:  female  Medical Record #: [id number]  Today's Date: 11/13/2019     Precautions  Precautions: Weight Bearing As Tolerated Right Lower Extremity, Posterior Hip Precautions, Fall Risk, Other (See Comments)  Comments: wound vac R hip, B foot wounds dorsal surface    Subjective    Pt was seated in w/c upon arrival and agreeable to treatment.  Pt was agreeable to schedule change.  Pt reported pain at 1/10 in R hip.      Objective       11/13/19 1001   Precautions   Precautions Weight Bearing As Tolerated Right Lower Extremity;Posterior Hip Precautions;Fall Risk;Other (See Comments)   Comments wound vac R hip, B foot wounds dorsal surface   Bed Mobility    Sit to Supine Moderate Assist   Sit to Stand Contact Guard Assist   Scooting Contact Guard Assist   Interdisciplinary Plan of Care Collaboration   IDT Collaboration with  Physical Therapist   Patient Position at End of Therapy In Bed;Call Light within Reach;Tray Table within Reach;Phone within Reach   Collaboration Comments CLOF   PT Total Time Spent   PT Individual Total Time Spent (Mins) 30   PT Charge Group   PT Therapeutic Activities 2       FIM Bed/Chair/Wheelchair Transfers Score: 3 - Moderate Assistance  Bed/Chair/Wheelchair Transfers Description:  Adaptive equipment, Increased time, Supervision for safety, Set-up of equipment, Verbal cueing, Assist with two limbs(Bed mobility with mod A; SPT with FWW and CGA)    FIM Stairs Score:   Up/down 2\" and 4\" step in // bars with CGA  Stairs Description:       Assessment    Pt demonstrated good knowledge of hip precautions (able to state 3/3) with good adherence to precautions with stair training.      Plan    Continue to progress gait training with FWW, transfer training, bed mobility, issue HEP.     "

## 2019-11-13 NOTE — ASSESSMENT & PLAN NOTE
S/P right SUNSHINE (11/6)  Nolvia-incisional blister noted  Afebrile  No leukocytosis (had leukopenia)  U/S RLE: negative for DVT  Anerobic bone culture +single colony of propionibacterium acnes, uncertain clinical significance  On Cefepime (thru 11/25) --> then Rocephin 1g daily for 5 weeks  ID following

## 2019-11-13 NOTE — WOUND TEAM
"[facility]  Inpatient Services  Initial Wound and Skin Care Evaluation    Admission Date: 11/12/2019     Consult Date:  11/12/19   HPI, PMH, SH: Reviewed    Unit where seen by Wound Team: [id number]     WOUND CONSULT RELATED TO:  Evaluation of bilateral foot wounds     SUBJECTIVE:  \"My legs swelled up and those blisters came\"      Self Report / Pain Level:  Right  with care but reports comfort after compression applied       OBJECTIVE:  Left foot blister is dry and intact and not care indicated other than some compression.  Right foot wound needs debridement    WOUND TYPE, LOCATION, CHARACTERISTICS (Pressure Injuries: location, stage, POA or date identified)     Wound 11/12/19 Foot full thickness right dorsum foot (Active)   Wound Image   11/13/2019  9:00 AM   Site Assessment Clean;Painful;Yellow;Brown;Dark edges 11/13/2019  9:00 AM   Nolvia-wound Assessment Clean;Pink;Edema 11/13/2019  9:00 AM   Margins Attached edges 11/13/2019  9:00 AM   Wound Length (cm) 4 cm 11/13/2019  9:00 AM   Wound Width (cm) 7 cm 11/13/2019  9:00 AM   Wound Surface Area (cm^2) 28 cm^2 11/13/2019  9:00 AM   Tunneling 0 cm 11/13/2019  9:00 AM   Undermining 0 cm 11/13/2019  9:00 AM   Closure Secondary intention 11/13/2019  9:00 AM   Drainage Amount Small 11/13/2019  9:00 AM   Drainage Description Serous 11/13/2019  9:00 AM   Non-staged Wound Description Full thickness 11/13/2019  9:00 AM   Treatments Cleansed;Site care 11/13/2019  9:00 AM   Cleansing Normal Saline Irrigation 11/13/2019  9:00 AM   Periwound Protectant Not Applicable 11/13/2019  9:00 AM   Dressing Options Adhesive Foam;Honey Colloid 11/13/2019  9:00 AM   Dressing Cleansing/Solutions Not Applicable 11/13/2019  9:00 AM   Dressing Changed New 11/13/2019  9:00 AM   Dressing Status Intact 11/13/2019  9:00 AM   Dressing Change Frequency Daily 11/13/2019  9:00 AM   NEXT Dressing Change  11/14/19 11/13/2019  9:00 AM   NEXT Weekly Photo (Inpatient Only) 11/15/19 " "11/13/2019  9:00 AM   WOUND NURSE ONLY - Odor None 11/13/2019  9:00 AM   WOUND NURSE ONLY - Exposed Structures None 11/13/2019  9:00 AM   WOUND NURSE ONLY - Tissue Type and Percentage yellow 11/13/2019  9:00 AM       Wound 11/12/19 Foot full thickness dorsum left foot (Active)   Wound Image   11/13/2019  9:00 AM   Site Assessment Clean;Dry;Intact;Brown 11/13/2019  9:00 AM   Nolvia-wound Assessment Clean;Dry;Intact 11/13/2019  9:00 AM   Margins Attached edges 11/13/2019  9:00 AM   Wound Length (cm) 2 cm 11/13/2019  9:00 AM   Wound Width (cm) 1.4 cm 11/13/2019  9:00 AM   Wound Surface Area (cm^2) 2.8 cm^2 11/13/2019  9:00 AM   Tunneling 0 cm 11/13/2019  9:00 AM   Undermining 0 cm 11/13/2019  9:00 AM   Closure Secondary intention 11/13/2019  9:00 AM   Drainage Amount None 11/13/2019  9:00 AM   Non-staged Wound Description Full thickness 11/13/2019  9:00 AM   Cleansing Not Applicable 11/13/2019  9:00 AM   Periwound Protectant Not Applicable 11/13/2019  9:00 AM   Dressing Options Open to Air 11/13/2019  9:00 AM   Dressing Cleansing/Solutions Not Applicable 11/13/2019  9:00 AM   Dressing Changed New 11/13/2019  9:00 AM   Dressing Status Intact 11/13/2019  9:00 AM   Dressing Change Frequency As Needed 11/13/2019  9:00 AM   NEXT Dressing Change  11/14/19 11/13/2019  9:00 AM   NEXT Weekly Photo (Inpatient Only) 11/19/19 11/13/2019  9:00 AM   WOUND NURSE ONLY - Odor None 11/13/2019  9:00 AM   WOUND NURSE ONLY - Exposed Structures None 11/13/2019  9:00 AM   WOUND NURSE ONLY - Tissue Type and Percentage thin brown scab 11/13/2019  9:00 AM   WOUND NURSE ONLY - Time Spent with Patient (mins) 60 11/13/2019  9:00 AM     Vascular:    Dorsal Pedal pulses:  Unable to palpate due to edema  Posterior tib pulses:         \"   **          \"            \"    CM:  NA    Lab Values:    Lab Results   Component Value Date/Time    WBC 3.7 (L) 11/13/2019 05:33 AM    RBC 2.77 (L) 11/13/2019 05:33 AM    HEMOGLOBIN 9.1 (L) 11/13/2019 05:33 AM    " HEMATOCRIT 28.2 (L) 11/13/2019 05:33 AM        No results found for: HBA1C        Culture:  NA      INTERVENTIONS BY WOUND TEAM:  Met with patient who recounts her history of dependency of legs due to hip problem.  Recently moved her and daughter is helpful.  Cleansed right foot wound with NS gauze and measurements taken of both wounds.  Covered right foot wound with honey colloid and adhesive foam informing patient rationale of care.  Tubi  E applied to both lower legs and feet.  Discussed with Dr. Gan and staff RN that wound team to re assess Friday.    Dressing Selection:  Honey colloid, adhesive foam, tubi  E         Interdisciplinary consultation: Patient, Bedside RN, Dr. Gan    EVALUATION: right foot wound requires debridement and honey colloid should promote this with change in therapy Friday pending wound status; mild compression to assist with edema    Factors affecting wound healing: edema   Goals: Steady decrease in wound area and depth weekly.    NURSING PLAN OF CARE ORDERS (X):    Dressing changes: See Dressing Care orders: X  Skin care: See Skin Care orders:   Rectal tube care: See Rectal Tube Care orders:   Other orders:    RSKIN: CURRENT (X) ORDERED (O):   Q shift Onel:  X  Q shift pressure point assessments:  X  Pressure redistribution mattress    X        AIMEE          Bariatric AIEME         Bariatric foam           Heel float boots      Heel silicone dressing      Float Heels off Bed with Pillows  X             Barrier wipes         Barrier Cream         Barrier paste          Sacral silicone dressing     Silicone O2 tubing         Anchorfast         Cannula fixation Device (Tender )          Gray Foam Ear protectors           Trach with Optifoam split foam                 Waffle cushion        Waffle Overlay         Rectal tube or BMS   Purwick/Condom Cath         Antifungal tx      Interdry          Reposition q 2 hours  X      Up to chair   X     Ambulate      PT/OT         Dietician        Diabetes Education      PO  X   TF     TPN     NPO   # days   Other        WOUND TEAM PLAN OF CARE (X):  NPWT change 3 x week:        Dressing changes by wound team:       Follow up as needed:  X Friday     Other (explain):     Anticipated discharge plans (X):   SNF:           Home Care:           Outpatient Wound Center:            Self Care:            Other:     TBD with ongoing wound care

## 2019-11-13 NOTE — THERAPY
"Occupational Therapy   Initial Evaluation     Patient Name: Janessa Cain  Age:  84 y.o., Sex:  female  Medical Record #: [id number]  Today's Date: 11/13/2019     Subjective    \"my  has had many hip surgeries so we are aware of the recovery process\"     Objective       11/13/19 0701   Prior Living Situation   Prior Services Intermittent Physical Support for ADL Per Family   Housing / Facility 1 Story House   Steps Into Home 1   Steps In Home 0   Elevator No   Bathroom Set up Walk In Shower;Shower Chair   Equipment Owned Front-Wheel Walker;Wheelchair;Tub / Shower Seat   Lives with - Patient's Self Care Capacity Spouse;Adult Children   Comments Pt reports that just moved here recently from [place]. Pt will stay with daughter until their home is built. Spouse is available to assist, daughter works full-time   Prior Level of ADL Function   Self Feeding Independent   Grooming / Hygiene Independent   Bathing Independent   Dressing Independent   Toileting Independent   Prior Level of IADL Function   Medication Management Independent   Laundry Requires Assist   Kitchen Mobility Requires Assist   Finances Independent   Home Management Dependent   Shopping Dependent   Prior Level Of Mobility Independent With Device in Community;Independent With Device in Home   Driving / Transportation Driving Independent   Occupation (Pre-Hospital Vocational) Retired Due To Age   Comments Pt has required increase assist in the last few months d/t increasing pain prior to surgery   IRF-RENEE:  Prior Functioning: Everyday Activities   Self Care Independent   Indoor Mobility (Ambulation) Needed some help   Stairs Needed some help   Functional Cognition Independent   Prior Device Use Manual wheelchair   Vitals   O2 (LPM) 2   Pain 0 - 10 Group   Therapist Pain Assessment 0;Prior to Activity   Cognition    Cognition / Consciousness WDL   Level of Consciousness Alert   IRF-RENEE:  Cognitive Pattern Assessment   Cognitive Pattern Assessment " "Used BIMS   IRF-RENEE:  Brief Interview for Mental Status (BIMS)   Repetition of Three Words (First Attempt) 3   Temporal Orientation: Able to Report the Correct Year Correct   Temporal Orientation: Able to Report the Correct Month Accurate within 5 days   Temporal Orientation: Able to Report the Correct Day of the Week Correct   Able to Recall \"Sock\" Yes, no cue required   Able to Recall \"Blue\" Yes, no cue required   Able to Recall \"Bed\" No, could not recall   BIMS Summary Score 13   Passive ROM Upper Body   Passive ROM Upper Body WDL   Active ROM Upper Body   Active ROM Upper Body  WDL   Dominant Hand Right   Strength Upper Body   Upper Body Strength  X   Gross Strength Generalized Weakness, Equal Bilaterally.    Sensation Upper Body   Upper Extremity Sensation  WDL   Upper Body Muscle Tone   Upper Body Muscle Tone  WDL   Balance Assessment   Sitting Balance (Static) Good   Sitting Balance (Dynamic) Fair +   Standing Balance (Static) Fair -   Standing Balance (Dynamic) Fair -   Weight Shift Sitting Good   Weight Shift Standing Fair   Bed Mobility    Supine to Sit Minimal Assist   Sit to Stand Minimal Assist   Coordination Upper Body   Coordination WDL   IRF-RENEE:  Eating   Assistance Needed Set-up / clean-up   [place] Physical Assistance Level No physical assistance or only touching/steadying assist   CARE Score 5   Discharge Goal:  Assistance Needed Independent   Discharge Goal:  Physical Assistance Level No physical assistance or only touching/steadying assist   Discharge Goal:  Score 6   IRF-RENEE:  Oral Hygiene   Assistance Needed Set-up / clean-up   Physical Assistance Level No physical assistance   CARE Score 5   Discharge Goal:  Assistance Needed Independent   Discharge Goal:  Physical Assistance Level No physical assistance or only touching/steadying assist   Discharge Goal:  Score 6   IRF-RENEE:  Shower/Bathe Self   Assistance Needed Physical assistance   Physical Assistance Level Less than 25%   CARE Score 3 "   Discharge Goal:  Assistance Needed Independent;Adaptive equipment   Discharge Goal:  Physical Assistance Level No physical assistance or only touching/steadying assist   Discharge Goal Score 6   IRF-RENEE:  Upper Body Dressing   Assistance Needed Set-up / clean-up;Supervision   Physical Assistance Level No physical assistance or only touching/steadying assist   CARE Score 4   Discharge Goal:  Assistance Needed Independent   Discharge Goal:  Physical Assistance Level No physical assistance or only touching/steadying assist   Dischage Goal:  Score 6   IRF-RENEE:  Lower Body Dressing   Assistance Needed Physical assistance   Physical Assistance Level 75% or more   CARE Score 2   Discharge Goal:  Assistance Needed Independent;Adaptive equipment   Discharge Goal:  Physical Assistance Level No physical assistance or only touching/steadying assist   Discharge Goal:  Score 6   IRF RENEE:  Putting On/Taking Off Footwear   Assistance Needed Physical assistance   Physical Assistance Level Total assistance   CARE Score 1   Discharge Goal:  Assistance Needed Independent;Adaptive equipment   Discharge Goal:  Physical Assistance Level No physical assistance or only touching/steadying assist   Discharge Goal:  Score 6   IRF-RENEE:  Toileting Hygiene   Assistance Needed Physical assistance   Physical Assistance Level 75% or more   CARE Score 2   Discharge Goal:  Assistance Needed Independent;Adaptive equipment   Discharge Goal:  Physical Assistance Level No physical assistance or only touching/steadying assist   Discharge Goal:  Score 6   IRF-RENEE:  Toilet Transfer   Assistance Needed Physical assistance   Physical Assistance Level Less than 25%   CARE Score 3   Discharge Goal:  Assistance Needed Independent;Adaptive equipment   Discharge Goal:  Physical Assistance Level No physical assistance or only touching/steadying assist   Discahrge Goal:  Score 6   IRF-RENEE:  Hearing, Speech, and Vision   Expression of Ideas and Wants 4    Understanding Verbal and Non-Verbal Content 4   Problem List   Problem List Decreased Active Daily Living Skills;Decreased Homemaking Skills;Decreased Functional Mobility;Decreased Activity Tolerance;Impaired Postural Control / Balance;Limited Knowledge of Post Op Precautions   Precautions   Precautions Weight Bearing As Tolerated Right Lower Extremity;Posterior Hip Precautions;Fall Risk   Comments wound vac R hip, B foot wounds dorsal surface, hypoallergenic linens   Current Discharge Plan   Current Discharge Plan Return to Prior Living Situation   Benefit    Therapy Benefit Patient Would Benefit from Inpatient Rehab Occupational Therapy to Maximize Kay with ADLs, IADLs and Functional Mobility.   Interdisciplinary Plan of Care Collaboration   IDT Collaboration with  Nursing;Physical Therapist   Patient Position at End of Therapy Seated;Call Light within Reach;Tray Table within Reach   Collaboration Comments re: CLOF   Equipment Needs   Assistive Device / DME Grab Bars By Toilet;Grab Bars In Shower / Tub;Shower Chair   Adaptive Equipment Dressing Stick;Reacher;Sock Aide;Long Handled Sponge   OT Total Time Spent   OT Individual Total Time Spent (Mins) 60   OT Charge Group   Charges Yes   OT Self Care / ADL 1   OT Evaluation OT Evaluation Low       FIM Eating Score:  5 - Standby Prompting/Supervision or Set-up  Eating Description:  Increased time, Verbal cueing, Set-up of equipment or meal/tube feeding    FIM Grooming Score:  5 - Standby Prompting/Supervision or Set-up  Grooming Description:  Increased time, Set-up of equipment    FIM Bathing Score:  4 - Minimal Assistance  Bathing Description:  Grab bar, Increased time, Hand held shower, Supervision for safety, Set-up of equipment, Verbal cueing(CGA when standing)    FIM Upper Body Dressin - Standby Prompting/Supervision or Set-up  Upper Body Dressing Description:  Increased time, Supervision for safety, Verbal cueing, Set-up of equipment    FIM Lower  Body Dressing Score:  2 - Max Assistance  Lower Body Dressing Description:  Increased time, Supervision for safety, Verbal cueing, Set-up of equipment    FIM Toilet Transfer Score:  4 - Minimal Assistance  Toilet Transfer Description:  Grab bar, Increased time, Supervision for safety, Verbal cueing, Set-up of equipment(stand pivot w/c<>toilet with GB)    FIM Tub/Shower Transfers Score:  3 - Moderate Assistance  Tub/Shower Transfers Description:  Grab bar, Increased time, Supervision for safety, Verbal cueing, Set-up of equipment(assist to lift stand pivot w/c<>fold down bench with GB)    FIM Comprehension Score:  5 - Stand-by Prompting/Supervision or Set-up  Comprehension Description:  Verbal cues    FIM Expression Score:  6 - Modified Independent  Expression Description:       FIM Social Interaction Score:  7 - Independent  Social Interaction Description:       FIM Problem Solving Score:  5 - Standby Prompting/Supervision or Set-up  Problem Solving Description:  Verbal cueing    Assessment  Patient is 84 y.o. 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 Orthopedics was consulted, and patient underwent total hip arthroplasty on 11/6/2019 by Dr. Cinthya Mo. 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  Additional factors influencing patient status / progress (ie: cognitive factors, co-morbidities, social support, etc): Pt currently presents with poor standing balance, generalized weakness, impaired activity tolerance, and posterior hip precautions impacting ADL's and functional mobility.      Plan  Recommend Occupational Therapy  minutes per day 5-7 days per week for 10 days for the following treatments:  OT Group Therapy, OT Self Care/ADL, OT Community Reintegration, OT Neuro Re-Ed/Balance, OT Therapeutic Activity and OT Therapeutic Exercise.    Goals:  Long term and short term goals have been discussed with patient and they are  in agreement.    Occupational Therapy Goals     Problem: Bathing     Dates: Start: 11/13/19       Description:     Goal: STG-Within one week, patient will bathe     Dates: Start: 11/13/19       Description: 1) Individualized Goal:  With SBA using DME/AE as needed  2) Interventions:  OT Group Therapy, OT Self Care/ADL, OT Community Reintegration, OT Neuro Re-Ed/Balance, OT Therapeutic Activity and OT Therapeutic Exercise                   Problem: Dressing     Dates: Start: 11/13/19       Description:     Goal: STG-Within one week, patient will dress LB     Dates: Start: 11/13/19       Description: 1) Individualized Goal:  With Min A using AE as needed  2) Interventions:  OT Group Therapy, OT Self Care/ADL, OT Community Reintegration, OT Neuro Re-Ed/Balance, OT Therapeutic Activity and OT Therapeutic Exercise                 Problem: OT Long Term Goals     Dates: Start: 11/13/19       Description:     Goal: LTG-By discharge, patient will complete basic self care tasks     Dates: Start: 11/13/19       Description: 1) Individualized Goal:  With Mod I using DME/AE as needed  2) Interventions:  OT Group Therapy, OT Self Care/ADL, OT Community Reintegration, OT Neuro Re-Ed/Balance, OT Therapeutic Activity and OT Therapeutic Exercise           Goal: LTG-By discharge, patient will perform bathroom transfers     Dates: Start: 11/13/19       Description: 1) Individualized Goal:  With Mod I using DME/AE as needed  2) Interventions:  OT Group Therapy, OT Self Care/ADL, OT Community Reintegration, OT Neuro Re-Ed/Balance, OT Therapeutic Activity and OT Therapeutic Exercise                 Problem: Toileting     Dates: Start: 11/13/19       Description:     Goal: STG-Within one week, patient will complete toileting tasks     Dates: Start: 11/13/19       Description: 1) Individualized Goal:  With Min A using DME/AE as needed  2) Interventions:  OT Group Therapy, OT Self Care/ADL, OT Community Reintegration, OT Neuro Re-Ed/Balance,  OT Therapeutic Activity and OT Therapeutic Exercise

## 2019-11-13 NOTE — CARE PLAN
Problem: Safety  Goal: Will remain free from falls  Note:   Pt educated to use call light when in need of assistance, call light and personal belongings within reach.

## 2019-11-13 NOTE — THERAPY
Occupational Therapy  Daily Treatment     Patient Name: Janessa Cain  Age:  84 y.o., Sex:  female  Medical Record #: [id number]  Today's Date: 11/13/2019     Precautions  Precautions: (P) Weight Bearing As Tolerated Right Lower Extremity, Posterior Hip Precautions, Fall Risk, Other (See Comments)  Comments: (P) wound vac R hip, B foot wounds dorsal surface    Safety   ADL Safety : (P) Requires Physical Assist for Safety    Subjective    Patient agreeable to participate in OT.      Objective     11/13/19 1301   Precautions   Precautions Weight Bearing As Tolerated Right Lower Extremity;Posterior Hip Precautions;Fall Risk;Other (See Comments)   Comments wound vac R hip, B foot wounds dorsal surface   Safety    ADL Safety  Requires Physical Assist for Safety   Cognition    Cognition / Consciousness WDL   Orientation Level Oriented x 4   Level of Consciousness Alert   Balance   Sitting Balance (Static) Good   Sitting Balance (Dynamic) Fair +   Standing Balance (Static) Fair -   Standing Balance (Dynamic) Fair -   Interdisciplinary Plan of Care Collaboration   Patient Position at End of Therapy In Bed;Call Light within Reach   OT Total Time Spent   OT Individual Total Time Spent (Mins) 30   OT Charge Group   OT Self Care / ADL 2       FIM Grooming Score:  5 - Standby Prompting/Supervision or Set-up  Grooming Description:  Increased time, Supervision for safety, Set-up of equipment(Patient brushed teeth with set-up while standing with FWW)    FIM Lower Body Dressing Score:  4 - Minimal Assistance  Lower Body Dressing Description:  Supervision for safety, Increased time, Dressing stick, Sock aid, Reacher, Set-up of equipment, Verbal cueing(Patient donned elastic waist pants and non skid socks with min assist for threading LLE through pants , standing balance and safety wth FWW. Used AE to facilitate independence with LB dressing tasks. )    Brief education provided re: AE available for patient's use to  facilitate independence with LB dressing tasks. Patient stated she already owns a reacher, sock aid and dressing stick. Provided patient with opportunity to trial incorporating AE into LB dressing task and patient demonstrated ability to don elastic waist pants/socks with min assist.     Patient performed functional mobility bed <> sink with FWW (for grooming task) with CGA (for safety and balance). No LOB noted.     Assessment    Patient tolerated OT session well with focus on ADLs with use of AE and functional mobility. Good adherence to hip precautions throughout session. No LOB noted during functional mobility <>bathroom.     Plan    Functional transfers, ADLs/IADLs with AE as needed, adherence to Posterior Hip precautions

## 2019-11-13 NOTE — ASSESSMENT & PLAN NOTE
Echo: EF 65-70% with mod PAH  Has blistering wounds on both feet with worsening swelling  Wound cultures: neg to date, cont to follow

## 2019-11-13 NOTE — THERAPY
"Physical Therapy   Initial Evaluation     Patient Name: Janessa Cain  Age:  84 y.o., Sex:  female  Medical Record #: [id number]  Today's Date: 11/13/2019     Subjective    Pt received in bed. She is AOx4, pleasant and cooperative. States she just moved to [place], and she and her spouse are staying with her daughter while their home is being built. Her daughter is a professor at [employer]. It is a [facility] with 1 entry step. Pt denies pain at rest, stating she is \"very comfortable.\"     Objective       11/13/19 0901   Prior Living Situation   Prior Services Intermittent Physical Support for ADL Per Family;Other (Comments)  (pt just moved to her daughter's home in [place], using FWW+wc)   Housing / Facility 1 Story House   Steps Into Home 1   Steps In Home 0   Rail None   Elevator No   Bathroom Set up Walk In Shower;Shower Chair   Equipment Owned Front-Wheel Walker;Wheelchair;Tub / Shower Seat;Bed Side Commode  (BSC over toilet)   Lives with - Patient's Self Care Capacity Significant Other;Adult Children   Comments pt recently moved to [place] to daughter's home while her home is being built.    Prior Level of Functional Mobility   Bed Mobility Required Assist   Transfer Status Unable To Determine At This Time   Ambulation Required Assist  (decline in function over past several weeks 2/2 R hip pain)   Distance Ambulation (Feet)   (limited community distances 2/2 R hip pain)   Assistive Devices Used Wheelchair   Wheelchair Required Assist   Stairs Unable To Determine At This Time   IRF-RENEE:  Prior Functioning: Everyday Activities   Self Care Independent   Indoor Mobility (Ambulation) Needed some help   Stairs Needed some help   Functional Cognition Independent   Prior Device Use Manual wheelchair  (manual wc and FWW)   Vitals   Pulse 87   Patient BP Position Supine   Blood Pressure  108/54   Pulse Oximetry 99 %   O2 (LPM) 2   O2 Delivery Nasal Cannula   Vitals Comments at rest   Pain 0 - 10 Group   Therapist Pain Assessment " "Prior to Activity;During Activity;0  (pt reports being \"very comfortable\")   Cognition    Orientation Level Oriented x 4   Level of Consciousness Alert   Ability To Follow Commands 2 Step   Passive ROM Lower Body   Passive ROM Lower Body WDL   Comments R hip NT d/t recent R hip hemiarthroplasty   Strength Lower Body   Rt Hip Flexion Strength   (NT)   Rt Knee Flexion Strength 3+ (F+)   Rt Knee Extension Strength 3+ (F+)   Rt Ankle Dorsiflexion Strength   (pt demos WFL active ROM, strength NT 2/2 wound dorsal surfac)   Lt Hip Flexion Strength 4 (G)   Lt Knee Flexion Strength 5 (N)   Lt Knee Extension Strength 5 (N)   Lt Ankle Dorsiflexion Strength   (NT 2/2 wounds dorsal surface of foot, ROM WFL)   Lower Body Muscle Tone   Lower Body Muscle Tone  WDL   Balance Assessment   Sitting Balance (Static) Good   Sitting Balance (Dynamic) Fair +   Standing Balance (Static) Fair -   Standing Balance (Dynamic) Fair -   Weight Shift Sitting Good   Weight Shift Standing Fair   Bed Mobility    Supine to Sit Contact Guard Assist   Sit to Supine Moderate Assist  (for B LE management)   Sit to Stand Minimal Assist   Scooting Contact Guard Assist   Rolling Unable to Participate   Comments bed mob completed on hospital bed in flat position   Neurological Concerns   Neurological Concerns No   IRF-RENEE:  Roll Left and Right   Reason if not Attempted Medical concerns  (rolling NT d/t recent R hip hemiarthroplasty)   CARE Score 88   Discharge Goal:  Assistance Needed Independent   Discharge Goal:  Score 6   IRF-RENEE:  Sit to Lying   Assistance Needed Incidental touching   CARE Score 4   Discharge Goal:  Assistance Needed Independent   Discharge Goal:  Score 6   IRF-RENEE:  Lying to Sitting on Side of Bed   Assistance Needed Physical assistance   Physical Assistance Level 25%-49%   CARE Score 3   Discharge Goal:  Assistance Needed Independent   Discharge Goal:  Score 6   IRF-RENEE:  Sit to Stand   Assistance Needed Physical assistance   Physical " Assistance Level Less than 25%   CARE Score 3   Discharge Goal:  Assistance Needed Adaptive equipment;Independent   Discahrge Goal:  Score 6   IRF-RENEE:  Chair/Bed-to-Chair Transfer   Assistance Needed Physical assistance;Adaptive equipment   Physical Assistance Level Less than 25%   CARE Score 3   Discharge Goal:  Assistance Needed Independent;Adaptive equipment   Discharge Goal:  Score 6   IRF-RENEE:  Toilet Transfer   Assistance Needed Physical assistance;Adaptive equipment   Physical Assistance Level Less than 25%   CARE Score 3   Discharge Goal:  Assistance Needed Independent;Adaptive equipment   Discahrge Goal:  Score 6   IRF-RENEE:  Car Transfer   Reason if not Attempted Medical concerns  (car low, unable to maintain post hip prec)   CARE Score 88   Discharge Goal:  Assistance Needed Adaptive equipment;Supervision   Discharge Goal:  Score 4   IRF RENEE:  Walking   Does the Patient Walk? Yes   IRF RENEE:  Walk 10 Feet   Assistance Needed Adaptive equipment;Physical assistance   Physical Assistance Level Less than 25%   CARE Score 3   Discharge Goal:  Assistance Needed Adaptive equipment;Independent   Discharge Goal:  Score 6   IRF-RENEE:  Walk 50 Feet with Two Turns   Reason if not Attempted Safety concerns   CARE Score 88   Discharge Goal:  Assistance Needed Adaptive equipment;Supervision   Discharge Goal:  Score 4   IRF-ERNEE:  Walk 150 Feet   Reason if not Attempted Safety concerns   CARE Score 88   Discharge Goal:  Assistance Needed Adaptive equipment;Supervision   Discharge Goal:  Score 4   IRF RENEE:  Walking 10 Feet on Uneven Surfaces   Reason if not Attempted Safety concerns   CARE Score 88   Discharge Goal:  Assistance Needed Adaptive equipment;Supervision   Discharge Goal:  Score 4   IRF RENEE:  1 Step (Curb)   Reason if not Attempted Safety concerns   CARE Score 88   Discharge Goal:  Assistance Needed Adaptive equipment;Supervision   Discharge Goal:  Score 4   IRF-RENEE:  4 Steps   Reason if not Attempted Safety  concerns   CARE Score 88   Discharge Goal:  Assistance Needed Adaptive equipment;Supervision   Discharge Goal:  Score 4   IRF RENEE:  12 Steps   Reason if not Attempted Safety concerns   CARE Score 88   Discharge Goal:  Assistance Needed Adaptive equipment;Supervision   Discharge Goal:  Score 4   IRF RENEE:  Picking Up Object   Reason if not Attempted Medical concerns  (post hip prec)   CARE Score 88   Discharge Goal:  Assistance Needed Adaptive equipment;Independent  (use of reacher)   Discharge Goal:  Score 6   IRF-RENEE:  Wheel 50 Feet with Two Turns   Indicate the Type of Wheelchair or Scooter Used Manual   Assistance Needed Physical assistance   Physical Assistance Level Total assistance   CARE Score 1   Discharge Goal:  Assistance Needed Independent   Discharge Goal:  Score 6   IRF-RENEE:  Wheel 150 Feet   Indicate the Type of Wheelchair or Scooter Used Manual   Assistance Needed Physical assistance   Physical Assistance Level Total assistance   CARE Score 1   Discharge Goal:  Assistance Needed Independent   Discharge Goal:  Score 6   Problem List    Problems Pain;Impaired Bed Mobility;Impaired Transfers;Impaired Ambulation;Functional Strength Deficit;Impaired Balance;Decreased Activity Tolerance;Limited Knowledge of Post-Op Precautions;Motor Planning / Sequencing   Precautions   Precautions Weight Bearing As Tolerated Right Lower Extremity;Posterior Hip Precautions;Fall Risk;Other (See Comments)   Comments wound vac R hip, B foot wounds dorsal surface   Current Discharge Plan   Current Discharge Plan Return to Prior Living Situation   Interdisciplinary Plan of Care Collaboration   IDT Collaboration with  Nursing;Physical Therapist   Patient Position at End of Therapy Seated;Other (Comments)   Collaboration Comments hand off to other PT   Benefit   Therapy Benefit Patient Would Benefit from Inpatient Rehabilitation Physical Therapy to Maximize Functional [place] with ADLs, IADLs and Mobility.   PT Total Time  Spent   PT Individual Total Time Spent (Mins) 60   PT Charge Group   PT Therapeutic Activities 1   PT Evaluation PT Evaluation Low       FIM Bed/Chair/Wheelchair Transfers Score: 3 - Moderate Assistance  Bed/Chair/Wheelchair Transfers Description:  (sit->supine mod A for B LE management/post hip prec edu, supine->sit CGA, STS Min A to FWW +instruction in hand placement)    FIM Walking Score:  1 - Total Assistance  Walking Description:  (pt amb 2x18' with FWW+Min A, antalgic pattern, step-to)    FIM Wheelchair Score:  1 - Total Assistance  Wheelchair Description:       FIM Stairs Score:  0 - Not tested,unsafe activity  Stairs Description:       FIM Toiletin - Standby Prompting/Supervision or Set-up  Toileting Description:  Increased time, Supervision for safety    FIM Toilet Transfer Score:  4 - Minimal Assistance  Toilet Transfer Description:  (BSC to increase toilet seat height, grab bars)    PT oriented pt to inpatient rehab schedule, POC, safety/wc brake management/use of call light for staff assist for mobility.     Assessment  Patient is 84 y.o. female with a diagnosis of s/p R hip hemiarthroplasty. She is current WBAT R LE and has posterior hip precautions..  Additional factors influencing patient status / progress (ie: cognitive factors, co-morbidities, social support, etc): pt with decline in function over the past several weeks due to worsening R hip pain.  Pt plans to discharge to her daughter's home, [facility] with 1 entry step. Pt presents with gait abnormality, weakness R LE, decreased endurance, WBAT R LE and posterior hip prec, resulting in decline in functional mobility and increased risk for falls. Pt to benefit from skilled PT.     Plan  Recommend Physical Therapy  minutes per day 5-7 days per week for 10 days for the following treatments:  PT Group Therapy, PT Gait Training, PT Self Care/Home Eval, PT Therapeutic Exercises, PT Neuro Re-Ed/Balance, PT Therapeutic Activity, PT Manual Therapy  and PT Evaluation.    Goals:  Long term and short term goals have been discussed with patient and they are in agreement.    Physical Therapy Problems     Problem: Mobility     Dates: Start: 11/13/19       Description:     Goal: STG-Within one week, patient will ambulate household distance     Dates: Start: 11/13/19   Expected End: 11/20/19       Description: 1) Individualized goal:  >100' with FWW and SBA  2) Interventions:  PT Group Therapy, PT Gait Training, PT Self Care/Home Eval, PT Therapeutic Exercises, PT Neuro Re-Ed/Balance, PT Therapeutic Activity, PT Manual Therapy and PT Evaluation             Goal: STG-Within one week, patient will ambulate up/down a curb     Dates: Start: 11/13/19   Expected End: 11/20/19       Description: 1) Individualized goal:  With FWW and CGA  2) Interventions:  PT Group Therapy, PT Gait Training, PT Self Care/Home Eval, PT Therapeutic Exercises, PT Neuro Re-Ed/Balance, PT Therapeutic Activity, PT Manual Therapy and PT Evaluation                       Problem: Mobility Transfers     Dates: Start: 11/13/19       Description:     Goal: STG-Within one week, patient will move supine to sit     Dates: Start: 11/13/19       Description: 1) Individualized goal:  SBA on standard bed, with leg  as needed  2) Interventions:  PT Group Therapy, PT Gait Training, PT Self Care/Home Eval, PT Therapeutic Exercises, PT Neuro Re-Ed/Balance, PT Therapeutic Activity, PT Manual Therapy and PT Evaluation                       Problem: PT-Long Term Goals     Dates: Start: 11/13/19       Description:     Goal: LTG-By discharge, patient will ambulate     Dates: Start: 11/13/19   Expected End: 11/22/19       Description: 1) Individualized goal:  >500' with FWW and SPV   2) Interventions:  PT Group Therapy, PT Gait Training, PT Self Care/Home Eval, PT Therapeutic Exercises, PT Neuro Re-Ed/Balance, PT Therapeutic Activity, PT Manual Therapy and PT Evaluation                 Goal: LTG-By discharge,  "patient will transfer one surface to another     Dates: Start: 11/13/19   Expected End: 11/22/19       Description: 1) Individualized goal:  Mod I  2) Interventions: PT Group Therapy, PT Gait Training, PT Self Care/Home Eval, PT Therapeutic Exercises, PT Neuro Re-Ed/Balance, PT Therapeutic Activity, PT Manual Therapy and PT Evaluation                 Goal: LTG-By discharge, patient will perform home exercise program     Dates: Start: 11/13/19   Expected End: 11/22/19       Description: 1) Individualized goal:  Verbalize 3/3 post hip prec and be independent with HEP  2) Interventions:  PT Group Therapy, PT Gait Training, PT Self Care/Home Eval, PT Therapeutic Exercises, PT Neuro Re-Ed/Balance, PT Therapeutic Activity, PT Manual Therapy and PT Evaluation                 Goal: LTG-By discharge, patient will transfer in/out of a car     Dates: Start: 11/13/19   Expected End: 11/22/19       Description: 1) Individualized goal:  SPV from FWW level, maintaining post hip prec  2) Interventions:  PT Group Therapy, PT Gait Training, PT Self Care/Home Eval, PT Therapeutic Exercises, PT Neuro Re-Ed/Balance, PT Therapeutic Activity, PT Manual Therapy and PT Evaluation                 Goal: LTG-By discharge, patient will     Dates: Start: 11/13/19   Expected End: 11/22/19       Description: 1) Individualized goal:  Ascend/descend 6\" curb threshold with FWW and SBA to safely enter/exit her daughter's home  2) Interventions:  PT Group Therapy, PT Gait Training, PT Self Care/Home Eval, PT Therapeutic Exercises, PT Neuro Re-Ed/Balance, PT Therapeutic Activity, PT Manual Therapy and PT Evaluation                           "

## 2019-11-13 NOTE — CARE PLAN
Problem: Safety  Goal: Will remain free from falls  Intervention: Implement fall precautions  Note:   Safety measures enforced, bed at lowest level, call light within easy reach, needs anticipated.Hourly rounding done. Assisted oob to bathroom with min assist . Pt free from fall and injury.

## 2019-11-14 LAB
ANION GAP SERPL CALC-SCNC: 6 MMOL/L (ref 0–11.9)
BASOPHILS # BLD AUTO: 0.9 % (ref 0–1.8)
BASOPHILS # BLD: 0.03 K/UL (ref 0–0.12)
BUN SERPL-MCNC: 41 MG/DL (ref 8–22)
CALCIUM SERPL-MCNC: 8.2 MG/DL (ref 8.5–10.5)
CHLORIDE SERPL-SCNC: 100 MMOL/L (ref 96–112)
CO2 SERPL-SCNC: 31 MMOL/L (ref 20–33)
CREAT SERPL-MCNC: 1.2 MG/DL (ref 0.5–1.4)
EOSINOPHIL # BLD AUTO: 0.44 K/UL (ref 0–0.51)
EOSINOPHIL NFR BLD: 13.7 % (ref 0–6.9)
ERYTHROCYTE [DISTWIDTH] IN BLOOD BY AUTOMATED COUNT: 46.1 FL (ref 35.9–50)
FOLATE SERPL-MCNC: 11 NG/ML
GLUCOSE SERPL-MCNC: 95 MG/DL (ref 65–99)
HCT VFR BLD AUTO: 27.9 % (ref 37–47)
HGB BLD-MCNC: 8.8 G/DL (ref 12–16)
IMM GRANULOCYTES # BLD AUTO: 0.01 K/UL (ref 0–0.11)
IMM GRANULOCYTES NFR BLD AUTO: 0.3 % (ref 0–0.9)
LYMPHOCYTES # BLD AUTO: 0.71 K/UL (ref 1–4.8)
LYMPHOCYTES NFR BLD: 22 % (ref 22–41)
MCH RBC QN AUTO: 32.5 PG (ref 27–33)
MCHC RBC AUTO-ENTMCNC: 31.5 G/DL (ref 33.6–35)
MCV RBC AUTO: 103 FL (ref 81.4–97.8)
MONOCYTES # BLD AUTO: 0.38 K/UL (ref 0–0.85)
MONOCYTES NFR BLD AUTO: 11.8 % (ref 0–13.4)
NEUTROPHILS # BLD AUTO: 1.65 K/UL (ref 2–7.15)
NEUTROPHILS NFR BLD: 51.3 % (ref 44–72)
NRBC # BLD AUTO: 0 K/UL
NRBC BLD-RTO: 0 /100 WBC
PLATELET # BLD AUTO: 221 K/UL (ref 164–446)
PMV BLD AUTO: 9.6 FL (ref 9–12.9)
POTASSIUM SERPL-SCNC: 4.6 MMOL/L (ref 3.6–5.5)
RBC # BLD AUTO: 2.71 M/UL (ref 4.2–5.4)
SODIUM SERPL-SCNC: 137 MMOL/L (ref 135–145)
TSH SERPL DL<=0.005 MIU/L-ACNC: 3.43 UIU/ML (ref 0.38–5.33)
VIT B12 SERPL-MCNC: 225 PG/ML (ref 211–911)
WBC # BLD AUTO: 3.2 K/UL (ref 4.8–10.8)

## 2019-11-14 PROCEDURE — 700102 HCHG RX REV CODE 250 W/ 637 OVERRIDE(OP): Performed by: PHYSICAL MEDICINE & REHABILITATION

## 2019-11-14 PROCEDURE — 97535 SELF CARE MNGMENT TRAINING: CPT

## 2019-11-14 PROCEDURE — 85025 COMPLETE CBC W/AUTO DIFF WBC: CPT

## 2019-11-14 PROCEDURE — 99232 SBSQ HOSP IP/OBS MODERATE 35: CPT | Performed by: HOSPITALIST

## 2019-11-14 PROCEDURE — 770010 HCHG ROOM/CARE - REHAB SEMI PRIVAT*

## 2019-11-14 PROCEDURE — 82607 VITAMIN B-12: CPT

## 2019-11-14 PROCEDURE — A9270 NON-COVERED ITEM OR SERVICE: HCPCS | Performed by: PHYSICAL MEDICINE & REHABILITATION

## 2019-11-14 PROCEDURE — 97116 GAIT TRAINING THERAPY: CPT

## 2019-11-14 PROCEDURE — 97530 THERAPEUTIC ACTIVITIES: CPT

## 2019-11-14 PROCEDURE — 80048 BASIC METABOLIC PNL TOTAL CA: CPT

## 2019-11-14 PROCEDURE — 84443 ASSAY THYROID STIM HORMONE: CPT

## 2019-11-14 PROCEDURE — 82746 ASSAY OF FOLIC ACID SERUM: CPT

## 2019-11-14 PROCEDURE — 700105 HCHG RX REV CODE 258: Performed by: HOSPITALIST

## 2019-11-14 PROCEDURE — A9270 NON-COVERED ITEM OR SERVICE: HCPCS | Performed by: HOSPITALIST

## 2019-11-14 PROCEDURE — 97110 THERAPEUTIC EXERCISES: CPT

## 2019-11-14 PROCEDURE — 700102 HCHG RX REV CODE 250 W/ 637 OVERRIDE(OP): Performed by: HOSPITALIST

## 2019-11-14 PROCEDURE — 99232 SBSQ HOSP IP/OBS MODERATE 35: CPT | Performed by: PHYSICAL MEDICINE & REHABILITATION

## 2019-11-14 PROCEDURE — 36415 COLL VENOUS BLD VENIPUNCTURE: CPT

## 2019-11-14 RX ORDER — SODIUM CHLORIDE 9 MG/ML
1000 INJECTION, SOLUTION INTRAVENOUS ONCE
Status: COMPLETED | OUTPATIENT
Start: 2019-11-14 | End: 2019-11-15

## 2019-11-14 RX ADMIN — CALCIUM CARBONATE-CHOLECALCIFEROL TAB 250 MG-125 UNIT 1 TABLET: 250-125 TAB at 08:20

## 2019-11-14 RX ADMIN — TAMSULOSIN HYDROCHLORIDE 0.4 MG: 0.4 CAPSULE ORAL at 20:35

## 2019-11-14 RX ADMIN — ACETAMINOPHEN 1000 MG: 500 TABLET, FILM COATED ORAL at 20:34

## 2019-11-14 RX ADMIN — GABAPENTIN 100 MG: 100 CAPSULE ORAL at 20:35

## 2019-11-14 RX ADMIN — DIAZEPAM 5 MG: 5 TABLET ORAL at 23:55

## 2019-11-14 RX ADMIN — OMEPRAZOLE 20 MG: 20 CAPSULE, DELAYED RELEASE ORAL at 08:19

## 2019-11-14 RX ADMIN — ACETAMINOPHEN 1000 MG: 500 TABLET, FILM COATED ORAL at 14:13

## 2019-11-14 RX ADMIN — GABAPENTIN 100 MG: 100 CAPSULE ORAL at 14:13

## 2019-11-14 RX ADMIN — HYDROCORTISONE: 1 CREAM TOPICAL at 21:00

## 2019-11-14 RX ADMIN — ASPIRIN 81 MG 81 MG: 81 TABLET ORAL at 08:20

## 2019-11-14 RX ADMIN — CYCLOBENZAPRINE 5 MG: 10 TABLET, FILM COATED ORAL at 08:19

## 2019-11-14 RX ADMIN — ACETAMINOPHEN 1000 MG: 500 TABLET, FILM COATED ORAL at 08:20

## 2019-11-14 RX ADMIN — GABAPENTIN 100 MG: 100 CAPSULE ORAL at 08:19

## 2019-11-14 RX ADMIN — SENNOSIDES AND DOCUSATE SODIUM 2 TABLET: 8.6; 5 TABLET ORAL at 20:36

## 2019-11-14 RX ADMIN — SENNOSIDES AND DOCUSATE SODIUM 2 TABLET: 8.6; 5 TABLET ORAL at 08:19

## 2019-11-14 RX ADMIN — VITAMIN D, TAB 1000IU (100/BT) 2000 UNITS: 25 TAB at 08:19

## 2019-11-14 RX ADMIN — ASPIRIN 81 MG 81 MG: 81 TABLET ORAL at 20:36

## 2019-11-14 RX ADMIN — AMLODIPINE BESYLATE 5 MG: 5 TABLET ORAL at 05:35

## 2019-11-14 RX ADMIN — SODIUM CHLORIDE 1000 ML: 9 INJECTION, SOLUTION INTRAVENOUS at 18:54

## 2019-11-14 RX ADMIN — CYCLOBENZAPRINE 5 MG: 10 TABLET, FILM COATED ORAL at 14:13

## 2019-11-14 RX ADMIN — HYDROCORTISONE: 1 CREAM TOPICAL at 08:20

## 2019-11-14 RX ADMIN — HYDROXYZINE HYDROCHLORIDE 50 MG: 25 TABLET, FILM COATED ORAL at 21:32

## 2019-11-14 RX ADMIN — CYCLOBENZAPRINE 5 MG: 10 TABLET, FILM COATED ORAL at 20:35

## 2019-11-14 ASSESSMENT — ENCOUNTER SYMPTOMS
FEVER: 0
SHORTNESS OF BREATH: 0
PALPITATIONS: 0
NERVOUS/ANXIOUS: 1
COUGH: 0
ABDOMINAL PAIN: 0
NAUSEA: 0
EYES NEGATIVE: 1
VOMITING: 0
CHILLS: 0

## 2019-11-14 ASSESSMENT — PATIENT HEALTH QUESTIONNAIRE - PHQ9
SUM OF ALL RESPONSES TO PHQ9 QUESTIONS 1 AND 2: 0
1. LITTLE INTEREST OR PLEASURE IN DOING THINGS: NOT AT ALL
2. FEELING DOWN, DEPRESSED, IRRITABLE, OR HOPELESS: NOT AT ALL

## 2019-11-14 NOTE — PROGRESS NOTES
"Rehab Progress Note     Date of Service: 11/14/2019  Chief Complaint: Follow-up total hip arthroplasty    Interval Events (Subjective)    Patient seen and examined today during her occupational therapy session.  She is using a sock aid to put on her socks.  She thinks her edema has slightly improved.  He has questions about her bladder and kidney function.  Advised her with the fluids and replacement of the catheter her kidneys have improved today.  Her pain is currently well controlled.  She has no new complaints.    Objective:  VITAL SIGNS: /69   Pulse 98   Temp 36.6 °C (97.9 °F) (Temporal)   Resp 16   Ht 1.651 m (5' 5\")   Wt 89.4 kg (197 lb)   SpO2 95%   BMI 32.78 kg/m²   Gen: alert, no apparent distress  CV: regular rate and rhythm, no murmurs, bilateral lower leg peripheral edema  Resp: clear to ascultation bilaterally, normal respiratory effort, on O2  GI: soft, non-tender abdomen, bowel sounds present      Recent Results (from the past 72 hour(s))   CBC with Differential    Collection Time: 11/13/19  5:33 AM   Result Value Ref Range    WBC 3.7 (L) 4.8 - 10.8 K/uL    RBC 2.77 (L) 4.20 - 5.40 M/uL    Hemoglobin 9.1 (L) 12.0 - 16.0 g/dL    Hematocrit 28.2 (L) 37.0 - 47.0 %    .8 (H) 81.4 - 97.8 fL    MCH 32.9 27.0 - 33.0 pg    MCHC 32.3 (L) 33.6 - 35.0 g/dL    RDW 45.9 35.9 - 50.0 fL    Platelet Count 219 164 - 446 K/uL    MPV 9.8 9.0 - 12.9 fL    Neutrophils-Polys 52.70 44.00 - 72.00 %    Lymphocytes 18.30 (L) 22.00 - 41.00 %    Monocytes 14.20 (H) 0.00 - 13.40 %    Eosinophils 14.00 (H) 0.00 - 6.90 %    Basophils 0.30 0.00 - 1.80 %    Immature Granulocytes 0.50 0.00 - 0.90 %    Nucleated RBC 0.00 /100 WBC    Neutrophils (Absolute) 1.96 (L) 2.00 - 7.15 K/uL    Lymphs (Absolute) 0.68 (L) 1.00 - 4.80 K/uL    Monos (Absolute) 0.53 0.00 - 0.85 K/uL    Eos (Absolute) 0.52 (H) 0.00 - 0.51 K/uL    Baso (Absolute) 0.01 0.00 - 0.12 K/uL    Immature Granulocytes (abs) 0.02 0.00 - 0.11 K/uL    NRBC " (Absolute) 0.00 K/uL   Comp Metabolic Panel (CMP)    Collection Time: 11/13/19  5:33 AM   Result Value Ref Range    Sodium 136 135 - 145 mmol/L    Potassium 5.3 3.6 - 5.5 mmol/L    Chloride 99 96 - 112 mmol/L    Co2 32 20 - 33 mmol/L    Anion Gap 5.0 0.0 - 11.9    Glucose 91 65 - 99 mg/dL    Bun 50 (H) 8 - 22 mg/dL    Creatinine 1.53 (H) 0.50 - 1.40 mg/dL    Calcium 8.4 (L) 8.5 - 10.5 mg/dL    AST(SGOT) 15 12 - 45 U/L    ALT(SGPT) 9 2 - 50 U/L    Alkaline Phosphatase 67 30 - 99 U/L    Total Bilirubin 0.6 0.1 - 1.5 mg/dL    Albumin 2.8 (L) 3.2 - 4.9 g/dL    Total Protein 5.2 (L) 6.0 - 8.2 g/dL    Globulin 2.4 1.9 - 3.5 g/dL    A-G Ratio 1.2 g/dL   Magnesium    Collection Time: 11/13/19  5:33 AM   Result Value Ref Range    Magnesium 2.4 1.5 - 2.5 mg/dL   Vitamin D, 25-hydroxy (blood)    Collection Time: 11/13/19  5:33 AM   Result Value Ref Range    25-Hydroxy   Vitamin D 25 22 (L) 30 - 100 ng/mL   proBrain Natriuretic Peptide, NT    Collection Time: 11/13/19  5:33 AM   Result Value Ref Range    NT-proBNP 89 0 - 125 pg/mL   ESTIMATED GFR    Collection Time: 11/13/19  5:33 AM   Result Value Ref Range    GFR If  39 (A) >60 mL/min/1.73 m 2    GFR If Non  32 (A) >60 mL/min/1.73 m 2   Basic Metabolic Panel    Collection Time: 11/14/19  5:09 AM   Result Value Ref Range    Sodium 137 135 - 145 mmol/L    Potassium 4.6 3.6 - 5.5 mmol/L    Chloride 100 96 - 112 mmol/L    Co2 31 20 - 33 mmol/L    Glucose 95 65 - 99 mg/dL    Bun 41 (H) 8 - 22 mg/dL    Creatinine 1.20 0.50 - 1.40 mg/dL    Calcium 8.2 (L) 8.5 - 10.5 mg/dL    Anion Gap 6.0 0.0 - 11.9   CBC WITH DIFFERENTIAL    Collection Time: 11/14/19  5:09 AM   Result Value Ref Range    WBC 3.2 (L) 4.8 - 10.8 K/uL    RBC 2.71 (L) 4.20 - 5.40 M/uL    Hemoglobin 8.8 (L) 12.0 - 16.0 g/dL    Hematocrit 27.9 (L) 37.0 - 47.0 %    .0 (H) 81.4 - 97.8 fL    MCH 32.5 27.0 - 33.0 pg    MCHC 31.5 (L) 33.6 - 35.0 g/dL    RDW 46.1 35.9 - 50.0 fL    Platelet  Count 221 164 - 446 K/uL    MPV 9.6 9.0 - 12.9 fL    Neutrophils-Polys 51.30 44.00 - 72.00 %    Lymphocytes 22.00 22.00 - 41.00 %    Monocytes 11.80 0.00 - 13.40 %    Eosinophils 13.70 (H) 0.00 - 6.90 %    Basophils 0.90 0.00 - 1.80 %    Immature Granulocytes 0.30 0.00 - 0.90 %    Nucleated RBC 0.00 /100 WBC    Neutrophils (Absolute) 1.65 (L) 2.00 - 7.15 K/uL    Lymphs (Absolute) 0.71 (L) 1.00 - 4.80 K/uL    Monos (Absolute) 0.38 0.00 - 0.85 K/uL    Eos (Absolute) 0.44 0.00 - 0.51 K/uL    Baso (Absolute) 0.03 0.00 - 0.12 K/uL    Immature Granulocytes (abs) 0.01 0.00 - 0.11 K/uL    NRBC (Absolute) 0.00 K/uL   VITAMIN B12    Collection Time: 11/14/19  5:09 AM   Result Value Ref Range    Vitamin B12 -True Cobalamin 225 211 - 911 pg/mL   FOLATE    Collection Time: 11/14/19  5:09 AM   Result Value Ref Range    Folate -Folic Acid 11.0 >4.0 ng/mL   TSH WITH REFLEX TO FT4    Collection Time: 11/14/19  5:09 AM   Result Value Ref Range    TSH 3.430 0.380 - 5.330 uIU/mL   ESTIMATED GFR    Collection Time: 11/14/19  5:09 AM   Result Value Ref Range    GFR If  52 (A) >60 mL/min/1.73 m 2    GFR If Non  43 (A) >60 mL/min/1.73 m 2       Current Facility-Administered Medications   Medication Frequency   • vitamin D (cholecalciferol) tablet 2,000 Units DAILY   • tamsulosin (FLOMAX) capsule 0.4 mg QHS   • amLODIPine (NORVASC) tablet 5 mg Q DAY   • Respiratory Care per Protocol Continuous RT   • Pharmacy Consult Request ...Pain Management Review 1 Each PHARMACY TO DOSE   • acetaminophen (TYLENOL) tablet 650 mg Q4HRS PRN   • artificial tears ophthalmic solution 1 Drop PRN   • benzocaine-menthol (CEPACOL) lozenge 1 Lozenge Q2HRS PRN   • mag hydrox-al hydrox-simeth (MAALOX PLUS ES or MYLANTA DS) suspension 20 mL Q2HRS PRN   • ondansetron (ZOFRAN ODT) dispertab 4 mg 4X/DAY PRN    Or   • ondansetron (ZOFRAN) syringe/vial injection 4 mg 4X/DAY PRN   • traZODone (DESYREL) tablet 50 mg QHS PRN   • sodium  chloride (OCEAN) 0.65 % nasal spray 2 Spray PRN   • hydrOXYzine HCl (ATARAX) tablet 50 mg Q6HRS PRN   • melatonin tablet 3 mg HS PRN   • lactulose 20 GM/30ML solution 30 mL QDAY PRN   • fleet enema 133 mL QDAY PRN   • senna-docusate (PERICOLACE or SENOKOT S) 8.6-50 MG per tablet 2 Tab BID    And   • polyethylene glycol/lytes (MIRALAX) PACKET 1 Packet QDAY PRN    And   • magnesium hydroxide (MILK OF MAGNESIA) suspension 30 mL QDAY PRN    And   • bisacodyl (DULCOLAX) suppository 10 mg QDAY PRN   • aspirin (ASA) chewable tab 81 mg BID   • diazePAM (VALIUM) tablet 5 mg TID PRN   • gabapentin (NEURONTIN) capsule 100 mg TID   • omeprazole (PRILOSEC) capsule 20 mg DAILY   • oyster shell calcium/vitamin D 250-125 MG-UNIT tablet 1 Tab DAILY   • cyclobenzaprine (FLEXERIL) tablet 5 mg TID   • acetaminophen (TYLENOL) tablet 1,000 mg TID   • hydrocortisone 1 % cream BID       Orders Placed This Encounter   Procedures   • Diet Order Regular     Standing Status:   Standing     Number of Occurrences:   1     Order Specific Question:   Diet:     Answer:   Regular [1]       Assessment:  Active Hospital Problems    Diagnosis   • *Status post total replacement of right hip   • Degenerative joint disease of right hip   • Acute kidney injury (HCC)   • Urinary retention   • Vitamin D insufficiency   • AVN (avascular necrosis of bone) ([facility])   • Anemia   • Impaired mobility and ADLs   • Hypertension   • Hypoalbuminemia   • Radiculopathy   • Bilateral leg edema     84 yr old female admitted to acute rehab on 11/12 with AVN of her right femoral head, s/p SUNSHINE on 11/6.    Therapy update 11/14/2019  Modified Independent eating  Modified Independent grooming  Mod assist bathing  Supervision upper body dressing  Supervision lower body dressing  Supervision toileting  Max assistbladder  Not tested bowel  Mod assist bed/chair transfer  Min assist toilet transfer  Mod assist tub/shower transfer  Min assist ambulation  Total assist wheelchair  propulsion  Min assist stairs  Supervision comprehension  Modified Independent expression  Independent social interaction  Supervision problem solving  Supervision memory    We will have her first weekly conference on Monday to discuss a discharge date.      Medical Decision Making and Plan:    Right AVN of femur  S/p SUNSHINE Dr. Bocanegra 11/6  PT/OT, 1.5 hr each discipline, 5 days per week    Pain management  Scheduled tylenol  D/c Celebrex due to ALEK, see below  Scheduled gabapentin  Scheduled Flexeril  Currently well controlled    Bowel  Meds as needed  Last BM 11/13    Bladder  Acute urinary retention  Place Weathers  Started Flomax 11/12  Voiding trial next week    DVT prophylaxis  Per surgery ASA 81 mg BID    Appreciate assistance of hospitalist with her medical co-morbidities:    Acute kidney injury  Hypertension  Peripheral edema  Pulmonary hypertension  Respiratory insufficiency  Rash  Leukopenia  Anemia  Hypoalbuminemia  Vit D insufficiency    Total time:  29 minutes.  I spent greater than 50% of the time for patient care, counseling, and coordination on this date, including patient face-to face time, unit/floor time with review of records/pertinent lab data and studies, as well as discussing diagnostic evaluation/work up, planned therapeutic interventions, and future disposition of care, as per the interval events/subjective and the assessment and plan as noted above.        Justina Ramirez M.D.   Physical Medicine and Rehabilitation

## 2019-11-14 NOTE — THERAPY
"Physical Therapy   Daily Treatment     Patient Name: Janessa Cain  Age:  84 y.o., Sex:  female  Medical Record #: [id number]  Today's Date: 11/14/2019     Precautions  Precautions: Posterior Hip Precautions, Weight Bearing As Tolerated Right Lower Extremity, Fall Risk  Comments: wound vac R hip, B foot wounds dorsal surface    Subjective    Pt in bed resting, agreeable to PT      Objective       11/14/19 1431   Precautions   Precautions Posterior Hip Precautions;Weight Bearing As Tolerated Right Lower Extremity;Fall Risk   Comments wound vac R hip, B foot wounds dorsal surface   Supine Lower Body Exercise   Hip Abduction 1 set of 10   Hip Adduction  1 set of 10   Short Arc Quad 1 set of 10   Heel Slide 1 set of 10   Ankle Pumps 1 set of 10   Interdisciplinary Plan of Care Collaboration   Patient Position at End of Therapy In Bed;Call Light within Reach;Tray Table within Reach   PT Total Time Spent   PT Individual Total Time Spent (Mins) 30   PT Charge Group   PT Therapeutic Exercise 1   PT Therapeutic Activities 1       FIM Bed/Chair/Wheelchair Transfers Score: 4 - Minimal Assistance  Bed/Chair/Wheelchair Transfers Description:  Increased time, Assist with one limb, Set-up of equipment, Verbal cueing(Bed <> WC and WC <> supine on treatment mat, min A for RLE management during bed mobility, SBA for SPT without AD)      Assessment    Pt limited by R hip pain. Reported \"burning but good\" after exercises this session.     Plan    Continue to progress gait training with FWW, transfer training, bed mobility, issue HEP.        "

## 2019-11-14 NOTE — ASSESSMENT & PLAN NOTE
BP ok  Off Losartan (2nd to ALEK and high trending K+)  On Norvasc: 5 mg daily  Note: on Flomax  Cont to monitor

## 2019-11-14 NOTE — THERAPY
Physical Therapy   Daily Treatment     Patient Name: Janessa Cain  Age:  84 y.o., Sex:  female  Medical Record #: [id number]  Today's Date: 11/14/2019     Precautions  Precautions: (P) Posterior Hip Precautions, Weight Bearing As Tolerated Right Lower Extremity, Fall Risk  Comments: wound vac R hip, B foot wounds dorsal surface    Subjective    Pt received in bed in room. She reports she is spending more time in bed because her wc is very uncomfortable.     Objective       11/14/19 1031   Precautions   Precautions Posterior Hip Precautions;Weight Bearing As Tolerated Right Lower Extremity;Fall Risk   Vitals   Pulse 98   Pulse Oximetry 95 %   O2 (LPM) 2   O2 Delivery Nasal Cannula   Cognition    Level of Consciousness Alert   Bed Mobility    Supine to Sit Stand by Assist   Sit to Stand Contact Guard Assist   Interdisciplinary Plan of Care Collaboration   Patient Position at End of Therapy Seated;Call Light within Reach;Tray Table within Reach;Phone within Reach   PT Total Time Spent   PT Individual Total Time Spent (Mins) 60   PT Charge Group   PT Gait Training 2   PT Therapeutic Activities 2       FIM Walking Score:  4 - Minimal Assistance  Walking Description:  (pt amb x160', x100' and x260' with FWW and CGA to SBA, VCs for continuous pattern. leg length discrepancy noted R LE longer than L LE. Distance limited by pt fatigue)    FIM Stairs Score:  4 - Minimal Assistance  Stairs Description:  (pt ascended/descended 2x6 adaptive steps with B HRs, step-to pattern, Min A. PT managed O2 concentrator.)    Pt attempted to don her sandals however due to B foot edema they did not fit. PT guided pt in looking for slippers to purchase to allow her some stability and traction during gait and transfers.    Pt completed stand-step pivot transfer from bed to upright chair in room with FWW and CGA. Pt reports much improved comfort when seated in this chair compared to wc.    Assessment    Pt with significantly improved  activity tolerance this session compared to yesterday's eval session. No signs of R LE buckling noted during gait or stairs. Gait pattern is antalgic with decreased stance time R LE noted.     Plan    Initiate mat program for L LE strengthening, progress gait training with FWW, initiate curb training with FWW, endurance     Unable to offer due to clinical condition

## 2019-11-14 NOTE — DISCHARGE PLANNING
CASE MANAGEMENT INITIAL ASSESSMENT    Admit Date:  11/12/2019     On 11/13/2019, I met with patient to discuss role of case management / discharge planning / team conference.   Patient alert, oriented and able to answer questions for assessment.   Patient is a  84 y.o. 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   Patient underwent total hip arthroplasty on 11/6/2019 by Dr. Cinthya Mo MD.   Patient was inpatient at [facility] 10/3 to 11/12/2019 and admitted to [facility] on 11/12/2019.   The admitting physician is Dr. Justina Ramirez.     Diagnosis: Right total hip arthroplasty  S/P hip hemiarthroplasty    Co-morbidities:   Patient Active Problem List    Diagnosis Date Noted   • Degenerative joint disease of right hip 11/03/2019     Priority: High   • Acute kidney injury (HCC) 11/13/2019   • Urinary retention 11/13/2019   • Vitamin D insufficiency 11/13/2019   • Leukopenia 11/13/2019   • AVN (avascular necrosis of bone) ([facility]) 11/12/2019   • Status post total replacement of right hip 11/12/2019   • Anemia 11/12/2019   • Impaired mobility and ADLs 11/12/2019   • Hypertension 11/12/2019   • Hypoalbuminemia 11/11/2019   • Radiculopathy 11/04/2019   • Dehydration 11/03/2019   • Bilateral leg edema 11/03/2019     Prior Living Situation:  Housing / Facility: [address]  Lives with - Patient's Self Care Capacity: Spouse, Adult Children    Prior Level of Function:  Medication Management: Independent  Finances: Independent  Home Management: Dependent  Shopping: Dependent  Prior Level Of Mobility: Independent With Device in Community, Independent With Device in Home  Driving / Transportation: Unable To Determine At This Time    Support Systems:  Primary : Matthew Oneal, daughter, [phone]  Other support systems: Almas Cain, spouse [phone].       Previous Services Utilized:   Equipment Owned: Front-Wheel Walker,  Wheelchair  Prior Services: Intermittent Physical Support for ADL Per Family(Just moved in with Daughter in [place] from [place] )    Other Information:  Occupation (Pre-Hospital Vocational): Retired Due To Age     Primary Payor Source: Medicare A, Medicare B  Secondary Payor Source: Supplemental Insurance()  Primary Care Practitioner : None at this time. Daughter is working to locate one for patient.   Other MDs: Dr. Mo, Orthopedic Surgery    Patient / Family Goal:  Patient / Family Goal: To get better and solve her medical issues.  DC Disposition: To single level home with 1 entry step, with support of her  and her daughter. Patient and her  recently moved to [place], are waiting for their home to be built. She and her  live with her daughter and son-in-law. Patient identified her daughter as her primary contact.  DC Needs: Anticipate home health care.   Daughter is working to find patient a PCP. Advised I could assist with that if needed. F/U needed with surgeon.   DME for mobility and safety - has a wc and FWW which were her 's.   Strengths: Motivated. Patient stated she has been sitting up to sleep the past month so the bed is very comfortable to her, and she is resting well.     Additional Case Management Questions:  Have you ever received case management services for yourself or a family member? no    Do you feel you have and an understanding of what services  provide? Yes - verbal and written explanation provided    Do you have any additional questions regarding case management?  no        CASE MANAGEMENT PLAN OF CARE   Individualized Goals:   1. Improve functional status to return home with support of   2. To establish with PCP  3. F/U MD appointments to be in place when discharged    Barriers:   1. Functional deficits    Plan:  1. Continue to follow patient through hospitalization and provide discharge planning in collaboration with patient, family,  physicians and ancillary services.     2. Utilize community resources to ensure a safe discharge.

## 2019-11-14 NOTE — ASSESSMENT & PLAN NOTE
H&H stable with Hb 9.3  B12: 225 (11/14)  Folate: 11.0  TSH: wnl  On B12 supplements (11/19)  Monitor

## 2019-11-14 NOTE — CARE PLAN
Problem: Communication  Goal: The ability to communicate needs accurately and effectively will improve  Outcome: PROGRESSING AS EXPECTED    Patient alert and oriented x 4.     Problem: Safety  Goal: Will remain free from injury  Outcome: PROGRESSING AS EXPECTED    Pt uses call light consistently and appropriately. Waits for assistance does not attempt self transfer this shift. Able to verbalize needs.

## 2019-11-14 NOTE — THERAPY
"Occupational Therapy  Daily Treatment     Patient Name: Janessa Cain  Age:  84 y.o., Sex:  female  Medical Record #: [id number]  Today's Date: 11/14/2019     Precautions  Precautions: Posterior Hip Precautions, Weight Bearing As Tolerated Right Lower Extremity, Fall Risk  Comments: wound vac R hip, B foot wounds dorsal surface    Safety   ADL Safety : Requires Physical Assist for Safety    Subjective    Pt seated in regular chair upon arrival - \"this chair is so much comfortable than the wheelchair\"     Objective       11/14/19 1301   Standing Upper Body Exercises   Comments standing hydrocycle -  4 min x 2 with seated RB in between.    Interdisciplinary Plan of Care Collaboration   Patient Position at End of Therapy Seated;Tray Table within Reach;Call Light within Reach   OT Total Time Spent   OT Individual Total Time Spent (Mins) 30   OT Charge Group   OT Therapeutic Exercise  2       Assessment    Pt completed standing cardiovascular therex to the best of her abilities with no complaints of increase pain    Plan    Cont overall strength/endurance and standing balance to improve ADL's and functional mobility    "

## 2019-11-14 NOTE — FLOWSHEET NOTE
Oximetry  check     11/14/19 0742   Events/Summary/Plan   Events/Summary/Plan O2 spot check   Chest Exam   Respiration 16   Pulse 86   Oxygen   Home O2 Use Prior To Admission? No   Pulse Oximetry 96 %   O2 (LPM) 2   O2 Daily Delivery Respiratory  Silicone Nasal Cannula

## 2019-11-14 NOTE — THERAPY
"Occupational Therapy  Daily Treatment     Patient Name: Janessa Cain  Age:  84 y.o., Sex:  female  Medical Record #: [id number]  Today's Date: 2019     Precautions  Precautions: Weight Bearing As Tolerated Right Lower Extremity, Posterior Hip Precautions, Fall Risk, Other (See Comments)  Comments: wound vac R hip, B foot wounds dorsal surface    Safety   ADL Safety : Requires Physical Assist for Safety    Subjective    \"the sock aid I have at home is better\"     Objective    FWW mobility - CGA 50ft with no LOB room<>hallway, Min v/c's for hand placement     19 0831   Bed Mobility    Sit to Supine Moderate Assist  (assist BLE's)   Skilled Intervention Verbal Cuing   Interdisciplinary Plan of Care Collaboration   Patient Position at End of Therapy Call Light within Reach;Tray Table within Reach;In Bed   OT Total Time Spent   OT Individual Total Time Spent (Mins) 60   OT Charge Group   OT Self Care / ADL 3   OT Therapeutic Exercise  1       FIM Eating Score:  6 - Modified Independent  Eating Description:  Increased time    FIM Grooming Score:  6 - Modified Independent  Grooming Description:  Increased time    FIM Upper Body Dressin - Standby Prompting/Supervision or Set-up  Upper Body Dressing Description:  Increased time    FIM Lower Body Dressing Score:  5 - Standby Prompting/Supervision or Set-up  Lower Body Dressing Description:  Increased time, Supervision for safety, Verbal cueing, Set-up of equipment(only assist to manage hannon, pt able to complete dressing using AE)      Assessment    Pt able to complete UB/LB dressing at SBA overall with use of AE, no v/c's required on AE use. Pt reported pain at R buttocks when seated in w/c, but improved when changing seats at EOB and with walking at FWW    Plan    Cont overall strength/endurance and standing balance to improve ADL's and functional mobility    "

## 2019-11-14 NOTE — CARE PLAN
Problem: Safety  Goal: Will remain free from injury  Outcome: PROGRESSING AS EXPECTED  Note:   Reinforced safety precautions to pt. Encouraged pt to use call light when assistance is needed, call light is within easy reach, bed is locked and at lowest position.       Problem: Pain Management  Goal: Pain level will decrease to patient's comfort goal  Outcome: PROGRESSING AS EXPECTED  Note:   Patient said her pain is manageable. Declined any pain med at this time. On schedule tylenol. Up and participated in therapy. Tolerated well.

## 2019-11-15 LAB
ANION GAP SERPL CALC-SCNC: 6 MMOL/L (ref 0–11.9)
BUN SERPL-MCNC: 26 MG/DL (ref 8–22)
CALCIUM SERPL-MCNC: 8.3 MG/DL (ref 8.5–10.5)
CHLORIDE SERPL-SCNC: 105 MMOL/L (ref 96–112)
CO2 SERPL-SCNC: 29 MMOL/L (ref 20–33)
CREAT SERPL-MCNC: 0.74 MG/DL (ref 0.5–1.4)
ERYTHROCYTE [DISTWIDTH] IN BLOOD BY AUTOMATED COUNT: 46.2 FL (ref 35.9–50)
GLUCOSE SERPL-MCNC: 103 MG/DL (ref 65–99)
HCT VFR BLD AUTO: 28.5 % (ref 37–47)
HGB BLD-MCNC: 8.8 G/DL (ref 12–16)
MCH RBC QN AUTO: 32.1 PG (ref 27–33)
MCHC RBC AUTO-ENTMCNC: 30.9 G/DL (ref 33.6–35)
MCV RBC AUTO: 104 FL (ref 81.4–97.8)
NT-PROBNP SERPL IA-MCNC: 167 PG/ML (ref 0–125)
PLATELET # BLD AUTO: 230 K/UL (ref 164–446)
PMV BLD AUTO: 9.6 FL (ref 9–12.9)
POTASSIUM SERPL-SCNC: 4.5 MMOL/L (ref 3.6–5.5)
RBC # BLD AUTO: 2.74 M/UL (ref 4.2–5.4)
SODIUM SERPL-SCNC: 140 MMOL/L (ref 135–145)
WBC # BLD AUTO: 4.2 K/UL (ref 4.8–10.8)

## 2019-11-15 PROCEDURE — 99232 SBSQ HOSP IP/OBS MODERATE 35: CPT | Performed by: HOSPITALIST

## 2019-11-15 PROCEDURE — 700102 HCHG RX REV CODE 250 W/ 637 OVERRIDE(OP): Performed by: PHYSICAL MEDICINE & REHABILITATION

## 2019-11-15 PROCEDURE — 99232 SBSQ HOSP IP/OBS MODERATE 35: CPT | Performed by: PHYSICAL MEDICINE & REHABILITATION

## 2019-11-15 PROCEDURE — 97110 THERAPEUTIC EXERCISES: CPT

## 2019-11-15 PROCEDURE — 36415 COLL VENOUS BLD VENIPUNCTURE: CPT

## 2019-11-15 PROCEDURE — 700102 HCHG RX REV CODE 250 W/ 637 OVERRIDE(OP): Performed by: HOSPITALIST

## 2019-11-15 PROCEDURE — A9270 NON-COVERED ITEM OR SERVICE: HCPCS | Performed by: HOSPITALIST

## 2019-11-15 PROCEDURE — 80048 BASIC METABOLIC PNL TOTAL CA: CPT

## 2019-11-15 PROCEDURE — 85027 COMPLETE CBC AUTOMATED: CPT

## 2019-11-15 PROCEDURE — 97116 GAIT TRAINING THERAPY: CPT

## 2019-11-15 PROCEDURE — A9270 NON-COVERED ITEM OR SERVICE: HCPCS | Performed by: PHYSICAL MEDICINE & REHABILITATION

## 2019-11-15 PROCEDURE — 83880 ASSAY OF NATRIURETIC PEPTIDE: CPT

## 2019-11-15 PROCEDURE — 97535 SELF CARE MNGMENT TRAINING: CPT

## 2019-11-15 PROCEDURE — 770010 HCHG ROOM/CARE - REHAB SEMI PRIVAT*

## 2019-11-15 PROCEDURE — 97530 THERAPEUTIC ACTIVITIES: CPT

## 2019-11-15 RX ADMIN — HYDROCORTISONE: 1 CREAM TOPICAL at 08:08

## 2019-11-15 RX ADMIN — TAMSULOSIN HYDROCHLORIDE 0.4 MG: 0.4 CAPSULE ORAL at 20:06

## 2019-11-15 RX ADMIN — CALCIUM CARBONATE-CHOLECALCIFEROL TAB 250 MG-125 UNIT 1 TABLET: 250-125 TAB at 08:08

## 2019-11-15 RX ADMIN — ASPIRIN 81 MG 81 MG: 81 TABLET ORAL at 08:08

## 2019-11-15 RX ADMIN — ACETAMINOPHEN 1000 MG: 500 TABLET, FILM COATED ORAL at 14:42

## 2019-11-15 RX ADMIN — VITAMIN D, TAB 1000IU (100/BT) 2000 UNITS: 25 TAB at 08:09

## 2019-11-15 RX ADMIN — ACETAMINOPHEN 1000 MG: 500 TABLET, FILM COATED ORAL at 20:06

## 2019-11-15 RX ADMIN — SENNOSIDES AND DOCUSATE SODIUM 2 TABLET: 8.6; 5 TABLET ORAL at 08:08

## 2019-11-15 RX ADMIN — ASPIRIN 81 MG 81 MG: 81 TABLET ORAL at 20:06

## 2019-11-15 RX ADMIN — DIAZEPAM 5 MG: 5 TABLET ORAL at 21:13

## 2019-11-15 RX ADMIN — GABAPENTIN 100 MG: 100 CAPSULE ORAL at 14:42

## 2019-11-15 RX ADMIN — GABAPENTIN 100 MG: 100 CAPSULE ORAL at 20:07

## 2019-11-15 RX ADMIN — DIAZEPAM 5 MG: 5 TABLET ORAL at 17:07

## 2019-11-15 RX ADMIN — CYCLOBENZAPRINE 5 MG: 10 TABLET, FILM COATED ORAL at 08:08

## 2019-11-15 RX ADMIN — CYCLOBENZAPRINE 5 MG: 10 TABLET, FILM COATED ORAL at 20:06

## 2019-11-15 RX ADMIN — CYCLOBENZAPRINE 5 MG: 10 TABLET, FILM COATED ORAL at 14:42

## 2019-11-15 RX ADMIN — GABAPENTIN 100 MG: 100 CAPSULE ORAL at 08:09

## 2019-11-15 RX ADMIN — OMEPRAZOLE 20 MG: 20 CAPSULE, DELAYED RELEASE ORAL at 08:09

## 2019-11-15 RX ADMIN — AMLODIPINE BESYLATE 5 MG: 5 TABLET ORAL at 05:33

## 2019-11-15 RX ADMIN — HYDROXYZINE HYDROCHLORIDE 50 MG: 25 TABLET, FILM COATED ORAL at 20:07

## 2019-11-15 RX ADMIN — ACETAMINOPHEN 1000 MG: 500 TABLET, FILM COATED ORAL at 08:08

## 2019-11-15 ASSESSMENT — ENCOUNTER SYMPTOMS
COUGH: 0
EYES NEGATIVE: 1
PALPITATIONS: 0
ABDOMINAL PAIN: 0
SHORTNESS OF BREATH: 0
NAUSEA: 0
VOMITING: 0
CHILLS: 0
FEVER: 0

## 2019-11-15 NOTE — PROGRESS NOTES
Right hip Prevena wound vac removed today. Picture taken. Incision with va. Blister found next to incision area. Wound consult order placed. Charge nurse Kym and Dr Ramirez notified.

## 2019-11-15 NOTE — REHAB-CM IDT TEAM NOTE
Case Management    DC Planning  DC destination/dispostion:  To single level home with 1 entry step, with support of her  and her daughter. Patient and her  recently moved to [place], are waiting for their home to be built. She and her  live with her daughter and son-in-law.     DC Needs: Anticipate home health care.   Daughter is working to find patient a PCP.  F/U needed with surgeon - Dr. Mo.  DME for mobility and safety - has a wc and FWW which were her 's.     Barriers to discharge:  Functional deficits.    Strengths: Motivated. Family supportive.    Section completed by:  Petra Gibson R.N.

## 2019-11-15 NOTE — THERAPY
"Occupational Therapy  Daily Treatment     Patient Name: Janessa Cain  Age:  84 y.o., Sex:  female  Medical Record #: [id number]  Today's Date: 11/15/2019     Precautions  Precautions: (P) Posterior Hip Precautions, Weight Bearing As Tolerated Right Lower Extremity, Fall Risk  Comments: (P) wound vac R hip, B foot wounds dorsal surface, hannon    Safety   ADL Safety : Requires Physical Assist for Safety    Subjective    \"I don't feel safe walking with these socks cut like this.\"     Objective       11/15/19 0931   Precautions   Precautions Posterior Hip Precautions;Weight Bearing As Tolerated Right Lower Extremity;Fall Risk   Comments wound vac R hip, B foot wounds dorsal surface, hannon   Sitting Upper Body Exercises   Sitting Upper Body Exercises Yes   Upper Extremity Bike Minutes / Rest Breaks (See Comments)  (motomed cycle gear 4 x 10.5 minutes)   IADL Treatments   Kitchen Mobility Education educated on safe kitchen mobility techniques with FWW while maintaining post hip precautions.  Pt then mobilized around kitchen with FWW and supervision while retrieving items from high/low cupboards, counter and various appliances.    Bed Mobility    Sit to Supine Moderate Assist   Comments for LEs   Interdisciplinary Plan of Care Collaboration   Patient Position at End of Therapy In Bed;Call Light within Reach;Tray Table within Reach;Phone within Reach   OT Total Time Spent   OT Individual Total Time Spent (Mins) 60   OT Charge Group   OT Self Care / ADL 1   OT Therapy Activity 1   OT Therapeutic Exercise  2     Got a pair of blue bariatric socks for pt and donned them on her feet for improved fit and increase in patient feeling safe.      Dry stall shower transfer x 2 with SBA.  First attempt with grab bars, second attempt with FWW.      FIM Grooming Score:  5 - Standby Prompting/Supervision or Set-up  Grooming Description:  Supervision for safety(SBA/supervised standing at sink to brush teeth)    FIM Walking " Score:  5 - Standby Prompting/Supervision or Set-up  Walking Description:  Walker, Supervision for safety, Oxygen(SBA with ' x 2 with therapist managing oxygen)      Assessment    Patient happy and feels more safe to have socks that fit her feet.      Plan    Cont overall strength/endurance and standing balance to improve ADL's and functional mobility

## 2019-11-15 NOTE — PROGRESS NOTES
Hospital Medicine Daily Progress Note      Chief Complaint  HTN, ALEK    Interval Problem Update  Pt doesn't understand why she's on IVF despite leg swelling.  Explained in layman's terms that she is intravascularly volume depleted.  Labs reviewed.    Review of Systems  Review of Systems   Constitutional: Negative for chills and fever.   HENT: Negative.    Eyes: Negative.    Respiratory: Negative for cough and shortness of breath.    Cardiovascular: Negative for chest pain and palpitations.   Gastrointestinal: Negative for abdominal pain, nausea and vomiting.   Musculoskeletal: Positive for joint pain.        Wound pain   Skin: Negative for itching and rash.   Psychiatric/Behavioral: The patient is nervous/anxious.         Physical Exam  Temp:  [36.5 °C (97.7 °F)-36.6 °C (97.9 °F)] 36.5 °C (97.7 °F)  Pulse:  [83-98] 84  Resp:  [16-18] 18  BP: (107-124)/(60-69) 124/66  SpO2:  [93 %-98 %] 95 %    Physical Exam  Vitals signs reviewed.   Constitutional:       General: She is not in acute distress.     Appearance: Normal appearance. She is obese. She is not ill-appearing.   HENT:      Head: Normocephalic and atraumatic.      Right Ear: External ear normal.      Left Ear: External ear normal.      Nose: Nose normal.   Eyes:      General:         Right eye: No discharge.         Left eye: No discharge.      Extraocular Movements: Extraocular movements intact.      Conjunctiva/sclera: Conjunctivae normal.   Neck:      Musculoskeletal: Normal range of motion and neck supple.   Cardiovascular:      Rate and Rhythm: Normal rate and regular rhythm.   Pulmonary:      Effort: No respiratory distress.      Breath sounds: No wheezing.      Comments: Decreased BS  Abdominal:      General: Bowel sounds are normal.      Palpations: Abdomen is soft.      Comments: obese   Musculoskeletal:      Right lower leg: Edema present.      Left lower leg: Edema present.   Skin:     General: Skin is warm and dry.   Neurological:      Mental  Status: She is alert.         Fluids    Intake/Output Summary (Last 24 hours) at 11/14/2019 1704  Last data filed at 11/14/2019 1425  Gross per 24 hour   Intake 420 ml   Output 3600 ml   Net -3180 ml       Laboratory  Recent Labs     11/13/19  0533 11/14/19  0509   WBC 3.7* 3.2*   RBC 2.77* 2.71*   HEMOGLOBIN 9.1* 8.8*   HEMATOCRIT 28.2* 27.9*   .8* 103.0*   MCH 32.9 32.5   MCHC 32.3* 31.5*   RDW 45.9 46.1   PLATELETCT 219 221   MPV 9.8 9.6     Recent Labs     11/13/19  0533 11/14/19  0509   SODIUM 136 137   POTASSIUM 5.3 4.6   CHLORIDE 99 100   CO2 32 31   GLUCOSE 91 95   BUN 50* 41*   CREATININE 1.53* 1.20   CALCIUM 8.4* 8.2*                   Assessment/Plan  Leukopenia  Assessment & Plan  Follow WBC and ANC  Monitor need for G-CSF    Vitamin D insufficiency- (present on admission)  Assessment & Plan  Vit D level 22  On supplementation    Acute kidney injury (HCC)- (present on admission)  Assessment & Plan  Renal function improving off Celebrex, Lasix, and Losartan  Continue IVF hydration one more day  Follow renal function    Hypertension- (present on admission)  Assessment & Plan  Discontinued Losartan for ALEK and high trending K+  Started low dose Norvasc  Observe blood pressure trends  Monitor for orthostatic hypotension on Flomax    Anemia- (present on admission)  Assessment & Plan  Has macrocytic indices  Vit B12, Folate, and TSH all normal  Follow H/H    AVN (avascular necrosis of bone) (HCC)- (present on admission)  Assessment & Plan  S/P right SUNSHINE on 11/6/19 per Dr. Mo  Wound care and pain control  U/S 11/3/19 negative for DVT    Bilateral leg edema- (present on admission)  Assessment & Plan  Echo EF 65-70% w/ mod PHTN  BNP 89     Full Code

## 2019-11-15 NOTE — CARE PLAN
"  Problem: Safety  Goal: Will remain free from injury  Outcome: PROGRESSING AS EXPECTED  Patient was impulsive, using her walker to stand at the side of her bed.  States she is having muscle spasms and needs to stand.   Bed alarm turned, on patient assisted back to bed.   She does have her call light and is aware that she needs to call for assistance.         Problem: Pain Management  Goal: Pain level will decrease to patient's comfort goal  Outcome: PROGRESSING AS EXPECTED     Patient medicated with valium per prn order,  scheduled Flexeril not effective.  Tubi  removed from Patients legs per her complaint of being \"miseralble with them on\"  and demanding that they are removed.    "

## 2019-11-15 NOTE — REHAB-PHARMACY IDT TEAM NOTE
Pharmacy   Pharmacy  Antibiotics/Antifungals/Antivirals:  Medication:      Active Orders (From admission, onward)    None        Route:        NA  Stop Date:  NA  Reason:      NA  Antihypertensives/Cardiac:  Medication:    Orders (72h ago, onward)     Start     Ordered    11/14/19 0600  amLODIPine (NORVASC) tablet 5 mg  Q DAY      11/13/19 1549    11/13/19 0900  losartan (COZAAR) tablet 25 mg  DAILY,   Status:  Discontinued      11/12/19 1159    11/13/19 0600  furosemide (LASIX) tablet 40 mg  Q DAY,   Status:  Discontinued      11/12/19 1159              Patient Vitals for the past 24 hrs:   BP Pulse   11/15/19 1402 136/78 75   11/15/19 0635 151/74 70   11/15/19 0533 123/69 --   11/14/19 1900 122/86 95     Anticoagulation:  Medication: Aspirin    Other key medications: A review of the medication list reveals no issues at this time. Patient is currently on antihypertensive(s). Recommend home blood pressure monitoring/recording if antihypertensive(s) regimen(s) continue.    Section completed by: Manny Sandoval [facility]

## 2019-11-15 NOTE — THERAPY
Physical Therapy   Daily Treatment     Patient Name: Janessa Cain  Age:  84 y.o., Sex:  female  Medical Record #: [id number]  Today's Date: 11/15/2019     Precautions  Precautions: Posterior Hip Precautions, Fall Risk, Weight Bearing As Tolerated Left Lower Extremity  Comments: wound vac R hip, B foot wounds dorsal surface, hannon    Subjective    Pt was supine in bed upon arrival and agreeable to treatment.       Objective       11/15/19 1101   Precautions   Precautions Posterior Hip Precautions;Fall Risk;Weight Bearing As Tolerated Left Lower Extremity   Bed Mobility    Supine to Sit Stand by Assist   Sit to Supine Stand by Assist   Sit to Stand Stand by Assist   Scooting Stand by Assist   Interdisciplinary Plan of Care Collaboration   Patient Position at End of Therapy Seated;Call Light within Reach;Tray Table within Reach;Phone within Reach   PT Total Time Spent   PT Individual Total Time Spent (Mins) 30   PT Charge Group   PT Therapeutic Activities 2       FIM Bed/Chair/Wheelchair Transfers Score: 5 - Standby Prompting/Supervision or Set-up  Bed/Chair/Wheelchair Transfers Description:  Adaptive equipment, Increased time, Supervision for safety, Set-up of equipment, Verbal cueing(Bed mobility with leg  and SPT with FWW with SBA)    Completed bed mobility and transfer training with use of leg  and FWW x 4 reps.     Assessment    Pt demonstrated improvement in independence in bed mobility with use of leg .  Pt demonstrated good safety this session.      Plan    Continue to progress gait training with FWW, transfer training, bed mobility, issue HEP.

## 2019-11-15 NOTE — WOUND TEAM
"[facility]  Inpatient Services  Wound and Skin Care Progress Note    Admission Date: 11/12/2019       HPI, PMH, SH: Reviewed    Unit where seen by Wound Team: [id number]     WOUND FOLLOW UP/CONSULT RELATED TO:  Re evaluation of bilateral foot wounds and hip incision lala wound     SUBJECTIVE:  \"Those socks tend to roll over\"      Self Report / Pain Level:  Some tenderness with care \"but not enough to complain about\"       OBJECTIVE:  Left foot blister is wet and opened; Prevena dressing removed from hip incision revealing intact blister distal lala wound skin; no advanced wound care indicated; orders written and discussed with staff RN.    WOUND TYPE, LOCATION, CHARACTERISTICS (Pressure Injuries: location, stage, POA or date identified)      Incision 11/12/19 Hip (Active)   Wound Image   11/15/2019  2:00 PM   Site Assessment Clean;Intact 11/15/2019  2:00 PM   Lala-wound Assessment Clean;Intact 11/15/2019  2:00 PM   Margins Attached edges 11/15/2019  2:00 PM   Tunneling 0 cm 11/15/2019  2:00 PM   Undermining 0 cm 11/15/2019  2:00 PM   Closure Staples 11/15/2019  2:00 PM   Drainage Amount Small 11/15/2019  9:00 AM   Drainage Description Sanguineous 11/15/2019  9:00 AM   Treatments Cleansed;Site care 11/15/2019  9:00 AM   Dressing Options Hydrofiber;Island Dressing 11/15/2019  2:00 PM   Dressing Changed Other (Comment) 11/15/2019  2:00 PM   Dressing Status Clean;Dry;Intact 11/15/2019  9:00 AM   Dressing Change Frequency Every 48 hrs 11/15/2019  2:00 PM   NEXT Dressing Change  11/17/19 11/15/2019  2:00 PM   NEXT Weekly Photo (Inpatient Only) 11/22/19 11/15/2019  2:00 PM        Wound 11/12/19 Foot full thickness right dorsum foot (Active)   Wound Image   11/15/2019  2:00 PM   Site Assessment Clean;Yellow;Red;Drainage;Intact 11/15/2019  2:00 PM   Lala-wound Assessment Clean;Intact;Edema 11/15/2019  2:00 PM   Margins Attached edges 11/15/2019  2:00 PM   Wound Length (cm) 4 cm 11/15/2019  2:00 PM   Wound Width " (cm) 7 cm 11/15/2019  2:00 PM   Wound Depth (cm) 0.1 cm 11/15/2019  2:00 PM   Wound Surface Area (cm^2) 28 cm^2 11/15/2019  2:00 PM   Tunneling 0 cm 11/15/2019  2:00 PM   Undermining 0 cm 11/15/2019  2:00 PM   Closure Secondary intention 11/15/2019  2:00 PM   Drainage Amount Small 11/15/2019  2:00 PM   Drainage Description Serosanguineous 11/15/2019  2:00 PM   Non-staged Wound Description Full thickness 11/15/2019  2:00 PM   Treatments Cleansed;Site care 11/15/2019  2:00 PM   Cleansing Approved Wound Cleanser 11/15/2019  2:00 PM   Periwound Protectant Not Applicable 11/15/2019  2:00 PM   Dressing Options Hydrofera Blue Ready;Honey Colloid;Adhesive Foam 11/15/2019  2:00 PM   Dressing Cleansing/Solutions Not Applicable 11/15/2019  2:00 PM   Dressing Changed New 11/15/2019  2:00 PM   Dressing Status Intact 11/15/2019  2:00 PM   Dressing Change Frequency Daily 11/15/2019  2:00 PM   NEXT Dressing Change  11/16/19 11/15/2019  2:00 PM   NEXT Weekly Photo (Inpatient Only) 11/22/19 11/15/2019  2:00 PM   WOUND NURSE ONLY - Odor None 11/15/2019  2:00 PM   WOUND NURSE ONLY - Exposed Structures None 11/15/2019  2:00 PM   WOUND NURSE ONLY - Tissue Type and Percentage yellow/red 11/15/2019  2:00 PM       Wound 11/12/19 Foot full thickness dorsum left foot (Active)   Wound Image   11/15/2019  2:00 PM   Site Assessment Clean;Intact;Red 11/15/2019  2:00 PM   Nolvia-wound Assessment Clean;Intact;Edema 11/15/2019  2:00 PM   Margins Attached edges 11/15/2019  2:00 PM   Wound Length (cm) 1.5 cm 11/15/2019  2:00 PM   Wound Width (cm) 2.2 cm 11/15/2019  2:00 PM   Wound Depth (cm) 0.1 cm 11/15/2019  2:00 PM   Wound Surface Area (cm^2) 3.3 cm^2 11/15/2019  2:00 PM   Tunneling 0 cm 11/15/2019  2:00 PM   Undermining 0 cm 11/15/2019  2:00 PM   Closure Secondary intention 11/15/2019  2:00 PM   Drainage Amount Small 11/15/2019  2:00 PM   Drainage Description Serosanguineous 11/15/2019  2:00 PM   Non-staged Wound Description Full thickness  "11/15/2019  2:00 PM   Treatments Cleansed;Site care 11/15/2019  2:00 PM   Cleansing Approved Wound Cleanser 11/15/2019  2:00 PM   Periwound Protectant Not Applicable 11/15/2019  2:00 PM   Dressing Options Hydrofera Blue Ready 11/15/2019  2:00 PM   Dressing Cleansing/Solutions Not Applicable 11/15/2019  2:00 PM   Dressing Changed New 11/15/2019  2:00 PM   Dressing Status Intact 11/15/2019  2:00 PM   Dressing Change Frequency Every 72 hrs 11/15/2019  2:00 PM   NEXT Dressing Change  11/18/19 11/15/2019  2:00 PM   NEXT Weekly Photo (Inpatient Only) 11/20/19 11/15/2019  2:00 PM   WOUND NURSE ONLY - Odor None 11/15/2019  2:00 PM   WOUND NURSE ONLY - Exposed Structures None 11/15/2019  2:00 PM   WOUND NURSE ONLY - Tissue Type and Percentage red 11/15/2019  2:00 PM   WOUND NURSE ONLY - Time Spent with Patient (mins) 60 11/15/2019  2:00 PM        Vascular:    Dorsal Pedal pulses:  Unable to palpate due to edema  Posterior tib pulses:         \"   **          \"            \"    CM:  NA    Lab Values:    Lab Results   Component Value Date/Time    WBC 4.2 (L) 11/15/2019 05:09 AM    RBC 2.74 (L) 11/15/2019 05:09 AM    HEMOGLOBIN 8.8 (L) 11/15/2019 05:09 AM    HEMATOCRIT 28.5 (L) 11/15/2019 05:09 AM        No results found for: HBA1C        Culture:  NA      INTERVENTIONS BY WOUND TEAM: Discussed incisional lala wound blister with staff RN who has appropriately placed correct dressing.  Orders written.  Met with patient and removed tubi  noting that folded over upper end and no compression over foot where needed.  Removed old dressings and cleansed with NS gauze.  Measurements and photos taken.  Left foot covered with hydrofera blue and secured with hypafix tape.  Right foot wound clean on lateral side and covered this with hydrofera blue foam and secured with hypafix tape.  Honey colloid over medial wound bed as still with yellow slough.  Tubi  E re applied.  Discussed with staff RN and wounds observed by Dr. Mooney. "     Dressing Selection:  Honey colloid, hydrofera blue, adhesive foam, tubi  E         Interdisciplinary consultation: Patient, Bedside RN, Dr. Mooney    EVALUATION: right foot wound improving with medial wound continuing with debridement; left foot scab now wet and requires a dressing; continue with compression; will re assess next week     Factors affecting wound healing: edema   Goals: Steady decrease in wound area and depth weekly    NURSING PLAN OF CARE ORDERS (X):    Dressing changes: See Dressing Care orders: X updated  Skin care: See Skin Care orders:   Rectal tube care: See Rectal Tube Care orders:   Other orders:    WOUND TEAM PLAN OF CARE (X):  NPWT change 3 x week:        Dressing changes by wound team:       Follow up as needed:  X weekly     Other (explain):     Anticipated discharge plans (X):   SNF:           Home Care:           Outpatient Wound Center:            Self Care:            Other:     TBD with ongoing wound care

## 2019-11-15 NOTE — PROGRESS NOTES
"Rehab Progress Note     Date of Service: 11/15/2019  Chief Complaint: Follow-up total hip arthroplasty    Interval Events (Subjective)    Patient seen and examined today in her room.  She is postop day 10 her dressing was removed.  Nursing is noted a small blister on her hip.  Wound care has been consulted.  Patient also noted to have pink-tinged urine today and a Weathers catheter.  We discussed the plan for voiding trial next week.  Patient also reports that she did have very frequent urination prior to this admission and wonders if maybe this bladder problem is not new.  Advised she may need a referral to urology after discharge which she is in agreement with.    Objective:  VITAL SIGNS: /74   Pulse 70   Temp 36.3 °C (97.4 °F) (Temporal)   Resp 18   Ht 1.651 m (5' 5\")   Wt 89.4 kg (197 lb)   SpO2 95%   BMI 32.78 kg/m²   Gen: alert, no apparent distress  CV: regular rate and rhythm, no murmurs, no peripheral edema  Resp: clear to ascultation bilaterally, normal respiratory effort  GI: soft, non-tender abdomen, bowel sounds present      Recent Results (from the past 72 hour(s))   CBC with Differential    Collection Time: 11/13/19  5:33 AM   Result Value Ref Range    WBC 3.7 (L) 4.8 - 10.8 K/uL    RBC 2.77 (L) 4.20 - 5.40 M/uL    Hemoglobin 9.1 (L) 12.0 - 16.0 g/dL    Hematocrit 28.2 (L) 37.0 - 47.0 %    .8 (H) 81.4 - 97.8 fL    MCH 32.9 27.0 - 33.0 pg    MCHC 32.3 (L) 33.6 - 35.0 g/dL    RDW 45.9 35.9 - 50.0 fL    Platelet Count 219 164 - 446 K/uL    MPV 9.8 9.0 - 12.9 fL    Neutrophils-Polys 52.70 44.00 - 72.00 %    Lymphocytes 18.30 (L) 22.00 - 41.00 %    Monocytes 14.20 (H) 0.00 - 13.40 %    Eosinophils 14.00 (H) 0.00 - 6.90 %    Basophils 0.30 0.00 - 1.80 %    Immature Granulocytes 0.50 0.00 - 0.90 %    Nucleated RBC 0.00 /100 WBC    Neutrophils (Absolute) 1.96 (L) 2.00 - 7.15 K/uL    Lymphs (Absolute) 0.68 (L) 1.00 - 4.80 K/uL    Monos (Absolute) 0.53 0.00 - 0.85 K/uL    Eos (Absolute) 0.52 " (H) 0.00 - 0.51 K/uL    Baso (Absolute) 0.01 0.00 - 0.12 K/uL    Immature Granulocytes (abs) 0.02 0.00 - 0.11 K/uL    NRBC (Absolute) 0.00 K/uL   Comp Metabolic Panel (CMP)    Collection Time: 11/13/19  5:33 AM   Result Value Ref Range    Sodium 136 135 - 145 mmol/L    Potassium 5.3 3.6 - 5.5 mmol/L    Chloride 99 96 - 112 mmol/L    Co2 32 20 - 33 mmol/L    Anion Gap 5.0 0.0 - 11.9    Glucose 91 65 - 99 mg/dL    Bun 50 (H) 8 - 22 mg/dL    Creatinine 1.53 (H) 0.50 - 1.40 mg/dL    Calcium 8.4 (L) 8.5 - 10.5 mg/dL    AST(SGOT) 15 12 - 45 U/L    ALT(SGPT) 9 2 - 50 U/L    Alkaline Phosphatase 67 30 - 99 U/L    Total Bilirubin 0.6 0.1 - 1.5 mg/dL    Albumin 2.8 (L) 3.2 - 4.9 g/dL    Total Protein 5.2 (L) 6.0 - 8.2 g/dL    Globulin 2.4 1.9 - 3.5 g/dL    A-G Ratio 1.2 g/dL   Magnesium    Collection Time: 11/13/19  5:33 AM   Result Value Ref Range    Magnesium 2.4 1.5 - 2.5 mg/dL   Vitamin D, 25-hydroxy (blood)    Collection Time: 11/13/19  5:33 AM   Result Value Ref Range    25-Hydroxy   Vitamin D 25 22 (L) 30 - 100 ng/mL   proBrain Natriuretic Peptide, NT    Collection Time: 11/13/19  5:33 AM   Result Value Ref Range    NT-proBNP 89 0 - 125 pg/mL   ESTIMATED GFR    Collection Time: 11/13/19  5:33 AM   Result Value Ref Range    GFR If  39 (A) >60 mL/min/1.73 m 2    GFR If Non  32 (A) >60 mL/min/1.73 m 2   Basic Metabolic Panel    Collection Time: 11/14/19  5:09 AM   Result Value Ref Range    Sodium 137 135 - 145 mmol/L    Potassium 4.6 3.6 - 5.5 mmol/L    Chloride 100 96 - 112 mmol/L    Co2 31 20 - 33 mmol/L    Glucose 95 65 - 99 mg/dL    Bun 41 (H) 8 - 22 mg/dL    Creatinine 1.20 0.50 - 1.40 mg/dL    Calcium 8.2 (L) 8.5 - 10.5 mg/dL    Anion Gap 6.0 0.0 - 11.9   CBC WITH DIFFERENTIAL    Collection Time: 11/14/19  5:09 AM   Result Value Ref Range    WBC 3.2 (L) 4.8 - 10.8 K/uL    RBC 2.71 (L) 4.20 - 5.40 M/uL    Hemoglobin 8.8 (L) 12.0 - 16.0 g/dL    Hematocrit 27.9 (L) 37.0 - 47.0 %    MCV  103.0 (H) 81.4 - 97.8 fL    MCH 32.5 27.0 - 33.0 pg    MCHC 31.5 (L) 33.6 - 35.0 g/dL    RDW 46.1 35.9 - 50.0 fL    Platelet Count 221 164 - 446 K/uL    MPV 9.6 9.0 - 12.9 fL    Neutrophils-Polys 51.30 44.00 - 72.00 %    Lymphocytes 22.00 22.00 - 41.00 %    Monocytes 11.80 0.00 - 13.40 %    Eosinophils 13.70 (H) 0.00 - 6.90 %    Basophils 0.90 0.00 - 1.80 %    Immature Granulocytes 0.30 0.00 - 0.90 %    Nucleated RBC 0.00 /100 WBC    Neutrophils (Absolute) 1.65 (L) 2.00 - 7.15 K/uL    Lymphs (Absolute) 0.71 (L) 1.00 - 4.80 K/uL    Monos (Absolute) 0.38 0.00 - 0.85 K/uL    Eos (Absolute) 0.44 0.00 - 0.51 K/uL    Baso (Absolute) 0.03 0.00 - 0.12 K/uL    Immature Granulocytes (abs) 0.01 0.00 - 0.11 K/uL    NRBC (Absolute) 0.00 K/uL   VITAMIN B12    Collection Time: 11/14/19  5:09 AM   Result Value Ref Range    Vitamin B12 -True Cobalamin 225 211 - 911 pg/mL   FOLATE    Collection Time: 11/14/19  5:09 AM   Result Value Ref Range    Folate -Folic Acid 11.0 >4.0 ng/mL   TSH WITH REFLEX TO FT4    Collection Time: 11/14/19  5:09 AM   Result Value Ref Range    TSH 3.430 0.380 - 5.330 uIU/mL   ESTIMATED GFR    Collection Time: 11/14/19  5:09 AM   Result Value Ref Range    GFR If  52 (A) >60 mL/min/1.73 m 2    GFR If Non  43 (A) >60 mL/min/1.73 m 2   CBC WITHOUT DIFFERENTIAL    Collection Time: 11/15/19  5:09 AM   Result Value Ref Range    WBC 4.2 (L) 4.8 - 10.8 K/uL    RBC 2.74 (L) 4.20 - 5.40 M/uL    Hemoglobin 8.8 (L) 12.0 - 16.0 g/dL    Hematocrit 28.5 (L) 37.0 - 47.0 %    .0 (H) 81.4 - 97.8 fL    MCH 32.1 27.0 - 33.0 pg    MCHC 30.9 (L) 33.6 - 35.0 g/dL    RDW 46.2 35.9 - 50.0 fL    Platelet Count 230 164 - 446 K/uL    MPV 9.6 9.0 - 12.9 fL   Basic Metabolic Panel    Collection Time: 11/15/19  5:09 AM   Result Value Ref Range    Sodium 140 135 - 145 mmol/L    Potassium 4.5 3.6 - 5.5 mmol/L    Chloride 105 96 - 112 mmol/L    Co2 29 20 - 33 mmol/L    Glucose 103 (H) 65 - 99 mg/dL     Bun 26 (H) 8 - 22 mg/dL    Creatinine 0.74 0.50 - 1.40 mg/dL    Calcium 8.3 (L) 8.5 - 10.5 mg/dL    Anion Gap 6.0 0.0 - 11.9   proBrain Natriuretic Peptide, NT    Collection Time: 11/15/19  5:09 AM   Result Value Ref Range    NT-proBNP 167 (H) 0 - 125 pg/mL   ESTIMATED GFR    Collection Time: 11/15/19  5:09 AM   Result Value Ref Range    GFR If African American >60 >60 mL/min/1.73 m 2    GFR If Non African American >60 >60 mL/min/1.73 m 2       Current Facility-Administered Medications   Medication Frequency   • vitamin D (cholecalciferol) tablet 2,000 Units DAILY   • tamsulosin (FLOMAX) capsule 0.4 mg QHS   • amLODIPine (NORVASC) tablet 5 mg Q DAY   • Respiratory Care per Protocol Continuous RT   • Pharmacy Consult Request ...Pain Management Review 1 Each PHARMACY TO DOSE   • acetaminophen (TYLENOL) tablet 650 mg Q4HRS PRN   • artificial tears ophthalmic solution 1 Drop PRN   • benzocaine-menthol (CEPACOL) lozenge 1 Lozenge Q2HRS PRN   • mag hydrox-al hydrox-simeth (MAALOX PLUS ES or MYLANTA DS) suspension 20 mL Q2HRS PRN   • ondansetron (ZOFRAN ODT) dispertab 4 mg 4X/DAY PRN    Or   • ondansetron (ZOFRAN) syringe/vial injection 4 mg 4X/DAY PRN   • traZODone (DESYREL) tablet 50 mg QHS PRN   • sodium chloride (OCEAN) 0.65 % nasal spray 2 Spray PRN   • hydrOXYzine HCl (ATARAX) tablet 50 mg Q6HRS PRN   • melatonin tablet 3 mg HS PRN   • lactulose 20 GM/30ML solution 30 mL QDAY PRN   • fleet enema 133 mL QDAY PRN   • senna-docusate (PERICOLACE or SENOKOT S) 8.6-50 MG per tablet 2 Tab BID    And   • polyethylene glycol/lytes (MIRALAX) PACKET 1 Packet QDAY PRN    And   • magnesium hydroxide (MILK OF MAGNESIA) suspension 30 mL QDAY PRN    And   • bisacodyl (DULCOLAX) suppository 10 mg QDAY PRN   • aspirin (ASA) chewable tab 81 mg BID   • diazePAM (VALIUM) tablet 5 mg TID PRN   • gabapentin (NEURONTIN) capsule 100 mg TID   • omeprazole (PRILOSEC) capsule 20 mg DAILY   • oyster shell calcium/vitamin D 250-125 MG-UNIT tablet  1 Tab DAILY   • cyclobenzaprine (FLEXERIL) tablet 5 mg TID   • acetaminophen (TYLENOL) tablet 1,000 mg TID   • hydrocortisone 1 % cream BID       Orders Placed This Encounter   Procedures   • Diet Order Regular     Standing Status:   Standing     Number of Occurrences:   1     Order Specific Question:   Diet:     Answer:   Regular [1]       Assessment:  Active Hospital Problems    Diagnosis   • *Status post total replacement of right hip   • Degenerative joint disease of right hip   • Acute kidney injury (HCC)   • Urinary retention   • Vitamin D insufficiency   • AVN (avascular necrosis of bone) (HCC)   • Anemia   • Impaired mobility and ADLs   • Hypertension   • Hypoalbuminemia   • Radiculopathy   • Bilateral leg edema     84 yr old female admitted to acute rehab on 11/12 with AVN of her right femoral head, s/p SUNSHINE on 11/6.    We will have her first weekly conference on Monday to discuss a discharge date.      Medical Decision Making and Plan:    Right AVN of femur  S/p SUNSHINE Dr. Bocanegra 11/6  PT/OT, 1.5 hr each discipline, 5 days per week  Dressing removed POD 10 - today  Sutures out POD 14  Outpatient follow up    Pain management  Scheduled tylenol  D/c Celebrex due to ALEK, see below  Scheduled gabapentin  Scheduled Flexeril  Currently well controlled    Bowel  Meds as needed  Last BM 11/14    Bladder  Acute urinary retention  Place Weathers  Started Flomax 11/12  Voiding trial next week    DVT prophylaxis  Per surgery ASA 81 mg BID    Appreciate assistance of hospitalist with her medical co-morbidities:    Acute kidney injury, resolved with IVF  Hypertension  Peripheral edema, improved  Pulmonary hypertension  Respiratory insufficiency, attempt O2 titration  Rash, back  Leukopenia  Anemia  Hypoalbuminemia  Vit D insufficiency  Wounds/blisters, wound care consult    Total time:  25 minutes.  I spent greater than 50% of the time for patient care, counseling, and coordination on this date, including patient face-to face  time, unit/floor time with review of records/pertinent lab data and studies, as well as discussing diagnostic evaluation/work up, planned therapeutic interventions, and future disposition of care, as per the interval events/subjective and the assessment and plan as noted above.    I have performed a physical exam, reviewed and updated ROS, as well as the assessment and plan today 11/15/2019. In review of note from 11/14/2019 there are no new changes except as documented above.    Justina Ramirez M.D.   Physical Medicine and Rehabilitation

## 2019-11-15 NOTE — CARE PLAN
Problem: Safety  Goal: Will remain free from injury  Outcome: PROGRESSING AS EXPECTED  Note:   Reinforced safety precautions to pt. Encouraged pt to use call light when assistance is needed, call light is within easy reach, bed is locked and at lowest position.       Problem: Infection  Goal: Will remain free from infection  Outcome: PROGRESSING AS EXPECTED  Note:   Patient has hannon draining. Noted blood in her hannon bag. Dr Ramirez notified.      Problem: Bowel/Gastric:  Goal: Normal bowel function is maintained or improved  Outcome: PROGRESSING AS EXPECTED  Note:   Patient is on bowel med daily. Had BM today in bathroom. Denied s/s of constipation.

## 2019-11-15 NOTE — THERAPY
"Occupational Therapy  Daily Treatment     Patient Name: Janessa Cain  Age:  84 y.o., Sex:  female  Medical Record #: [id number]  Today's Date: 11/15/2019     Precautions  Precautions: Posterior Hip Precautions, Weight Bearing As Tolerated Right Lower Extremity, Fall Risk, Other (See Comments)  Comments: wound vac R hip, B foot wounds dorsal surface, hannon    Safety   ADL Safety : Requires Physical Assist for Safety    Subjective    \"I need to use the bathroom\"     Objective       11/15/19 1431   Precautions   Precautions Posterior Hip Precautions;Weight Bearing As Tolerated Right Lower Extremity;Fall Risk;Other (See Comments)   Comments wound vac R hip, B foot wounds dorsal surface, hannon   Vitals   O2 (LPM) 2   O2 Delivery Nasal Cannula   Cognition    Level of Consciousness Alert   Sitting Upper Body Exercises   Other Exercise BUE strengthening exercises completed with 4# bar, 3x10 each: bicep curls, chest press, fwd arm circles and front arm raise.   Bed Mobility    Supine to Sit Supervised   Scooting Supervised   Interdisciplinary Plan of Care Collaboration   IDT Collaboration with  Physical Therapist;Nursing   Patient Position at End of Therapy Seated;Self Releasing Lap Belt Applied;Other (Comments)   Collaboration Comments transfer of care to PT; RN for medications   OT Total Time Spent   OT Individual Total Time Spent (Mins) 30   OT Charge Group   OT Self Care / ADL 1   OT Therapeutic Exercise  1       FIM Grooming Score:  7 - Independent  Grooming Description:  (seated EOB to comb hair.)    FIM Toiletin - Standby Prompting/Supervision or Set-up  Toileting Description:  Grab bar, Increased time, Supervision for safety, Verbal cueing, Set-up of equipment    FIM Toilet Transfer Score:  5 - Standby Prompting/Supervision or Set-up  Toilet Transfer Description:  Grab bar, Increased time, Supervision for safety, Verbal cueing, Set-up of equipment(w/c<>toilet SPT with GB and close " SBA.)      Assessment    Pt tolerated session well and making progress with safety with toileting and toilet transfers, and maintained hip prec throughout session. She con't to c/o foot pain with standing. Pt tolerated BUE strengthening exercises well.     Plan    Cont overall strength/endurance and standing balance to improve ADL's and functional mobility

## 2019-11-16 LAB
APPEARANCE UR: ABNORMAL
BACTERIA #/AREA URNS HPF: ABNORMAL /HPF
BILIRUB UR QL STRIP.AUTO: NEGATIVE
COLOR UR: ABNORMAL
EPI CELLS #/AREA URNS HPF: NEGATIVE /HPF
GLUCOSE UR STRIP.AUTO-MCNC: NEGATIVE MG/DL
KETONES UR STRIP.AUTO-MCNC: NEGATIVE MG/DL
LEUKOCYTE ESTERASE UR QL STRIP.AUTO: ABNORMAL
MICRO URNS: ABNORMAL
NITRITE UR QL STRIP.AUTO: POSITIVE
PH UR STRIP.AUTO: 7.5 [PH] (ref 5–8)
PROT UR QL STRIP: 100 MG/DL
RBC # URNS HPF: >150 /HPF
RBC UR QL AUTO: ABNORMAL
SP GR UR STRIP.AUTO: 1.01
UROBILINOGEN UR STRIP.AUTO-MCNC: 1 MG/DL
WBC #/AREA URNS HPF: ABNORMAL /HPF

## 2019-11-16 PROCEDURE — 99232 SBSQ HOSP IP/OBS MODERATE 35: CPT | Performed by: HOSPITALIST

## 2019-11-16 PROCEDURE — 81001 URINALYSIS AUTO W/SCOPE: CPT

## 2019-11-16 PROCEDURE — 770010 HCHG ROOM/CARE - REHAB SEMI PRIVAT*

## 2019-11-16 PROCEDURE — 700102 HCHG RX REV CODE 250 W/ 637 OVERRIDE(OP): Performed by: HOSPITALIST

## 2019-11-16 PROCEDURE — 700102 HCHG RX REV CODE 250 W/ 637 OVERRIDE(OP): Performed by: PHYSICAL MEDICINE & REHABILITATION

## 2019-11-16 PROCEDURE — A6213 FOAM DRG >16<=48 SQ IN W/BDR: HCPCS | Performed by: PHYSICAL MEDICINE & REHABILITATION

## 2019-11-16 PROCEDURE — A9270 NON-COVERED ITEM OR SERVICE: HCPCS | Performed by: PHYSICAL MEDICINE & REHABILITATION

## 2019-11-16 PROCEDURE — A9270 NON-COVERED ITEM OR SERVICE: HCPCS | Performed by: HOSPITALIST

## 2019-11-16 RX ADMIN — HYDROXYZINE HYDROCHLORIDE 50 MG: 25 TABLET, FILM COATED ORAL at 20:25

## 2019-11-16 RX ADMIN — HYDROCORTISONE: 1 CREAM TOPICAL at 08:42

## 2019-11-16 RX ADMIN — ACETAMINOPHEN 1000 MG: 500 TABLET, FILM COATED ORAL at 08:40

## 2019-11-16 RX ADMIN — ASPIRIN 81 MG 81 MG: 81 TABLET ORAL at 08:41

## 2019-11-16 RX ADMIN — CALCIUM CARBONATE-CHOLECALCIFEROL TAB 250 MG-125 UNIT 1 TABLET: 250-125 TAB at 08:41

## 2019-11-16 RX ADMIN — CYCLOBENZAPRINE 5 MG: 10 TABLET, FILM COATED ORAL at 14:51

## 2019-11-16 RX ADMIN — ASPIRIN 81 MG 81 MG: 81 TABLET ORAL at 20:21

## 2019-11-16 RX ADMIN — CYCLOBENZAPRINE 5 MG: 10 TABLET, FILM COATED ORAL at 20:21

## 2019-11-16 RX ADMIN — VITAMIN D, TAB 1000IU (100/BT) 2000 UNITS: 25 TAB at 08:40

## 2019-11-16 RX ADMIN — TAMSULOSIN HYDROCHLORIDE 0.4 MG: 0.4 CAPSULE ORAL at 20:22

## 2019-11-16 RX ADMIN — ACETAMINOPHEN 1000 MG: 500 TABLET, FILM COATED ORAL at 14:51

## 2019-11-16 RX ADMIN — GABAPENTIN 100 MG: 100 CAPSULE ORAL at 20:22

## 2019-11-16 RX ADMIN — AMLODIPINE BESYLATE 5 MG: 5 TABLET ORAL at 05:42

## 2019-11-16 RX ADMIN — OMEPRAZOLE 20 MG: 20 CAPSULE, DELAYED RELEASE ORAL at 08:41

## 2019-11-16 RX ADMIN — GABAPENTIN 100 MG: 100 CAPSULE ORAL at 08:40

## 2019-11-16 RX ADMIN — GABAPENTIN 100 MG: 100 CAPSULE ORAL at 14:51

## 2019-11-16 RX ADMIN — CYCLOBENZAPRINE 5 MG: 10 TABLET, FILM COATED ORAL at 08:41

## 2019-11-16 RX ADMIN — DIAZEPAM 5 MG: 5 TABLET ORAL at 20:25

## 2019-11-16 RX ADMIN — ACETAMINOPHEN 1000 MG: 500 TABLET, FILM COATED ORAL at 20:21

## 2019-11-16 ASSESSMENT — ENCOUNTER SYMPTOMS
EYES NEGATIVE: 1
CHILLS: 0
VOMITING: 0
SHORTNESS OF BREATH: 0
COUGH: 0
NAUSEA: 0
FEVER: 0
ABDOMINAL PAIN: 0
PALPITATIONS: 0

## 2019-11-16 NOTE — PROGRESS NOTES
"Hospital Medicine Daily Progress Note      Chief Complaint  HTN, ALEK    Interval Problem Update  Pt concerned that left leg swelling is better but thinks that right leg is worse.  No chest pain or shortness of breath.  Also c/o \"pink urine.\"    Review of Systems  Review of Systems   Constitutional: Negative for chills and fever.   HENT: Negative.    Eyes: Negative.    Respiratory: Negative for cough and shortness of breath.    Cardiovascular: Negative for chest pain and palpitations.   Gastrointestinal: Negative for abdominal pain, nausea and vomiting.   Musculoskeletal: Positive for joint pain.        Wound pain   Skin: Negative for itching and rash.        Physical Exam  Temp:  [36.4 °C (97.6 °F)-36.7 °C (98.1 °F)] 36.6 °C (97.9 °F)  Pulse:  [75-96] 96  Resp:  [16-18] 16  BP: (128-144)/(66-84) 144/74  SpO2:  [95 %-96 %] 95 %    Physical Exam  Vitals signs reviewed.   Constitutional:       General: She is not in acute distress.     Appearance: Normal appearance. She is obese. She is not ill-appearing.   HENT:      Head: Normocephalic and atraumatic.      Right Ear: External ear normal.      Left Ear: External ear normal.      Nose: Nose normal.   Eyes:      General:         Right eye: No discharge.         Left eye: No discharge.      Extraocular Movements: Extraocular movements intact.      Conjunctiva/sclera: Conjunctivae normal.   Neck:      Musculoskeletal: Normal range of motion and neck supple.   Cardiovascular:      Rate and Rhythm: Normal rate and regular rhythm.   Pulmonary:      Effort: No respiratory distress.      Breath sounds: No wheezing.      Comments: Decreased BS  Abdominal:      General: Bowel sounds are normal. There is no distension.      Palpations: Abdomen is soft.      Tenderness: There is no tenderness.      Comments: obese   Musculoskeletal:      Right lower leg: Edema present.      Left lower leg: Edema present.      Comments: Right hip incision dressed   Skin:     General: Skin is warm " "and dry.      Comments: Bilat foot wounds dressed   Neurological:      Mental Status: She is alert.         Fluids    Intake/Output Summary (Last 24 hours) at 11/16/2019 1207  Last data filed at 11/16/2019 1000  Gross per 24 hour   Intake 1320 ml   Output 2800 ml   Net -1480 ml       Laboratory  Recent Labs     11/14/19  0509 11/15/19  0509   WBC 3.2* 4.2*   RBC 2.71* 2.74*   HEMOGLOBIN 8.8* 8.8*   HEMATOCRIT 27.9* 28.5*   .0* 104.0*   MCH 32.5 32.1   MCHC 31.5* 30.9*   RDW 46.1 46.2   PLATELETCT 221 230   MPV 9.6 9.6     Recent Labs     11/14/19  0509 11/15/19  0509   SODIUM 137 140   POTASSIUM 4.6 4.5   CHLORIDE 100 105   CO2 31 29   GLUCOSE 95 103*   BUN 41* 26*   CREATININE 1.20 0.74   CALCIUM 8.2* 8.3*                   Assessment/Plan  Leukopenia  Assessment & Plan  Follow WBC and ANC  Monitor need for G-CSF    Vitamin D insufficiency- (present on admission)  Assessment & Plan  Vit D level 22  On supplementation    Acute kidney injury (HCC)- (present on admission)  Assessment & Plan  Off Celebrex, Lasix, and Losartan  Renal function improved w/ IVF  Will request UA for pt c/o \"pink urine\"    Hypertension- (present on admission)  Assessment & Plan  Discontinued Losartan for ALEK and high trending K+  Started low dose Norvasc, may need to increase dose if BP consistently elevated  Monitor for orthostatic hypotension on Flomax    Anemia- (present on admission)  Assessment & Plan  Has macrocytic indices  Vit B12, Folate, and TSH all normal  Follow H/H    AVN (avascular necrosis of bone) (HCC)- (present on admission)  Assessment & Plan  S/P right SUNSHINE on 11/6/19 per Dr. Mo  Nolvia-incisional blister noted  Continue wound care and pain control  U/S 11/3/19 negative for DVT  Will repeat Venous Duplex given pt complaint of worsening pain and swelling    Bilateral leg edema- (present on admission)  Assessment & Plan  Echo EF 65-70% w/ mod PHTN  Has blistering wounds on both feet  Improving w/ wound care     Full " Code

## 2019-11-16 NOTE — PROGRESS NOTES
0545. Found open skin area to bilateral buttocks, bleeding and redness. Cleansed with NS, pat dry with sterile gauze and covered with Mepilex. Patient denies scratching it. CN notified. For wound consult and wound care protocol order. Am shift nurse notified. Will monitor.

## 2019-11-16 NOTE — PROGRESS NOTES
Hospital Medicine Daily Progress Note      Chief Complaint  HTN, ALEK    Interval Problem Update  Pt content that leg swelling is better.  Wounds examined w/ Wound RN.    Review of Systems  Review of Systems   Constitutional: Negative for chills and fever.   HENT: Negative.    Eyes: Negative.    Respiratory: Negative for cough and shortness of breath.    Cardiovascular: Negative for chest pain and palpitations.   Gastrointestinal: Negative for abdominal pain, nausea and vomiting.   Musculoskeletal: Positive for joint pain.        Wound pain   Skin: Negative for itching and rash.        Physical Exam  Temp:  [36.4 °C (97.6 °F)-36.7 °C (98.1 °F)] 36.6 °C (97.9 °F)  Pulse:  [75-96] 96  Resp:  [16-18] 16  BP: (128-144)/(66-84) 144/74  SpO2:  [95 %-96 %] 95 %    Physical Exam  Vitals signs reviewed.   Constitutional:       General: She is not in acute distress.     Appearance: Normal appearance. She is obese. She is not ill-appearing.   HENT:      Head: Normocephalic and atraumatic.      Right Ear: External ear normal.      Left Ear: External ear normal.      Nose: Nose normal.   Eyes:      General:         Right eye: No discharge.         Left eye: No discharge.      Extraocular Movements: Extraocular movements intact.      Conjunctiva/sclera: Conjunctivae normal.   Neck:      Musculoskeletal: Normal range of motion and neck supple.   Cardiovascular:      Rate and Rhythm: Normal rate and regular rhythm.   Pulmonary:      Effort: No respiratory distress.      Breath sounds: No wheezing.      Comments: Decreased BS  Abdominal:      General: Bowel sounds are normal. There is no distension.      Palpations: Abdomen is soft.      Tenderness: There is no tenderness.      Comments: obese   Musculoskeletal:      Right lower leg: Edema present.      Left lower leg: Edema present.   Skin:     General: Skin is warm and dry.      Comments: Fluid filled blister near incision, shallow wounds on dorsum of foot bilat   Neurological:       Mental Status: She is alert.         Fluids    Intake/Output Summary (Last 24 hours) at 11/16/2019 1159  Last data filed at 11/16/2019 1000  Gross per 24 hour   Intake 1320 ml   Output 2800 ml   Net -1480 ml       Laboratory  Recent Labs     11/14/19  0509 11/15/19  0509   WBC 3.2* 4.2*   RBC 2.71* 2.74*   HEMOGLOBIN 8.8* 8.8*   HEMATOCRIT 27.9* 28.5*   .0* 104.0*   MCH 32.5 32.1   MCHC 31.5* 30.9*   RDW 46.1 46.2   PLATELETCT 221 230   MPV 9.6 9.6     Recent Labs     11/14/19  0509 11/15/19  0509   SODIUM 137 140   POTASSIUM 4.6 4.5   CHLORIDE 100 105   CO2 31 29   GLUCOSE 95 103*   BUN 41* 26*   CREATININE 1.20 0.74   CALCIUM 8.2* 8.3*                   Assessment/Plan  Leukopenia  Assessment & Plan  Follow WBC and ANC  Monitor need for G-CSF    Vitamin D insufficiency- (present on admission)  Assessment & Plan  Vit D level 22  On supplementation    Acute kidney injury (HCC)- (present on admission)  Assessment & Plan  Renal function improved off Celebrex, Lasix, and Losartan  Will discontinue IVF to avoid volume overload    Hypertension- (present on admission)  Assessment & Plan  Discontinued Losartan for ALEK and high trending K+  Started low dose Norvasc  Observe blood pressure trends  Monitor for orthostatic hypotension on Flomax    Anemia- (present on admission)  Assessment & Plan  Has macrocytic indices  Vit B12, Folate, and TSH all normal  Follow H/H    AVN (avascular necrosis of bone) ([facility])- (present on admission)  Assessment & Plan  S/P right SUNSHINE on 11/6/19 per Dr. Mo  Nolvia-incisional blister noted  Continue wound care and pain control  U/S 11/3/19 negative for DVT    Bilateral leg edema- (present on admission)  Assessment & Plan  Echo EF 65-70% w/ mod PHTN  Has blistering wounds on both feet  Improving w/ wound care     Full Code    Reviewed w/ pt, Wound RN, and Dr. Ramirez

## 2019-11-16 NOTE — THERAPY
Physical Therapy   Daily Treatment     Patient Name: Janessa Cain  Age:  84 y.o., Sex:  female  Medical Record #: [id number]  Today's Date: 11/15/2019     Precautions  Precautions: (P) Posterior Hip Precautions, Weight Bearing As Tolerated Right Lower Extremity, Fall Risk  Comments: (P) wound vac R hip, B foot wounds dorsal surface, hannon    Subjective    I have already done a lot of walking today.     Objective     11/15/19 1501   Precautions   Precautions Posterior Hip Precautions;Weight Bearing As Tolerated Right Lower Extremity;Fall Risk   Comments wound vac R hip, B foot wounds dorsal surface, hannon   Pain   Intervention Medication (see MAR)   Pain 0 - 10 Group   Location Hip   Location Orientation Right   Pain Rating Scale (NPRS) 6   Description Aching   Comfort Goal Perform Activity   Therapist Pain Assessment During Activity;Prior to Activity   Cognition    Orientation Level Oriented x 4   Level of Consciousness Alert   Sitting Lower Body Exercises   Long Arc Quad 2 sets of 15   Standing Lower Body Exercises   Hamstring Curl 2 sets of 15;Right    Hip Abduction 2 sets of 15;Right    Marching 2 sets of 15   Heel Rise 2 sets of 15;Bilateral   Toe Rise 2 sets of 15;Bilateral   Mini Squat 1 set of 10;Partial   Step Up 2 sets of 10   Step Down 2 sets of 10   Bed Mobility    Sit to Supine Minimal Assist   Sit to Stand Stand by Assist   Neuro-Muscular Treatments   Neuro-Muscular Treatments Weight Shift Right;Weight Shift Left;Sequencing   Comments with standing ther ex, gait with fww for step-through pattern   Interdisciplinary Plan of Care Collaboration   IDT Collaboration with  Physical Therapist   Patient Position at End of Therapy In Bed;Call Light within Reach;Phone within Reach;Tray Table within Reach   Collaboration Comments CLOF   PT Total Time Spent   PT Individual Total Time Spent (Mins) 60   PT Charge Group   PT Gait Training 2   PT Therapeutic Exercise 2     Walking in gym performed without  O2 to trial weaning from 2L. SPO2 dropped to 88%, but quickly increased to >94% with recovery breathing.     FIM Walking Score:  5 - Standby Prompting/Supervision or Set-up  Walking Description:  Walker, Supervision for safety(SBA, 150 ft x 2 in gym without O2, SPO2 measured 88%-95% with recovery x 1 min, VC for step-through pattern)    FIM Wheelchair Score:  1 - Total Assistance  Wheelchair Description:  Extra time, Requires incidental assist(45 ft in gym, around various obstacles, UE propulsion)      Assessment    Patient very motivated for HEP to assist with edema management and wound healing. Gait endurance limited by fatigue and pain in R hip. Patient able to recite 3/3 hip precautions and perform recovery breathing properly.    Plan    Continue to progress gait training with FWW, transfer training, bed mobility, issue HEP.

## 2019-11-16 NOTE — CARE PLAN
Problem: Safety  Goal: Will remain free from injury  Outcome: PROGRESSING AS EXPECTED  Note:   Call light with in reach. Redirection to use call light for assistance. Non skid socks. Upper siderails up x2 while in bed.      Problem: Pain Management  Goal: Pain level will decrease to patient's comfort goal  Outcome: PROGRESSING AS EXPECTED  Note:   Educate patient of non-pharmacological comfort measures: repositioning, relaxation/breathing technique, cold compress and activities. Complains of BLE spasm pain and anxiety, repositioned self and as needed medications given with good effect. No respiratory distress, on room air. Able to make needs known. Assisted as needed. Will continue to monitor.

## 2019-11-17 ENCOUNTER — APPOINTMENT (OUTPATIENT)
Dept: RADIOLOGY | Facility: MEDICAL CENTER | Age: 84
DRG: 560 | End: 2019-11-17
Attending: HOSPITALIST
Payer: MEDICARE

## 2019-11-17 ENCOUNTER — ANCILLARY PROCEDURE (OUTPATIENT)
Dept: CARDIOLOGY | Facility: REHABILITATION | Age: 84
DRG: 560 | End: 2019-11-17
Attending: HOSPITALIST
Payer: MEDICARE

## 2019-11-17 PROBLEM — S81.809A MULTIPLE OPEN WOUNDS OF LOWER LEG: Status: ACTIVE | Noted: 2019-11-03

## 2019-11-17 PROBLEM — R31.9 HEMATURIA: Status: ACTIVE | Noted: 2019-11-17

## 2019-11-17 PROCEDURE — A9270 NON-COVERED ITEM OR SERVICE: HCPCS | Performed by: HOSPITALIST

## 2019-11-17 PROCEDURE — 97530 THERAPEUTIC ACTIVITIES: CPT

## 2019-11-17 PROCEDURE — 700111 HCHG RX REV CODE 636 W/ 250 OVERRIDE (IP): Performed by: HOSPITALIST

## 2019-11-17 PROCEDURE — 99233 SBSQ HOSP IP/OBS HIGH 50: CPT | Performed by: HOSPITALIST

## 2019-11-17 PROCEDURE — 770010 HCHG ROOM/CARE - REHAB SEMI PRIVAT*

## 2019-11-17 PROCEDURE — 700105 HCHG RX REV CODE 258: Performed by: HOSPITALIST

## 2019-11-17 PROCEDURE — 97110 THERAPEUTIC EXERCISES: CPT

## 2019-11-17 PROCEDURE — 97116 GAIT TRAINING THERAPY: CPT

## 2019-11-17 PROCEDURE — 700102 HCHG RX REV CODE 250 W/ 637 OVERRIDE(OP): Performed by: PHYSICAL MEDICINE & REHABILITATION

## 2019-11-17 PROCEDURE — 87186 SC STD MICRODIL/AGAR DIL: CPT | Mod: 91

## 2019-11-17 PROCEDURE — 87077 CULTURE AEROBIC IDENTIFY: CPT | Mod: 91

## 2019-11-17 PROCEDURE — A9270 NON-COVERED ITEM OR SERVICE: HCPCS | Performed by: PHYSICAL MEDICINE & REHABILITATION

## 2019-11-17 PROCEDURE — 93971 EXTREMITY STUDY: CPT | Mod: RT

## 2019-11-17 PROCEDURE — 700102 HCHG RX REV CODE 250 W/ 637 OVERRIDE(OP): Performed by: HOSPITALIST

## 2019-11-17 PROCEDURE — 93971 EXTREMITY STUDY: CPT | Mod: 26 | Performed by: INTERNAL MEDICINE

## 2019-11-17 PROCEDURE — 97535 SELF CARE MNGMENT TRAINING: CPT

## 2019-11-17 PROCEDURE — 87086 URINE CULTURE/COLONY COUNT: CPT

## 2019-11-17 PROCEDURE — 94760 N-INVAS EAR/PLS OXIMETRY 1: CPT

## 2019-11-17 RX ORDER — CEFAZOLIN SODIUM 1 G/3ML
INJECTION, POWDER, FOR SOLUTION INTRAMUSCULAR; INTRAVENOUS
Status: ACTIVE
Start: 2019-11-17 | End: 2019-11-18

## 2019-11-17 RX ADMIN — GABAPENTIN 100 MG: 100 CAPSULE ORAL at 08:41

## 2019-11-17 RX ADMIN — DIAZEPAM 5 MG: 5 TABLET ORAL at 20:13

## 2019-11-17 RX ADMIN — HYDROXYZINE HYDROCHLORIDE 50 MG: 25 TABLET, FILM COATED ORAL at 20:13

## 2019-11-17 RX ADMIN — CYCLOBENZAPRINE 5 MG: 10 TABLET, FILM COATED ORAL at 08:40

## 2019-11-17 RX ADMIN — CYCLOBENZAPRINE 5 MG: 10 TABLET, FILM COATED ORAL at 15:33

## 2019-11-17 RX ADMIN — AMLODIPINE BESYLATE 5 MG: 5 TABLET ORAL at 05:34

## 2019-11-17 RX ADMIN — CEFAZOLIN 2 G: 10 INJECTION, POWDER, FOR SOLUTION INTRAVENOUS; PARENTERAL at 21:07

## 2019-11-17 RX ADMIN — ACETAMINOPHEN 1000 MG: 500 TABLET, FILM COATED ORAL at 07:27

## 2019-11-17 RX ADMIN — CYCLOBENZAPRINE 5 MG: 10 TABLET, FILM COATED ORAL at 20:14

## 2019-11-17 RX ADMIN — ACETAMINOPHEN 1000 MG: 500 TABLET, FILM COATED ORAL at 20:13

## 2019-11-17 RX ADMIN — ASPIRIN 81 MG 81 MG: 81 TABLET ORAL at 20:14

## 2019-11-17 RX ADMIN — GABAPENTIN 100 MG: 100 CAPSULE ORAL at 15:33

## 2019-11-17 RX ADMIN — TAMSULOSIN HYDROCHLORIDE 0.4 MG: 0.4 CAPSULE ORAL at 20:14

## 2019-11-17 RX ADMIN — HYDROCORTISONE: 1 CREAM TOPICAL at 08:46

## 2019-11-17 RX ADMIN — CALCIUM CARBONATE-CHOLECALCIFEROL TAB 250 MG-125 UNIT 1 TABLET: 250-125 TAB at 08:41

## 2019-11-17 RX ADMIN — OMEPRAZOLE 20 MG: 20 CAPSULE, DELAYED RELEASE ORAL at 08:41

## 2019-11-17 RX ADMIN — VITAMIN D, TAB 1000IU (100/BT) 2000 UNITS: 25 TAB at 08:41

## 2019-11-17 RX ADMIN — ACETAMINOPHEN 1000 MG: 500 TABLET, FILM COATED ORAL at 15:34

## 2019-11-17 RX ADMIN — ASPIRIN 81 MG 81 MG: 81 TABLET ORAL at 08:41

## 2019-11-17 RX ADMIN — GABAPENTIN 100 MG: 100 CAPSULE ORAL at 20:13

## 2019-11-17 RX ADMIN — CEFAZOLIN 2 G: 10 INJECTION, POWDER, FOR SOLUTION INTRAVENOUS; PARENTERAL at 15:59

## 2019-11-17 ASSESSMENT — ENCOUNTER SYMPTOMS
ABDOMINAL PAIN: 0
NAUSEA: 0
FEVER: 0
PALPITATIONS: 0
EYES NEGATIVE: 1
SHORTNESS OF BREATH: 0
COUGH: 0
CHILLS: 0
VOMITING: 0

## 2019-11-17 ASSESSMENT — PATIENT HEALTH QUESTIONNAIRE - PHQ9
1. LITTLE INTEREST OR PLEASURE IN DOING THINGS: NOT AT ALL
2. FEELING DOWN, DEPRESSED, IRRITABLE, OR HOPELESS: NOT AT ALL
SUM OF ALL RESPONSES TO PHQ9 QUESTIONS 1 AND 2: 0

## 2019-11-17 NOTE — THERAPY
"Occupational Therapy  Daily Treatment     Patient Name: Janessa Cain  Age:  84 y.o., Sex:  female  Medical Record #: [id number]  Today's Date: 11/17/2019     Precautions  Precautions: Posterior Hip Precautions, Weight Bearing As Tolerated Right Lower Extremity, Other (See Comments), Fall Risk  Comments: hannon, UTI    Safety   ADL Safety : Modified Independent  Bathroom Safety: Modified Independent    Subjective    \"I have everything else at home, I'll look on [employer] for a leg \"     Objective    Assessed pt to be Mod I - problem solved hannon management when transferring in/out of bed. Determined it was best to leave hannon hooked to FWW beside bed, edu pt re: positioning of bag/tubing to prevent pressure sores/infection. Bed mobility Mod I when exiting out of L side of bed (door side) with AE use (sock aid, reacher, leg )    Discussed edema mgmt strategies at home with firm chair for ease of transfers and a stool to elevate LE's     11/17/19 1331   Interdisciplinary Plan of Care Collaboration   Patient Position at End of Therapy Seated;Call Light within Reach;Tray Table within Reach   OT Total Time Spent   OT Individual Total Time Spent (Mins) 30   OT Charge Group   OT Self Care / ADL 2       Assessment    Pt cleared to be Mod I in room at FWW level using compensatory strategies and AE    Plan    Cont overall strength/endurance and standing balance to improve IADL's and functional mobility  "

## 2019-11-17 NOTE — CARE PLAN
Problem: Safety  Goal: Will remain free from injury  Outcome: PROGRESSING AS EXPECTED   Pt uses call light consistently and appropriately. Waits for assistance does not attempt self transfer this shift. Able to verbalize needs.   Problem: Infection  Goal: Will remain free from infection  Outcome: PROGRESSING AS EXPECTED   Patient in IV ABT therapy, no s/s of any adverse reaction noted. Will continue to monitor.     Problem: Pain Management  Goal: Pain level will decrease to patient's comfort goal  Outcome: PROGRESSING AS EXPECTED   Patient able to verbalize pain level and verbalize an acceptable level of pain.

## 2019-11-17 NOTE — PROGRESS NOTES
Received shift report and assumed care of patient.  Patient awake, calm and stable, currently positioned in bed for comfort and safety; call light within reach.  Denies pain or discomfort at this time. Patient's NG tube flow/patency and residuals checked, head of bed remained elevated at 30 degrees. No s/s of aspiration noted, no cough no SOB. Will continue to monitor.

## 2019-11-17 NOTE — CARE PLAN
Problem: Safety  Goal: Will remain free from injury  Outcome: PROGRESSING AS EXPECTED  Note:   Call light with in reach. Redirection to use call light for assistance.  Upper siderails up x2 while in bed.      Problem: Pain Management  Goal: Pain level will decrease to patient's comfort goal  Outcome: PROGRESSING AS EXPECTED  Note:   Educate patient of non-pharmacological comfort measures: repositioning, relaxation/breathing technique, cold compress and activities. Complains of BLE spasm pain and anxiety, repositioned self in bed and as needed medications given with relief. No respiratory distress. Able to make needs known. Assisted as needed. Will continue to monitor.

## 2019-11-17 NOTE — THERAPY
Physical Therapy   Daily Treatment     Patient Name: Janessa Cain  Age:  84 y.o., Sex:  female  Medical Record #: [id number]  Today's Date: 11/17/2019     Precautions  Precautions: (P) Posterior Hip Precautions, Weight Bearing As Tolerated Right Lower Extremity, Fall Risk, Other (See Comments)  Comments: (P) hannon, UTI    Subjective    Pt received in room in chair. States she had a good discussion with the MD earlier today regarding her swollen LEs. She states they have ordered ultrasounds of her legs and a culture of the wounds on her feet.     Objective       11/17/19 1101   Precautions   Precautions Posterior Hip Precautions;Weight Bearing As Tolerated Right Lower Extremity;Fall Risk;Other (See Comments)   Comments hannon, UTI   Vitals   Pulse 96   Room Air Oximetry 94   Cognition    Level of Consciousness Alert   Bed Mobility    Supine to Sit Modified Independent   Sit to Supine Modified Independent   Sit to Stand Modified Independent  (bed flat, pt self-manges cath bag)   Interdisciplinary Plan of Care Collaboration   Patient Position at End of Therapy Seated;Call Light within Reach;Tray Table within Reach;Phone within Reach;Family / Friend in Room   Collaboration Comments pt's daughter Henry and spouse Almas are present, they have questions regarding pt's mobility, and medical related questions which PT deferred to MD and RN   PT Total Time Spent   PT Individual Total Time Spent (Mins) 45   PT Charge Group   PT Gait Training 1   PT Therapeutic Activities 2       FIM Bed/Chair/Wheelchair Transfers Score: 6 - Modified Independent  Bed/Chair/Wheelchair Transfers Description:  Adaptive equipment, Increased time    FIM Walking Score:  6 - Modified Independent  Walking Description:  (Mod I with FWW, antalgic pattern)    Pt completed supine<>sit with Mod I with use of leg , and reacher to manage hannon cath bag, with increased time. PT cautioned patient on making sure tubing wasn't left under LE due to  risk of pressure injury.    Assessment    Pt now Mod I in room with FWW.    Plan    Progress gait with FWW, endurance, LE strengthening.

## 2019-11-17 NOTE — FLOWSHEET NOTE
11/17/19 0755   Events/Summary/Plan   Events/Summary/Plan 02 spot check.  Pt sstates she has been on RA since yesterday afternoon.  Will monitor until 24 hours    Education   Education Yes - Pt. / Family has been Instructed in use of Respiratory Equipment   Respiratory WDL   Respiratory (WDL) X   Chest Exam   Respiration 18   Pulse 99   Oximetry   #Pulse Oximetry (Single Determination) Yes   Oxygen   Home O2 Use Prior To Admission? No   Pulse Oximetry 96 %  (after deep breaths)   O2 Daily Delivery Respiratory  Room Air with O2 Available

## 2019-11-17 NOTE — THERAPY
"Occupational Therapy  Daily Treatment     Patient Name: Janessa Cain  Age:  84 y.o., Sex:  female  Medical Record #: [id number]  Today's Date: 2019     Precautions  Precautions: Posterior Hip Precautions, Weight Bearing As Tolerated Right Lower Extremity, Fall Risk  Comments: wound vac R hip, B foot wounds dorsal surface, hannon    Safety   ADL Safety : Modified Independent  Bathroom Safety: Modified Independent    Subjective    \"my feet were in pain at 3 am this morning\"     Objective       19 0931   Safety    ADL Safety  Modified Independent   Bathroom Safety Modified Independent   Vitals   Pulse Oximetry 92 %  (following ADL routine)   O2 Delivery None (Room Air)   Interdisciplinary Plan of Care Collaboration   Patient Position at End of Therapy Seated;Call Light within Reach;Tray Table within Reach   OT Total Time Spent   OT Individual Total Time Spent (Mins) 60   OT Charge Group   OT Self Care / ADL 4       FIM Eating Score:  7 - Independent  Eating Description:       FIM Grooming Score:  7 - Independent  Grooming Description:       FIM Bathing Score:  6 - Modified Independent  Bathing Description:       FIM Upper Body Dressin - Independent  Upper Body Dressing Description:       FIM Lower Body Dressing Score:  6 - Modified Independent  Lower Body Dressing Description:  Increased time, Assist device/equipment, Dressing stick(dressing stick used appropriately without assist, pt managed own hannon)    FIM Toileting Body Dressin - Modified Independent  Toileting Description:  Grab bar, Increased time(with FWW)    FIM Toilet Transfer Score:  6 - Modified Independent  Toilet Transfer Description:  Grab bar, Increased time(with FWW, ambulated regular chair<>toilet )    FIM Tub/Shower Transfers Score:  6 - Modified Independent  Tub/Shower Transfers Description:  Grab bar, Increased time(with FWW)      Assessment    Pt completed shower routine at Mod I level overall using FWW for " ambulation and seated in chair facing sink counter for sink bath.  Pt's functional performance was impacted by posterior hip precautions, but demo 100% adherence to precautions and able to demo back AE appropriately without cues, and no increase in pain reported. Pt reports that she had a discussion with MD edward: pain in bilateral feet, increase in swelling, and wounds. Pt prefers to take sink baths from this point on until wounds on bilateral feet improve for infection prevention, alternative strategies were discussed such as using dressings and plastic to cover wounds - pt verbalized understanding but prefers sink baths    Plan    Cont overall strength/endurance and standing balance to improve IADL's and functional mobility

## 2019-11-17 NOTE — THERAPY
"Physical Therapy   Daily Treatment     Patient Name: Janessa Cain  Age:  84 y.o., Sex:  female  Medical Record #: [id number]  Today's Date: 11/17/2019     Precautions  Precautions: Posterior Hip Precautions, Weight Bearing As Tolerated Right Lower Extremity, Other (See Comments), Fall Risk  Comments: hannon, UTI    Subjective    Pt received in room, her spouse Almas is present. Pt agreeable to PT session.     Objective       11/17/19 1401   Precautions   Precautions Posterior Hip Precautions;Weight Bearing As Tolerated Right Lower Extremity;Other (See Comments);Fall Risk   Comments PHILIP hannon   Cognition    Level of Consciousness Alert   Supine Lower Body Exercise   Hip Abduction 1 set of 15;Right   (unable to complete 3 sets d/t STAT US)   Hip Adduction  3 sets of 15;Bilateral  (isometric folded towel squeeze)   Short Arc Quad 3 sets of 15;Right    Other Exercises B quad+glut sets 2x15 reps with 3-5\" hold   Bed Mobility    Supine to Sit Modified Independent   Sit to Supine Modified Independent   Sit to Stand Modified Independent   Scooting Modified Independent   Interdisciplinary Plan of Care Collaboration   IDT Collaboration with  Nursing  (re: STAT US ordered)   Patient Position at End of Therapy In Bed;Call Light within Reach;Tray Table within Reach;Phone within Reach;Other (Comments)   Collaboration Comments transfer of care to ultrasound tech   PT Total Time Spent   PT Individual Total Time Spent (Mins) 60   PT Charge Group   PT Gait Training 1   PT Therapeutic Exercise 2   PT Therapeutic Activities 1       Pt amb room<>gym (X220' each direction) with FWW and Mod I, decreased anuja/antalgic pattern d/t reports of R foot pain.    PT assisted pt back to bed at end of session for ultrasound of R LE.       Assessment    Pt making steady progress towards goals. She is Mod I with FWW for transfers and ambulation.    Plan    Progress stair training, curb training with FWW, progress mat program/LE " strengthening

## 2019-11-17 NOTE — CARE PLAN
Problem: Safety  Goal: Will remain free from injury  Outcome: PROGRESSING AS EXPECTED  Note:   Reinforced safety precautions to pt. Encouraged pt to use call light when assistance is needed, call light is within easy reach, bed is locked and at lowest position.       Problem: Pain Management  Goal: Pain level will decrease to patient's comfort goal  Outcome: PROGRESSING AS EXPECTED  Note:   Patient said her pain is manageable. On schedule tylenol for pain.

## 2019-11-17 NOTE — PROGRESS NOTES
Received shift report and assumed care of patient.  Patient awake, calm and stable, currently positioned in bed for comfort and safety; call light within reach.  Denies pain or discomfort at this time.  Will continue to monitor.

## 2019-11-17 NOTE — PROGRESS NOTES
Hospital Medicine Daily Progress Note      Chief Complaint  HTN, ALEK    Interval Problem Update  Pt c/o pain and swelling from right foot wounds.  UA reviewed.    Review of Systems  Review of Systems   Constitutional: Negative for chills and fever.   HENT: Negative.    Eyes: Negative.    Respiratory: Negative for cough and shortness of breath.    Cardiovascular: Negative for chest pain and palpitations.   Gastrointestinal: Negative for abdominal pain, nausea and vomiting.   Musculoskeletal: Positive for joint pain.        Wound pain   Skin: Negative for itching and rash.        Physical Exam  Temp:  [36.7 °C (98.1 °F)-37 °C (98.6 °F)] 36.7 °C (98.1 °F)  Pulse:  [92-99] 99  Resp:  [16-18] 18  BP: (114-136)/(65-70) 128/65  SpO2:  [92 %-96 %] 92 %    Physical Exam  Vitals signs reviewed.   Constitutional:       General: She is not in acute distress.     Appearance: Normal appearance. She is not ill-appearing.   HENT:      Head: Normocephalic and atraumatic.      Right Ear: External ear normal.      Left Ear: External ear normal.      Nose: Nose normal.   Eyes:      General:         Right eye: No discharge.         Left eye: No discharge.      Extraocular Movements: Extraocular movements intact.      Conjunctiva/sclera: Conjunctivae normal.   Neck:      Musculoskeletal: Normal range of motion and neck supple.   Cardiovascular:      Rate and Rhythm: Normal rate and regular rhythm.   Pulmonary:      Effort: No respiratory distress.      Breath sounds: No wheezing.      Comments: Decreased BS  Abdominal:      General: Bowel sounds are normal. There is no distension.      Palpations: Abdomen is soft.      Tenderness: There is no tenderness.      Comments: obese   Musculoskeletal:      Right lower leg: Edema present.      Left lower leg: Edema present.      Comments: Right hip incision dressed   Skin:     General: Skin is warm and dry.      Comments: Bilat foot wounds dressed   Neurological:      Mental Status: She is alert.          Fluids    Intake/Output Summary (Last 24 hours) at 11/17/2019 1134  Last data filed at 11/17/2019 0845  Gross per 24 hour   Intake 840 ml   Output 2400 ml   Net -1560 ml       Laboratory  Recent Labs     11/15/19  0509   WBC 4.2*   RBC 2.74*   HEMOGLOBIN 8.8*   HEMATOCRIT 28.5*   .0*   MCH 32.1   MCHC 30.9*   RDW 46.2   PLATELETCT 230   MPV 9.6     Recent Labs     11/15/19  0509   SODIUM 140   POTASSIUM 4.5   CHLORIDE 105   CO2 29   GLUCOSE 103*   BUN 26*   CREATININE 0.74   CALCIUM 8.3*                   Assessment/Plan  Hematuria  Assessment & Plan  UA w/ packed WBC, >150 RBC, and many bacteria  Request UCx  Start empiric abx given recent hip hardware    Leukopenia  Assessment & Plan  Follow WBC and ANC  Monitor need for G-CSF    Vitamin D insufficiency- (present on admission)  Assessment & Plan  Vit D level 22  On supplementation    Acute kidney injury (HCC)- (present on admission)  Assessment & Plan  Off Celebrex, Lasix, and Losartan  Renal function improved w/ IVF  Check F/U labs in am    Hypertension- (present on admission)  Assessment & Plan  Discontinued Losartan for ALEK and high trending K+  Continue Norvasc for blood pressure control  Monitor for orthostatic hypotension on Flomax    Anemia- (present on admission)  Assessment & Plan  Has macrocytic indices  Vit B12, Folate, and TSH all normal  Follow H/H    AVN (avascular necrosis of bone) (HCC)- (present on admission)  Assessment & Plan  S/P right SUNSHINE on 11/6/19 per Dr. Mo  Nolvia-incisional blister noted  Continue wound care and pain control  U/S 11/3/19 negative for DVT  Will repeat Venous Duplex given pt complaint of worsening pain and swelling    Multiple open wounds of lower leg- (present on admission)  Assessment & Plan  Echo EF 65-70% w/ mod PHTN  Has blistering wounds on both feet w/ worsening pain and swelling  Will request wound GS+Cx  Start empiric abx (for UTI and concern for possible wound infxn)     Full Code    Discussed in  detail w/ pt and RN

## 2019-11-18 LAB
ANION GAP SERPL CALC-SCNC: 7 MMOL/L (ref 0–11.9)
BUN SERPL-MCNC: 12 MG/DL (ref 8–22)
CALCIUM SERPL-MCNC: 8.6 MG/DL (ref 8.5–10.5)
CHLORIDE SERPL-SCNC: 105 MMOL/L (ref 96–112)
CO2 SERPL-SCNC: 26 MMOL/L (ref 20–33)
CREAT SERPL-MCNC: 0.67 MG/DL (ref 0.5–1.4)
ERYTHROCYTE [DISTWIDTH] IN BLOOD BY AUTOMATED COUNT: 46.7 FL (ref 35.9–50)
GLUCOSE SERPL-MCNC: 91 MG/DL (ref 65–99)
HCT VFR BLD AUTO: 29.5 % (ref 37–47)
HGB BLD-MCNC: 9.3 G/DL (ref 12–16)
MCH RBC QN AUTO: 32.5 PG (ref 27–33)
MCHC RBC AUTO-ENTMCNC: 31.5 G/DL (ref 33.6–35)
MCV RBC AUTO: 103.1 FL (ref 81.4–97.8)
PLATELET # BLD AUTO: 273 K/UL (ref 164–446)
PMV BLD AUTO: 9.3 FL (ref 9–12.9)
POTASSIUM SERPL-SCNC: 3.9 MMOL/L (ref 3.6–5.5)
RBC # BLD AUTO: 2.86 M/UL (ref 4.2–5.4)
SODIUM SERPL-SCNC: 138 MMOL/L (ref 135–145)
WBC # BLD AUTO: 4.9 K/UL (ref 4.8–10.8)

## 2019-11-18 PROCEDURE — 97110 THERAPEUTIC EXERCISES: CPT

## 2019-11-18 PROCEDURE — 700102 HCHG RX REV CODE 250 W/ 637 OVERRIDE(OP): Performed by: HOSPITALIST

## 2019-11-18 PROCEDURE — 97535 SELF CARE MNGMENT TRAINING: CPT

## 2019-11-18 PROCEDURE — 85027 COMPLETE CBC AUTOMATED: CPT

## 2019-11-18 PROCEDURE — 700105 HCHG RX REV CODE 258: Performed by: HOSPITALIST

## 2019-11-18 PROCEDURE — 80048 BASIC METABOLIC PNL TOTAL CA: CPT

## 2019-11-18 PROCEDURE — 700105 HCHG RX REV CODE 258: Performed by: INTERNAL MEDICINE

## 2019-11-18 PROCEDURE — 700111 HCHG RX REV CODE 636 W/ 250 OVERRIDE (IP): Performed by: HOSPITALIST

## 2019-11-18 PROCEDURE — 99233 SBSQ HOSP IP/OBS HIGH 50: CPT | Performed by: PHYSICAL MEDICINE & REHABILITATION

## 2019-11-18 PROCEDURE — 94760 N-INVAS EAR/PLS OXIMETRY 1: CPT

## 2019-11-18 PROCEDURE — 770010 HCHG ROOM/CARE - REHAB SEMI PRIVAT*

## 2019-11-18 PROCEDURE — A9270 NON-COVERED ITEM OR SERVICE: HCPCS | Performed by: HOSPITALIST

## 2019-11-18 PROCEDURE — 36415 COLL VENOUS BLD VENIPUNCTURE: CPT

## 2019-11-18 PROCEDURE — 97116 GAIT TRAINING THERAPY: CPT

## 2019-11-18 PROCEDURE — 700102 HCHG RX REV CODE 250 W/ 637 OVERRIDE(OP): Performed by: PHYSICAL MEDICINE & REHABILITATION

## 2019-11-18 PROCEDURE — A9270 NON-COVERED ITEM OR SERVICE: HCPCS | Performed by: PHYSICAL MEDICINE & REHABILITATION

## 2019-11-18 PROCEDURE — 700111 HCHG RX REV CODE 636 W/ 250 OVERRIDE (IP): Performed by: INTERNAL MEDICINE

## 2019-11-18 PROCEDURE — 99232 SBSQ HOSP IP/OBS MODERATE 35: CPT | Performed by: HOSPITALIST

## 2019-11-18 PROCEDURE — 97530 THERAPEUTIC ACTIVITIES: CPT

## 2019-11-18 RX ORDER — CYCLOBENZAPRINE HCL 10 MG
5 TABLET ORAL 3 TIMES DAILY PRN
Status: DISCONTINUED | OUTPATIENT
Start: 2019-11-18 | End: 2019-11-21 | Stop reason: HOSPADM

## 2019-11-18 RX ADMIN — ACETAMINOPHEN 1000 MG: 500 TABLET, FILM COATED ORAL at 20:00

## 2019-11-18 RX ADMIN — ASPIRIN 81 MG 81 MG: 81 TABLET ORAL at 20:00

## 2019-11-18 RX ADMIN — OMEPRAZOLE 20 MG: 20 CAPSULE, DELAYED RELEASE ORAL at 08:11

## 2019-11-18 RX ADMIN — ASPIRIN 81 MG 81 MG: 81 TABLET ORAL at 08:11

## 2019-11-18 RX ADMIN — DIAZEPAM 5 MG: 5 TABLET ORAL at 20:00

## 2019-11-18 RX ADMIN — GABAPENTIN 100 MG: 100 CAPSULE ORAL at 15:27

## 2019-11-18 RX ADMIN — TAMSULOSIN HYDROCHLORIDE 0.4 MG: 0.4 CAPSULE ORAL at 20:00

## 2019-11-18 RX ADMIN — CYCLOBENZAPRINE 5 MG: 10 TABLET, FILM COATED ORAL at 15:27

## 2019-11-18 RX ADMIN — CYCLOBENZAPRINE 5 MG: 10 TABLET, FILM COATED ORAL at 08:12

## 2019-11-18 RX ADMIN — CEFTRIAXONE SODIUM 2 G: 2 INJECTION, POWDER, FOR SOLUTION INTRAMUSCULAR; INTRAVENOUS at 15:26

## 2019-11-18 RX ADMIN — VITAMIN D, TAB 1000IU (100/BT) 2000 UNITS: 25 TAB at 08:11

## 2019-11-18 RX ADMIN — SENNOSIDES AND DOCUSATE SODIUM 2 TABLET: 8.6; 5 TABLET ORAL at 08:11

## 2019-11-18 RX ADMIN — CEFAZOLIN 2 G: 10 INJECTION, POWDER, FOR SOLUTION INTRAVENOUS; PARENTERAL at 05:34

## 2019-11-18 RX ADMIN — CALCIUM CARBONATE-CHOLECALCIFEROL TAB 250 MG-125 UNIT 1 TABLET: 250-125 TAB at 08:11

## 2019-11-18 RX ADMIN — GABAPENTIN 100 MG: 100 CAPSULE ORAL at 20:00

## 2019-11-18 RX ADMIN — TRAZODONE HYDROCHLORIDE 50 MG: 50 TABLET ORAL at 22:06

## 2019-11-18 RX ADMIN — ACETAMINOPHEN 1000 MG: 500 TABLET, FILM COATED ORAL at 08:11

## 2019-11-18 RX ADMIN — ACETAMINOPHEN 1000 MG: 500 TABLET, FILM COATED ORAL at 15:27

## 2019-11-18 RX ADMIN — GABAPENTIN 100 MG: 100 CAPSULE ORAL at 08:11

## 2019-11-18 RX ADMIN — AMLODIPINE BESYLATE 5 MG: 5 TABLET ORAL at 05:33

## 2019-11-18 ASSESSMENT — ENCOUNTER SYMPTOMS
SHORTNESS OF BREATH: 0
EYES NEGATIVE: 1
ABDOMINAL PAIN: 0
VOMITING: 0
COUGH: 0
CHILLS: 0
PALPITATIONS: 0
FEVER: 0
NAUSEA: 0

## 2019-11-18 ASSESSMENT — PATIENT HEALTH QUESTIONNAIRE - PHQ9
2. FEELING DOWN, DEPRESSED, IRRITABLE, OR HOPELESS: NOT AT ALL
SUM OF ALL RESPONSES TO PHQ9 QUESTIONS 1 AND 2: 0
1. LITTLE INTEREST OR PLEASURE IN DOING THINGS: NOT AT ALL

## 2019-11-18 NOTE — PROGRESS NOTES
Indwelling catheter removed per Doctor Ashley's order, 800 ml of clear yellow urine discarded.   14:00 Patient unable to void, bladder scan done: 520 ml . Patient went to the toilet and tried again, Bladder scan post void: 420 ml. Patient denied any pain or bladder discomfort, requested to have OT now and she will try again. Will continue to monitor.

## 2019-11-18 NOTE — PROGRESS NOTES
Hospital Medicine Daily Progress Note      Chief Complaint  HTN, ALEK    Interval Problem Update  Pt making good progress w/ therapies but still has significant pain and swelling in right foot.    Review of Systems  Review of Systems   Constitutional: Negative for chills and fever.   HENT: Negative.    Eyes: Negative.    Respiratory: Negative for cough and shortness of breath.    Cardiovascular: Negative for chest pain and palpitations.   Gastrointestinal: Negative for abdominal pain, nausea and vomiting.   Musculoskeletal: Positive for joint pain.        Wound pain   Skin: Negative for itching and rash.        Physical Exam  Temp:  [36.4 °C (97.6 °F)-36.8 °C (98.3 °F)] 36.6 °C (97.9 °F)  Pulse:  [87-99] 98  Resp:  [16-18] 18  BP: ()/(64-80) 142/71  SpO2:  [91 %-96 %] 91 %    Physical Exam  Vitals signs reviewed.   Constitutional:       General: She is not in acute distress.     Appearance: Normal appearance. She is not ill-appearing.   HENT:      Head: Normocephalic and atraumatic.      Right Ear: External ear normal.      Left Ear: External ear normal.      Nose: Nose normal.   Eyes:      General:         Right eye: No discharge.         Left eye: No discharge.      Extraocular Movements: Extraocular movements intact.      Conjunctiva/sclera: Conjunctivae normal.   Neck:      Musculoskeletal: Normal range of motion and neck supple.   Cardiovascular:      Rate and Rhythm: Normal rate and regular rhythm.   Pulmonary:      Effort: No respiratory distress.      Breath sounds: No wheezing.      Comments: Decreased BS  Abdominal:      General: Bowel sounds are normal. There is no distension.      Palpations: Abdomen is soft.      Tenderness: There is no tenderness.      Comments: obese   Musculoskeletal:      Right lower leg: Edema present.      Left lower leg: Edema present.      Comments: Right hip incision dressed   Skin:     General: Skin is warm and dry.      Comments: Bilat foot wounds dressed   Neurological:       Mental Status: She is alert.         Fluids    Intake/Output Summary (Last 24 hours) at 11/18/2019 1448  Last data filed at 11/18/2019 0935  Gross per 24 hour   Intake 360 ml   Output 2000 ml   Net -1640 ml       Laboratory  Recent Labs     11/18/19  0520   WBC 4.9   RBC 2.86*   HEMOGLOBIN 9.3*   HEMATOCRIT 29.5*   .1*   MCH 32.5   MCHC 31.5*   RDW 46.7   PLATELETCT 273   MPV 9.3     Recent Labs     11/18/19  0520   SODIUM 138   POTASSIUM 3.9   CHLORIDE 105   CO2 26   GLUCOSE 91   BUN 12   CREATININE 0.67   CALCIUM 8.6                   Assessment/Plan  Hematuria  Assessment & Plan  UA w/ packed WBC, >150 RBC, and many bacteria  UCx >100,000 LFGNR  Continue empiric abx pending final C+S    Leukopenia  Assessment & Plan  Low WBC resolved on abx    Vitamin D insufficiency- (present on admission)  Assessment & Plan  Vit D level 22  On supplementation    Acute kidney injury (HCC)- (present on admission)  Assessment & Plan  Renal function normalized off Celebrex, Lasix, and Losartan and w/ IVF    Hypertension- (present on admission)  Assessment & Plan  Discontinued Losartan for ALEK and high trending K+  Continue Norvasc for blood pressure control  Monitor for orthostatic hypotension on Flomax    Anemia- (present on admission)  Assessment & Plan  Has macrocytic indices  Vit B12, Folate, and TSH all normal  Following H/H    AVN (avascular necrosis of bone) ([facility])- (present on admission)  Assessment & Plan  S/P right SUNSHINE on 11/6/19 per Dr. Mo  Nolvia-incisional blister noted  Continue wound care and pain control  U/S RLE negative for DVT  Anerobic bone culture +single colony of propionibacterium acnes, uncertain clinical significance  ID consulted and Dr. Mora has kindly agreed to see the pt    Multiple open wounds of lower leg- (present on admission)  Assessment & Plan  Echo EF 65-70% w/ mod PHTN  Has blistering wounds on both feet w/ worsening pain and swelling  Wound GS+Cx in progress  Continue empiric abx  (for UTI and concern for possible right foot wound infxn)     Full Code    Reviewed w/ Dr. Mora and Dr. Ramirez

## 2019-11-18 NOTE — CARE PLAN
Problem: Safety  Goal: Will remain free from injury  Outcome: PROGRESSING AS EXPECTED  Note:   Call light with in reach. Redirection to use call light for assistance.  Upper siderails up x2 while in bed.      Problem: Infection  Goal: Will remain free from infection  Outcome: PROGRESSING AS EXPECTED  Note:   Continues IV ABT, no adverse reaction. Complains of RLE spasm pain and anxiety, as needed medication given with good effect. No respiratory distress. Able to make needs known. Assisted as needed. HS snacks given. Will continue to monitor.

## 2019-11-18 NOTE — REHAB-COLLABORATIVE ONGOING IDT TEAM NOTE
Weekly Interdisciplinary Team Conference Note    Weekly Interdisciplinary Team Conference # 1  Date:  11/18/2019    Clinicians present and reporting at team conference include the following:   MD: Justina Ramirez MD   RN:  Marika Mendoza RN    PT:   Yola Mccarthy PT, DPT  OT:  Katrin Hoffman MS, OT/L   ST:  Not Applicable.   CM:  Petra Gibson RN [facility]  REC:  Not Applicable  RT:  Not Applicable  RPh:  Manny Sandoval [facility]  Other:   None  All reporting clinicians have a working knowledge of this patient's plan of care.    Targeted DC Date:  11/20/2019     Medical    Patient Active Problem List    Diagnosis Date Noted   • Degenerative joint disease of right hip 11/03/2019     Priority: High   • Hematuria 11/17/2019   • Acute kidney injury (HCC) 11/13/2019   • Urinary retention 11/13/2019   • Vitamin D insufficiency 11/13/2019   • Leukopenia 11/13/2019   • AVN (avascular necrosis of bone) ([facility]) 11/12/2019   • Status post total replacement of right hip 11/12/2019   • Anemia 11/12/2019   • Impaired mobility and ADLs 11/12/2019   • Hypertension 11/12/2019   • Hypoalbuminemia 11/11/2019   • Radiculopathy 11/04/2019   • Dehydration 11/03/2019   • Multiple open wounds of lower leg 11/03/2019     Results     Procedure Component Value Ref Range Date/Time    URINE CULTURE-EXISTING-LESS THAN 48 HOURS [[id number]]  (Abnormal) Collected:  11/17/19 1115    Order Status:  Completed Lab Status:  Preliminary result Updated:  11/18/19 1119    Specimen:  Urine, Clean Catch      Significant Indicator POS     Source UR     Site URINE, CLEAN CATCH     Culture Result -      Lactose fermenting Gram negative kosta  >100,000 cfu/mL      Narrative:       Collected By:[id number] WILDER ROSAS  None of available indications applicable but checked in order  to proceed w/ order.  Pt w/ gross hematuria.  Indication for culture:->Unexplained new onset of Flank pain  and/or Costovertebral angle tenderness  Collected By:[id number] WILDER ROSAS    ESTIMATED GFR  [[id number]] Collected:  11/18/19 0520    Order Status:  Completed Lab Status:  Final result Updated:  11/18/19 0708     GFR If African American >60 >60 mL/min/1.73 m 2      GFR If Non African American >60 >60 mL/min/1.73 m 2     Basic Metabolic Panel [[id number]] Collected:  11/18/19 0520    Order Status:  Completed Lab Status:  Final result Updated:  11/18/19 0708    Specimen:  Blood      Sodium 138 135 - 145 mmol/L      Potassium 3.9 3.6 - 5.5 mmol/L      Chloride 105 96 - 112 mmol/L      Co2 26 20 - 33 mmol/L      Glucose 91 65 - 99 mg/dL      Bun 12 8 - 22 mg/dL      Creatinine 0.67 0.50 - 1.40 mg/dL      Calcium 8.6 8.5 - 10.5 mg/dL      Anion Gap 7.0 0.0 - 11.9     CBC WITHOUT DIFFERENTIAL [[id number]]  (Abnormal) Collected:  11/18/19 0520    Order Status:  Completed Lab Status:  Final result Updated:  11/18/19 0621    Specimen:  Blood      WBC 4.9 4.8 - 10.8 K/uL      RBC 2.86 4.20 - 5.40 M/uL      Hemoglobin 9.3 12.0 - 16.0 g/dL      Hematocrit 29.5 37.0 - 47.0 %      .1 81.4 - 97.8 fL      MCH 32.5 27.0 - 33.0 pg      MCHC 31.5 33.6 - 35.0 g/dL      RDW 46.7 35.9 - 50.0 fL      Platelet Count 273 164 - 446 K/uL      MPV 9.3 9.0 - 12.9 fL         Nursing  Diet/Nutrition:  Regular and Thin Liquids  Medication Administration:  Whole with Liquid Wash  % consumed at meals in last 24 hours:  Consumed % of meals per documentation.  Meal/Snack Consumption for the past 24 hrs:   Oral Nutrition   11/17/19 1200 Lunch;Between % Consumed   11/17/19 0845 Breakfast;Between % Consumed     Snack schedule:  HS  Supplement:  Other: none  Appetite:  Good  Fluid Intake/Output in past 24 hours: In: 840 [P.O.:840]  Out: 3200   Admit Weight:  Weight: 89.4 kg (197 lb)  Weight Last 7 Days   [88.9 kg (195 lb 15.8 oz)-89.4 kg (197 lb)] 88.9 kg (195 lb 15.8 oz) (11/17 1500)    Pain Issues:    Location:  Leg (11/17 2013)  Right (11/17 2013)         Severity:  neither Moderate nor Severe   Scheduled pain  medications:  gabapentin (NEURONTIN)      PRN pain medications used in last 24 hours:  None   Non Pharmacologic Interventions:  warm blanket, repositioned and rest  Effectiveness of pain management plan:  good=patient states acceptable comfort after interventions    Bowel:    Bowel Assist Initial Score:  5 - Standby Prompting/Supervision or Set-up  Bowel Assist Current Score:  5 - Standby Prompting/Supervision or Set-up  Bowl Accidents in last 7 days:     Last bowel movement: 11/17/19  Stool Description: Medium     Usual bowel pattern:  daily  Scheduled bowel medications:  senna-docusate (PERICOLACE or SENOKOT S)   PRN bowel medications used in last 24 hours:  None  Nursing Interventions:  Increased time, Scheduled medication  Effectiveness of bowel program:   good=regular, predictable, controlled emptying of bowel  Bladder:    Bladder Assist Initial Score:  2 - Max Assistance  Bladder Assist Current Score:  1 - Total Assistance  Bladder Accidents in last 7 days:     PVR range for past 24-48 hours: -- ()  Intermittent Catheterization:  Weathers catheter  Medications affecting bladder:  Flomax      Interventions:  Medication, Emptying of device, Indwelling catheter      Weathers:    Type:     Patient Lines/Drains/Airways Status    Active Catheter     Name: Placement date: Placement time: Site: Days:    Urethral Catheter Straight-tip  11/13/19   1325   4              Date placed:          Justification:    Diabetes:  Blood Sugar Frequency:  None    Range of BS for last 48 hours:       Scheduled diabetic medications:  None  Sliding scale usage in past 24 hours:  No  Total Short acting insulin in the past 24 hours:  None  Total Long acting insulin in the past 24 hours:  None    Wound:         Patient Lines/Drains/Airways Status    Active Current Wounds     Name: Placement date: Placement time: Site: Days:    Wound 11/12/19 Foot full thickness right dorsum foot  11/12/19   1614   --  5    Wound 11/12/19 Foot full thickness  dorsum left foot  11/12/19   1616   --  5    Wound 11/16/19 Other (comment) Buttocks  11/16/19   0545   --  1                   Interventions:  Change dressings as needed, daily, every 48 hours and every 72 hours. Monitor every shift.  Effectiveness of intervention:  wound is improving     Wound Vac Location:  Not applicable  Dressing last changed:  11/16/19  Pump Mode Pressure Setting       Description of drainage:  Serosanguineous    Sleep/wake cycle:   Average hours slept:  Sleeps 4-6 hours without waking  Sleep medication usage:  None    Patient/Family Training/Education:  Bladder Management/Training, Fall Prevention, General Self Care, Pain Management, Safe Transfers, Safety and Wound Care  Discharge Supply Recommendations:  Catheter Supplies, Blood Pressure Monitor and Wound Care Supplies  Strengths: Alert and oriented, Supportive family and Motivated for self care and independence   Barriers:   Generalized weakness and Hypertension       Nursing Problems     Problem: Bowel/Gastric:     Description:     Goal: Normal bowel function is maintained or improved     Description:           Goal: Will not experience complications related to bowel motility     Description:                 Problem: Communication     Description:     Goal: The ability to communicate needs accurately and effectively will improve     Description:                 Problem: Discharge Barriers/Planning     Description:     Goal: Patient's continuum of care needs will be met     Description:                 Problem: Infection     Description:     Goal: Will remain free from infection     Description:                 Problem: Knowledge Deficit     Description:     Goal: Knowledge of disease process/condition, treatment plan, diagnostic tests, and medications will improve     Description:           Goal: Knowledge of the prescribed therapeutic regimen will improve     Description:                 Problem: Pain Management     Description:      Goal: Pain level will decrease to patient's comfort goal     Description:                 Problem: Psychosocial Needs:     Description:     Goal: Level of anxiety will decrease     Description:                 Problem: Respiratory:     Description:     Goal: Respiratory status will improve     Description:                 Problem: Safety     Description:     Goal: Will remain free from injury     Description:           Goal: Will remain free from falls     Description:                 Problem: Skin Integrity     Description:     Goal: Risk for impaired skin integrity will decrease     Description:                 Problem: Venous Thromboembolism (VTW)/Deep Vein Thrombosis (DVT) Prevention:     Description:     Goal: Patient will participate in Venous Thrombosis (VTE)/Deep Vein Thrombosis (DVT)Prevention Measures     Description:                        Long Term Goals:   At discharge patient will be able to function safely at home and in the community with support.    Section completed by:  Dayanna Anguiano R.N.        Respiratory Therapy    Pt has been weaned off of oxygen in the last 36 hours.  She is conscientious in continuing to do her breathing exercises.  There are no respiratory barriers pertaining to this patient.  Section completed by:  Mae Lim, RRT    Mobility  Bed mobility:   Mod I  Bed /Chair/Wheelchair Transfer Initial:  1 - Total Assistance  Bed /Chair/Wheelchair Transfer Current:  6 - Modified Independent   Bed/Chair/Wheelchair Transfer Description:  Adaptive equipment, Increased time  Walk Initial:  1 - Total Assistance  Walk Current:  6 - Modified Independent   Walk Description:  (Mod I with FWW, antalgic pattern)  Wheelchair Initial:  1 - Total Assistance  Wheelchair Current:  1 - Total Assistance   Wheelchair Description:  Extra time, Requires incidental assist(45 ft in gym, around various obstacles, UE propulsion)  Stairs Initial:  0 - Not tested,unsafe activity  Stairs Current: 5 -  Standby Prompting/Supervision or Set-up   Stairs Description: (pt ascended/descended 3x4 std height steps with B HRs, step-to pattern, distant SPV)  Patient/Family Training/Education:  Posterior hip prec, transfers, gait, curbs, stairs.  DME/DC Recommendations:  Outpatient PT, FWW  Strengths:  Able to follow instructions, Adequate strength, Alert and oriented, Effective communication skills, Independent PLOF, Making steady progress towards goals, Manages pain appropriately, Motivated for self care and independence, Pleasant and cooperative, Supportive family and Willingly participates in therapeutic activities  Barriers:   Other: B foot edema/wounds, decreased endurance  # of short term goals set= 3  # of short term goals met=3/3, progress towards LTGs    Physical Therapy Problems     Problem: PT-Long Term Goals     Dates: Start: 11/13/19       Description:     Goal: LTG-By discharge, patient will ambulate     Dates: Start: 11/13/19   Expected End: 11/22/19       Description: 1) Individualized goal:  >500' with FWW and SPV   2) Interventions:  PT Group Therapy, PT Gait Training, PT Self Care/Home Eval, PT Therapeutic Exercises, PT Neuro Re-Ed/Balance, PT Therapeutic Activity, PT Manual Therapy and PT Evaluation                 Goal: LTG-By discharge, patient will transfer one surface to another     Dates: Start: 11/13/19   Expected End: 11/22/19       Description: 1) Individualized goal:  Mod I  2) Interventions: PT Group Therapy, PT Gait Training, PT Self Care/Home Eval, PT Therapeutic Exercises, PT Neuro Re-Ed/Balance, PT Therapeutic Activity, PT Manual Therapy and PT Evaluation                 Goal: LTG-By discharge, patient will perform home exercise program     Dates: Start: 11/13/19   Expected End: 11/22/19       Description: 1) Individualized goal:  Verbalize 3/3 post hip prec and be independent with HEP  2) Interventions:  PT Group Therapy, PT Gait Training, PT Self Care/Home Eval, PT Therapeutic Exercises,  "PT Neuro Re-Ed/Balance, PT Therapeutic Activity, PT Manual Therapy and PT Evaluation                 Goal: LTG-By discharge, patient will transfer in/out of a car     Dates: Start: 11/13/19   Expected End: 11/22/19       Description: 1) Individualized goal:  SPV from FWW level, maintaining post hip prec  2) Interventions:  PT Group Therapy, PT Gait Training, PT Self Care/Home Eval, PT Therapeutic Exercises, PT Neuro Re-Ed/Balance, PT Therapeutic Activity, PT Manual Therapy and PT Evaluation                 Goal: LTG-By discharge, patient will     Dates: Start: 11/13/19   Expected End: 11/22/19       Description: 1) Individualized goal:  Ascend/descend 6\" curb threshold with FWW and SBA to safely enter/exit her daughter's home  2) Interventions:  PT Group Therapy, PT Gait Training, PT Self Care/Home Eval, PT Therapeutic Exercises, PT Neuro Re-Ed/Balance, PT Therapeutic Activity, PT Manual Therapy and PT Evaluation                               Section completed by:  Yola Mccarthy DPT    Activities of Daily Living  Eating Initial:  5 - Standby Prompting/Supervision or Set-up  Eating Current:  7 - Independent   Eating Description:  Increased time  Grooming Initial:  5 - Standby Prompting/Supervision or Set-up  Grooming Current:  7 - Independent   Grooming Description:  (seated EOB to comb hair.)  Bathing Initial:  4 - Minimal Assistance  Bathing Current:  6 - Modified Independent   Bathing Description:  Increased time  Upper Body Dressing Initial:  5 - Standby Prompting/Supervision or Set-up  Upper Body Dressing Current:  7 - Independent   Upper Body Dressing Description:  Increased time  Lower Body Dressing Initial:  2 - Max Assistance  Lower Body Dressing Current:  6 - Modified Independent   Lower Body Dressing Description:  6 - Modified Independent  Toileting Initial:  5 - Standby Prompting/Supervision or Set-up  Toileting Current:  6 - Modified Independent   Toileting Description:  Grab bar, Increased time(with " FWW)  Toilet Transfer Initial:  4 - Minimal Assistance  Toilet Transfer Current:  6 - Modified Independent   Toilet Transfer Description:  6 - Modified Independent  Tub / Shower Transfer Initial:  3 - Moderate Assistance  Tub / Shower Transfer Current:  6 - Modified Independent   Tub / Shower Transfer Description:  Grab bar, Increased time(with FWW)  IADL:  Supervision with FWW  Family Training/Education:  Spouse not present. Family Training not required.  DME/DC Recommendations:  Pt reported that bathroom renovation will be completed at [facility] by discharge - pt will have shower chair (or commode used as shower seat), grab bars in step-in shower    Strengths:  Able to follow instructions, Alert and oriented, Effective communication skills, Good insight into deficits/needs, Independent PLOF, Manages pain appropriately, Motivated for self care and independence, Pleasant and cooperative, Supportive family and Willingly participates in therapeutic activities  Barriers:  Decreased endurance and Other: hannon, bilateral feet pain d/t wounds, posterior hip precautions     # of short term goals set= 3    # of short term goals met= 3/3     Occupational Therapy Goals     Problem: Functional Transfers     Dates: Start: 11/18/19       Description:     Goal: STG-Within one week, patient will transfer to step in shower     Dates: Start: 11/18/19       Description: 1) Individualized Goal: to shower chair with supervision using grab bars as needed  2) Interventions: OT Group Therapy, OT Self Care/ADL, OT Community Reintegration, OT Neuro Re-Ed/Balance, OT Therapeutic Activity and OT Therapeutic Exercise                   Problem: IADL's     Dates: Start: 11/18/19       Description:     Goal: STG-Within one week, patient will prepare a meal     Dates: Start: 11/18/19       Description: 1) Individualized Goal: With supervision using FWW  2) Interventions: OT Group Therapy, OT Self Care/ADL, OT Community Reintegration, OT Neuro  Re-Ed/Balance, OT Therapeutic Activity and OT Therapeutic Exercise             Goal: STG-Within one week, patient will access kitchen area     Dates: Start: 11/18/19       Description: 1) Individualized Goal: With supervision using FWW  2) Interventions: OT Group Therapy, OT Self Care/ADL, OT Community Reintegration, OT Neuro Re-Ed/Balance, OT Therapeutic Activity and OT Therapeutic Exercise                   Problem: OT Long Term Goals     Dates: Start: 11/13/19       Description:     Goal: LTG-By discharge, patient will complete basic self care tasks     Dates: Start: 11/13/19       Description: 1) Individualized Goal:  With Mod I using DME/AE as needed  2) Interventions:  OT Group Therapy, OT Self Care/ADL, OT Community Reintegration, OT Neuro Re-Ed/Balance, OT Therapeutic Activity and OT Therapeutic Exercise           Goal: LTG-By discharge, patient will perform bathroom transfers     Dates: Start: 11/13/19       Description: 1) Individualized Goal:  With Mod I using DME/AE as needed  2) Interventions:  OT Group Therapy, OT Self Care/ADL, OT Community Reintegration, OT Neuro Re-Ed/Balance, OT Therapeutic Activity and OT Therapeutic Exercise                       Section completed by:  Katrin Hoffman MS,OTR/L             REHAB-Pharmacy IDT Team Note by Manny Sandoval RPH at 11/15/2019  3:32 PM  Version 1 of 1    Author:  Manny Sandoval RPH Service:  -- Author Type:  Pharmacist    Filed:  11/15/2019  3:32 PM Date of Service:  11/15/2019  3:32 PM Status:  Signed    :  Manny Sandoval RPH (Pharmacist)         Pharmacy   Pharmacy  Antibiotics/Antifungals/Antivirals:  Medication:      Active Orders (From admission, onward)    None        Route:        NA  Stop Date:  NA  Reason:      NA  Antihypertensives/Cardiac:  Medication:    Orders (72h ago, onward)     Start     Ordered    11/14/19 0600  amLODIPine (NORVASC) tablet 5 mg  Q DAY      11/13/19 1549    11/13/19 0900  losartan (COZAAR) tablet 25 mg   DAILY,   Status:  Discontinued      11/12/19 1159    11/13/19 0600  furosemide (LASIX) tablet 40 mg  Q DAY,   Status:  Discontinued      11/12/19 1159              Patient Vitals for the past 24 hrs:   BP Pulse   11/15/19 1402 136/78 75   11/15/19 0635 151/74 70   11/15/19 0533 123/69 --   11/14/19 1900 122/86 95     Anticoagulation:  Medication: Aspirin    Other key medications: A review of the medication list reveals no issues at this time. Patient is currently on antihypertensive(s). Recommend home blood pressure monitoring/recording if antihypertensive(s) regimen(s) continue.    Section completed by: Manny Sandoval [facility][[email or web address]]     Attribution Key     AW.1 - Manny Sandoval [facility] on 11/15/2019  3:32 PM                  DC Planning  DC destination/dispostion:  To single level home with 1 entry step, with support of her  and her daughter. Patient and her  recently moved to [place], are waiting for their home to be built. She and her  live with her daughter and son-in-law.     DC Needs: Anticipate home health care.   Daughter is working to find patient a PCP.  F/U needed with surgeon - Dr. Mo.  DME for mobility and safety - has a wc and FWW which were her 's.     Barriers to discharge:  Functional deficits.    Strengths: Motivated. Family supportive.    Section completed by:  Petra Gibson R.N.    Summary: Outpatient PT   Equip: has her own walker   Follow up: PCP, surgery, outpatient wound care, possibly urology    Physician Summary  Justina Ramirez MD participated and led team conference discussion.

## 2019-11-18 NOTE — REHAB-RESPIRATORY IDT TEAM NOTE
Respiratory Therapy   Respiratory Therapy    Pt has been weaned off of oxygen in the last 36 hours.  She is conscientious in continuing to do her breathing exercises.  There are no respiratory barriers pertaining to this patient.  Section completed by:  Mae Lim, RRT

## 2019-11-18 NOTE — REHAB-PT IDT TEAM NOTE
Physical Therapy   Mobility  Bed mobility:   Mod I  Bed /Chair/Wheelchair Transfer Initial:  1 - Total Assistance  Bed /Chair/Wheelchair Transfer Current:  6 - Modified Independent   Bed/Chair/Wheelchair Transfer Description:  Adaptive equipment, Increased time  Walk Initial:  1 - Total Assistance  Walk Current:  6 - Modified Independent   Walk Description:  (Mod I with FWW, antalgic pattern)  Wheelchair Initial:  1 - Total Assistance  Wheelchair Current:  1 - Total Assistance   Wheelchair Description:  Extra time, Requires incidental assist(45 ft in gym, around various obstacles, UE propulsion)  Stairs Initial:  0 - Not tested,unsafe activity  Stairs Current: 5 - Standby Prompting/Supervision or Set-up   Stairs Description: (pt ascended/descended 3x4 std height steps with B HRs, step-to pattern, distant SPV)  Patient/Family Training/Education:  Posterior hip prec, transfers, gait, curbs, stairs.  DME/DC Recommendations:  Outpatient PT, FWW  Strengths:  Able to follow instructions, Adequate strength, Alert and oriented, Effective communication skills, Independent PLOF, Making steady progress towards goals, Manages pain appropriately, Motivated for self care and independence, Pleasant and cooperative, Supportive family and Willingly participates in therapeutic activities  Barriers:   Other: B foot edema/wounds, decreased endurance  # of short term goals set= 3  # of short term goals met=3/3, progress towards LTGs    Physical Therapy Problems     Problem: PT-Long Term Goals     Dates: Start: 11/13/19       Description:     Goal: LTG-By discharge, patient will ambulate     Dates: Start: 11/13/19   Expected End: 11/22/19       Description: 1) Individualized goal:  >500' with FWW and SPV   2) Interventions:  PT Group Therapy, PT Gait Training, PT Self Care/Home Eval, PT Therapeutic Exercises, PT Neuro Re-Ed/Balance, PT Therapeutic Activity, PT Manual Therapy and PT Evaluation                 Goal: LTG-By discharge,  "patient will transfer one surface to another     Dates: Start: 11/13/19   Expected End: 11/22/19       Description: 1) Individualized goal:  Mod I  2) Interventions: PT Group Therapy, PT Gait Training, PT Self Care/Home Eval, PT Therapeutic Exercises, PT Neuro Re-Ed/Balance, PT Therapeutic Activity, PT Manual Therapy and PT Evaluation                 Goal: LTG-By discharge, patient will perform home exercise program     Dates: Start: 11/13/19   Expected End: 11/22/19       Description: 1) Individualized goal:  Verbalize 3/3 post hip prec and be independent with HEP  2) Interventions:  PT Group Therapy, PT Gait Training, PT Self Care/Home Eval, PT Therapeutic Exercises, PT Neuro Re-Ed/Balance, PT Therapeutic Activity, PT Manual Therapy and PT Evaluation                 Goal: LTG-By discharge, patient will transfer in/out of a car     Dates: Start: 11/13/19   Expected End: 11/22/19       Description: 1) Individualized goal:  SPV from FWW level, maintaining post hip prec  2) Interventions:  PT Group Therapy, PT Gait Training, PT Self Care/Home Eval, PT Therapeutic Exercises, PT Neuro Re-Ed/Balance, PT Therapeutic Activity, PT Manual Therapy and PT Evaluation                 Goal: LTG-By discharge, patient will     Dates: Start: 11/13/19   Expected End: 11/22/19       Description: 1) Individualized goal:  Ascend/descend 6\" curb threshold with FWW and SBA to safely enter/exit her daughter's home  2) Interventions:  PT Group Therapy, PT Gait Training, PT Self Care/Home Eval, PT Therapeutic Exercises, PT Neuro Re-Ed/Balance, PT Therapeutic Activity, PT Manual Therapy and PT Evaluation                               Section completed by:  Yola Mccarthy DPT     "

## 2019-11-18 NOTE — PROGRESS NOTES
"Rehab Progress Note     Date of Service: 11/18/2019  Chief Complaint: Follow-up total hip arthroplasty    Interval Events (Subjective)    Patient seen and examined in the therapy gym today. She didn't sleep well last night as her roommates alarms went off multiple times. Roommate is being moved.    Patient started on antibiotics per Dr. Mooney for +UTI as well as concerns about her right dorsal blister wound on her foot. Patient states she refused a shower today due to concerns about the bacteria in the bathroom.     Weathers removed this morning for a voiding trial. Patient moving her bowels.    Objective:  VITAL SIGNS: /72   Pulse 91   Temp 36.4 °C (97.6 °F) (Temporal)   Resp 18   Ht 1.651 m (5' 5\")   Wt 88.9 kg (195 lb 15.8 oz)   SpO2 94%   BMI 32.61 kg/m²   Gen: alert, no apparent distress  CV: regular rate and rhythm, no murmurs, right LE > left LE peripheral edema  Resp: clear to ascultation bilaterally, normal respiratory effort  GI: soft, non-tender abdomen, bowel sounds present      Recent Results (from the past 72 hour(s))   URINALYSIS    Collection Time: 11/16/19  1:30 PM   Result Value Ref Range    Color Yvonne     Character Cloudy (A)     Specific Gravity 1.015 <1.035    Ph 7.5 5.0 - 8.0    Glucose Negative Negative mg/dL    Ketones Negative Negative mg/dL    Protein 100 (A) Negative mg/dL    Bilirubin Negative Negative    Urobilinogen, Urine 1.0 Negative    Nitrite Positive (A) Negative    Leukocyte Esterase Large (A) Negative    Occult Blood Large (A) Negative    Micro Urine Req Microscopic    URINE MICROSCOPIC (W/UA)    Collection Time: 11/16/19  1:30 PM   Result Value Ref Range    WBC Packed (A) /hpf    RBC >150 (A) /hpf    Bacteria Many (A) None /hpf    Epithelial Cells Negative /hpf   CBC WITHOUT DIFFERENTIAL    Collection Time: 11/18/19  5:20 AM   Result Value Ref Range    WBC 4.9 4.8 - 10.8 K/uL    RBC 2.86 (L) 4.20 - 5.40 M/uL    Hemoglobin 9.3 (L) 12.0 - 16.0 g/dL    Hematocrit 29.5 (L) " 37.0 - 47.0 %    .1 (H) 81.4 - 97.8 fL    MCH 32.5 27.0 - 33.0 pg    MCHC 31.5 (L) 33.6 - 35.0 g/dL    RDW 46.7 35.9 - 50.0 fL    Platelet Count 273 164 - 446 K/uL    MPV 9.3 9.0 - 12.9 fL   Basic Metabolic Panel    Collection Time: 11/18/19  5:20 AM   Result Value Ref Range    Sodium 138 135 - 145 mmol/L    Potassium 3.9 3.6 - 5.5 mmol/L    Chloride 105 96 - 112 mmol/L    Co2 26 20 - 33 mmol/L    Glucose 91 65 - 99 mg/dL    Bun 12 8 - 22 mg/dL    Creatinine 0.67 0.50 - 1.40 mg/dL    Calcium 8.6 8.5 - 10.5 mg/dL    Anion Gap 7.0 0.0 - 11.9   ESTIMATED GFR    Collection Time: 11/18/19  5:20 AM   Result Value Ref Range    GFR If African American >60 >60 mL/min/1.73 m 2    GFR If Non African American >60 >60 mL/min/1.73 m 2       Current Facility-Administered Medications   Medication Frequency   • ceFAZolin (ANCEF) 2 g in  mL IVPB Q8HRS   • vitamin D (cholecalciferol) tablet 2,000 Units DAILY   • tamsulosin (FLOMAX) capsule 0.4 mg QHS   • amLODIPine (NORVASC) tablet 5 mg Q DAY   • Respiratory Care per Protocol Continuous RT   • Pharmacy Consult Request ...Pain Management Review 1 Each PHARMACY TO DOSE   • acetaminophen (TYLENOL) tablet 650 mg Q4HRS PRN   • artificial tears ophthalmic solution 1 Drop PRN   • benzocaine-menthol (CEPACOL) lozenge 1 Lozenge Q2HRS PRN   • mag hydrox-al hydrox-simeth (MAALOX PLUS ES or MYLANTA DS) suspension 20 mL Q2HRS PRN   • ondansetron (ZOFRAN ODT) dispertab 4 mg 4X/DAY PRN    Or   • ondansetron (ZOFRAN) syringe/vial injection 4 mg 4X/DAY PRN   • traZODone (DESYREL) tablet 50 mg QHS PRN   • sodium chloride (OCEAN) 0.65 % nasal spray 2 Spray PRN   • hydrOXYzine HCl (ATARAX) tablet 50 mg Q6HRS PRN   • melatonin tablet 3 mg HS PRN   • lactulose 20 GM/30ML solution 30 mL QDAY PRN   • fleet enema 133 mL QDAY PRN   • senna-docusate (PERICOLACE or SENOKOT S) 8.6-50 MG per tablet 2 Tab BID    And   • polyethylene glycol/lytes (MIRALAX) PACKET 1 Packet QDAY PRN    And   • magnesium  hydroxide (MILK OF MAGNESIA) suspension 30 mL QDAY PRN    And   • bisacodyl (DULCOLAX) suppository 10 mg QDAY PRN   • aspirin (ASA) chewable tab 81 mg BID   • diazePAM (VALIUM) tablet 5 mg TID PRN   • gabapentin (NEURONTIN) capsule 100 mg TID   • omeprazole (PRILOSEC) capsule 20 mg DAILY   • oyster shell calcium/vitamin D 250-125 MG-UNIT tablet 1 Tab DAILY   • cyclobenzaprine (FLEXERIL) tablet 5 mg TID   • acetaminophen (TYLENOL) tablet 1,000 mg TID   • hydrocortisone 1 % cream BID       Orders Placed This Encounter   Procedures   • Diet Order Regular     Standing Status:   Standing     Number of Occurrences:   1     Order Specific Question:   Diet:     Answer:   Regular [1]       Assessment:  Active Hospital Problems    Diagnosis   • *Status post total replacement of right hip   • Degenerative joint disease of right hip   • Acute kidney injury (HCC)   • Urinary retention   • Vitamin D insufficiency   • AVN (avascular necrosis of bone) (HCC)   • Anemia   • Impaired mobility and ADLs   • Hypertension   • Hypoalbuminemia   • Radiculopathy   • Bilateral leg edema     84 yr old female admitted to acute rehab on 11/12 with AVN of her right femoral head, s/p SUNSHINE on 11/6.    I led and attended the weekly conference today, and agree with the IDT conference documentation and plan of care as noted below.    Date of conference: 11/18/2019    Goals and barriers: See IDT note.    Biggest barriers: urinary retention, right foot pain, wound cultures    Currently mod indep in room with walker    Therapy update 11/18/2019  Independent eating  Independent grooming  Modified Independent bathing  Independent upper body dressing  Modified Independent lower body dressing  Modified Independent toileting  Total assistbladder  Supervision bowel  Modified Independent bed/chair transfer  Modified Independent toilet transfer  Modified Independent tub/shower transfer  Modified Independent ambulation  Total assist wheelchair propulsion  Min  assist stairs  Supervision comprehension  Modified Independent expression  Independent social interaction  Supervision problem solving  Supervision memory    Anticipated DC date: 11/20/2019, pending wound cultures    Outpatient PT    Equip: has her own walker    Follow up: PCP, surgery, outpatient wound care, possibly urology    Medical Decision Making and Plan:    Right AVN of femur  S/p SUNSHINE Dr. Bocanegra 11/6  PT/OT, 1.5 hr each discipline, 5 days per week  Dressing removed POD 10  Sutures out POD 14 - tomorrow  Outpatient follow up    Pain management  Scheduled tylenol  D/c Celebrex due to ALEK, see below  Scheduled gabapentin  Change Flexeril to PRN  Currently well controlled    Bowel  Meds as needed  Last BM 11/17    Bladder  Acute urinary retention  Remove Weathers today for trial  Started Flomax 11/12  May need outpatient urology follow up, patient reports pre-morbid urinary frequency    DVT prophylaxis  Per surgery ASA 81 mg BID    Appreciate assistance of hospitalist with her medical co-morbidities:    Acute kidney injury, resolved with IVF  Hypertension  Peripheral edema, improved  Pulmonary hypertension  Respiratory insufficiency, resolved  Rash, back  Leukopenia, resolved  Anemia, improved  Hypoalbuminemia  Vit D insufficiency, on supplementation  Wounds/blisters, wound care consult  Hematuria/UTI, on antibiotics per hospitalist - waiting for wound cultures, then will change to oral      Total time:  40 minutes.  I spent greater than 50% of the time for patient care, counseling, and coordination on this date, including patient face-to face time, unit/floor time with review of records/pertinent lab data and studies, as well as discussing diagnostic evaluation/work up, planned therapeutic interventions, and future disposition of care, as per the interval events/subjective and the assessment and plan as noted above.      Justina Ramirez M.D.   Physical Medicine and Rehabilitation

## 2019-11-18 NOTE — FLOWSHEET NOTE
11/18/19 1048   Events/Summary/Plan   Events/Summary/Plan 02 spot check.  Pt has been on RA for about 36 hours; 02 DC'd per protocol   Education   Education Yes - Pt. / Family has been Instructed in use of Respiratory Equipment   Respiratory WDL   Respiratory (WDL) X   Chest Exam   Respiration 18   Pulse 92   Oximetry   #Pulse Oximetry (Single Determination) Yes   Oxygen   Home O2 Use Prior To Admission? No   Pulse Oximetry 96 %   O2 Daily Delivery Respiratory  Room Air with O2 Available

## 2019-11-18 NOTE — CARE PLAN
Problem: Mobility  Goal: STG-Within one week, patient will ambulate household distance  Description  1) Individualized goal:  >100' with FWW and SBA  2) Interventions:  PT Group Therapy, PT Gait Training, PT Self Care/Home Eval, PT Therapeutic Exercises, PT Neuro Re-Ed/Balance, PT Therapeutic Activity, PT Manual Therapy and PT Evaluation     Outcome: MET  Goal: STG-Within one week, patient will ambulate up/down a curb  Description  1) Individualized goal:  With FWW and CGA  2) Interventions:  PT Group Therapy, PT Gait Training, PT Self Care/Home Eval, PT Therapeutic Exercises, PT Neuro Re-Ed/Balance, PT Therapeutic Activity, PT Manual Therapy and PT Evaluation         Outcome: MET     Problem: Mobility Transfers  Goal: STG-Within one week, patient will move supine to sit  Description  1) Individualized goal:  SBA on standard bed, with leg  as needed  2) Interventions:  PT Group Therapy, PT Gait Training, PT Self Care/Home Eval, PT Therapeutic Exercises, PT Neuro Re-Ed/Balance, PT Therapeutic Activity, PT Manual Therapy and PT Evaluation         Outcome: MET

## 2019-11-18 NOTE — THERAPY
"Physical Therapy   Daily Treatment     Patient Name: Janessa Cain  Age:  84 y.o., Sex:  female  Medical Record #: [id number]  Today's Date: 11/18/2019     Precautions  Precautions: (P) Posterior Hip Precautions, Weight Bearing As Tolerated Right Lower Extremity  Comments: hannon, UTI    Subjective    Pt received in bed, states \"I'm trying to catch up on sleep. My roommate's bed alarm went off 11 times last night.\"     Objective       11/18/19 1031   Precautions   Precautions Posterior Hip Precautions;Weight Bearing As Tolerated Right Lower Extremity   Cognition    Level of Consciousness Alert   Supine Lower Body Exercise   Hip Abduction Hook Lying;3 sets of 10;Right    Heel Slide 3 sets of 10;Right   Other Exercises slide board used under R foot to reduce friction   Bed Mobility    Supine to Sit Modified Independent   Sit to Supine Modified Independent   Sit to Stand Modified Independent   Scooting Modified Independent   Rolling Modified Independent   Interdisciplinary Plan of Care Collaboration   IDT Collaboration with  Nursing   Patient Position at End of Therapy Seated;Call Light within Reach;Tray Table within Reach;Phone within Reach   Collaboration Comments re:pt unable to get adequate sleep last night d/t roommate's bed alarm continually going off   PT Total Time Spent   PT Individual Total Time Spent (Mins) 60   PT Charge Group   PT Gait Training 2   PT Therapeutic Exercise 1   PT Therapeutic Activities 1       FIM Bed/Chair/Wheelchair Transfers Score: 6 - Modified Independent  Bed/Chair/Wheelchair Transfers Description:       FIM Walking Score:  6 - Modified Independent  Walking Description:  (Mod I with FWW, antalgic pattern)    FIM Wheelchair Score:  1 - Total Assistance  Wheelchair Description:       FIM Stairs Score:  5 - Standby Prompting/Supervision or Set-up  Stairs Description:  (pt ascended/descended 3x4 std height steps with B HRs, step-to pattern, distant SPV)    Pt performed outside " "ambulation with FWW and Mod I.  PT demonstrated curb negotiation with FWW. Pt ascended/descended outside 6\" curb with FWW and SPV.    Assessment    Pt limited by reports of B foot pain. She is at a Mod I or SPV level with FWW for functional mobility including curbs, stairs, ambulation, transfers.    Plan    Car tx practice from FWW level, review HEP, initiate standing there ex, endurance    "

## 2019-11-18 NOTE — REHAB-OT IDT TEAM NOTE
Occupational Therapy   Activities of Daily Living  Eating Initial:  5 - Standby Prompting/Supervision or Set-up  Eating Current:  7 - Independent   Eating Description:  Increased time  Grooming Initial:  5 - Standby Prompting/Supervision or Set-up  Grooming Current:  7 - Independent   Grooming Description:  (seated EOB to comb hair.)  Bathing Initial:  4 - Minimal Assistance  Bathing Current:  6 - Modified Independent   Bathing Description:  Increased time  Upper Body Dressing Initial:  5 - Standby Prompting/Supervision or Set-up  Upper Body Dressing Current:  7 - Independent   Upper Body Dressing Description:  Increased time  Lower Body Dressing Initial:  2 - Max Assistance  Lower Body Dressing Current:  6 - Modified Independent   Lower Body Dressing Description:  6 - Modified Independent  Toileting Initial:  5 - Standby Prompting/Supervision or Set-up  Toileting Current:  6 - Modified Independent   Toileting Description:  Grab bar, Increased time(with FWW)  Toilet Transfer Initial:  4 - Minimal Assistance  Toilet Transfer Current:  6 - Modified Independent   Toilet Transfer Description:  6 - Modified Independent  Tub / Shower Transfer Initial:  3 - Moderate Assistance  Tub / Shower Transfer Current:  6 - Modified Independent   Tub / Shower Transfer Description:  Grab bar, Increased time(with FWW)  IADL:  Supervision with FWW  Family Training/Education:  Spouse not present. Family Training not required.  DME/DC Recommendations:  Pt reported that bathroom renovation will be completed at [facility] by discharge - pt will have shower chair (or commode used as shower seat), grab bars in step-in shower    Strengths:  Able to follow instructions, Alert and oriented, Effective communication skills, Good insight into deficits/needs, Independent PLOF, Manages pain appropriately, Motivated for self care and independence, Pleasant and cooperative, Supportive family and Willingly participates in therapeutic  activities  Barriers:  Decreased endurance and Other: hannon, bilateral feet pain d/t wounds, posterior hip precautions     # of short term goals set= 3    # of short term goals met= 3/3     Occupational Therapy Goals     Problem: Functional Transfers     Dates: Start: 11/18/19       Description:     Goal: STG-Within one week, patient will transfer to step in shower     Dates: Start: 11/18/19       Description: 1) Individualized Goal: to shower chair with supervision using grab bars as needed  2) Interventions: OT Group Therapy, OT Self Care/ADL, OT Community Reintegration, OT Neuro Re-Ed/Balance, OT Therapeutic Activity and OT Therapeutic Exercise                   Problem: IADL's     Dates: Start: 11/18/19       Description:     Goal: STG-Within one week, patient will prepare a meal     Dates: Start: 11/18/19       Description: 1) Individualized Goal: With supervision using FWW  2) Interventions: OT Group Therapy, OT Self Care/ADL, OT Community Reintegration, OT Neuro Re-Ed/Balance, OT Therapeutic Activity and OT Therapeutic Exercise             Goal: STG-Within one week, patient will access kitchen area     Dates: Start: 11/18/19       Description: 1) Individualized Goal: With supervision using FWW  2) Interventions: OT Group Therapy, OT Self Care/ADL, OT Community Reintegration, OT Neuro Re-Ed/Balance, OT Therapeutic Activity and OT Therapeutic Exercise                   Problem: OT Long Term Goals     Dates: Start: 11/13/19       Description:     Goal: LTG-By discharge, patient will complete basic self care tasks     Dates: Start: 11/13/19       Description: 1) Individualized Goal:  With Mod I using DME/AE as needed  2) Interventions:  OT Group Therapy, OT Self Care/ADL, OT Community Reintegration, OT Neuro Re-Ed/Balance, OT Therapeutic Activity and OT Therapeutic Exercise           Goal: LTG-By discharge, patient will perform bathroom transfers     Dates: Start: 11/13/19       Description: 1) Individualized  Goal:  With Mod I using DME/AE as needed  2) Interventions:  OT Group Therapy, OT Self Care/ADL, OT Community Reintegration, OT Neuro Re-Ed/Balance, OT Therapeutic Activity and OT Therapeutic Exercise                       Section completed by:  Katrin Hoffman MS,OTR/L

## 2019-11-18 NOTE — THERAPY
"Occupational Therapy  Daily Treatment     Patient Name: Janessa Cain  Age:  84 y.o., Sex:  female  Medical Record #: [id number]  Today's Date: 2019     Precautions  Precautions: Posterior Hip Precautions, Weight Bearing As Tolerated Right Lower Extremity, Other (See Comments), Fall Risk  Comments: hannon, UTI    Safety   ADL Safety : Modified Independent  Bathroom Safety: Modified Independent    Subjective    \"I am starting to figure this hannon out\"     Objective    Problem solved hannon management when transferring in/out of bed. Pt able to demo back at Mod I level using reacher, leg .      Pt able to retrieve clothing in closet at [facility] - Mod I, no LOB     19 0901   Interdisciplinary Plan of Care Collaboration   Patient Position at End of Therapy Seated;Call Light within Reach;Tray Table within Reach   OT Total Time Spent   OT Individual Total Time Spent (Mins) 30   OT Charge Group   OT Self Care / ADL 2          FIM Grooming Score:  7 - Independent  Grooming Description:  (at sink standing)    FIM Bathing Score:  6 - Modified Independent  Bathing Description:       FIM Upper Body Dressin - Independent  Upper Body Dressing Description:       FIM Lower Body Dressing Score:  6 - Modified Independent  Lower Body Dressing Description:  Increased time, Dressing stick, Reacher, Sock aid      Assessment    Pt completed dressing, bed mobility, functional ambulation at [facility] at Mod I level    Plan    Cont overall strength/endurance and standing balance to improve IADL's and functional mobility    "

## 2019-11-18 NOTE — THERAPY
Physical Therapy   Daily Treatment     Patient Name: Janessa Cain  Age:  84 y.o., Sex:  female  Medical Record #: [id number]  Today's Date: 11/18/2019     Precautions  Precautions: Posterior Hip Precautions, Weight Bearing As Tolerated Right Lower Extremity  Comments: hannon, UTI    Subjective    Pt received in room in bed, agreeable to PT session.     Objective       11/18/19 1301   Precautions   Precautions Posterior Hip Precautions;Weight Bearing As Tolerated Right Lower Extremity   Pain 0 - 10 Group   Therapist Pain Assessment Prior to Activity;During Activity;Nurse Notified;2;4  (R foot pain)   Cognition    Level of Consciousness Alert   Sitting Lower Body Exercises   Nustep Resistance Level 3  (LE propulsion x10 min, 411 steps)   Bed Mobility    Supine to Sit Modified Independent   Sit to Stand Modified Independent   Interdisciplinary Plan of Care Collaboration   Patient Position at End of Therapy Seated;Other (Comments)   Collaboration Comments pt is Mod I in room c FFWW   PT Total Time Spent   PT Individual Total Time Spent (Mins) 30   PT Charge Group   PT Therapeutic Exercise 1   PT Therapeutic Activities 1     Pt amb gym->room with FWW and Mod I, reciprocal pattern, mildly reduced stance time R LE.     Assessment    Pt fully cooperative with session.    Plan    Progress endurance, gait with FWW, standing balance. Tentative DC 11/20 to daughter's home

## 2019-11-18 NOTE — THERAPY
Occupational Therapy  Daily Treatment     Patient Name: Janessa Cain  Age:  84 y.o., Sex:  female  Medical Record #: [id number]  Today's Date: 11/18/2019     Precautions  Precautions: Posterior Hip Precautions, Weight Bearing As Tolerated Right Lower Extremity  Comments: hannon, UTI    Safety   ADL Safety : Modified Independent  Bathroom Safety: Modified Independent    Subjective    Pt supine in bed upon arrival, receiving bladder scan from RN. Pt agreeable to UB therex     Objective    Room<>gym [facility] ambulation Mod I. Toilet transfer and toileting at [facility] - Mod I     11/18/19 1401   Sitting Upper Body Exercises   Chest Press 3 sets of 10;Bilateral;Weight (See Comments for lbs)  (4# free weight)   Shoulder Press 3 sets of 10;Bilateral;Weight (See Comments for lbs)  (4# free weight)   Shoulder Extension 3 sets of 10;Bilateral;Weight (See Comments for lbs)  (4# free weight)   Internal Shoulder Rotation 3 sets of 10;Bilateral;Weight (See Comments for lbs)  (4# free weight)   External Shoulder Rotation 3 sets of 10;Bilateral;Weight (See Comments for lbs)  (4# free weight)   Scapular Depression 3 sets of 10;Bilateral;Weight (See Comments for lbs)  (4# free weight)   Bilateral Row 3 sets of 10;Bilateral;Weight (See Comments for lbs)  (4# free weight)   Bicep Curls 3 sets of 10;Bilateral;Weight (See Comments for lbs)  (4# free weight)   Interdisciplinary Plan of Care Collaboration   Patient Position at End of Therapy Seated;Call Light within Reach;Tray Table within Reach   OT Total Time Spent   OT Individual Total Time Spent (Mins) 60   OT Charge Group   OT Therapeutic Exercise  4       Assessment    Pt completed UB therex to the best of their abilities with no complaints of pain    Plan    Cont overall strength/endurance and standing balance to improve IADL's and functional mobility

## 2019-11-18 NOTE — WOUND TEAM
"[facility]  Inpatient Services  Wound and Skin Care Progress Note    HPI, PMH, SH: Reviewed    WOUND TEAM FOLLOW UP: Newly identified wound  Unit where seen by Wound Team: 5-1      SUBJECTIVE: \"I Can stand for you and then I want to sit in the chair.\"    Self Report / Pain Level: 4/10 at buttocks wounds    WOUND TYPE, LOCATION, CHARACTERISTICS:    Wound 11/16/19 Partial Thickness Wound Buttocks medial, from moisture and friction (Active)   Wound Image      Site Assessment Red;Pink    Nolvia-wound Assessment Intact    Margins Undefined edges;Attached edges    Wound Length (cm) 4 cm    Wound Width (cm) 4.5 cm    Wound Depth (cm) 0.2 cm    Wound Surface Area (cm^2) 18 cm^2    Tunneling 0 cm    Undermining 0 cm    Closure None    Drainage Amount Small    Drainage Description Yellow;Other (Comment);Serous    Non-staged Wound Description Partial thickness    Treatments Cleansed;Site care    Cleansing Normal Saline Irrigation    Periwound Protectant Not Applicable    Dressing Options Hydrofera Blue Ready;Mepilex    Dressing Cleansing/Solutions Not Applicable    Dressing Changed New    Dressing Status Clean;Dry;Intact    Dressing Change Frequency Every 72 hrs    NEXT Dressing Change  11/21/19    NEXT Weekly Photo (Inpatient Only) 11/23/19    WOUND NURSE ONLY - Odor Mild    WOUND NURSE ONLY - Exposed Structures None    WOUND NURSE ONLY - Tissue Type and Percentage 100% red/pink        INTERVENTIONS BY WOUND TEAM: Performed hand hygiene. Donned gloves. Patient stood up using her FWW. Dressing removed from buttock wounds. Changed gloves. Cleaned wounds with saline and gauze, cleaned inside the gluteal cleft then dried it well. Applied Aquacel Ag to wounds, but decided to change that to hydrofera blue ready as this is still appropriate and is being used on the feet. Applied new sacral mepilex.     Interdisciplinary consultation: Patient and nursing    EVALUATION AND PROGRESS OF WOUND(S): Hydrofera blue ready " provides topical antimicrobial treatment and absorption of exudate without laterally wicking to periwound. Green drainage may be indicative of pseudomonas and HFBR is a great treatment for that. Sacral mepilex is absorptive and protective because it is designed to redistribute the forces of friction and shear.    Rationale for changes in Plan of Care: newly identified wound    Factors affecting wound healing: excessive moisture, body habitus.  Goals:a steady decrease in size of wounds. Drainage will be clear not green    NURSING PLAN OF CARE:    Dressing changes: Continue previous Dressing Care orders:   X     See new Dressing Care orders:     X for buttocks  Skin care: See Skin Care orders:        Rectal tube care: See Rectal Tube Care orders:      Other orders:           WOUND TEAM PLAN OF CARE (X):   NPWT change 3 x week:        Dressing changes:       Follow up as needed:     X weekly on bilateral feet 11/22/2019  Other:

## 2019-11-18 NOTE — REHAB-NURSING IDT TEAM NOTE
Nursing   Nursing  Diet/Nutrition:  Regular and Thin Liquids  Medication Administration:  Whole with Liquid Wash  % consumed at meals in last 24 hours:  Consumed % of meals per documentation.  Meal/Snack Consumption for the past 24 hrs:   Oral Nutrition   11/17/19 1200 Lunch;Between % Consumed   11/17/19 0845 Breakfast;Between % Consumed     Snack schedule:    Supplement:  Other: none  Appetite:  Good  Fluid Intake/Output in past 24 hours: In: 840 [P.O.:840]  Out: 3200   Admit Weight:  Weight: 89.4 kg (197 lb)  Weight Last 7 Days   [88.9 kg (195 lb 15.8 oz)-89.4 kg (197 lb)] 88.9 kg (195 lb 15.8 oz) (11/17 1500)    Pain Issues:    Location:  Leg (11/17 2013)  Right (11/17 2013)         Severity:  neither Moderate nor Severe   Scheduled pain medications:  gabapentin (NEURONTIN)      PRN pain medications used in last 24 hours:  None   Non Pharmacologic Interventions:  warm blanket, repositioned and rest  Effectiveness of pain management plan:  good=patient states acceptable comfort after interventions    Bowel:    Bowel Assist Initial Score:  5 - Standby Prompting/Supervision or Set-up  Bowel Assist Current Score:  5 - Standby Prompting/Supervision or Set-up  Bowl Accidents in last 7 days:     Last bowel movement: 11/17/19  Stool Description: Medium     Usual bowel pattern:  daily  Scheduled bowel medications:  senna-docusate (PERICOLACE or SENOKOT S)   PRN bowel medications used in last 24 hours:  None  Nursing Interventions:  Increased time, Scheduled medication  Effectiveness of bowel program:   good=regular, predictable, controlled emptying of bowel  Bladder:    Bladder Assist Initial Score:  2 - Max Assistance  Bladder Assist Current Score:  1 - Total Assistance  Bladder Accidents in last 7 days:     PVR range for past 24-48 hours: -- ()  Intermittent Catheterization:  Weathers catheter  Medications affecting bladder:  Flomax      Interventions:  Medication, Emptying of device, Indwelling  catheter      Weathers:    Type:     Patient Lines/Drains/Airways Status    Active Catheter     Name: Placement date: Placement time: Site: Days:    Urethral Catheter Straight-tip  11/13/19   1325   4              Date placed:          Justification:    Diabetes:  Blood Sugar Frequency:  None    Range of BS for last 48 hours:       Scheduled diabetic medications:  None  Sliding scale usage in past 24 hours:  No  Total Short acting insulin in the past 24 hours:  None  Total Long acting insulin in the past 24 hours:  None    Wound:         Patient Lines/Drains/Airways Status    Active Current Wounds     Name: Placement date: Placement time: Site: Days:    Wound 11/12/19 Foot full thickness right dorsum foot  11/12/19   1614   --  5    Wound 11/12/19 Foot full thickness dorsum left foot  11/12/19   1616   --  5    Wound 11/16/19 Other (comment) Buttocks  11/16/19   0545   --  1                   Interventions:  Change dressings as needed, daily, every 48 hours and every 72 hours. Monitor every shift.  Effectiveness of intervention:  wound is improving     Wound Vac Location:  Not applicable  Dressing last changed:  11/16/19  Pump Mode Pressure Setting       Description of drainage:  Serosanguineous    Sleep/wake cycle:   Average hours slept:  Sleeps 4-6 hours without waking  Sleep medication usage:  None    Patient/Family Training/Education:  Bladder Management/Training, Fall Prevention, General Self Care, Pain Management, Safe Transfers, Safety and Wound Care  Discharge Supply Recommendations:  Catheter Supplies, Blood Pressure Monitor and Wound Care Supplies  Strengths: Alert and oriented, Supportive family and Motivated for self care and independence   Barriers:   Generalized weakness and Hypertension       Nursing Problems     Problem: Bowel/Gastric:     Description:     Goal: Normal bowel function is maintained or improved     Description:           Goal: Will not experience complications related to bowel motility      Description:                 Problem: Communication     Description:     Goal: The ability to communicate needs accurately and effectively will improve     Description:                 Problem: Discharge Barriers/Planning     Description:     Goal: Patient's continuum of care needs will be met     Description:                 Problem: Infection     Description:     Goal: Will remain free from infection     Description:                 Problem: Knowledge Deficit     Description:     Goal: Knowledge of disease process/condition, treatment plan, diagnostic tests, and medications will improve     Description:           Goal: Knowledge of the prescribed therapeutic regimen will improve     Description:                 Problem: Pain Management     Description:     Goal: Pain level will decrease to patient's comfort goal     Description:                 Problem: Psychosocial Needs:     Description:     Goal: Level of anxiety will decrease     Description:                 Problem: Respiratory:     Description:     Goal: Respiratory status will improve     Description:                 Problem: Safety     Description:     Goal: Will remain free from injury     Description:           Goal: Will remain free from falls     Description:                 Problem: Skin Integrity     Description:     Goal: Risk for impaired skin integrity will decrease     Description:                 Problem: Venous Thromboembolism (VTW)/Deep Vein Thrombosis (DVT) Prevention:     Description:     Goal: Patient will participate in Venous Thrombosis (VTE)/Deep Vein Thrombosis (DVT)Prevention Measures     Description:                        Long Term Goals:   At discharge patient will be able to function safely at home and in the community with support.    Section completed by:  Dayanna Anguiano R.N.

## 2019-11-19 DIAGNOSIS — R33.9 URINARY RETENTION: ICD-10-CM

## 2019-11-19 PROBLEM — R60.0 EDEMA, LOWER EXTREMITY: Status: ACTIVE | Noted: 2019-11-19

## 2019-11-19 PROBLEM — N39.0 UTI (URINARY TRACT INFECTION): Status: ACTIVE | Noted: 2019-11-17

## 2019-11-19 PROCEDURE — 97530 THERAPEUTIC ACTIVITIES: CPT

## 2019-11-19 PROCEDURE — 700102 HCHG RX REV CODE 250 W/ 637 OVERRIDE(OP): Performed by: HOSPITALIST

## 2019-11-19 PROCEDURE — A9270 NON-COVERED ITEM OR SERVICE: HCPCS | Performed by: HOSPITALIST

## 2019-11-19 PROCEDURE — 700102 HCHG RX REV CODE 250 W/ 637 OVERRIDE(OP): Performed by: PHYSICAL MEDICINE & REHABILITATION

## 2019-11-19 PROCEDURE — 99233 SBSQ HOSP IP/OBS HIGH 50: CPT | Performed by: PHYSICAL MEDICINE & REHABILITATION

## 2019-11-19 PROCEDURE — 700105 HCHG RX REV CODE 258: Performed by: INTERNAL MEDICINE

## 2019-11-19 PROCEDURE — A9270 NON-COVERED ITEM OR SERVICE: HCPCS | Performed by: PHYSICAL MEDICINE & REHABILITATION

## 2019-11-19 PROCEDURE — 97116 GAIT TRAINING THERAPY: CPT

## 2019-11-19 PROCEDURE — 99232 SBSQ HOSP IP/OBS MODERATE 35: CPT | Performed by: HOSPITALIST

## 2019-11-19 PROCEDURE — 99221 1ST HOSP IP/OBS SF/LOW 40: CPT | Performed by: INTERNAL MEDICINE

## 2019-11-19 PROCEDURE — 700111 HCHG RX REV CODE 636 W/ 250 OVERRIDE (IP): Performed by: INTERNAL MEDICINE

## 2019-11-19 PROCEDURE — 770010 HCHG ROOM/CARE - REHAB SEMI PRIVAT*

## 2019-11-19 PROCEDURE — 97110 THERAPEUTIC EXERCISES: CPT

## 2019-11-19 RX ORDER — POTASSIUM CHLORIDE 20 MEQ/1
10 TABLET, EXTENDED RELEASE ORAL DAILY
Status: DISCONTINUED | OUTPATIENT
Start: 2019-11-20 | End: 2019-11-21 | Stop reason: HOSPADM

## 2019-11-19 RX ORDER — FUROSEMIDE 20 MG/1
20 TABLET ORAL
Status: DISCONTINUED | OUTPATIENT
Start: 2019-11-20 | End: 2019-11-21 | Stop reason: HOSPADM

## 2019-11-19 RX ADMIN — GABAPENTIN 100 MG: 100 CAPSULE ORAL at 14:22

## 2019-11-19 RX ADMIN — VITAMIN D, TAB 1000IU (100/BT) 2000 UNITS: 25 TAB at 07:57

## 2019-11-19 RX ADMIN — CYCLOBENZAPRINE 5 MG: 10 TABLET, FILM COATED ORAL at 07:58

## 2019-11-19 RX ADMIN — CEFEPIME 2 G: 2 INJECTION, POWDER, FOR SOLUTION INTRAVENOUS at 11:04

## 2019-11-19 RX ADMIN — SENNOSIDES AND DOCUSATE SODIUM 2 TABLET: 8.6; 5 TABLET ORAL at 19:57

## 2019-11-19 RX ADMIN — OMEPRAZOLE 20 MG: 20 CAPSULE, DELAYED RELEASE ORAL at 07:59

## 2019-11-19 RX ADMIN — TRAZODONE HYDROCHLORIDE 50 MG: 50 TABLET ORAL at 21:12

## 2019-11-19 RX ADMIN — ACETAMINOPHEN 1000 MG: 500 TABLET, FILM COATED ORAL at 14:22

## 2019-11-19 RX ADMIN — ASPIRIN 81 MG 81 MG: 81 TABLET ORAL at 07:59

## 2019-11-19 RX ADMIN — CYCLOBENZAPRINE 5 MG: 10 TABLET, FILM COATED ORAL at 19:57

## 2019-11-19 RX ADMIN — CYANOCOBALAMIN TAB 1000 MCG 1000 MCG: 1000 TAB at 11:44

## 2019-11-19 RX ADMIN — TAMSULOSIN HYDROCHLORIDE 0.4 MG: 0.4 CAPSULE ORAL at 19:57

## 2019-11-19 RX ADMIN — ACETAMINOPHEN 1000 MG: 500 TABLET, FILM COATED ORAL at 19:57

## 2019-11-19 RX ADMIN — ASPIRIN 81 MG 81 MG: 81 TABLET ORAL at 19:57

## 2019-11-19 RX ADMIN — CALCIUM CARBONATE-CHOLECALCIFEROL TAB 250 MG-125 UNIT 1 TABLET: 250-125 TAB at 07:58

## 2019-11-19 RX ADMIN — GABAPENTIN 100 MG: 100 CAPSULE ORAL at 07:59

## 2019-11-19 RX ADMIN — ACETAMINOPHEN 1000 MG: 500 TABLET, FILM COATED ORAL at 07:58

## 2019-11-19 RX ADMIN — AMLODIPINE BESYLATE 5 MG: 5 TABLET ORAL at 05:12

## 2019-11-19 RX ADMIN — CEFEPIME 2 G: 2 INJECTION, POWDER, FOR SOLUTION INTRAVENOUS at 21:12

## 2019-11-19 RX ADMIN — GABAPENTIN 100 MG: 100 CAPSULE ORAL at 19:57

## 2019-11-19 ASSESSMENT — ENCOUNTER SYMPTOMS
SHORTNESS OF BREATH: 0
ABDOMINAL PAIN: 0
VOMITING: 0
DIARRHEA: 0
NERVOUS/ANXIOUS: 0
NAUSEA: 0
FEVER: 0
CHILLS: 0

## 2019-11-19 ASSESSMENT — PATIENT HEALTH QUESTIONNAIRE - PHQ9
SUM OF ALL RESPONSES TO PHQ9 QUESTIONS 1 AND 2: 0
2. FEELING DOWN, DEPRESSED, IRRITABLE, OR HOPELESS: NOT AT ALL
1. LITTLE INTEREST OR PLEASURE IN DOING THINGS: NOT AT ALL

## 2019-11-19 NOTE — CARE PLAN
Problem: Safety  Goal: Will remain free from injury  Outcome: PROGRESSING AS EXPECTED  Note:   Reinforced safety precautions to pt. Encouraged pt to use call light when assistance is needed, call light is within easy reach, bed is locked and at lowest position.       Problem: Pain Management  Goal: Pain level will decrease to patient's comfort goal  Outcome: PROGRESSING AS EXPECTED  Note:   Patient c/o muscle spasm this am. Medicated her with flexeril for spasm. On schedule tylenol for pain. Patient said pain is manageable.

## 2019-11-19 NOTE — PROGRESS NOTES
Hospital Medicine Daily Progress Note      Chief Complaint:  Hypertension  ALEK  UTI    Interval History:  No significant events or changes since last visit    Review of Systems  Review of Systems   Constitutional: Negative for chills and fever.   Respiratory: Negative for shortness of breath.    Cardiovascular: Negative for chest pain.   Gastrointestinal: Negative for abdominal pain, diarrhea, nausea and vomiting.   Psychiatric/Behavioral: The patient is not nervous/anxious.         Physical Exam  Temp:  [36.6 °C (97.9 °F)-36.7 °C (98 °F)] 36.7 °C (98 °F)  Pulse:  [91-98] 92  Resp:  [18] 18  BP: (134-142)/(64-71) 134/64  SpO2:  [91 %-94 %] 94 %    Physical Exam  Vitals signs reviewed.   Constitutional:       General: She is not in acute distress.     Appearance: Normal appearance. She is not ill-appearing.   HENT:      Head: Atraumatic.      Mouth/Throat:      Mouth: Mucous membranes are moist.      Pharynx: Oropharynx is clear.   Eyes:      Conjunctiva/sclera: Conjunctivae normal.      Pupils: Pupils are equal, round, and reactive to light.   Neck:      Musculoskeletal: Normal range of motion and neck supple.   Cardiovascular:      Rate and Rhythm: Normal rate and regular rhythm.      Heart sounds: No murmur.   Pulmonary:      Effort: Pulmonary effort is normal.      Breath sounds: No wheezing or rales.   Abdominal:      General: There is no distension.      Palpations: Abdomen is soft.      Tenderness: There is no tenderness.      Comments: obese   Musculoskeletal:      Right lower leg: Edema present.      Left lower leg: Edema present.      Comments: Right hip incision dressed.  Edema R>L.   Skin:     General: Skin is warm and dry.      Findings: No rash.      Comments: Bilat foot wounds dressed   Neurological:      Mental Status: She is alert.   Psychiatric:         Mood and Affect: Mood normal.         Behavior: Behavior normal.         Fluids    Intake/Output Summary (Last 24 hours) at 11/19/2019 1051  Last  data filed at 11/19/2019 0900  Gross per 24 hour   Intake 600 ml   Output 1500 ml   Net -900 ml       Laboratory  Recent Labs     11/18/19  0520   WBC 4.9   RBC 2.86*   HEMOGLOBIN 9.3*   HEMATOCRIT 29.5*   .1*   MCH 32.5   MCHC 31.5*   RDW 46.7   PLATELETCT 273   MPV 9.3     Recent Labs     11/18/19  0520   SODIUM 138   POTASSIUM 3.9   CHLORIDE 105   CO2 26   GLUCOSE 91   BUN 12   CREATININE 0.67   CALCIUM 8.6                   Assessment/Plan  Edema, lower extremity  Assessment & Plan  Has edema of B/L LE with right side > left side  Was on high dose Lasix before and got dehydrated -- resolved  Will start low dose Lasix: 20 mg daily  Will start KCL: 10 meq daily  Monitor K+: 3.9 (11/18)    UTI (urinary tract infection)  Assessment & Plan  U/A (+): Pseudomonas & GNR  F/U C&S  On Cefepime (thru 11/25)    Leukopenia  Assessment & Plan  Currently resolved (after abx started)    Vitamin D insufficiency- (present on admission)  Assessment & Plan  Vit D: 22  On supplements    Acute kidney injury (HCC)- (present on admission)  Assessment & Plan  Currently resolved  Bun/Cr: wnl  S/P IVF's  Likely 2nd to dehydration    Hypertension- (present on admission)  Assessment & Plan  BP a little labile  Off Losartan (2nd to ALEK and high trending K+)  On Norvasc: 5 mg daily  Note: on Flomax  Cont to monitor    Anemia- (present on admission)  Assessment & Plan  H&H stable with Hb 9.3  B12: 225 (11/14)  Folate: 11.0  TSH: wnl  Will give B12 supplements (11/19)  Monitor    AVN (avascular necrosis of bone) (HCC)- (present on admission)  Assessment & Plan  S/P right SUNSHINE (11/6)  Nolvia-incisional blister noted  Afebrile  No leukocytosis (had leukopenia)  U/S RLE: negative for DVT  Anerobic bone culture +single colony of propionibacterium acnes, uncertain clinical significance  On Cefepime (thru 11/25) --> then Rocephin 1g daily for 5 weeks  ID consulted and now following (11/19)    Multiple open wounds of lower leg- (present on  admission)  Assessment & Plan  Echo: EF 65-70% with mod PAH  Has blistering wounds on both feet with worsening swelling

## 2019-11-19 NOTE — CARE PLAN
Problem: Communication  Goal: The ability to communicate needs accurately and effectively will improve  Note:   Makes needs known to staff.  Encouraged to use call light for staff assist.      Problem: Safety  Goal: Will remain free from falls  Note:   Call light kept within reach and encouraged to use for assistance and with toileting needs. Encouraged to call staff and to wait for staff before transfers as needed.  Pt is Mod-I in room with FWW.     Problem: Pain Management  Goal: Pain level will decrease to patient's comfort goal  Note:   Complains of pain to right leg.  Medicated with scheduled Gabapentin and Tylenol.  Has + relief.  See MAR and doc flow sheet.  Will continue to monitor patient.

## 2019-11-19 NOTE — PROGRESS NOTES
Received bedside shift report from Marika OAKES RN regarding patient and assumed care. Patient awake, calm and stable, currently positioned in bed for comfort and safety; call light within reach. Denies pain or discomfort at this time. Will continue to monitor.

## 2019-11-19 NOTE — DISCHARGE PLANNING
On 11/18/19, met with patient following Team Conference to relay progress and anticipated discharge date of 11/20/2019. We discussed that the discharge date may change, depending on results of the urine culture.     Today, ID has seen patient and recommended 7 days of BID antibiotic, then will change antibiotic to a once daily. Dr. Ramirez is recommending skilled care for at least the next 7 days because of bid antibiotic and to address wounds on feet. Patient agrees. SNF choice list provided to patient.  Called patient's daughter, Matthew, and provided update. She is on her way here, and I will meet with her when she arrives.

## 2019-11-19 NOTE — THERAPY
Occupational Therapy  Daily Treatment     Patient Name: Janessa Cain  Age:  84 y.o., Sex:  female  Medical Record #: [id number]  Today's Date: 11/19/2019     Precautions  Precautions: Posterior Hip Precautions, Weight Bearing As Tolerated Right Lower Extremity  Comments: (P) hannon, UTI    Safety   ADL Safety : Modified Independent  Bathroom Safety: Modified Independent    Subjective    Pt agreeable to OT     Objective       11/19/19 0931   Precautions   Precautions Posterior Hip Precautions;Weight Bearing As Tolerated Right Lower Extremity   Comments hannon, UTI   Standing Upper Body Exercises   Standing Upper Body Exercises Yes   Chest Press 2 sets of 10   Shoulder Press 2 sets of 10   Other Exercises core twists with arms extended 2x10 each side, standing ball toss 1x35 reps   Comments all therex completed with 4# medicine ball   Sitting Lower Body Exercises   Sitting Lower Body Exercises Yes   Sit to Stand 1 set of 10  (with 4# ball, c/o increased foot pain so terminated)   OT Total Time Spent   OT Individual Total Time Spent (Mins) 30   OT Charge Group   OT Therapy Activity 2     Assessment    Session focused on progression of standing balance/tolerance w/o UE support; pt tolerated well with no LOB, tendency to place increased weight on LLE and to stand on R heel (avoiding ball of foot) for pain management of R foot wounds.     Plan    Cont overall strength/endurance and standing balance to improve IADL's and functional mobility. Tentative d/c home Tomorrow or Thursday

## 2019-11-19 NOTE — DISCHARGE PLANNING
Met with patient's daughter, Tamzen, and patient's  with patient present. Updated them regarding patient's treatment plan. Choice obtained from patient for skilled care - [facility] is first choice.

## 2019-11-19 NOTE — THERAPY
Physical Therapy   Daily Treatment     Patient Name: Janessa Cain  Age:  84 y.o., Sex:  female  Medical Record #: [id number]  Today's Date: 11/19/2019     Precautions  Precautions: Posterior Hip Precautions, Weight Bearing As Tolerated Right Lower Extremity  Comments: hannon, UTI    Subjective    Pt received in room, agreeable to PT session. She is upset regarding having an infection in her hip that caused avascular necrosis.      Objective       11/19/19 1001   Precautions   Precautions Posterior Hip Precautions;Weight Bearing As Tolerated Right Lower Extremity   Cognition    Level of Consciousness Alert   Sitting Lower Body Exercises   Nustep Resistance Level 3  (LE propulsion x15 min, 672 steps)   Standing Lower Body Exercises   Hamstring Curl 3 sets of 10;Bilateral    Hip Abduction 3 sets of 10;Bilateral   Heel Rise Bilateral   Toe Rise 1 set of 10;Bilateral   Mini Squat Partial;3 sets of 10   Other Exercises in // bars, no seated rests required   Bed Mobility    Sit to Stand Modified Independent   Interdisciplinary Plan of Care Collaboration   Patient Position at End of Therapy Other (Comments);Call Light within Reach;Phone within Reach;Tray Table within Reach   Collaboration Comments RN in room, pt MOd I with FWW   PT Total Time Spent   PT Individual Total Time Spent (Mins) 60   PT Charge Group   PT Therapeutic Exercise 3   PT Therapeutic Activities 1       FIM Bed/Chair/Wheelchair Transfers Score: 6 - Modified Independent  Bed/Chair/Wheelchair Transfers Description:       FIM Walking Score:  6 - Modified Independent  Walking Description:  (Mod I with FWW)    FIM Wheelchair Score:  1 - Total Assistance  Wheelchair Description:       Assessment    Pt fully participative with session. She is Mod I with FWW for mobility.    Plan    Tentative DC to home on Thursday

## 2019-11-19 NOTE — DISCHARGE PLANNING
Per T[facility] at [facility], referral accepted.  Dr. Pop is accepting physician.  They will pick patient up tomorrow at 1300.  CM notified.

## 2019-11-19 NOTE — PROGRESS NOTES
"Rehab Progress Note     Date of Service: 11/19/2019  Chief Complaint: Follow-up total hip arthroplasty    Interval Events (Subjective)    Patient seen and examined today during her therapy session. She is ambulating with walker without any assistance. Is doing quite well functionally.    She failed her voiding trial and the catheter had to be replaced. Referral to be made to urology.     Infectious disease consulted for +would culture of her right hip. Will need 6 weeks of IV antibiotics. Patient agreeable to transfer to skilled facility to continue her antibiotics.     She has no new complaints, and is actually taking this news quite well. She states once on daily antibiotics, she can manage this at home by herself, as she did this for her .      Objective:  VITAL SIGNS: /64   Pulse 92   Temp 36.7 °C (98 °F) (Oral)   Resp 18   Ht 1.651 m (5' 5\")   Wt 88.9 kg (195 lb 15.8 oz)   SpO2 94%   BMI 32.61 kg/m²   Gen: alert, no apparent distress  CV: regular rate and rhythm, no murmurs, bilateral left ? Right foot peripheral edema  Resp: clear to ascultation bilaterally, normal respiratory effort  GI: soft, non-tender abdomen, bowel sounds present      Recent Results (from the past 72 hour(s))   URINALYSIS    Collection Time: 11/16/19  1:30 PM   Result Value Ref Range    Color Yvonne     Character Cloudy (A)     Specific Gravity 1.015 <1.035    Ph 7.5 5.0 - 8.0    Glucose Negative Negative mg/dL    Ketones Negative Negative mg/dL    Protein 100 (A) Negative mg/dL    Bilirubin Negative Negative    Urobilinogen, Urine 1.0 Negative    Nitrite Positive (A) Negative    Leukocyte Esterase Large (A) Negative    Occult Blood Large (A) Negative    Micro Urine Req Microscopic    URINE MICROSCOPIC (W/UA)    Collection Time: 11/16/19  1:30 PM   Result Value Ref Range    WBC Packed (A) /hpf    RBC >150 (A) /hpf    Bacteria Many (A) None /hpf    Epithelial Cells Negative /hpf   URINE CULTURE-EXISTING-LESS THAN 48 " HOURS    Collection Time: 11/17/19 11:15 AM   Result Value Ref Range    Significant Indicator POS (POS)     Source UR     Site URINE, CLEAN CATCH     Culture Result - (A)     Culture Result (A)      Lactose fermenting Gram negative kosta  >100,000 cfu/mL      Culture Result (A)      Pseudomonas aeruginosa  >100,000 cfu/mL  P.aeruginosa may develop resistance during prolonged therapy  with all antibiotics. Isolates that are initially susceptible  may become resistant within three to four days after  initiation of therapy. Testing of repeat isolates may be  warranted.         Susceptibility    Pseudomonas aeruginosa - BRENDA     Ceftazidime 4 Sensitive mcg/mL     Ciprofloxacin >2 Resistant mcg/mL     Cefepime <=2 Sensitive mcg/mL     Amikacin <=16 Sensitive mcg/mL     Gentamicin <=4 Sensitive mcg/mL     Tobramycin <=4 Sensitive mcg/mL     Levofloxacin >4 Resistant mcg/mL     Meropenem <=1 Sensitive mcg/mL     Pip/Tazobactam <=16 Sensitive mcg/mL   CBC WITHOUT DIFFERENTIAL    Collection Time: 11/18/19  5:20 AM   Result Value Ref Range    WBC 4.9 4.8 - 10.8 K/uL    RBC 2.86 (L) 4.20 - 5.40 M/uL    Hemoglobin 9.3 (L) 12.0 - 16.0 g/dL    Hematocrit 29.5 (L) 37.0 - 47.0 %    .1 (H) 81.4 - 97.8 fL    MCH 32.5 27.0 - 33.0 pg    MCHC 31.5 (L) 33.6 - 35.0 g/dL    RDW 46.7 35.9 - 50.0 fL    Platelet Count 273 164 - 446 K/uL    MPV 9.3 9.0 - 12.9 fL   Basic Metabolic Panel    Collection Time: 11/18/19  5:20 AM   Result Value Ref Range    Sodium 138 135 - 145 mmol/L    Potassium 3.9 3.6 - 5.5 mmol/L    Chloride 105 96 - 112 mmol/L    Co2 26 20 - 33 mmol/L    Glucose 91 65 - 99 mg/dL    Bun 12 8 - 22 mg/dL    Creatinine 0.67 0.50 - 1.40 mg/dL    Calcium 8.6 8.5 - 10.5 mg/dL    Anion Gap 7.0 0.0 - 11.9   ESTIMATED GFR    Collection Time: 11/18/19  5:20 AM   Result Value Ref Range    GFR If African American >60 >60 mL/min/1.73 m 2    GFR If Non African American >60 >60 mL/min/1.73 m 2       Current Facility-Administered  Medications   Medication Frequency   • cefepime (MAXIPIME) 2 g in  mL IVPB Q12HRS   • cyanocobalamin (VITAMIN B12) tablet 1,000 mcg DAILY   • cyclobenzaprine (FLEXERIL) tablet 5 mg TID PRN   • vitamin D (cholecalciferol) tablet 2,000 Units DAILY   • tamsulosin (FLOMAX) capsule 0.4 mg QHS   • amLODIPine (NORVASC) tablet 5 mg Q DAY   • Respiratory Care per Protocol Continuous RT   • Pharmacy Consult Request ...Pain Management Review 1 Each PHARMACY TO DOSE   • acetaminophen (TYLENOL) tablet 650 mg Q4HRS PRN   • artificial tears ophthalmic solution 1 Drop PRN   • benzocaine-menthol (CEPACOL) lozenge 1 Lozenge Q2HRS PRN   • mag hydrox-al hydrox-simeth (MAALOX PLUS ES or MYLANTA DS) suspension 20 mL Q2HRS PRN   • ondansetron (ZOFRAN ODT) dispertab 4 mg 4X/DAY PRN    Or   • ondansetron (ZOFRAN) syringe/vial injection 4 mg 4X/DAY PRN   • traZODone (DESYREL) tablet 50 mg QHS PRN   • sodium chloride (OCEAN) 0.65 % nasal spray 2 Spray PRN   • hydrOXYzine HCl (ATARAX) tablet 50 mg Q6HRS PRN   • melatonin tablet 3 mg HS PRN   • lactulose 20 GM/30ML solution 30 mL QDAY PRN   • fleet enema 133 mL QDAY PRN   • senna-docusate (PERICOLACE or SENOKOT S) 8.6-50 MG per tablet 2 Tab BID    And   • polyethylene glycol/lytes (MIRALAX) PACKET 1 Packet QDAY PRN    And   • magnesium hydroxide (MILK OF MAGNESIA) suspension 30 mL QDAY PRN    And   • bisacodyl (DULCOLAX) suppository 10 mg QDAY PRN   • aspirin (ASA) chewable tab 81 mg BID   • diazePAM (VALIUM) tablet 5 mg TID PRN   • gabapentin (NEURONTIN) capsule 100 mg TID   • omeprazole (PRILOSEC) capsule 20 mg DAILY   • oyster shell calcium/vitamin D 250-125 MG-UNIT tablet 1 Tab DAILY   • acetaminophen (TYLENOL) tablet 1,000 mg TID   • hydrocortisone 1 % cream BID       Orders Placed This Encounter   Procedures   • Diet Order Regular     Standing Status:   Standing     Number of Occurrences:   1     Order Specific Question:   Diet:     Answer:   Regular [1]       Assessment:  Active  Hospital Problems    Diagnosis   • *Status post total replacement of right hip   • Degenerative joint disease of right hip   • Acute kidney injury (HCC)   • Urinary retention   • Vitamin D insufficiency   • AVN (avascular necrosis of bone) (HCC)   • Anemia   • Impaired mobility and ADLs   • Hypertension   • Hypoalbuminemia   • Radiculopathy   • Bilateral leg edema     84 yr old female admitted to acute rehab on 11/12 with AVN of her right femoral head, s/p SUNSHINE on 11/6.    I led and attended the weekly conference, and agree with the IDT conference documentation and plan of care as noted below.    Date of conference: 11/18/2019    Goals and barriers: See IDT note.    Biggest barriers: urinary retention, right foot pain, wound cultures    Currently mod indep in room with walker    Anticipated DC date: 11/20/2019, to SNF due to continued need for IV antiobiotics    Outpatient PT    Equip: has her own walker    Follow up: PCP, surgery, outpatient wound care, possibly urology    Medical Decision Making and Plan:    Right AVN of femur  S/p SUNSHINE Dr. Bocanegra 11/6  PT/OT, 1.5 hr each discipline, 5 days per week  Dressing removed POD 10  Sutures out POD 14 - today  Outpatient follow up  +wound culture from hip biospy - see below    Pain management  Scheduled tylenol  D/c Celebrex due to ALEK, see below  Scheduled gabapentin  Change Flexeril to PRN  Currently well controlled    Bowel  Meds as needed  Last BM 11/17    Bladder  Acute urinary retention  Failed another voiding trial yesterday  Started Flomax 11/12  outpatient urology follow up    DVT prophylaxis  Per surgery ASA 81 mg BID    Appreciate assistance of hospitalist with her medical co-morbidities:    Acute kidney injury, resolved with IVF  Hypertension  Peripheral edema, improved  Pulmonary hypertension  Respiratory insufficiency, resolved  Rash, back  Leukopenia, resolved  Anemia, improved  Hypoalbuminemia  Vit D insufficiency, on supplementation  Wounds/blisters, wound  care consult  Hematuria/UTI, on IV antibiotics  +hip wound culture, consulted ID, appreciate assistance    Total time:  39 minutes.  I spent greater than 50% of the time for patient care, counseling, and coordination on this date, including patient face-to face time, unit/floor time with review of records/pertinent lab data and studies, as well as discussing diagnostic evaluation/work up, planned therapeutic interventions, and future disposition of care, as per the interval events/subjective and the assessment and plan as noted above.    I have performed a physical exam, reviewed and updated ROS, as well as the assessment and plan today 11/19/2019. In review of note from 11/18/2019 there are no new changes except as documented above.          Justina Ramirez M.D.   Physical Medicine and Rehabilitation

## 2019-11-20 ENCOUNTER — HOSPITAL ENCOUNTER (OUTPATIENT)
Dept: RADIOLOGY | Facility: MEDICAL CENTER | Age: 84
End: 2019-11-20
Attending: PHYSICAL MEDICINE & REHABILITATION
Payer: COMMERCIAL

## 2019-11-20 LAB
BACTERIA UR CULT: ABNORMAL
GRAM STN SPEC: NORMAL
SIGNIFICANT IND 70042: ABNORMAL
SIGNIFICANT IND 70042: NORMAL
SITE SITE: ABNORMAL
SITE SITE: NORMAL
SOURCE SOURCE: ABNORMAL
SOURCE SOURCE: NORMAL

## 2019-11-20 PROCEDURE — A9270 NON-COVERED ITEM OR SERVICE: HCPCS | Performed by: HOSPITALIST

## 2019-11-20 PROCEDURE — 700102 HCHG RX REV CODE 250 W/ 637 OVERRIDE(OP): Performed by: HOSPITALIST

## 2019-11-20 PROCEDURE — A9270 NON-COVERED ITEM OR SERVICE: HCPCS | Performed by: PHYSICAL MEDICINE & REHABILITATION

## 2019-11-20 PROCEDURE — 700101 HCHG RX REV CODE 250: Performed by: PHYSICAL MEDICINE & REHABILITATION

## 2019-11-20 PROCEDURE — 87186 SC STD MICRODIL/AGAR DIL: CPT

## 2019-11-20 PROCEDURE — 87205 SMEAR GRAM STAIN: CPT

## 2019-11-20 PROCEDURE — 87077 CULTURE AEROBIC IDENTIFY: CPT | Mod: 91

## 2019-11-20 PROCEDURE — 770010 HCHG ROOM/CARE - REHAB SEMI PRIVAT*

## 2019-11-20 PROCEDURE — 700102 HCHG RX REV CODE 250 W/ 637 OVERRIDE(OP): Performed by: PHYSICAL MEDICINE & REHABILITATION

## 2019-11-20 PROCEDURE — 87070 CULTURE OTHR SPECIMN AEROBIC: CPT

## 2019-11-20 PROCEDURE — 97110 THERAPEUTIC EXERCISES: CPT

## 2019-11-20 PROCEDURE — 97535 SELF CARE MNGMENT TRAINING: CPT

## 2019-11-20 PROCEDURE — 99233 SBSQ HOSP IP/OBS HIGH 50: CPT | Performed by: PHYSICAL MEDICINE & REHABILITATION

## 2019-11-20 PROCEDURE — 97530 THERAPEUTIC ACTIVITIES: CPT

## 2019-11-20 PROCEDURE — 36573 INSJ PICC RS&I 5 YR+: CPT

## 2019-11-20 PROCEDURE — 99232 SBSQ HOSP IP/OBS MODERATE 35: CPT | Performed by: HOSPITALIST

## 2019-11-20 PROCEDURE — 700111 HCHG RX REV CODE 636 W/ 250 OVERRIDE (IP): Performed by: INTERNAL MEDICINE

## 2019-11-20 PROCEDURE — 700105 HCHG RX REV CODE 258: Performed by: INTERNAL MEDICINE

## 2019-11-20 RX ADMIN — ACETAMINOPHEN 650 MG: 325 TABLET, FILM COATED ORAL at 11:53

## 2019-11-20 RX ADMIN — Medication 20 ML: at 22:00

## 2019-11-20 RX ADMIN — CYANOCOBALAMIN TAB 1000 MCG 1000 MCG: 1000 TAB at 09:00

## 2019-11-20 RX ADMIN — ACETAMINOPHEN 1000 MG: 500 TABLET, FILM COATED ORAL at 09:01

## 2019-11-20 RX ADMIN — OMEPRAZOLE 20 MG: 20 CAPSULE, DELAYED RELEASE ORAL at 09:00

## 2019-11-20 RX ADMIN — CEFEPIME 2 G: 2 INJECTION, POWDER, FOR SOLUTION INTRAVENOUS at 21:09

## 2019-11-20 RX ADMIN — SENNOSIDES AND DOCUSATE SODIUM 2 TABLET: 8.6; 5 TABLET ORAL at 09:00

## 2019-11-20 RX ADMIN — CALCIUM CARBONATE-CHOLECALCIFEROL TAB 250 MG-125 UNIT 1 TABLET: 250-125 TAB at 09:00

## 2019-11-20 RX ADMIN — ASPIRIN 81 MG 81 MG: 81 TABLET ORAL at 09:00

## 2019-11-20 RX ADMIN — TAMSULOSIN HYDROCHLORIDE 0.4 MG: 0.4 CAPSULE ORAL at 21:10

## 2019-11-20 RX ADMIN — DIAZEPAM 5 MG: 5 TABLET ORAL at 09:01

## 2019-11-20 RX ADMIN — ACETAMINOPHEN 1000 MG: 500 TABLET, FILM COATED ORAL at 14:17

## 2019-11-20 RX ADMIN — CEFEPIME 2 G: 2 INJECTION, POWDER, FOR SOLUTION INTRAVENOUS at 09:01

## 2019-11-20 RX ADMIN — CYCLOBENZAPRINE 5 MG: 10 TABLET, FILM COATED ORAL at 21:23

## 2019-11-20 RX ADMIN — POTASSIUM CHLORIDE 10 MEQ: 1500 TABLET, EXTENDED RELEASE ORAL at 09:00

## 2019-11-20 RX ADMIN — FUROSEMIDE 20 MG: 20 TABLET ORAL at 05:07

## 2019-11-20 RX ADMIN — GABAPENTIN 100 MG: 100 CAPSULE ORAL at 09:00

## 2019-11-20 RX ADMIN — GABAPENTIN 100 MG: 100 CAPSULE ORAL at 21:09

## 2019-11-20 RX ADMIN — ASPIRIN 81 MG 81 MG: 81 TABLET ORAL at 21:09

## 2019-11-20 RX ADMIN — ACETAMINOPHEN 1000 MG: 500 TABLET, FILM COATED ORAL at 21:09

## 2019-11-20 RX ADMIN — AMLODIPINE BESYLATE 5 MG: 5 TABLET ORAL at 05:07

## 2019-11-20 RX ADMIN — VITAMIN D, TAB 1000IU (100/BT) 2000 UNITS: 25 TAB at 09:00

## 2019-11-20 RX ADMIN — GABAPENTIN 100 MG: 100 CAPSULE ORAL at 14:17

## 2019-11-20 RX ADMIN — SENNOSIDES AND DOCUSATE SODIUM 2 TABLET: 8.6; 5 TABLET ORAL at 21:09

## 2019-11-20 RX ADMIN — CYCLOBENZAPRINE 5 MG: 10 TABLET, FILM COATED ORAL at 11:53

## 2019-11-20 RX ADMIN — TRAZODONE HYDROCHLORIDE 50 MG: 50 TABLET ORAL at 21:10

## 2019-11-20 ASSESSMENT — ENCOUNTER SYMPTOMS
VOMITING: 0
BLURRED VISION: 0
SHORTNESS OF BREATH: 0
DIZZINESS: 0
FEVER: 0
PALPITATIONS: 0
HEADACHES: 0
HALLUCINATIONS: 0
NAUSEA: 0

## 2019-11-20 ASSESSMENT — PATIENT HEALTH QUESTIONNAIRE - PHQ9
1. LITTLE INTEREST OR PLEASURE IN DOING THINGS: NOT AT ALL
SUM OF ALL RESPONSES TO PHQ9 QUESTIONS 1 AND 2: 0
SUM OF ALL RESPONSES TO PHQ9 QUESTIONS 1 AND 2: 0
2. FEELING DOWN, DEPRESSED, IRRITABLE, OR HOPELESS: NOT AT ALL
1. LITTLE INTEREST OR PLEASURE IN DOING THINGS: NOT AT ALL
2. FEELING DOWN, DEPRESSED, IRRITABLE, OR HOPELESS: NOT AT ALL

## 2019-11-20 ASSESSMENT — ACTIVITIES OF DAILY LIVING (ADL)
SHOWER_TRANSFER_LEVEL_OF_ASSIST: ABLE TO COMPLETE SHOWER TRANSFER WITHOUT ASSIST
TOILETING_LEVEL_OF_ASSIST: ABLE TO COMPLETE TOILETING WITHOUT ASSIST
TOILET_TRANSFER_LEVEL_OF_ASSIST: ABLE TO COMPLETE TOILET TRANSFER WITHOUT ASSIST

## 2019-11-20 NOTE — WOUND TEAM
"[facility]  Inpatient Services  Wound and Skin Care Progress Note    Admission Date: 11/12/2019       HPI, PMH, SH: Reviewed    Unit where seen by Wound Team:      WOUND FOLLOW UP/CONSULT RELATED TO:  Re evaluation of bilateral foot wounds      SUBJECTIVE:  \"They don't seem to understand\"      Self Report / Pain Level:  Right foot painful when dressings removed      OBJECTIVE:  Found with dressings intact; tubi  is wrapped around ankle only on left leg and toes to ankle on RLE versus knee to toes.     WOUND TYPE, LOCATION, CHARACTERISTICS (Pressure Injuries: location, stage, POA or date identified)          Wound 11/12/19 Foot full thickness right dorsum foot (Active)   Wound Image   11/20/2019  8:40 AM   Site Assessment Clean;Intact;Yellow;Red;Edema 11/20/2019  8:40 AM   Nolvia-wound Assessment Clean;Intact 11/20/2019  8:40 AM   Margins Attached edges 11/20/2019  8:40 AM   Wound Length (cm) 4 cm 11/20/2019  8:40 AM   Wound Width (cm) 6.5 cm 11/20/2019  8:40 AM   Wound Depth (cm) 0.1 cm 11/20/2019  8:40 AM   Wound Surface Area (cm^2) 26 cm^2 11/20/2019  8:40 AM   Tunneling 0 cm 11/20/2019  8:40 AM   Undermining 0 cm 11/20/2019  8:40 AM   Closure Secondary intention 11/20/2019  8:40 AM   Drainage Amount Scant 11/20/2019  8:40 AM   Drainage Description Serosanguineous 11/20/2019  8:40 AM   Non-staged Wound Description Full thickness 11/20/2019  8:40 AM   Treatments Cleansed;Site care 11/20/2019  8:40 AM   Cleansing Normal Saline Irrigation 11/20/2019  8:40 AM   Periwound Protectant Skin Protectant wipes to Periwound 11/20/2019  8:40 AM   Dressing Options Honey Colloid;Dry Gauze;Transparent Film 11/20/2019  8:40 AM   Dressing Cleansing/Solutions Not Applicable 11/20/2019  8:40 AM   Dressing Changed New 11/20/2019  8:40 AM   Dressing Status Intact 11/20/2019  8:40 AM   Dressing Change Frequency Daily 11/20/2019  8:40 AM   NEXT Dressing Change  11/21/19 11/20/2019  8:40 AM   NEXT Weekly Photo " "(Inpatient Only) 11/27/19 11/20/2019  8:40 AM   WOUND NURSE ONLY - Odor None 11/20/2019  8:40 AM   WOUND NURSE ONLY - Exposed Structures None 11/20/2019  8:40 AM   WOUND NURSE ONLY - Tissue Type and Percentage red with yellow slough 11/20/2019  8:40 AM       Wound 11/12/19 Foot full thickness dorsum left foot (Active)   Wound Image   11/20/2019  8:40 AM   Site Assessment Clean;Intact;Red;Edema 11/20/2019  8:40 AM   Nolvia-wound Assessment Clean;Intact 11/20/2019  8:40 AM   Margins Attached edges 11/20/2019  8:40 AM   Wound Length (cm) 1.5 cm 11/20/2019  8:40 AM   Wound Width (cm) 2 cm 11/20/2019  8:40 AM   Wound Depth (cm) 0.1 cm 11/20/2019  8:40 AM   Wound Surface Area (cm^2) 3 cm^2 11/20/2019  8:40 AM   Tunneling 0 cm 11/20/2019  8:40 AM   Undermining 0 cm 11/20/2019  8:40 AM   Closure Secondary intention 11/20/2019  8:40 AM   Drainage Amount None 11/20/2019  8:40 AM   Drainage Description     Non-staged Wound Description Full thickness 11/20/2019  8:40 AM   Treatments Cleansed;Site care 11/20/2019  8:40 AM   Cleansing Normal Saline Irrigation 11/20/2019  8:40 AM   Periwound Protectant Not Applicable 11/20/2019  8:40 AM   Dressing Options Hydrofera Blue Ready 11/20/2019  8:40 AM   Dressing Cleansing/Solutions Not Applicable 11/20/2019  8:40 AM   Dressing Changed Changed 11/20/2019  8:40 AM   Dressing Status Intact 11/20/2019  8:40 AM   Dressing Change Frequency Every 72 hrs 11/20/2019  8:40 AM   NEXT Dressing Change  11/23/19 11/20/2019  8:40 AM   NEXT Weekly Photo (Inpatient Only) 11/27/19 11/20/2019  8:40 AM   WOUND NURSE ONLY - Odor None 11/20/2019  8:40 AM   WOUND NURSE ONLY - Exposed Structures None 11/20/2019  8:40 AM   WOUND NURSE ONLY - Tissue Type and Percentage red  11/20/2019  8:40 AM   WOUND NURSE ONLY - Time Spent with Patient (mins) 60 11/20/2019  8:40 AM          Vascular:    Dorsal Pedal pulses:  Unable to palpate due to edema  Posterior tib pulses:         \"   **          \"            \"    CM:  " NA    Lab Values:    Lab Results   Component Value Date/Time    WBC 4.9 11/18/2019 05:20 AM    RBC 2.86 (L) 11/18/2019 05:20 AM    HEMOGLOBIN 9.3 (L) 11/18/2019 05:20 AM    HEMATOCRIT 29.5 (L) 11/18/2019 05:20 AM        No results found for: HBA1C        Culture:  11/20/19  Taken right foot after cleansing      INTERVENTIONS BY WOUND TEAM:   Met with patient and see above regarding tubi  placement.  Removed tubi  and old dressings.  Cleansed wounds with NS gauze and using scalpel and forcep removed yellow slough as able right foot.  Culture obtained right foot and given to supervisor.  Skin prep applied to lala wound skin right foot and then covered wound bed with honey colloid and secured with dry gauze and transparent film.  Left foot wounds covered with hydrofera blue foam and secured with hypafix tape.  Tubi  E applied and additional pair given to patient for discharge today.  Discussed with staff RN.     Dressing Selection:  Honey colloid, hydrofera blue, adhesive foam, tubi  E         Interdisciplinary consultation: Patient, Bedside RN    EVALUATION: right foot wound continues to improve with yellow slough resolving; left foot also improving with current care.  Patient to discharge to SNF this afternoon for continued IV antibiotics; continue with compression     Factors affecting wound healing: edema   Goals: Steady decrease in wound area and depth weekly    NURSING PLAN OF CARE ORDERS (X):    Dressing changes: See Dressing Care orders: X updated  Skin care: See Skin Care orders:   Rectal tube care: See Rectal Tube Care orders:   Other orders:    WOUND TEAM PLAN OF CARE (X):  NPWT change 3 x week:        Dressing changes by wound team:       Follow up as needed:     Other (explain): DC to SNF this afternoon    Anticipated discharge plans (X):   SNF:     X with ongoing wound care      Home Care:           Outpatient Wound Center:            Self Care:            Other:

## 2019-11-20 NOTE — THERAPY
"Occupational Therapy  Daily Treatment     Patient Name: Janessa Cain  Age:  84 y.o., Sex:  female  Medical Record #: [id number]  Today's Date: 11/20/2019     Precautions  Precautions: (P) Posterior Hip Precautions, Weight Bearing As Tolerated Right Lower Extremity  Comments: hannon, UTI    Safety   ADL Safety : Modified Independent  Bathroom Safety: Modified Independent    Subjective    \"I just found out five minutes ago that I had you for therapy.\"     Objective       11/20/19 1031   Precautions   Precautions Posterior Hip Precautions;Weight Bearing As Tolerated Right Lower Extremity   Interdisciplinary Plan of Care Collaboration   Patient Position at End of Therapy Seated;Call Light within Reach;Tray Table within Reach;Phone within Reach   OT Total Time Spent   OT Individual Total Time Spent (Mins) 30   OT Charge Group   OT Therapy Activity 2     Dynamic standing balance with no UE support while tossing/catching medium sized ball on flat ground.      2 x 10 R and L knee extension seated EOM.    Up/down six 4\" stairs with B handrails and mod I.    FIM Walking Score:  6 - Modified Independent  Walking Description:  Walker(mod I w/ ' x 2)      Assessment    Patient ready to d/c to SNF tomorrow    Plan    D/C to SNF tomorrow    "

## 2019-11-20 NOTE — CONSULTS
DATE OF SERVICE:  11/19/2019    INFECTIOUS DISEASE CONSULTATION    REASON FOR CONSULT:  UTI and positive bone culture.    CONSULTING PHYSICIAN:  Oumou Mooney MD    HISTORY OF PRESENT ILLNESS:  This is a very pleasant 84-year-old white female   who is currently admitted to [facility] rehab after left hip surgery.  She has a   longstanding history of degenerative disease and her initial surgery on hip   was back in 2008.  She states in September, she had had a cortisone   injection due to persistent pain, but not only failed to improve, her pain got much worse.  She was seen by   orthopedic surgery in [place], but because she was planned to move to [place],   she deferred surgery until she arrived here.  The pain was so severe she   became dehydrated because she was unable to ambulate to get water.  She was   forced to sleep in a wheelchair because she was unable to lie flat and was   requiring the use of a walker because of the hip pain.  Pain was described as   burning, extended to her leg and foot. Prior to the injection, she was ambulating with walker. She was admitted to [facility] on   11/03/2019, seen and evaluated by orthopedic surgery and she underwent right   total hip arthroplasty on 11/06/2019.  She was found to have right hip   avascular necrosis with collapse and acetabular destruction.  Three screws   were placed.  Operative note was reviewed.  She was noted to have atrophy and   the musculature, complete collapse of the femoral head.  The fragments of bone   were sent to microbiology and one of them is growing Propionibacterium.  She   was not on antibiotics prior to her admission.  She denies any associated   fever, chills, nausea, vomiting or diarrhea.  She just primarily complains of   significant longstanding pain and discomfort.    She has required a Weathers catheter since her surgery, was attempted to be   removed and after it was removed, she was noted to have dysuria, urinary   hesitancy and difficulty  voiding.  Culture was done, is polymicrobial and   catheter has since been replaced.  Infectious Diseases consulted for antibiotic recommendation and management    REVIEW OF SYSTEMS:  Positive for leg swelling with ulcerations, some pain in   her right hip; however, she is now able to ambulate with a walker   independently.  Pain has improved since her surgery.    ALLERGIES:  SHE IS ALLERGIC TO HYDROCODONE, OXYCODONE, AND TRAMADOL, AND IS   INTOLERANT TO TURKEY.      PAST MEDICAL HISTORY:  Hypertension and degenerative disease.    PAST SURGICAL HISTORY:  Left hip resurfacing in 2008.    FAMILY HISTORY:  Coronary artery disease and ovarian cancer.    SOCIAL HISTORY:  She is a former smoker.  She is .  She usually has   about 1 drink daily.    PHYSICAL EXAMINATION:  GENERAL:  She is a well-nourished, well-developed female in no acute distress,   pleasant, cooperative.  VITAL SIGNS:  She has been afebrile.  Temperature is 98, blood pressure   137/68, pulse 91, respiratory rate 18, oxygen saturation 94% on no oxygen.    She weighs 89 kilos.  HEENT:  Normocephalic, atraumatic.  Pupils equal, round, reactive to light.    Extraocular movements intact.  Oropharynx is clear.  NECK:  Supple.  There is no JVD or stridor.  CARDIOVASCULAR:  Regular rate and rhythm, unable to visualize surgical site as   the patient was seen in physical therapy and was unable to remove her pants.  CHEST:  Clear to auscultation bilaterally, unlabored.  Normal respiratory   effort.  ABDOMEN:  Soft, nontender, nondistended.  EXTREMITIES:  Show no cyanosis or clubbing.  There is visible dependent edema,   bilateral lower extremities.  NEUROLOGIC:  She is awake, alert and oriented.  Speech is fluent without   dysarthria.  Cranial nerves are intact.  She is moving all extremities.  PSYCHIATRIC:  Behavior is normal.  Affect is normal.      CURRENT LABORATORY DATA:  White blood cell count was 3.2, now 4.9, H and H of   9.3 and 29, platelets are  273.  Sodium 138, potassium 3.9, chloride 105,   bicarbonate 26, glucose 91, BUN 12, creatinine 0.67.  Pro BNP was 167.    Creatinine on 11/13 was 1.53 with a BUN of 50.  Urinalysis showed large blood,   protein, positive nitrite, large leukocyte esterase, packed wbc's, greater   than 150 rbc's.  Urine culture is showing lactose fermenting gram negative kosta   and pseudomonas resistant to quinolones.  Right proximal hip bone grew   Propionibacterium acnes.  Other cultures are negative so far.    IMAGING:  CT scan of the hip on 11/04 showed marked deformity of the right hip   joint with fragmentation of the femoral head, bony resorption flattening   consistent with chronic avascular necrosis with subchondral collapse of the   femoral head and severe osteoarthritis.  EKG showed a QTc of 458.      ASSESSMENT AND PLAN:  An 84-year-old female recently moved to [place] from   [place] with chronic degenerative joint disease with history of resurfacing   of bilateral hips, admitted with chronic and increasing pain of the right   hip.  She does have a history of cortisone injection, unclear at this time if   this is contributory to her positive cultures.  She underwent   right total hip arthroplasty on 11/6.  She was found to have avascular necrosis and   complete collapse of the femoral head.  Unfortunately, bone cultures have grown Propionibacterium acnes,   although this pathogen is more commonly   seen in infections related to her shoulder surgeries and shoulder replacement, it   has been reported in infection of hip and knee replacements, so would treat this as a true   pathogen rather than contaminant, especially in view of her recent surgery   and hardware placement.  She also has the finding of a catheter related   urinary tract infection.  It sounds like she was symptomatic with hematuria,   urgency, dysuria, hesitancy, and difficulty voiding.  Unfortunately, the   pseudomonas is resistant to Cipro, so we will  currently change her antibiotics   to cefepime 2 g IV q. 12 hours to complete 7 days of treatment, which she   will complete on 11/25/2019 and then switch to once daily ceftriaxone 2 g IV   daily to complete an additional 5 weeks of antibiotic therapy to complete a   total course of 6 weeks of IV antibiotics for her left hip osteomyelitis (Stop date 12/29/2019).  She   did have leukopenia.  This has resolved spontaneously.  She did have evidence   of prerenal azotemia, which is improved with IV fluids.  Continue to monitor   renal function at least weekly and we will dose adjust antibiotics   accordingly.  Discussed at length with Dr. Ramirez.  There is plan for PICC   line placement.  Further recommendations per clinical course.      Thank you and we will follow with you.       ____________________________________     MD SIMIN MOONEY / DICK    DD:  11/19/2019 13:50:45  DT:  11/19/2019 16:15:15    D#:  [id number]  Job#:  [id number]

## 2019-11-20 NOTE — PROGRESS NOTES
"Rehab Progress Note     Date of Service: 11/20/2019  Chief Complaint: Follow-up total hip arthroplasty    Interval Events (Subjective)    Patient seen and examined today in her room.  She has multiple family members present.  We discussed the plan of care in terms of her antibiotics for her bladder as well as her hip infection.  Patient has been accepted to skilled but cannot go there until her PICC line is placed.  This test to occur at the acute hospital.  We also discussed the plan for follow-up with urology given her continued urinary retention.  Patient reports her right foot is very painful from the wound care that she had this morning.  Patient has no other new complaints and seems to be taking this all quite well.     Objective:  VITAL SIGNS: /68   Pulse 100   Temp 37.1 °C (98.7 °F) (Temporal)   Resp 20   Ht 1.651 m (5' 5\")   Wt 88.9 kg (195 lb 15.8 oz)   SpO2 98%   BMI 32.61 kg/m²   Gen: alert, no apparent distress  CV: regular rate and rhythm, no murmurs, improved peripheral edema  Resp: clear to ascultation bilaterally, normal respiratory effort  GI: soft, non-tender abdomen, bowel sounds present      Recent Results (from the past 72 hour(s))   CBC WITHOUT DIFFERENTIAL    Collection Time: 11/18/19  5:20 AM   Result Value Ref Range    WBC 4.9 4.8 - 10.8 K/uL    RBC 2.86 (L) 4.20 - 5.40 M/uL    Hemoglobin 9.3 (L) 12.0 - 16.0 g/dL    Hematocrit 29.5 (L) 37.0 - 47.0 %    .1 (H) 81.4 - 97.8 fL    MCH 32.5 27.0 - 33.0 pg    MCHC 31.5 (L) 33.6 - 35.0 g/dL    RDW 46.7 35.9 - 50.0 fL    Platelet Count 273 164 - 446 K/uL    MPV 9.3 9.0 - 12.9 fL   Basic Metabolic Panel    Collection Time: 11/18/19  5:20 AM   Result Value Ref Range    Sodium 138 135 - 145 mmol/L    Potassium 3.9 3.6 - 5.5 mmol/L    Chloride 105 96 - 112 mmol/L    Co2 26 20 - 33 mmol/L    Glucose 91 65 - 99 mg/dL    Bun 12 8 - 22 mg/dL    Creatinine 0.67 0.50 - 1.40 mg/dL    Calcium 8.6 8.5 - 10.5 mg/dL    Anion Gap 7.0 0.0 - 11.9 "   ESTIMATED GFR    Collection Time: 11/18/19  5:20 AM   Result Value Ref Range    GFR If African American >60 >60 mL/min/1.73 m 2    GFR If Non African American >60 >60 mL/min/1.73 m 2       Current Facility-Administered Medications   Medication Frequency   • cefepime (MAXIPIME) 2 g in  mL IVPB Q12HRS   • cyanocobalamin (VITAMIN B12) tablet 1,000 mcg DAILY   • furosemide (LASIX) tablet 20 mg Q DAY   • potassium chloride SA (Kdur) tablet 10 mEq DAILY   • cyclobenzaprine (FLEXERIL) tablet 5 mg TID PRN   • vitamin D (cholecalciferol) tablet 2,000 Units DAILY   • tamsulosin (FLOMAX) capsule 0.4 mg QHS   • amLODIPine (NORVASC) tablet 5 mg Q DAY   • Respiratory Care per Protocol Continuous RT   • Pharmacy Consult Request ...Pain Management Review 1 Each PHARMACY TO DOSE   • acetaminophen (TYLENOL) tablet 650 mg Q4HRS PRN   • artificial tears ophthalmic solution 1 Drop PRN   • benzocaine-menthol (CEPACOL) lozenge 1 Lozenge Q2HRS PRN   • mag hydrox-al hydrox-simeth (MAALOX PLUS ES or MYLANTA DS) suspension 20 mL Q2HRS PRN   • ondansetron (ZOFRAN ODT) dispertab 4 mg 4X/DAY PRN    Or   • ondansetron (ZOFRAN) syringe/vial injection 4 mg 4X/DAY PRN   • traZODone (DESYREL) tablet 50 mg QHS PRN   • sodium chloride (OCEAN) 0.65 % nasal spray 2 Spray PRN   • hydrOXYzine HCl (ATARAX) tablet 50 mg Q6HRS PRN   • melatonin tablet 3 mg HS PRN   • lactulose 20 GM/30ML solution 30 mL QDAY PRN   • fleet enema 133 mL QDAY PRN   • senna-docusate (PERICOLACE or SENOKOT S) 8.6-50 MG per tablet 2 Tab BID    And   • polyethylene glycol/lytes (MIRALAX) PACKET 1 Packet QDAY PRN    And   • magnesium hydroxide (MILK OF MAGNESIA) suspension 30 mL QDAY PRN    And   • bisacodyl (DULCOLAX) suppository 10 mg QDAY PRN   • aspirin (ASA) chewable tab 81 mg BID   • diazePAM (VALIUM) tablet 5 mg TID PRN   • gabapentin (NEURONTIN) capsule 100 mg TID   • omeprazole (PRILOSEC) capsule 20 mg DAILY   • oyster shell calcium/vitamin D 250-125 MG-UNIT tablet 1  Tab DAILY   • acetaminophen (TYLENOL) tablet 1,000 mg TID   • hydrocortisone 1 % cream BID       Orders Placed This Encounter   Procedures   • Diet Order Regular     Standing Status:   Standing     Number of Occurrences:   1     Order Specific Question:   Diet:     Answer:   Regular [1]       Assessment:  Active Hospital Problems    Diagnosis   • *Status post total replacement of right hip   • Degenerative joint disease of right hip   • Acute kidney injury (HCC)   • Urinary retention   • Vitamin D insufficiency   • AVN (avascular necrosis of bone) (HCC)   • Anemia   • Impaired mobility and ADLs   • Hypertension   • Hypoalbuminemia   • Radiculopathy   • Bilateral leg edema     84 yr old female admitted to acute rehab on 11/12 with AVN of her right femoral head, s/p SUNSHINE on 11/6.    I led and attended the weekly conference, and agree with the IDT conference documentation and plan of care as noted below.    Date of conference: 11/18/2019    Goals and barriers: See IDT note.    Biggest barriers: urinary retention, right foot pain, wound cultures    Currently mod indep in room with walker    Anticipated DC date: 11/20/2019, to SNF due to continued need for IV antiobiotics    Outpatient PT    Equip: has her own walker    Follow up: PCP, surgery, outpatient wound care, possibly urology    Medical Decision Making and Plan:    Right AVN of femur  S/p SUNSHINE Dr. Bocanegra 11/6  PT/OT, 1.5 hr each discipline, 5 days per week  Dressing removed POD 10  Sutures removed  Outpatient follow up  +wound culture from hip biospy - see below    Pain management  Scheduled tylenol  D/c Celebrex due to ALEK, see below  Scheduled gabapentin  Changed Flexeril to PRN  Currently well controlled    Bowel  Meds as needed  Last BM 11/18    Bladder  Acute urinary retention  Failed another voiding trial 11/18  Continue Flomax, started 11/12  outpatient urology follow up, referral made    DVT prophylaxis  Per surgery ASA 81 mg BID    Appreciate assistance of  hospitalist with her medical co-morbidities:    Acute kidney injury, resolved with IVF  Hypertension  Peripheral edema, improved  Pulmonary hypertension  Respiratory insufficiency, resolved  Rash, back, resolved  Leukopenia, resolved  Anemia, improved  Hypoalbuminemia  Vit D insufficiency, on supplementation  Wounds/blisters, wound care consult  Hematuria/UTI, on IV antibiotics  +hip wound culture, consulted ID, appreciate assistance    Total time:  37 minutes.  I spent greater than 50% of the time for patient care, counseling, and coordination on this date, including patient face-to face time, unit/floor time with review of records/pertinent lab data and studies, as well as discussing diagnostic evaluation/work up, planned therapeutic interventions, and future disposition of care, as per the interval events/subjective and the assessment and plan as noted above.    Entire time today spent discussing the plan of care in terms of her bladder, antibiotics, PICC line placement, and transferred to skilled nursing facility with the patient and the family.      Justina Ramirez M.D.   Physical Medicine and Rehabilitation

## 2019-11-20 NOTE — THERAPY
"Occupational Therapy  Daily Treatment     Patient Name: Janessa Cain  Age:  84 y.o., Sex:  female  Medical Record #: [id number]  Today's Date: 2019     Precautions  Precautions: Posterior Hip Precautions, Weight Bearing As Tolerated Right Lower Extremity  Comments: hannon, UTI     Safety   ADL Safety : (P) Modified Independent  Bathroom Safety: (P) Modified Independent  Comments: (P) Setup to cover foot wounds for shower. Mod I for all other ADL activity with use of AE for LB ADLs.     Subjective    \"I am dying for a shower\"     Objective       19 1231   Precautions   Precautions Posterior Hip Precautions;Weight Bearing As Tolerated Right Lower Extremity   Comments hannon, UTI    Safety    ADL Safety  Modified Independent   Bathroom Safety Modified Independent   Comments Setup to cover foot wounds for shower. Mod I for all other ADL activity with use of AE for LB ADLs.    OT Total Time Spent   OT Individual Total Time Spent (Mins) 60   OT Charge Group   OT Self Care / ADL 4       FIM Grooming Score:  7 - Independent  Grooming Description:  (at sink standing)    FIM Bathing Score:  5 - Standby Prompting/Supervision or Set-up  Bathing Description:       FIM Upper Body Dressin - Independent  Upper Body Dressing Description:       FIM Lower Body Dressing Score:  6 - Modified Independent  Lower Body Dressing Description:  Reacher, Increased time(able to manage catheter with increased time, reacher for hip precautions. Did not don socks due to bilateral foot wounds, donned slip on shoes)    FIM Toiletin - Modified Independent  Toileting Description:  (FWW)    FIM Toilet Transfer Score:  6 - Modified Independent  Toilet Transfer Description:  (FWW)    FIM Tub/Shower Transfers Score:  6 - Modified Independent  Tub/Shower Transfers Description:  (FWW)      Assessment    Pt tolerated session well, she is completing ADL routine at modified independent level using FWW for mobility, reacher for " dressing, LH sponge for bathing. She requires setup assist for shower to cover bilateral foot wounds to keep them clean and dry. Demos good integration of posterior hip precautions without cues, requires increased time for activity.     Plan    Prep for d/c tomorrow

## 2019-11-20 NOTE — THERAPY
Physical Therapy   Daily Treatment     Patient Name: Janessa Cain  Age:  84 y.o., Sex:  female  Medical Record #: [id number]  Today's Date: 11/20/2019     Precautions  Precautions: (P) Posterior Hip Precautions, Weight Bearing As Tolerated Right Lower Extremity  Comments: (P) hannon, UTI    Subjective    Pt was sitting in w/c upon arrival and agreeable to tx. Pt requested to work on arm exercises as foot was hurting from debridement.      Objective       11/20/19 1101   Precautions   Precautions Posterior Hip Precautions;Weight Bearing As Tolerated Right Lower Extremity   Comments hannon, UTI   Interdisciplinary Plan of Care Collaboration   Patient Position at End of Therapy Seated;Call Light within Reach;Tray Table within Reach;Phone within Reach   PT Total Time Spent   PT Individual Total Time Spent (Mins) 30   PT Charge Group   PT Therapeutic Exercise 2     UE arm bike: 5min x 2 fwd 5 x 2 retro level 3    Assessment    Pt was participatory in session    Plan    Prepare to dc for skilled

## 2019-11-20 NOTE — ASSESSMENT & PLAN NOTE
Has edema of B/L LE with right side > left side  Was on high dose Lasix before and got dehydrated -- resolved  On Lasix: 20 mg daily (11/20)  On KCL: 10 meq daily  Monitor K+: 3.9 (11/18)

## 2019-11-20 NOTE — CARE PLAN
"  Problem: PT-Long Term Goals  Goal: LTG-By discharge, patient will ambulate  Description  1) Individualized goal:  >500' with FWW and SPV   2) Interventions:  PT Group Therapy, PT Gait Training, PT Self Care/Home Eval, PT Therapeutic Exercises, PT Neuro Re-Ed/Balance, PT Therapeutic Activity, PT Manual Therapy and PT Evaluation         Outcome: MET  Goal: LTG-By discharge, patient will transfer one surface to another  Description  1) Individualized goal:  Mod I  2) Interventions: PT Group Therapy, PT Gait Training, PT Self Care/Home Eval, PT Therapeutic Exercises, PT Neuro Re-Ed/Balance, PT Therapeutic Activity, PT Manual Therapy and PT Evaluation         Outcome: MET  Goal: LTG-By discharge, patient will perform home exercise program  Description  1) Individualized goal:  Verbalize 3/3 post hip prec and be independent with HEP  2) Interventions:  PT Group Therapy, PT Gait Training, PT Self Care/Home Eval, PT Therapeutic Exercises, PT Neuro Re-Ed/Balance, PT Therapeutic Activity, PT Manual Therapy and PT Evaluation         Outcome: MET  Goal: LTG-By discharge, patient will transfer in/out of a car  Description  1) Individualized goal:  SPV from FWW level, maintaining post hip prec  2) Interventions:  PT Group Therapy, PT Gait Training, PT Self Care/Home Eval, PT Therapeutic Exercises, PT Neuro Re-Ed/Balance, PT Therapeutic Activity, PT Manual Therapy and PT Evaluation         Outcome: MET  Goal: LTG-By discharge, patient will  Description  1) Individualized goal:  Ascend/descend 6\" curb threshold with FWW and SBA to safely enter/exit her daughter's home  2) Interventions:  PT Group Therapy, PT Gait Training, PT Self Care/Home Eval, PT Therapeutic Exercises, PT Neuro Re-Ed/Balance, PT Therapeutic Activity, PT Manual Therapy and PT Evaluation         Outcome: MET     "

## 2019-11-20 NOTE — DISCHARGE PLANNING
Notified patient, patient's daughter, nursing unit and Dr. Ramirez of plan for transfer to [facility] on 11/20/19 at 1PM.

## 2019-11-20 NOTE — PROGRESS NOTES
Received bedside shift report from Sherine OAKES RN regarding patient and assumed care. Patient awake, calm and stable, currently positioned in bed for comfort and safety; call light within reach. Denies pain or discomfort at this time. Will continue to monitor.

## 2019-11-20 NOTE — PROGRESS NOTES
Hospital Medicine Daily Progress Note      Chief Complaint:  Hypertension  ALEK  UTI    Interval History:  No significant events or changes since last visit    Review of Systems  Review of Systems   Constitutional: Negative for fever.   Eyes: Negative for blurred vision.   Respiratory: Negative for shortness of breath.    Cardiovascular: Negative for palpitations.   Gastrointestinal: Negative for nausea and vomiting.   Neurological: Negative for dizziness and headaches.   Psychiatric/Behavioral: Negative for hallucinations.        Physical Exam  Temp:  [36.5 °C (97.7 °F)-36.7 °C (98 °F)] 36.7 °C (98 °F)  Pulse:  [] 96  Resp:  [16-18] 18  BP: (124-144)/(70-73) 144/70  SpO2:  [98 %] 98 %    Physical Exam  Vitals signs reviewed.   Constitutional:       General: She is not in acute distress.  HENT:      Mouth/Throat:      Mouth: Mucous membranes are moist.      Pharynx: Oropharynx is clear.   Eyes:      General: No scleral icterus.  Neck:      Musculoskeletal: Normal range of motion and neck supple.   Cardiovascular:      Rate and Rhythm: Normal rate and regular rhythm.      Heart sounds: No murmur.   Pulmonary:      Effort: Pulmonary effort is normal.      Breath sounds: Normal breath sounds.   Abdominal:      General: There is no distension.      Palpations: Abdomen is soft.      Tenderness: There is no tenderness.      Comments: obese   Musculoskeletal:      Right lower leg: Edema present.      Left lower leg: Edema present.      Comments: Right hip incision dressed.  Edema R>L.   Skin:     General: Skin is warm and dry.      Findings: No rash.      Comments: Bilat foot wounds dressed   Neurological:      Mental Status: She is alert.   Psychiatric:         Mood and Affect: Mood normal.         Behavior: Behavior normal.         Fluids    Intake/Output Summary (Last 24 hours) at 11/20/2019 0787  Last data filed at 11/20/2019 0567  Gross per 24 hour   Intake 700 ml   Output 600 ml   Net 100 ml        Laboratory  Recent Labs     11/18/19  0520   WBC 4.9   RBC 2.86*   HEMOGLOBIN 9.3*   HEMATOCRIT 29.5*   .1*   MCH 32.5   MCHC 31.5*   RDW 46.7   PLATELETCT 273   MPV 9.3     Recent Labs     11/18/19  0520   SODIUM 138   POTASSIUM 3.9   CHLORIDE 105   CO2 26   GLUCOSE 91   BUN 12   CREATININE 0.67   CALCIUM 8.6                   Assessment/Plan  Edema, lower extremity  Assessment & Plan  Has edema of B/L LE with right side > left side  Was on high dose Lasix before and got dehydrated -- resolved  On Lasix: 20 mg daily (11/20)  On KCL: 10 meq daily  Monitor K+: 3.9 (11/18)    UTI (urinary tract infection)  Assessment & Plan  U/A (+): Pseudomonas & lactose fermenting GNR  F/U C&S  On Cefepime (thru 11/25)    Vitamin D insufficiency- (present on admission)  Assessment & Plan  Vit D: 22  On supplements    Acute kidney injury (HCC)- (present on admission)  Assessment & Plan  Currently resolved  Bun/Cr: wnl  S/P IVF's  Likely 2nd to dehydration    Hypertension- (present on admission)  Assessment & Plan  BP ok  Off Losartan (2nd to ALEK and high trending K+)  On Norvasc: 5 mg daily  Note: on Flomax  Cont to monitor    Anemia- (present on admission)  Assessment & Plan  H&H stable with Hb 9.3  B12: 225 (11/14)  Folate: 11.0  TSH: wnl  On B12 supplements (11/19)  Monitor    AVN (avascular necrosis of bone) ([facility])- (present on admission)  Assessment & Plan  S/P right SUNSHINE (11/6)  Nolvia-incisional blister noted  Afebrile  No leukocytosis (had leukopenia)  U/S RLE: negative for DVT  Anerobic bone culture +single colony of propionibacterium acnes, uncertain clinical significance  On Cefepime (thru 11/25) --> then Rocephin 1g daily for 5 weeks  ID consulted and now following (11/19)    Multiple open wounds of lower leg- (present on admission)  Assessment & Plan  Echo: EF 65-70% with mod PAH  Has blistering wounds on both feet with worsening swelling  F/U wound cultures

## 2019-11-20 NOTE — THERAPY
Missed Therapy     Patient Name: Janessa Cain  Age:  84 y.o., Sex:  female  Medical Record #: [id number]  Today's Date: 11/20/2019    Discussed missed therapy with therapy  and Dr Ramirez.    Pt reports she is leaving for PICC line placement soon and is waiting for transport.

## 2019-11-20 NOTE — THERAPY
Physical Therapy   Daily Treatment     Patient Name: Janessa Cain  Age:  84 y.o., Sex:  female  Medical Record #: [id number]  Today's Date: 11/19/2019     Precautions  Precautions: (P) Posterior Hip Precautions, Weight Bearing As Tolerated Right Lower Extremity  Comments: hannon, UTI    Subjective    Pt received in room, agreeable to PT session. Pt states she is going to skilled nursing tomorrow because of her IV antibiotics.     Objective       11/19/19 1501   Precautions   Precautions Posterior Hip Precautions;Weight Bearing As Tolerated Right Lower Extremity   Cognition    Level of Consciousness Alert   Bed Mobility    Sit to Stand Modified Independent   Interdisciplinary Plan of Care Collaboration   Patient Position at End of Therapy Seated;Call Light within Reach;Tray Table within Reach;Phone within Reach   PT Total Time Spent   PT Individual Total Time Spent (Mins) 30   PT Charge Group   PT Gait Training 1   PT Therapeutic Activities 1       FIM Stairs Score:  5 - Standby Prompting/Supervision or Set-up  Stairs Description:  (pt ascended/descended 2x6 adaptive steps with B HRs, step-to pattern, distant SPV)    Pt ambulated room<>gym with FWW and Mod I.    HEP handout provided to pt.      Assessment    Pt independent with HEP.    Plan    DC to SNF tomorrow d/t IV antibiotics.

## 2019-11-21 VITALS
RESPIRATION RATE: 18 BRPM | BODY MASS INDEX: 32.65 KG/M2 | HEIGHT: 65 IN | WEIGHT: 195.99 LBS | HEART RATE: 98 BPM | TEMPERATURE: 98.3 F | DIASTOLIC BLOOD PRESSURE: 66 MMHG | SYSTOLIC BLOOD PRESSURE: 155 MMHG | OXYGEN SATURATION: 91 %

## 2019-11-21 PROBLEM — M86.9 BONE INFECTION (HCC): Status: ACTIVE | Noted: 2019-11-21

## 2019-11-21 PROBLEM — D72.819 LEUKOPENIA: Status: RESOLVED | Noted: 2019-11-13 | Resolved: 2019-11-21

## 2019-11-21 PROBLEM — M54.10 RADICULOPATHY: Status: RESOLVED | Noted: 2019-11-04 | Resolved: 2019-11-21

## 2019-11-21 PROBLEM — N17.9 ACUTE KIDNEY INJURY (HCC): Status: RESOLVED | Noted: 2019-11-13 | Resolved: 2019-11-21

## 2019-11-21 PROCEDURE — 700102 HCHG RX REV CODE 250 W/ 637 OVERRIDE(OP): Performed by: HOSPITALIST

## 2019-11-21 PROCEDURE — 700101 HCHG RX REV CODE 250: Performed by: PHYSICAL MEDICINE & REHABILITATION

## 2019-11-21 PROCEDURE — 99232 SBSQ HOSP IP/OBS MODERATE 35: CPT | Performed by: HOSPITALIST

## 2019-11-21 PROCEDURE — A9270 NON-COVERED ITEM OR SERVICE: HCPCS | Performed by: HOSPITALIST

## 2019-11-21 PROCEDURE — 700102 HCHG RX REV CODE 250 W/ 637 OVERRIDE(OP): Performed by: PHYSICAL MEDICINE & REHABILITATION

## 2019-11-21 PROCEDURE — 99233 SBSQ HOSP IP/OBS HIGH 50: CPT | Performed by: INTERNAL MEDICINE

## 2019-11-21 PROCEDURE — A9270 NON-COVERED ITEM OR SERVICE: HCPCS | Performed by: PHYSICAL MEDICINE & REHABILITATION

## 2019-11-21 PROCEDURE — 700105 HCHG RX REV CODE 258: Performed by: INTERNAL MEDICINE

## 2019-11-21 PROCEDURE — 99239 HOSP IP/OBS DSCHRG MGMT >30: CPT | Performed by: PHYSICAL MEDICINE & REHABILITATION

## 2019-11-21 PROCEDURE — 700111 HCHG RX REV CODE 636 W/ 250 OVERRIDE (IP): Performed by: INTERNAL MEDICINE

## 2019-11-21 RX ORDER — TRAZODONE HYDROCHLORIDE 50 MG/1
50 TABLET ORAL
Qty: 30 TAB | Refills: 3
Start: 2019-11-21 | End: 2020-05-21

## 2019-11-21 RX ORDER — AMLODIPINE BESYLATE 5 MG/1
5 TABLET ORAL DAILY
Qty: 30 TAB
Start: 2019-11-22 | End: 2020-05-20

## 2019-11-21 RX ORDER — TAMSULOSIN HYDROCHLORIDE 0.4 MG/1
0.4 CAPSULE ORAL
Qty: 30 CAP
Start: 2019-11-21 | End: 2020-05-20

## 2019-11-21 RX ORDER — POTASSIUM CHLORIDE 750 MG/1
10 TABLET, EXTENDED RELEASE ORAL DAILY
Qty: 60 TAB | Refills: 11
Start: 2019-11-22 | End: 2020-05-21

## 2019-11-21 RX ORDER — ACETAMINOPHEN 325 MG/1
650 TABLET ORAL EVERY 4 HOURS PRN
Qty: 30 TAB | Refills: 0
Start: 2019-11-21 | End: 2020-05-20

## 2019-11-21 RX ORDER — MELATONIN
1000 DAILY
Start: 2019-11-22 | End: 2020-05-21

## 2019-11-21 RX ORDER — OMEPRAZOLE 20 MG/1
20 CAPSULE, DELAYED RELEASE ORAL DAILY
Qty: 30 CAP
Start: 2019-11-22 | End: 2020-05-21

## 2019-11-21 RX ORDER — AMOXICILLIN 250 MG
2 CAPSULE ORAL 2 TIMES DAILY
Qty: 30 TAB | Refills: 0
Start: 2019-11-21 | End: 2021-08-02

## 2019-11-21 RX ORDER — GABAPENTIN 100 MG/1
100 CAPSULE ORAL 3 TIMES DAILY
Qty: 90 CAP
Start: 2019-11-21 | End: 2020-05-21

## 2019-11-21 RX ORDER — CEFTRIAXONE 1 G/1
2 INJECTION, POWDER, FOR SOLUTION INTRAMUSCULAR; INTRAVENOUS DAILY
Refills: 0
Start: 2019-11-26 | End: 2019-12-29

## 2019-11-21 RX ORDER — FUROSEMIDE 20 MG/1
20 TABLET ORAL DAILY
Qty: 60 TAB
Start: 2019-11-22 | End: 2020-11-25

## 2019-11-21 RX ADMIN — Medication 20 ML: at 08:43

## 2019-11-21 RX ADMIN — VITAMIN D, TAB 1000IU (100/BT) 2000 UNITS: 25 TAB at 08:21

## 2019-11-21 RX ADMIN — CALCIUM CARBONATE-CHOLECALCIFEROL TAB 250 MG-125 UNIT 1 TABLET: 250-125 TAB at 08:21

## 2019-11-21 RX ADMIN — CEFEPIME 2 G: 2 INJECTION, POWDER, FOR SOLUTION INTRAVENOUS at 08:42

## 2019-11-21 RX ADMIN — CYCLOBENZAPRINE 5 MG: 10 TABLET, FILM COATED ORAL at 10:58

## 2019-11-21 RX ADMIN — AMLODIPINE BESYLATE 5 MG: 5 TABLET ORAL at 06:11

## 2019-11-21 RX ADMIN — GABAPENTIN 100 MG: 100 CAPSULE ORAL at 08:21

## 2019-11-21 RX ADMIN — ACETAMINOPHEN 1000 MG: 500 TABLET, FILM COATED ORAL at 08:21

## 2019-11-21 RX ADMIN — POTASSIUM CHLORIDE 10 MEQ: 1500 TABLET, EXTENDED RELEASE ORAL at 08:21

## 2019-11-21 RX ADMIN — OMEPRAZOLE 20 MG: 20 CAPSULE, DELAYED RELEASE ORAL at 08:21

## 2019-11-21 RX ADMIN — FUROSEMIDE 20 MG: 20 TABLET ORAL at 06:11

## 2019-11-21 RX ADMIN — CYANOCOBALAMIN TAB 1000 MCG 1000 MCG: 1000 TAB at 08:21

## 2019-11-21 RX ADMIN — ASPIRIN 81 MG 81 MG: 81 TABLET ORAL at 08:21

## 2019-11-21 ASSESSMENT — ENCOUNTER SYMPTOMS
BLURRED VISION: 0
DIZZINESS: 0
CHILLS: 0
COUGH: 0
NAUSEA: 0
DIARRHEA: 0
FEVER: 0
NERVOUS/ANXIOUS: 0
VOMITING: 0
ABDOMINAL PAIN: 0

## 2019-11-21 NOTE — DISCHARGE PLANNING
Patient has returned from having PICC line inserted. Plan is for transfer to skilled care on 11/21/19 @ noon. Updated patient and updated daughter, Matthew, by phone.

## 2019-11-21 NOTE — PROGRESS NOTES
Hospital Medicine Daily Progress Note      Chief Complaint:  Hypertension  ALEK  UTI    Interval History:  No significant events or changes since last visit    Review of Systems  Review of Systems   Constitutional: Negative for fever.   Eyes: Negative for blurred vision.   Respiratory: Negative for cough.    Cardiovascular: Negative for chest pain.   Gastrointestinal: Negative for diarrhea.   Musculoskeletal: Negative for joint pain.   Neurological: Negative for dizziness.   Psychiatric/Behavioral: The patient is not nervous/anxious.         Physical Exam  Temp:  [36.2 °C (97.1 °F)-36.8 °C (98.3 °F)] 36.8 °C (98.3 °F)  Pulse:  [] 98  Resp:  [18] 18  BP: (109-155)/(65-85) 155/66  SpO2:  [91 %-93 %] 91 %    Physical Exam  Vitals signs reviewed.   Constitutional:       Appearance: She is not diaphoretic.   HENT:      Mouth/Throat:      Pharynx: No oropharyngeal exudate or posterior oropharyngeal erythema.   Neck:      Musculoskeletal: Normal range of motion and neck supple.   Cardiovascular:      Rate and Rhythm: Normal rate and regular rhythm.      Heart sounds: No murmur.   Pulmonary:      Effort: Pulmonary effort is normal.      Breath sounds: Normal breath sounds. No stridor.   Abdominal:      General: Bowel sounds are normal.      Palpations: Abdomen is soft.      Tenderness: There is no tenderness.      Comments: obese   Musculoskeletal:      Right lower leg: Edema present.      Left lower leg: Edema present.      Comments: Right hip incision dressed.  Edema R>L.   Skin:     General: Skin is warm and dry.      Findings: No rash.      Comments: Bilat foot wounds dressed   Neurological:      Mental Status: She is alert.   Psychiatric:         Mood and Affect: Mood normal.         Behavior: Behavior normal.         Fluids    Intake/Output Summary (Last 24 hours) at 11/21/2019 0754  Last data filed at 11/21/2019 0616  Gross per 24 hour   Intake 360 ml   Output 1300 ml   Net -940 ml       Laboratory                         Assessment/Plan  Edema, lower extremity  Assessment & Plan  Has edema of B/L LE with right side > left side  Was on high dose Lasix before and got dehydrated -- resolved  On Lasix: 20 mg daily (11/20)  On KCL: 10 meq daily  Monitor K+: 3.9 (11/18)    UTI (urinary tract infection)  Assessment & Plan  U/A (+): Pseudomonas & E. Coli  On Cefepime (thru 11/25)    Vitamin D insufficiency- (present on admission)  Assessment & Plan  Vit D: 22  On supplements    Acute kidney injury (HCC)- (present on admission)  Assessment & Plan  Currently resolved  Bun/Cr: wnl  S/P IVF's  Likely 2nd to dehydration    Hypertension- (present on admission)  Assessment & Plan  BP ok  Off Losartan (2nd to ALEK and high trending K+)  On Norvasc: 5 mg daily  Note: on Flomax  Cont to monitor    Anemia- (present on admission)  Assessment & Plan  H&H stable with Hb 9.3  B12: 225 (11/14)  Folate: 11.0  TSH: wnl  On B12 supplements (11/19)  Monitor    AVN (avascular necrosis of bone) (HCC)- (present on admission)  Assessment & Plan  S/P right SUNSHINE (11/6)  Nolvia-incisional blister noted  Afebrile  No leukocytosis (had leukopenia)  U/S RLE: negative for DVT  Anerobic bone culture +single colony of propionibacterium acnes, uncertain clinical significance  On Cefepime (thru 11/25) --> then Rocephin 1g daily for 5 weeks  ID following    Multiple open wounds of lower leg- (present on admission)  Assessment & Plan  Echo: EF 65-70% with mod PAH  Has blistering wounds on both feet with worsening swelling  Wound cultures: neg to date, cont to follow

## 2019-11-21 NOTE — DISCHARGE PLANNING
Case management Summary:   Met with patient prior to transfer to skilled care - [facility]. F/U phone call to her daughter, Matthew, who asked for a contact at [facility] to help coordinate patient's discharge after foot wounds improved and bid IV antibiotic completed.   Daughter stated that Dr. Mora's office staff informed her that patient would need to stay at [facility] until Dec. 29, 2019. Daughter is sure patient will not stay for this.  Dr. Mora had verbally indicated to me that patient could go home on once daily IV antibiotic.   I called Cailin at [facility] to see if there is an option to go home with a patient that has Medicare primary (which only allows [facility] administration) and  secondary. Cailin indicated that [facility] would have to run it with the order and insurance information, but put in the information and found that there would be a $350 copay each week. There is a deductible and max out of pocket.   I provided this information to Matthew so that she can assist with getting this set up for patient, if needed. I provided [facility]'s phone number.   I also called Bridgette at [facility] and left a message with detailed information and to call me with any questions.   Informed patient and Matthew that we would have set up wound care at the wound care center, PCP appointment (daughter has an appointment in Jan, 2020 for patient to establish care), surgeon and urology, if needed.  Matthew very active in oversight in patient's care and will ensure patient has appropriate f/u, in conjunction with [facility] Discharge Planner.    Completed application for disabled persons' placard application faxed to [facility].    During hospitalization, I have provided support and education and have been available for questions and information during hours of operation, communicated with therapy team and MD along with providing links/resources  to outside services.    Patient verbalizes  agreement with all plans and has an understanding of the next steps within the post acute services.     Individualized Goals:   1. Improve functional status to return home with support of   2. To establish with PCP  3. F/U MD appointments to be in place when discharged    Outcome:   1. Not met - to skilled, then plan to return home.  2. Appointment in place for Jan. 2020. Daughter working to get her on cancellation list and to get her in sooner.  3. To be done by skilled care discharge planner, with assist from chuy Castro.

## 2019-11-21 NOTE — DISCHARGE SUMMARY
"Rehab Discharge Note    Admission: 11/12/2019    Discharge: 11/21/2019     Admission Diagnosis:   Active Hospital Problems    Diagnosis   • *Status post total replacement of right hip   • Degenerative joint disease of right hip   • Bone infection (HCC)   • Edema, lower extremity   • UTI (urinary tract infection)   • Urinary retention   • Vitamin D insufficiency   • AVN (avascular necrosis of bone) (HCC)   • Anemia   • Impaired mobility and ADLs   • Hypertension   • Hypoalbuminemia   • Multiple open wounds of lower leg       Discharge Diagnosis:  Active Hospital Problems    Diagnosis   • *Status post total replacement of right hip   • Degenerative joint disease of right hip   • Bone infection (HCC)   • Edema, lower extremity   • UTI (urinary tract infection)   • Urinary retention   • Vitamin D insufficiency   • AVN (avascular necrosis of bone) (HCC)   • Anemia   • Impaired mobility and ADLs   • Hypertension   • Hypoalbuminemia   • Multiple open wounds of lower leg       HPI prior to rehab  Per Dr. Park's consult on 11/8: \"The patient is a 84 y.o. female with a past medical history of degenerative disease of the hips with left hip surgery in 2008;  who presented on 11/3/2019  4:27 PM with long-standing extreme pain in the right hip. Patient was seen by orthopedic surgeon in [place] but wanted to wait until she moved to [place] to have surgery. She moved to [place] 1 week ago the prior 3 days to admission has been sleeping in a wheelchair at night because she cannot tolerate laying flat secondary to pain.  She is a walker to ambulate with much pain.  Pain is described as burning, location is leg and foot on the right side.  On presentation she is constipated and dehydrated due to not drinking water because it is difficult for her to get to the bathroom to void.  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 Orthopedics was consulted, and patient underwent total hip arthroplasty on " "11/6/2019 by Dr. Cinthya Mo MD.      OT: Mod assist for lower body dressing and care, mod assist for bed mobility  PT: Mod assist supine to sit, min assist sit to stand, min assist with front wheel walker for gait for 50 feet     Medical complexity   Anemia with hemoglobin at 9.2, pain control, muscle spasms, prerenal azotemia, hyperkalemia, hyponatremia, questionable UTI, questionable radiculopathy-patient cannot tolerate MRI due to anxiety.\"     Patient current reports her hip pain is much improved since her surgery.  She is also very concerned about the edema she has in her legs which was actually what prompted her to present to urgent care.  We discussed the results of her echocardiogram as well as her ultrasound which were negative for any clots in her legs.  Currently on oxygen and denies a history of being on oxygen at home.   is at bedside.  He has moved her bowels since surgery, and reports frequency of urination since being on the Lasix.     Patient was evaluated by Rehab Medicine physician and Physical Therapy and Occupational Therapy and determined to be appropriate for acute inpatient rehab and was transferred to [facility] on 11/12/2019 11:26 AM.    Rehab Hospital Course    Right AVN of femur  S/p SUNSHINE Dr. Bocanegra 11/6  Dressing removed POD 10  Sutures removed POD 14  Outpatient follow up  +wound culture from hip biospy - see below     Pain management  Scheduled tylenol  D/c Celebrex due to ALEK, see below  Scheduled gabapentin  Changed Flexeril to PRN  Currently well controlled     Bowel  Meds as needed  Last BM 11/18     Bladder  Acute urinary retention  Failed 2 voiding trials 11/12 and  11/18  Continue Flomax, started 11/12  Outpatient urology follow up, referral made  Cath replaced 11/18     DVT prophylaxis  Per surgery ASA 81 mg BID, continue until cleared to stop by shaheen  RLRAHEL doppler on 11/17 negative for DVT     Appreciated assistance of hospitalist with her " medical co-morbidities:     Acute kidney injury, resolved with IVF  Hypertension, stable during admission  Peripheral edema, improved  Pulmonary hypertension  Respiratory insufficiency, resolved  Rash, back, resolved  Leukopenia, resolved  Anemia, improved  Hypoalbuminemia, seen by dietician  Vit D insufficiency, on supplementation  Wounds/blisters, wound care consult  Hematuria/UTI, on IV antibiotics for one more week  +hip wound culture, consulted ID, appreciate assistance:    Per Dr. Mora:    An 84-year-old female recently moved to [place] from   [place] with chronic degenerative joint disease with history of resurfacing of bilateral hips, admitted with chronic and increasing pain of the right hip. She does have a history of cortisone injection, unclear at this time if this is contributory to her positive cultures.  She underwent right total hip arthroplasty on 11/6. She was found to have avascular necrosis and complete collapse of the femoral head.  Unfortunately, bone cultures have grown Propionibacterium acnes, although this pathogen is more commonly seen in infections related to her shoulder surgeries and shoulder replacement, it has been reported in infection of hip and knee replacements, so would treat this as a true  pathogen rather than contaminant, especially in view of her recent surgery and hardware placement.  She also has the finding of a catheter related urinary tract infection.  It sounds like she was symptomatic with hematuria, urgency, dysuria, hesitancy, and difficulty voiding. Unfortunately, the pseudomonas is resistant to Cipro, so we will currently change her antibiotics to cefepime 2 g IV q. 12 hours to complete 7 days of treatment, which she   will complete on 11/25/2019 and then switch to once daily ceftriaxone 2 g IV daily to complete an additional 5 weeks of antibiotic therapy to complete a total course of 6 weeks of IV antibiotics for her left hip osteomyelitis (Stop date 12/29/2019).  " She did have leukopenia.  This has resolved spontaneously.  he did have evidence of prerenal azotemia, which is improved with IV fluids.  Continue to monitor renal function at least weekly and we will dose adjust antibiotics accordingly.  Discussed at length with Dr. Ramirez.  There is plan for PICC line placement.  Further recommendations per clinical course.\"    Lab Results   Component Value Date/Time    SODIUM 138 2019 05:20 AM    POTASSIUM 3.9 2019 05:20 AM    CHLORIDE 105 2019 05:20 AM    CO2 26 2019 05:20 AM    GLUCOSE 91 2019 05:20 AM    BUN 12 2019 05:20 AM    CREATININE 0.67 2019 05:20 AM      Lab Results   Component Value Date/Time    WBC 4.9 2019 05:20 AM    RBC 2.86 (L) 2019 05:20 AM    HEMOGLOBIN 9.3 (L) 2019 05:20 AM    HEMATOCRIT 29.5 (L) 2019 05:20 AM    .1 (H) 2019 05:20 AM    MCH 32.5 2019 05:20 AM    MCHC 31.5 (L) 2019 05:20 AM    MPV 9.3 2019 05:20 AM    NEUTSPOLYS 51.30 2019 05:09 AM    LYMPHOCYTES 22.00 2019 05:09 AM    MONOCYTES 11.80 2019 05:09 AM    EOSINOPHILS 13.70 (H) 2019 05:09 AM    BASOPHILS 0.90 2019 05:09 AM            Functional Status at Discharge  Eatin - Independent  Eating Description:  Increased time  Groomin - Independent  Grooming Description:  (at sink standing)  Bathin - Standby Prompting/Supervision or Set-up  Bathing Description:  Set-up of equipment(setup to cover bilateral LE wounds. Use of LH sponge to manage feet)  Upper Body Dressin - Independent  Upper Body Dressing Description:  Increased time  Lower Body Dressin - Modified Independent  Lower Body Dressing Description:  6 - Modified Independent  Discharge Location : Home  Patient Discharging with Assist of: Family   Level of Supervision Required: Intermittent Supervision  Recommended Equipment for Discharge: Front-Wheeled Walker;Reacher;Sock Aid;Shower Chair  Long " Term Goals Met: 3  Long Term Goals Not Met: 0  Criteria for Termination of Services: Maximum Function Achieved for Inpatient Rehabilitation  Walk:  6 - Modified Independent  Distance Walked:  Walks a minimum of 150 feet  Walk Description:  Walker(mod I w/ ' x 2)  Wheelchair:  1 - Total Assistance  Distance Propelled:  Propels less than 50 feet   Wheelchair Description:  Extra time, Requires incidental assist(45 ft in gym, around various obstacles, UE propulsion)  Stairs 5 - Standby Prompting/Supervision or Set-up  Stairs Description(pt ascended/descended 2x6 adaptive steps with B HRs, step-to pattern, distant SPV)  Discharge Location: Skilled Nursing Facility  Patient Discharging with Assist of: Other (See Comments)(SNF staff)  Level of Supervision Required Upon Discharge: No Supervision  Recommended Equipment for Discharge: Front-Wheeled Walker  Recommeded Services Upon Discharge: Outpatient Physical Therapy  Long Term Goals Met: 5  Long Term Goals Not Met: 0  Reason(s) for Goals Not Met: n/a  Criteria for Termination of Services: Maximum Function Achieved for Inpatient Rehabilitation  Comprehension Mode:  Auditory  Comprehension:  5 - Stand-by Prompting/Supervision or Set-up  Comprehension Description:  Verbal cues  Expression Mode:  Vocal  Expression:  7 - Independent  Expression Description:     Social Interaction:  7 - Independent  Social Interaction Description:     Problem Solvin - Standby Prompting/Supervision or Set-up  Problem Solving Description:  Verbal cueing  Memory:  5 - Standby Prompting/Supervision or Set-up  Memory Description:  Verbal cueing       Discharge Medication:     Medication List      START taking these medications      Instructions   acetaminophen 325 MG Tabs  Commonly known as:  TYLENOL   Take 2 Tabs by mouth every four hours as needed.  Dose:  650 mg     amLODIPine 5 MG Tabs  Start taking on:  2019  Commonly known as:  NORVASC   Take 1 Tab by mouth every  day.  Dose:  5 mg     cefTRIAXone 1 GM Solr  Start taking on:  November 26, 2019  Commonly known as:  ROCEPHIN   2,000 mg by Intravenous route every day for 33 days.  Dose:  2 g     cyanocobalamin 1000 MCG Tabs  Start taking on:  November 22, 2019  Commonly known as:  VITAMIN B12   Take 1 Tab by mouth every day.  Dose:  1,000 mcg     furosemide 20 MG Tabs  Start taking on:  November 22, 2019  Commonly known as:  LASIX   Take 1 Tab by mouth every day.  Dose:  20 mg     gabapentin 100 MG Caps  Commonly known as:  NEURONTIN   Take 1 Cap by mouth 3 times a day.  Dose:  100 mg     normal saline PF Soln   20 mL by Intravenous route every 12 hours.  Dose:  20 mL     NS SOLN 100 mL with cefepime 2 GM SOLR 2 g   2 g by Intravenous route every 12 hours.  Dose:  2 g     oyster shell calcium/vitamin D 250-125 MG-UNIT Tabs tablet  Start taking on:  November 22, 2019   Take 1 Tab by mouth every day.  Dose:  1 Tab     potassium chloride SA 10 MEQ Tbcr  Start taking on:  November 22, 2019  Commonly known as:  K-DUR   Take 1 Tab by mouth every day.  Dose:  10 mEq     senna-docusate 8.6-50 MG Tabs  Commonly known as:  PERICOLACE or SENOKOT S   Take 2 Tabs by mouth 2 Times a Day.  Dose:  2 Tab     tamsulosin 0.4 MG capsule  Commonly known as:  FLOMAX   Take 1 Cap by mouth every bedtime.  Dose:  0.4 mg     traZODone 50 MG Tabs  Commonly known as:  DESYREL   Take 1 Tab by mouth at bedtime as needed.  Dose:  50 mg     vitamin D3 (cholecalciferol) 1000 UNIT Tabs  Start taking on:  November 22, 2019   Take 1 Tab by mouth every day.  Dose:  1,000 Units        CHANGE how you take these medications      Instructions   omeprazole 20 MG delayed-release capsule  Start taking on:  November 22, 2019  What changed:    · medication strength  · how much to take  · when to take this  · additional instructions  Commonly known as:  PRILOSEC   Take 1 Cap by mouth every day.  Dose:  20 mg        CONTINUE taking these medications      Instructions    aspirin 81 MG Chew chewable tablet  Commonly known as:  ASA   Take 1 Tab by mouth 2 Times a Day.  Dose:  81 mg     cyclobenzaprine 5 MG tablet  Commonly known as:  FLEXERIL   Take 5 mg by mouth 3 times a day as needed for Muscle Spasms.  Dose:  5 mg        STOP taking these medications    acetaminophen-codeine #3 300-30 MG Tabs  Commonly known as:  TYLENOL #3     Calcium-Vitamin D 500-125 MG-UNIT Tabs     celecoxib 200 MG Caps  Commonly known as:  CELEBREX     estrogens, conjugated 0.3 MG Tabs  Commonly known as:  PREMARIN     ibuprofen 200 MG Tabs  Commonly known as:  MOTRIN     losartan 25 MG Tabs  Commonly known as:  COZAAR            Follow-up:  PCP after SNF, orthopedic surgery    Condition on Discharge:  Good.    More than 35 minutes was spent on discharging this patient, including face-to-face time, prescription management, and the dictation of this note.

## 2019-11-21 NOTE — PROGRESS NOTES
Report received from previous shift. Patient lying quietly in bed, offered no complaints and denies pain. Requested prn flexeril with PM meds, which was given. Patient was cooperative with assessment and medication administration. Will continue to monitor.

## 2019-11-21 NOTE — CARE PLAN
Problem: Communication  Goal: The ability to communicate needs accurately and effectively will improve  Outcome: PROGRESSING AS EXPECTED     Problem: Safety  Goal: Will remain free from injury  Outcome: PROGRESSING AS EXPECTED  Goal: Will remain free from falls  Outcome: PROGRESSING AS EXPECTED     Problem: Infection  Goal: Will remain free from infection  Outcome: PROGRESSING AS EXPECTED     Problem: Venous Thromboembolism (VTW)/Deep Vein Thrombosis (DVT) Prevention:  Goal: Patient will participate in Venous Thrombosis (VTE)/Deep Vein Thrombosis (DVT)Prevention Measures  Outcome: PROGRESSING AS EXPECTED     Problem: Bowel/Gastric:  Goal: Normal bowel function is maintained or improved  Outcome: PROGRESSING AS EXPECTED  Goal: Will not experience complications related to bowel motility  Outcome: PROGRESSING AS EXPECTED     Problem: Knowledge Deficit  Goal: Knowledge of disease process/condition, treatment plan, diagnostic tests, and medications will improve  Outcome: PROGRESSING AS EXPECTED  Goal: Knowledge of the prescribed therapeutic regimen will improve  Outcome: PROGRESSING AS EXPECTED     Problem: Discharge Barriers/Planning  Goal: Patient's continuum of care needs will be met  Outcome: PROGRESSING AS EXPECTED     Problem: Respiratory:  Goal: Respiratory status will improve  Outcome: PROGRESSING AS EXPECTED     Problem: Pain Management  Goal: Pain level will decrease to patient's comfort goal  Outcome: PROGRESSING AS EXPECTED     Problem: Psychosocial Needs:  Goal: Level of anxiety will decrease  Outcome: PROGRESSING AS EXPECTED     Problem: Skin Integrity  Goal: Risk for impaired skin integrity will decrease  Outcome: PROGRESSING AS EXPECTED

## 2019-11-21 NOTE — PROGRESS NOTES
Infectious Disease Progress Note    Author: Zamzam Mora M.D. 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 +UTI   AF had PICC placed-tolerating abx well. Pain controlled. Still requiring Weathers    Labs Reviewed and Medications Reviewed.    Review of Systems:  Review of Systems   Constitutional: Negative for chills and fever.   Gastrointestinal: Negative for abdominal pain, diarrhea, nausea and vomiting.   Musculoskeletal: Positive for joint pain.   All other systems reviewed and are negative.      Hemodynamics:  Temp (24hrs), Av.6 °C (97.9 °F), Min:36.2 °C (97.1 °F), Max:36.8 °C (98.3 °F)  Temperature: 36.8 °C (98.3 °F)  Pulse  Av.8  Min: 69  Max: 102   Blood Pressure : 155/66       Physical Exam:  Physical Exam  Vitals signs and nursing note reviewed.   Constitutional:       General: She is not in acute distress.     Appearance: She is not toxic-appearing or diaphoretic.   HENT:      Mouth/Throat:      Pharynx: No oropharyngeal exudate.   Eyes:      General:         Right eye: No discharge.         Left eye: No discharge.      Extraocular Movements: Extraocular movements intact.      Pupils: Pupils are equal, round, and reactive to light.   Neck:      Musculoskeletal: Normal range of motion. No muscular tenderness.   Cardiovascular:      Rate and Rhythm: Normal rate.   Pulmonary:      Effort: Pulmonary effort is normal. No respiratory distress.      Breath sounds: No stridor. No rhonchi.   Abdominal:      Palpations: Abdomen is soft.   Genitourinary:     Comments: Weathers  Musculoskeletal:         General: Swelling present.      Comments: LUE PICC   Skin:     General: Skin is warm.   Neurological:      General: No focal deficit present.      Mental Status: She is alert and oriented to person, place, and time.      Cranial Nerves: No cranial nerve deficit.       Motor: No weakness.      Comments: Using walker independently   Psychiatric:         Mood and Affect: Mood normal.         Behavior: Behavior normal.         Thought Content: Thought content normal.         Judgment: Judgment normal.         Meds:    Current Facility-Administered Medications:   •  normal saline PF  •  cefepime  •  cyanocobalamin  •  furosemide  •  potassium chloride SA  •  cyclobenzaprine  •  vitamin D  •  tamsulosin  •  amLODIPine  •  Respiratory Care per Protocol  •  Pharmacy Consult Request  •  acetaminophen  •  artificial tears  •  benzocaine-menthol  •  mag hydrox-al hydrox-simeth  •  ondansetron **OR** ondansetron  •  traZODone  •  sodium chloride  •  hydrOXYzine HCl  •  melatonin  •  lactulose  •  fleet  •  senna-docusate **AND** polyethylene glycol/lytes **AND** magnesium hydroxide **AND** bisacodyl  •  aspirin  •  diazePAM  •  gabapentin  •  omeprazole  •  oyster shell calcium/vitamin D  •  acetaminophen  •  hydrocortisone    Labs:  No results for input(s): WBC, RBC, HEMOGLOBIN, HEMATOCRIT, MCV, MCH, RDW, PLATELETCT, MPV, NEUTSPOLYS, LYMPHOCYTES, MONOCYTES, EOSINOPHILS, BASOPHILS, RBCMORPHOLO in the last 72 hours.  No results for input(s): SODIUM, POTASSIUM, CHLORIDE, CO2, GLUCOSE, BUN, CPKTOTAL in the last 72 hours.  No results for input(s): ALBUMIN, TBILIRUBIN, ALKPHOSPHAT, TOTPROTEIN, ALTSGPT, ASTSGOT, CREATININE in the last 72 hours.    Imaging:  Ct-hip W/o Plus Recons Right    Result Date: 11/4/2019 11/4/2019 3:19 PM HISTORY/REASON FOR EXAM:  Right-sided hip pain.. TECHNIQUE/EXAM DESCRIPTION AND NUMBER OF VIEWS: CT scan of the RIGHT lower extremity without contrast and including reconstructions. Thin-section noncontrast helical images were obtained. Coronal and sagittal reconstructions were generated from the axial images. Up to date radiation dose reduction adjustments have been utilized to meet ALARA standards for radiation dose reduction. COMPARISON: None. FINDINGS: There is marked  deformity of the right femoral head. There is fragmentation of the femoral head with flattening and surrounding bony debris. There is resultant subluxation of the femoral head with respect to the acetabulum. There is extensive full-thickness joint space loss with subchondral sclerosis and subchondral cystic change involving the acetabular aspect of the joint. There is no evidence of surrounding pelvic fracture. There has been prior left hip resurfacing procedure. There is a right hip joint effusion. There is degenerative change of the lumbar spine.     1.  Marked deformity of the right hip joint characterized by fragmentation of the femoral head with bony resorption and flattening of the femoral head. 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 Acuity of the fragmentation cannot be assessed. There is also secondary severe osteoarthritis present. 2.  Prior left hip resurfacing procedure.    Dx-chest-portable (1 View)    Result Date: 11/3/2019  11/3/2019 5:19 PM HISTORY/REASON FOR EXAM:  Chest Pain. TECHNIQUE/EXAM DESCRIPTION AND NUMBER OF VIEWS: Single portable view of the chest. COMPARISON: None FINDINGS: Single portable view of the chest demonstrates a normal cardiac silhouette and mediastinal contours.   There is calcification in the aorta. No discrete opacity, pleural fluid, or pneumothorax. Left hemidiaphragm is slightly elevated. No suspicious bony lesions.     No acute cardiopulmonary findings.    Dx-pelvis-1 Or 2 Views    Result Date: 11/6/2019 11/6/2019 5:10 PM HISTORY/REASON FOR EXAM:  post surgery. TECHNIQUE/EXAM DESCRIPTION AND NUMBER OF VIEWS:  1 view of the pelvis. COMPARISON: 11/3/2019 FINDINGS: Right hip arthroplasty has been performed. No periprosthetic fracture is detected. Left femoral head prosthesis in place     Interval right hip arthroplasty without evident complication.    Us-extremity Venous Lower Bilat    Result Date:  11/3/2019  11/3/2019 6:30 PM HISTORY/REASON FOR EXAM:  Swelling of Limb; Edema, R>L TECHNIQUE/EXAM DESCRIPTION: Bilateral lower extremity venous ultrasound was performed using both spectral and color flow Doppler. COMPARISON:  None. FINDINGS: Severely limited examination secondary to patient pain and subcutaneous fat edema. Patient had limited mobility. REAL-TIME GRAY-SCALE IMAGING: Real-time gray-scale imaging reveals no evidence of focal wall thickening. COLOR AND DUPLEX DOPPLER IMAGING: Using both spectral and color flow Doppler imaging, multiple images were obtained from the common femoral vein origin distally through the popliteal trifurcation. Multiple segments however are not included in the scan due to lack of visualization. Graded compression was used to demonstrate normal compressibility. There is no evidence of luminal thrombus. There is normal augmentation to flow.     No gross deep venous thrombosis. Severely limited evaluation due to subcutaneous fat edema, patient pain and mobility limitations    Us-extremity Venous Lower Unilat Right    Result Date: 2019   Vascular Laboratory  CONCLUSIONS  Normal superficial and deep veins in visualized structures.  Prior study 19.  NIKOLAI KERN  Exam Date:     2019 16:01  Room #:     [facility]  Priority:     Routine  Ht (in):             Wt (lb):  Ordering Physician:        ZULY KINGSTON  Referring Physician:       ZULY KINGSTON  Sonographer:               Koko Jaffe RDCS, RVT  Study Type:                Complete Unilateral  Technical Quality:         Adequate  Age:    84    Gender:     F  MRN:    [id number]  :    1935      BSA:  Indications:     Swelling of Limb  CPT Codes:       92179  ICD Codes:       729.81  History:         recent right hip surgery  Limitations:  PROCEDURES:  Right lower extremity venous duplex imaging.  The following venous structures were evaluated: common femoral, profunda   femoral, greater saphenous, femoral, popliteal , peroneal and posterior  tibial veins.  Serial compression, augmentation maneuvers,  color and spectral Doppler  flow evaluations were performed.  FINDINGS:  Right lower extremity -.  The peroneal and posterior tibial veins are difficult to assess for  compressibility and color flow due to subcutaneous fat edema. All other  vessels demonstrate complete color filling and compressibility with normal  venous flow dynamics including spontaneous flow, response to augmentation  maneuvers, and respiratory phasicity. No superficial or deep venous  thrombosis of visualized vessels.  Flow was evaluated in the contralateral common femoral vein and normal  venous flow dynamics including spontaneous flow, respiratory phasic  variation and augmentation were demonstrated.  Sharath Negrete MD  (Electronically Signed)  Final Date:      2019                   22:27    Ec-echocardiogram Complete W/o Cont    Result Date: 2019  Transthoracic Echo Report Echocardiography Laboratory CONCLUSIONS Normal left ventricular systolic function. Left ventricular ejection fraction is visually estimated to be 65-70%. Moderate concentric left ventricular hypertrophy. Aortic sclerosis without stenosis. Right heart pressures are consistent with moderate pulmonary hypertension. No prior study is available for comparison. NIKOLAI KERN Exam Date:         2019                    08:09 Exam Location:     Inpatient Priority:          Routine Ordering Physician:        SHERRILL BRYANT Referring Physician:       ANNETTE Rinaldi Sonographer:               Jaden Dougherty RDCS Age:    84     Gender:    F MRN:    [id number] :    1935 BSA:    1.98   Ht (in):    65     Wt (lb):    200 Exam Type:     Complete Indications:     Edema ICD Codes:       782.3 CPT Codes:       35960 BP:   144    /   98     HR:   85 Technical Quality:       Technically difficult  study -                          adequate information is obtained MEASUREMENTS  (Male / Female) Normal Values 2D ECHO LV Diastolic Diameter PLAX        4 cm                  4.2 - 5.9 / 3.9 - 5.3 cm LV Systolic Diameter PLAX         2.7 cm                2.1 - 4.0 cm IVS Diastolic Thickness           1.3 cm                LVPW Diastolic Thickness          1.3 cm                LVOT Diameter                     1.9 cm                RA Diameter                       4 cm                  Estimated LV Ejection Fraction    65 %                  LV Ejection Fraction MOD BP       52.7 %                >= 55  % LV Ejection Fraction MOD 4C       52 %                  LV Ejection Fraction MOD 2C       66.7 %                LA Volume Index                   25.4 cm???/m???           16 - 28 cm???/m??? IVC Diameter                      1.5 cm                DOPPLER AV Peak Velocity                  1.2 m/s               AV Peak Gradient                  6.1 mmHg              AV Mean Gradient                  3.6 mmHg              LVOT Peak Velocity                0.84 m/s              AV Area Cont Eq vti               2.3 cm???               MV Velocity Time Integral         15.1 cm               Mitral E Point Velocity           0.57 m/s              Mitral E to A Ratio               0.8                   MV Pressure Half Time             53.5 ms               MV Area PHT                       4.1 cm???               MV Deceleration Time              185 ms                TR Peak Velocity                  369 cm/s              PV Peak Velocity                  0.82 m/s              PV Peak Gradient                  2.7 mmHg              RVOT Peak Velocity                0.66 m/s              * Indicates values subject to auto-interpretation LV EF:  65    % FINDINGS Left Ventricle Normal left ventricular chamber size. Moderate concentric left ventricular hypertrophy. Normal left ventricular systolic function. Left ventricular  ejection fraction is visually estimated to be 65-70%. Indeterminate diastolic function. Right Ventricle Mildly dilated right ventricle. Normal right ventricular systolic function. Right Atrium Normal right atrial size. Normal inferior vena cava size and inspiratory collapse. Left Atrium Normal left atrial size. Mitral Valve Mitral annular calcification. No mitral stenosis. Trace mitral regurgitation. Aortic Valve Aortic sclerosis without stenosis. No aortic insufficiency. Tricuspid Valve Structurally normal tricuspid valve. No tricuspid stenosis. Mild tricuspid regurgitation. Right atrial pressure is estimated to be 3 mmHg. Estimated right ventricular systolic pressure  is 60 mmHg. Right heart pressures are consistent with moderate pulmonary hypertension. Pulmonic Valve Structurally normal pulmonic valve. No pulmonic stenosis. Trace pulmonic insufficiency. Pericardium No pericardial effusion seen. Aorta Normal aortic root for body surface area. Ascending aorta diameter is 3.8 cm. William Abarca M.D. (Electronically Signed) Final Date:     04 November 2019                 08:35    Dx-hip-unilateral-with Pelvis-1 View Right    Result Date: 11/3/2019  11/3/2019 8:10 PM HISTORY/REASON FOR EXAM:  Atraumatic Pelvic/Hip Pain. TECHNIQUE/EXAM DESCRIPTION AND NUMBER OF VIEWS:  2 views of the RIGHT hip. COMPARISON: None FINDINGS: Postsurgical changes of left hip arthroplasty are seen. There is severe degenerative changes of the right hip with erosion/resumption of the femoral head and acetabular remodeling.     1.  No radiographic evidence of acute traumatic injury. 2.  Severe degenerative changes of the right hip with extensive bony remodeling of the femoral head and acetabula.    Ir-picc Line Placement W/ Guidance > Age 5    Result Date: 11/20/2019  HISTORY/REASON FOR EXAM:   PICC placement. TECHNIQUE/EXAM DESCRIPTION AND NUMBER OF VIEWS:   PICC line insertion with ultrasound guidance.  The procedure was performed  using maximal sterile barrier technique including sterile gown, mask, cap, and donning of sterile gloves following appropriate hand hygiene and/or sterile scrub. Patient skin site was prepped with 2% Chlorhexidine solution. FINDINGS:  PICC line insertion with Ultrasound Guidance was performed by qualified nursing staff without the assistance of a Radiologist. PICC positioning appropriateness confirmed by 3CG technology; chest xray only needed in the instance 3CG unable to confirm placement.              Ultrasound-guided PICC placement performed by qualified nursing staff as above.     Ir-midline Catheter Insertion Wo Guidance > Age 3    Result Date: 11/4/2019  HISTORY/REASON FOR EXAM:  Midline Placement   TECHNIQUE/EXAM DESCRIPTION AND NUMBER OF VIEWS: Midline insertion with ultrasound guidance.  FINDINGS: Midline insertion with Ultrasound Guidance was performed by qualified nursing staff without the assistance of a Radiologist. Midline positioning as measured by RN or as appropriate length of catheter selected.              Ultrasound-guided midline placement performed by qualified nursing staff as above.       Micro:  Results     Procedure Component Value Units Date/Time    GRAM STAIN [[id number]] Collected:  11/20/19 0830    Order Status:  Completed Specimen:  Wound Updated:  11/20/19 2110     Significant Indicator .     Source WND     Site Right Foot     Gram Stain Result Rare WBCs.  Few Gram positive cocci.      Narrative:       HIS #[id number]    URINE CULTURE-EXISTING-LESS THAN 48 HOURS [[id number]]  (Abnormal)  (Susceptibility) Collected:  11/17/19 1115    Order Status:  Completed Specimen:  Urine, Clean Catch Updated:  11/20/19 1746     Significant Indicator POS     Source UR     Site URINE, CLEAN CATCH     Culture Result -      Escherichia coli  >100,000 cfu/mL        Pseudomonas aeruginosa  >100,000 cfu/mL  P.aeruginosa may develop resistance during prolonged therapy  with all antibiotics. Isolates that are  initially susceptible  may become resistant within three to four days after  initiation of therapy. Testing of repeat isolates may be  warranted.      Narrative:       Collected By:[id number] WILDER CAZARES.  None of available indications applicable but checked in order  to proceed w/ order.  Pt w/ gross hematuria.  Indication for culture:->Unexplained new onset of Flank pain  and/or Costovertebral angle tenderness  Collected By:[id number] WILDER CAZARES.    Susceptibility     Escherichia coli (1)     Antibiotic Interpretation Microscan Method Status    Ampicillin Resistant >16 mcg/mL BRENDA Final    Ceftriaxone Sensitive <=1 mcg/mL BRENDA Final    Ceftazidime Sensitive <=1 mcg/mL BRENDA Final    Cefotaxime Sensitive <=2 mcg/mL BRENDA Final    Cefazolin Sensitive <=2 mcg/mL BRENDA Final    Ciprofloxacin Sensitive <=1 mcg/mL BRENDA Final    Cefepime Sensitive <=2 mcg/mL BRENDA Final    Ampicillin/sulbactam Intermediate 16/8 mcg/mL BRENDA Final    Cefotetan Sensitive <=16 mcg/mL BRNEDA Final    Nitrofurantoin Sensitive <=32 mcg/mL BRENDA Final    Gentamicin Resistant >8 mcg/mL BRENDA Final    Levofloxacin Sensitive <=2 mcg/mL BRENDA Final    Pip/Tazobactam Sensitive <=16 mcg/mL BRENDA Final    Trimeth/Sulfa Resistant >2/38 mcg/mL BRENDA Final    Tobramycin Intermediate 8 mcg/mL BRENDA Final          Pseudomonas aeruginosa (2)     Antibiotic Interpretation Microscan Method Status    Ceftazidime Sensitive 4 mcg/mL BRENDA Final    Ciprofloxacin Resistant >2 mcg/mL BRENDA Final    Cefepime Sensitive <=2 mcg/mL BRENDA Final    Meropenem Sensitive <=1 mcg/mL BRENDA Final    Gentamicin Sensitive <=4 mcg/mL BRENDA Final    Levofloxacin Resistant >4 mcg/mL BRENDA Final    Pip/Tazobactam Sensitive <=16 mcg/mL BRENDA Final    Tobramycin Sensitive <=4 mcg/mL BRENDA Final    Amikacin Sensitive <=16 mcg/mL BRENDA Final                   CULTURE WOUND W/ GRAM STAIN [[id number]] Collected:  11/20/19 2830    Order Status:  Completed Specimen:  Other Updated:  11/20/19 1252    Narrative:       HIS #[id number]    CULTURE  WOUND W/ GRAM STAIN [[id number]]     Order Status:  No result Specimen:  Wound from Right Foot     CULTURE WOUND W/ GRAM STAIN [[id number]] Collected:  11/17/19 1530    Order Status:  Sent Specimen:  Wound from Right Foot     URINALYSIS [[id number]]  (Abnormal) Collected:  11/16/19 1330    Order Status:  Completed Specimen:  Urine, Weathers Cath Updated:  11/16/19 1925     Color Yvonne     Character Cloudy     Specific Gravity 1.015     Ph 7.5     Glucose Negative mg/dL      Ketones Negative mg/dL      Protein 100 mg/dL      Bilirubin Negative     Urobilinogen, Urine 1.0     Nitrite Positive     Leukocyte Esterase Large     Occult Blood Large     Micro Urine Req Microscopic    Narrative:       Collected By:[id number] ANCELMO OAKES          Assessment:  Active Hospital Problems    Diagnosis   • *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]   • Degenerative joint disease of right hip [M16.11]   • Bone infection (HCC) [M86.9]   • Edema, lower extremity [R60.0]   • UTI (urinary tract infection) [N39.0]   • AVN (avascular necrosis of bone) ([facility]) [M87.00]   • Multiple open wounds of lower leg [S81.809A]       Plan:  Infected right hip  Afebrile  No leukocytosis  S/p LYLA with screws 11/6  Bone culture +propionobact  Plan for 6 weeks IV abx followed by 3 months PO  Cefepime as below then switch to IV ceftriaxone 2 grams IV daily to complete IV course  Stop date IV abx 12/29/2019    Cath-UTI  Polymicrobial  Improved on abx  Continue cefepime through 11/25/2019  DC Weathers as soon as feasible    LE ulcerations, skin only  Colonized with mult bacteria-pictures reviewed  Left dorsal ulcerations-superficial and do not appear infected-some slough on right, larger, possibly infected  Abx as above  Wound care  If fail to improve, treat enterococcus    FU ID clinic 1-2 weeks    Discussed with internal medicine.

## 2019-11-21 NOTE — PROGRESS NOTES
Patient discharged to [facility] per order. Called Nurse report to Sanjeev OCASIO from [facility]. Reviewed discharge instructions with patient. All questions answered. Patient signed discharge summary and is placed in chart. All belonging sent with patient. Patient left at about 12:20pm.

## 2019-11-21 NOTE — DISCHARGE INSTRUCTIONS
Physical Therapy Discharge Instructions for Janessa Cain    11/20/2019    Weight Bearing Status - Patient Should: Bear Weight as Tolerated Right Leg  Level of Assist Required for Ambulation: No Assist on Flat Surfaces, No Assist on Curbs, Supervision on Stairs  Distance Patient May Ambulate: (as tolerated)  Device Recommended for Ambulation: Front-Wheeled Walker  Level of Assist Required to Propel Wheelchair: Requires No Assist  Level of Assist Required for Transfers: Requires No Assist  Device Recommended for Transfers: Front-Wheeled Walker  Home Exercise Program: Refer to Home Exercise Program Handout for Details  Prosthesis / Orthosis Recommendation / Location: No Prosthesis  or Orthosis Recommended    It has been a pleasure working with you Janessa! Keep up the hard work in your continued recovery!    Occupational Therapy Discharge Instructions for Janessa Cain    11/20/2019    Level of Assist Required for Eating: Able to Complete Eating without Assist  Level of Assist Required for Grooming: Able to Complete Grooming without Assist  Level of Assist Required for Dressing: Able to Complete Dressing without Assist  Equipment for Dressing: Reacher, Sock Aid  Level of Assist Required for Toileting: Able to Complete Toileting without Assist  Level of Assist Required for Toilet Transfer: Able to Complete Toilet Transfer without Assist  Level of Assist Required for Bathing: Able to Complete Bathing without Assist  Equipment for Bathing: Shower Chair, Grab Bars in Tub / Shower  Level of Assist Required for Shower Transfer: Able to Complete Shower Transfer without Assist  Equipment for Shower Transfer: Shower Chair  Level of Assist Required for Home Mgmt: Able to Complete Home Management without Assist  Level of Assist Required for Meal Prep: Able to Complete Meal Preparation without Assist  Driving: Please Contact Physician Prior to Driving  Comments: Pt has made steady progress during ARU  stay, is completing ADLs at modified independent level. Requires setup for bathing due to bilateral LE wounds.     KENDALL Aceves        [facility] NURSING DISCHARGE INSTRUCTIONS    Blood Pressure :   Weight:   Nursing recommendations for Janessa Houston Storm Cain at time of discharge are as follows:  Patient verbalized understanding of all discharge instructions and prescriptions.     Review all your home medications and newly ordered medications with your doctor and/or pharmacist. Follow medication instructions as directed by your doctor and/or pharmacist.    Pain Management:   Discharge Pain Medication Instructions:  Comfort Goal: Comfort at Rest, Comfort with Movement, Sleep Comfortably  Notify your primary care provider if pain is unrelieved with these measures, if the pain is new, or increased in intensity.    Discharge Skin Characteristics:    Discharge Skin Exam:    Wound 11/12/19 Foot full thickness right dorsum foot (Active)   Wound Image   11/20/2019  8:40 AM   Site Assessment Clean;SANJEEV 11/20/2019  9:00 PM   Nolvia-wound Assessment Clean;Intact 11/20/2019  9:00 PM   Wound Length (cm) 4 cm 11/20/2019  8:40 AM   Wound Width (cm) 6.5 cm 11/20/2019  8:40 AM   Wound Depth (cm) 0.1 cm 11/20/2019  8:40 AM   Wound Surface Area (cm^2) 26 cm^2 11/20/2019  8:40 AM   Tunneling 0 cm 11/20/2019  8:40 AM   Undermining 0 cm 11/20/2019  8:40 AM   Treatments Cleansed;Site care 11/20/2019  8:40 AM   Dressing Changed New 11/20/2019  8:40 AM   Dressing Status Intact 11/20/2019  9:00 PM   Dressing Change Frequency Daily 11/20/2019  9:00 PM   NEXT Dressing Change  11/21/19 11/20/2019  8:40 AM   NEXT Weekly Photo (Inpatient Only) 11/27/19 11/20/2019  8:40 AM   WOUND NURSE ONLY - Odor None 11/20/2019  8:40 AM   WOUND NURSE ONLY - Exposed Structures None 11/20/2019  8:40 AM   WOUND NURSE ONLY - Tissue Type and Percentage red with yellow slough 11/20/2019  8:40 AM       Wound 11/12/19 Foot full thickness dorsum left  foot (Active)   Wound Image   11/20/2019  8:40 AM   Site Assessment SANJEEV 11/20/2019  9:00 PM   Wound Length (cm) 1.5 cm 11/20/2019  8:40 AM   Wound Width (cm) 2 cm 11/20/2019  8:40 AM   Wound Depth (cm) 0.1 cm 11/20/2019  8:40 AM   Wound Surface Area (cm^2) 3 cm^2 11/20/2019  8:40 AM   Tunneling 0 cm 11/20/2019  8:40 AM   Undermining 0 cm 11/20/2019  8:40 AM   Treatments Cleansed;Site care 11/20/2019  8:40 AM   Dressing Changed Changed 11/20/2019  8:40 AM   Dressing Status Intact 11/20/2019  9:00 PM   Dressing Change Frequency Every 72 hrs 11/20/2019  9:00 PM   NEXT Dressing Change  11/23/19 11/20/2019  8:40 AM   NEXT Weekly Photo (Inpatient Only) 11/27/19 11/20/2019  8:40 AM   WOUND NURSE ONLY - Odor None 11/20/2019  8:40 AM   WOUND NURSE ONLY - Exposed Structures None 11/20/2019  8:40 AM   WOUND NURSE ONLY - Tissue Type and Percentage red  11/20/2019  8:40 AM   WOUND NURSE ONLY - Time Spent with Patient (mins) 60 11/20/2019  8:40 AM       Wound 11/16/19 Partial Thickness Wound Buttocks medial, from moisture and friction (Active)   Wound Image   11/16/2019  5:45 AM   Site Assessment SANJEEV 11/20/2019  9:00 PM   Nolvia-wound Assessment Intact 11/20/2019  9:00 PM   Margins Undefined edges;Attached edges 11/20/2019  9:00 PM   Wound Length (cm) 4 cm 11/18/2019  6:00 AM   Wound Width (cm) 4.5 cm 11/18/2019  6:00 AM   Wound Depth (cm) 0.2 cm 11/18/2019  6:00 AM   Wound Surface Area (cm^2) 18 cm^2 11/18/2019  6:00 AM   Tunneling 0 cm 11/18/2019  6:00 AM   Undermining 0 cm 11/18/2019  6:00 AM   Treatments Site care 11/18/2019  7:20 AM   Dressing Changed New 11/18/2019  7:20 AM   Dressing Change Frequency Every 72 hrs 11/20/2019  9:00 PM   NEXT Dressing Change  11/21/19 11/20/2019  9:00 AM   NEXT Weekly Photo (Inpatient Only) 11/21/19 11/18/2019  7:20 AM   WOUND NURSE ONLY - Odor Mild 11/18/2019  6:00 AM   WOUND NURSE ONLY - Exposed Structures None 11/18/2019  6:00 AM   WOUND NURSE ONLY - Tissue Type and Percentage 100% red/pink  11/18/2019  6:00 AM   WOUND NURSE ONLY - Time Spent with Patient (mins) 45 11/18/2019  6:00 AM     Skin / Wound Care Instructions: Please contact your primary care physician for any change in skin integrity.     If You Have Surgical Incisions / Wounds:  Monitor surgical site(s) for signs of increased swelling, redness or symptoms of drainage from the site or fever as this could indicate signs and symptoms of infection. If these symptoms are noted, notifiy your primary care provider.      Discharge Safety Instructions: Should Not Be Left Alone In The House     Discharge Safety Concerns: Weakness  The interdisciplinary team has made recommendation that you should not be left alone  in the house due to unsteady gait  Anti-embolic stockings are required during the day and off at night to increase circulation to the lower extremities.    Discharge Diet: Regular     Discharge Liquids: Thin  Discharge Bowel Function:    Please contact your primary care physician for any changes in bowel habits.  Discharge Bowel Program:    Discharge Bladder Function:    Discharge Urinary Devices:        Nursing Discharge Plan:       Total Hip Replacement  Total hip replacement is a surgery to replace your damaged hip joint. Your hip joint is replaced with a man-made (artificial) hip joint. This man-made hip joint is called a prosthesis. This surgery is done to lessen pain and improve movement.  What happens before the procedure?  · Do not eat or drink anything after midnight on the night before the procedure or as told by your doctor.  · Ask your doctor about:  ¨ Changing or stopping your normal medicines. This is important if you take diabetes medicines or blood thinners.  ¨ Taking aspirin or ibuprofen medicines. These thin your blood. Do not take these medicines if your doctor tells you not to.  · Plan to have someone take you home after the procedure.  · Ask your health care team how your surgery site will be marked.  · You may be given  medicines that kill germs (antibiotics) to help prevent infection.  What happens during the procedure?  · To help prevent infection:  ¨ Your health care team will wash or sanitize their hands.  ¨ Your skin will be washed with soap.  · An IV tube will be put into one of your veins.  · You will be given one or more of the following:  ¨ A medicine that makes you relaxed (sedative).  ¨ A medicine that makes you fall asleep (general anesthetic).  ¨ A medicine that numbs your body below the waist (spinal anesthetic).  · A cut (incision) will be made in your hip. Your surgeon will take out any damaged parts of your hip joint.  · Your surgeon will then:  ¨ Put a man-made hip joint into your pelvic bone. Screws may be used to keep the hip joint in place.  ¨ Take out the damaged ball of your thigh bone (femur). A man-made ball on a metal pole will replace the damaged ball.  ¨ The ball will be put into the new socket to make a new hip joint. Your hip joint will be checked to see if it moves as it should.  ¨ Close the cut and place a bandage over it.  What happens after the procedure?  · You will stay in a recovery area until your medicines wear off.  · Your nurse will monitor your vital signs. These include:  ¨ Your pulse.  ¨ Your blood pressure.  · Once you are doing okay, you will be taken to your hospital room.  · You may be told to take actions to help prevent blood clots. These may include:  ¨ Walking soon after surgery with someone helping you. Moving around helps to improve blood flow.  ¨ Taking medicines to thin your blood (anticoagulants).  ¨ Wearing special socks (compression stockings) or using other types of devices.  · You will do exercise therapy (physical therapy) until you are doing well. Your doctor will tell you when you are well enough to go home.  This information is not intended to replace advice given to you by your health care provider. Make sure you discuss any questions you have with your health care  provider.  Document Released: 03/11/2013 Document Revised: 08/21/2017 Document Reviewed: 02/18/2015  [employer] Interactive Patient Education © 2017 [employer]      How can pain medicine affect me?  You were prescribed pain medicine. This medicine may:  · Make you tired or sleepy.  · Make you feel dizzy.  · Affect how well you can:  ¨ Drive  ¨ Do certain activities.  Pain medicine may not make all of your pain go away. You should be comfortable enough to:  · Move.  · Breathe.  · Take care of yourself.  How often should I take pain medicine and how much should I take?  · Take pain medicine only as told by your doctor and only as needed for pain.  · You do not need to take pain medicine if you are not having pain, unless your doctor tells you to do that.  · You can take less than the prescribed dose if you find that less medicine helps your pain.  · If you have very bad (severe) pain, call your doctor. Do not take more pills than told by your doctor. Do not take pills more often than told by your doctor.  What should I avoid while I am taking pain medicine?  Follow these instructions after you start taking pain medicine, while you are taking the medicine, and for 8 hours after you stop taking the medicine:  · Do not drive.  · Do not use machinery.  · Do not use power tools.  · Do not sign legal documents.  · Do not drink alcohol.  · Do not take sleeping pills.  · Do not take care of children by yourself.  · Do not do any activities that involve climbing or being in high places.  · Do not go into any body of water unless there is an adult nearby who can watch and help you. This includes:  ¨ Lakes.  ¨ Rivers.  ¨ Oceans.  ¨ Spas.  ¨ Swimming pools.  How can I keep others safe while I am taking pain medicine?  · Store your pain medicine as told by your doctor. Make sure that you keep it where children and pets cannot reach it.  · Do not share your pain medicine with anyone.  · Do not save any leftover pills. If you have  any leftover pain medicine, get rid of it or destroy it as told by your doctor.  What else do I need to know about taking pain medicine?  · Use a poop (stool) softener if you have trouble pooping (constipation) because of your pain medicine. Eating more fruits and vegetables also helps with constipation.  · Write down the times when you take your pain medicine. Look at the times before you take your next dose of medicine.  · Your pain medicine might have acetaminophen in it. Do not take any other acetaminophen while you are taking this medicine. An overdose of acetaminophen can do very bad damage to your liver. If you are taking any medicines in addition to your pain medicine, check the active ingredients on those medicines to see if acetaminophen is listed.  When should I call my doctor?  · Your medicine is not helping the pain.  · You do either of these soon after you take the medicine:  ¨ Throw up (vomit).  ¨ Have watery poop (diarrhea).  · You have new pain in areas that did not hurt before.  · You have an allergic reaction to your medicine. This may include:  ¨ Feeling itchy.  ¨ Swelling.  ¨ Feeling dizzy.  ¨ Getting a new rash.  · You cannot put up with feeling:  ¨ Dizzy.  ¨ Sick to your stomach (nauseous).  When should I call 911 or go to the emergency room?  · You pass out (faint).  · You feel very confused.  · You throw up again and again.  · Your skin or lips turn pale or bluish in color.  · You are:  ¨ Short of breath.  ¨ Breathing much more slowly than usual.  · You have a very bad allergic reaction to your medicine. This includes:  ¨ Developing a swollen tongue.  ¨ Having trouble breathing.  This information is not intended to replace advice given to you by your health care provider. Make sure you discuss any questions you have with your health care provider.  Document Released: 06/05/2009 Document Revised: 08/24/2017 Document Reviewed: 10/22/2015  © 2017 [employer]      Hypertension  Hypertension is  another name for high blood pressure. High blood pressure forces your heart to work harder to pump blood. A blood pressure reading has two numbers, which includes a higher number over a lower number (example: 110/72).  Follow these instructions at home:  · Have your blood pressure rechecked by your doctor.  · Only take medicine as told by your doctor. Follow the directions carefully. The medicine does not work as well if you skip doses. Skipping doses also puts you at risk for problems.  · Do not smoke.  · Monitor your blood pressure at home as told by your doctor.  Contact a doctor if:  · You think you are having a reaction to the medicine you are taking.  · You have repeat headaches or feel dizzy.  · You have puffiness (swelling) in your ankles.  · You have trouble with your vision.  Get help right away if:  · You get a very bad headache and are confused.  · You feel weak, numb, or faint.  · You get chest or belly (abdominal) pain.  · You throw up (vomit).  · You cannot breathe very well.  This information is not intended to replace advice given to you by your health care provider. Make sure you discuss any questions you have with your health care provider.  Document Released: 06/05/2009 Document Revised: 05/25/2017 Document Reviewed: 10/10/2014  [employer] Interactive Patient Education © 2017 [employer]        Fall Prevention in the Home  Introduction  Falls can cause injuries. They can happen to people of all ages. There are many things you can do to make your home safe and to help prevent falls.  What can I do on the outside of my home?  · Regularly fix the edges of walkways and driveways and fix any cracks.  · Remove anything that might make you trip as you walk through a door, such as a raised step or threshold.  · Trim any bushes or trees on the path to your home.  · Use bright outdoor lighting.  · Clear any walking paths of anything that might make someone trip, such as rocks or tools.  · Regularly check  to see if handrails are loose or broken. Make sure that both sides of any steps have handrails.  · Any raised decks and porches should have guardrails on the edges.  · Have any leaves, snow, or ice cleared regularly.  · Use sand or salt on walking paths during winter.  · Clean up any spills in your garage right away. This includes oil or grease spills.  What can I do in the bathroom?  · Use night lights.  · Install grab bars by the toilet and in the tub and shower. Do not use towel bars as grab bars.  · Use non-skid mats or decals in the tub or shower.  · If you need to sit down in the shower, use a plastic, non-slip stool.  · Keep the floor dry. Clean up any water that spills on the floor as soon as it happens.  · Remove soap buildup in the tub or shower regularly.  · Attach bath mats securely with double-sided non-slip rug tape.  · Do not have throw rugs and other things on the floor that can make you trip.  What can I do in the bedroom?  · Use night lights.  · Make sure that you have a light by your bed that is easy to reach.  · Do not use any sheets or blankets that are too big for your bed. They should not hang down onto the floor.  · Have a firm chair that has side arms. You can use this for support while you get dressed.  · Do not have throw rugs and other things on the floor that can make you trip.  What can I do in the kitchen?  · Clean up any spills right away.  · Avoid walking on wet floors.  · Keep items that you use a lot in easy-to-reach places.  · If you need to reach something above you, use a strong step stool that has a grab bar.  · Keep electrical cords out of the way.  · Do not use floor polish or wax that makes floors slippery. If you must use wax, use non-skid floor wax.  · Do not have throw rugs and other things on the floor that can make you trip.  What can I do with my stairs?  · Do not leave any items on the stairs.  · Make sure that there are handrails on both sides of the stairs and use  them. Fix handrails that are broken or loose. Make sure that handrails are as long as the stairways.  · Check any carpeting to make sure that it is firmly attached to the stairs. Fix any carpet that is loose or worn.  · Avoid having throw rugs at the top or bottom of the stairs. If you do have throw rugs, attach them to the floor with carpet tape.  · Make sure that you have a light switch at the top of the stairs and the bottom of the stairs. If you do not have them, ask someone to add them for you.  What else can I do to help prevent falls?  · Wear shoes that:  ¨ Do not have high heels.  ¨ Have rubber bottoms.  ¨ Are comfortable and fit you well.  ¨ Are closed at the toe. Do not wear sandals.  · If you use a stepladder:  ¨ Make sure that it is fully opened. Do not climb a closed stepladder.  ¨ Make sure that both sides of the stepladder are locked into place.  ¨ Ask someone to hold it for you, if possible.  · Clearly юлия and make sure that you can see:  ¨ Any grab bars or handrails.  ¨ First and last steps.  ¨ Where the edge of each step is.  · Use tools that help you move around (mobility aids) if they are needed. These include:  ¨ Canes.  ¨ Walkers.  ¨ Scooters.  ¨ Crutches.  · Turn on the lights when you go into a dark area. Replace any light bulbs as soon as they burn out.  · Set up your furniture so you have a clear path. Avoid moving your furniture around.  · If any of your floors are uneven, fix them.  · If there are any pets around you, be aware of where they are.  · Review your medicines with your doctor. Some medicines can make you feel dizzy. This can increase your chance of falling.  Ask your doctor what other things that you can do to help prevent falls.  This information is not intended to replace advice given to you by your health care provider. Make sure you discuss any questions you have with your health care provider.  Document Released: 10/14/2010 Document Revised: 05/25/2017 Document Reviewed:  01/22/2016  © 2017 [employer]      Suicidal Feelings: How to Help Yourself  Suicide is the taking of one's own life. If you feel as though life is getting too tough to handle and are thinking about suicide, get help right away. To get help:  · Call your local emergency services (911 in the [place]  · Call a suicide hotline to speak with a trained counselor who understands how you are feeling. The following is a list of suicide hotlines in the [place]. For a list of hotlines in [place], visit www.suicide.org/hotlines/international/mgnedj-yjdhymt-jsizqtmv.html.  ¨ 9-510-259-TALK (1-155.717.7450).  ¨ 8-049-IILSOOU (1-808.391.1910).  ¨ 1-320.915.8371. This is a hotline for Algerian speakers.  ¨ 4-430-244-4TTY (1-149.603.9784). This is a hotline for TTY users.  ¨ 7-177-5-U-DAMEON (1-622.387.2726). This is a hotline for lesbian, alas, bisexual, transgender, or questioning youth.  · Contact a crisis center or a local suicide prevention center. To find a crisis center or suicide prevention center:  ¨ Call your local hospital, clinic, community service organization, mental health center, social service provider, or health department. Ask for assistance in connecting to a crisis center.  ¨ Visit www.suicidepreventionlifeline.org/getinvolved/ for a list of crisis centers in the [place], or visit www.suicideprevention.ca/tvlepjip-bbavb-dwmyycf/find-a-crisis-centre for a list of centers in [place].  · Visit the following websites:  ¨ National Suicide Prevention Lifeline: www.suicidepreventionlifeline.org  ¨ Hopeline: www.hopeline.com  ¨ American Foundation for Suicide Prevention: www.afsp.org  ¨ The [place] Project (for lesbian, alas, bisexual, transgender, or questioning youth): www.thetrevorproject.org  How can I help myself feel better?  · Promise yourself that you will not do anything drastic when you have suicidal feelings. Remember, there is hope. Many people have gotten through suicidal thoughts and feelings,  and you will, too. You may have gotten through them before, and this proves that you can get through them again.  · Let family, friends, teachers, or counselors know how you are feeling. Try not to isolate yourself from those who care about you. Remember, they will want to help you. Talk with someone every day, even if you do not feel sociable. Face-to-face conversation is best.  · Call a mental health professional and see one regularly.  · Visit your primary health care provider every year.  · Eat a well-balanced diet, and space your meals so you eat regularly.  · Get plenty of rest.  · Avoid alcohol and drugs, and remove them from your home. They will only make you feel worse.  · If you are thinking of taking a lot of medicine, give your medicine to someone who can give it to you one day at a time. If you are on antidepressants and are concerned you will overdose, let your health care provider know so he or she can give you safer medicines. Ask your mental health professional about the possible side effects of any medicines you are taking.  · Remove weapons, poisons, knives, and anything else that could harm you from your home.  · Try to stick to routines. Follow a schedule every day. Put self-care on your schedule.  · Make a list of realistic goals, and cross them off when you achieve them. Accomplishments give a sense of worth.  · Wait until you are feeling better before doing the things you find difficult or unpleasant.  · Exercise if you are able. You will feel better if you exercise for even a half hour each day.  · Go out in the sun or into nature. This will help you recover from depression faster. If you have a favorite place to walk, go there.  · Do the things that have always given you pleasure. Play your favorite music, read a good book, paint a picture, play your favorite instrument, or do anything else that takes your mind off your depression if it is safe to do.  · Keep your living space well  lit.  · When you are feeling well, write yourself a letter about tips and support that you can read when you are not feeling well.  · Remember that life’s difficulties can be sorted out with help. Conditions can be treated. You can work on thoughts and strategies that serve you well.  This information is not intended to replace advice given to you by your health care provider. Make sure you discuss any questions you have with your health care provider.  Document Released: 06/23/2004 Document Revised: 08/16/2017 Document Reviewed: 04/14/2015  [employer] Interactive Patient Education © 2017 [employer]         Case Management Discharge Instructions:   Discharge Location:    Agency Name/Address/Phone:    Home Health:    Outpatient Services:    DME Provider/Phone:    Medical Equipment Ordered:    Prescription Faxed to:        Discharge Medication Instructions:  Below are the medications your physician expects you to take upon discharge:

## 2019-11-22 LAB
BACTERIA WND AEROBE CULT: ABNORMAL
GRAM STN SPEC: ABNORMAL
SIGNIFICANT IND 70042: ABNORMAL
SITE SITE: ABNORMAL
SOURCE SOURCE: ABNORMAL

## 2019-11-24 NOTE — CONSULTS
DATE OF SERVICE:  11/19/2019    BEHAVIORAL MEDICINE EVALUATION    BRIEF HISTORY OF PRESENTING COMPLAINTS:  The patient is an 84-year-old white    female who is referred for a behavioral medicine evaluation by Dr. Ramirez.  The patient was transferred to rehab from [facility] where she presented   on 11/03/2019 with longstanding extreme pain in the right hip.  The patient   described her pain as burning.  It is located in the leg and foot on the right   side.  On presentation, she was constipated and dehydrated because she was   not drinking water since it was difficult for her to go to the bathroom.    Orthopedics was consulted and the patient underwent a total hip arthroplasty   on 11/06/2019.  The patient was stabilized postoperatively.  She was then sent   to rehab to address her general debility.    PAST MEDICAL HISTORY:  Significant for hypertension.    PSYCHOLOGICAL STATUS:  MENTAL STATUS EXAMINATION:  The patient is a well-nourished, obese female of   short to medium stature, who appeared her stated age of 84.  At presentation,   the patient was alert.  She was sitting in a wheelchair next to her bed when   approached.  The patient oriented well to my presence.  The patient was kempt   in appearance.  She was dressed casually in street attire that was appropriate   to her age and setting.  The patient's manner of presentation was cooperative   and friendly.    The patient was grossly oriented to time, place, and person.  Her language was   logical and goal oriented, and her speech was normal for rate and rhythm.    The patient's concentration and memory functioning appeared intact.    The patient's affect was slightly constricted, stable, and mildly intense.    She related well.  Her mood appeared somewhat irritable and slightly anxious,   but appropriate to the context.    There was no evidence of delusional or perceptual disturbance.  Also, the   patient showed no unusual pain or motor behavior during  the interview.    SPECIFIC BEHAVIORAL COMPLAINTS:  The patient denied any clinically significant   symptoms of a negative mood.  She reported no strong feelings of depression,   anxiety or anger.  She did admit to feeling frustrated and somewhat irritable   at times,  however.    The patient reported mild to moderate leg and hip pain.  She is asking for   medications when needed.  She said her energy levels are coming back.  She   reported her appetite is good.  The patient said she occasionally experiences   sleep problems from attention and anxiety.    The patient reported no interpersonal discord or discomfort, family   disharmony, or problems in her marriage.  She also reported she was managing   her day-to-day stressors well up until her hip became a problem.    The patient reported no ETOH or other drug abuse or any use of tobacco.    PSYCHIATRIC HISTORY:  The patient denied any history significant for   psychiatric disturbance or treatment including in or outpatient care.    PSYCHOMETRIC TESTING:  The patient was administered 2 psychometric tests and 2   screening instruments.  The PS/PC-R revealed no clinically significant   symptoms of a negative mood.  Her CDR survey showed no problems with level of   consciousness or attention.  The patient's speech and thinking were within   normal limits.  She did admit to significant problems with behavioral   activation and basic self-care.  No memory problems or perceptual anomalies   were noted.  Finally, the patient said her energy level is returning, she   reported no problems with her appetite, and she is experiencing   mild-to-moderate pain.    The patient was screened for any elder abuse or risk of suicide.  There is no   strong evidence for either problem.    SOCIAL HISTORY:  The patient is  and retired.  She lives with her    in [place], [place].    IMPRESSION:  Minor adjustment difficulties.    RECOMMENDATIONS:  The patient will be followed for  status and supportive care.       ____________________________________     THEODORA GORMAN, PHD    BHT / DICK    DD:  11/24/2019 15:24:28  DT:  11/24/2019 15:52:23    D#:  [id number]  Job#:  [id number]

## 2019-11-24 NOTE — PROGRESS NOTES
DATE OF SERVICE:  11/20/2019    The patient will be discharged soon.  She said she has done well in her rehab.    She reports she is ready to return home.  Patient talked at some length   about her expectations for returning home and what she believes she needs to   do to continue to make gains.       ____________________________________     THEODORA GORMAN, PHD    DILIP / DICK    DD:  11/24/2019 11:52:11  DT:  11/24/2019 12:31:46    D#:  [id number]  Job#:  [id number]

## 2019-11-26 ENCOUNTER — TELEPHONE (OUTPATIENT)
Dept: INFECTIOUS DISEASES | Facility: MEDICAL CENTER | Age: 84
End: 2019-11-26

## 2019-11-26 NOTE — TELEPHONE ENCOUNTER
Called and LM for [facility] to return my call to schedule a hospital follow up appointment for pt with Infectious Disease.  -AMP

## 2019-12-04 LAB
FUNGUS SPEC CULT: NORMAL
SIGNIFICANT IND 70042: NORMAL
SITE SITE: NORMAL
SOURCE SOURCE: NORMAL

## 2019-12-09 ENCOUNTER — OFFICE VISIT (OUTPATIENT)
Dept: INFECTIOUS DISEASES | Facility: MEDICAL CENTER | Age: 84
End: 2019-12-09
Payer: MEDICARE

## 2019-12-09 ENCOUNTER — TELEPHONE (OUTPATIENT)
Dept: INFECTIOUS DISEASES | Facility: MEDICAL CENTER | Age: 84
End: 2019-12-09

## 2019-12-09 VITALS
BODY MASS INDEX: 32.27 KG/M2 | HEIGHT: 64 IN | WEIGHT: 189 LBS | SYSTOLIC BLOOD PRESSURE: 148 MMHG | TEMPERATURE: 97.8 F | OXYGEN SATURATION: 97 % | HEART RATE: 92 BPM | DIASTOLIC BLOOD PRESSURE: 78 MMHG | RESPIRATION RATE: 14 BRPM

## 2019-12-09 DIAGNOSIS — M86.9 BONE INFECTION (HCC): ICD-10-CM

## 2019-12-09 DIAGNOSIS — M87.00 AVN (AVASCULAR NECROSIS OF BONE) (HCC): ICD-10-CM

## 2019-12-09 DIAGNOSIS — S81.801D MULTIPLE OPEN WOUNDS OF LOWER LEG, RIGHT, SUBSEQUENT ENCOUNTER: ICD-10-CM

## 2019-12-09 DIAGNOSIS — R33.9 URINARY RETENTION: ICD-10-CM

## 2019-12-09 PROBLEM — E86.0 DEHYDRATION: Status: RESOLVED | Noted: 2019-11-03 | Resolved: 2019-12-09

## 2019-12-09 PROBLEM — N39.0 UTI (URINARY TRACT INFECTION): Status: RESOLVED | Noted: 2019-11-17 | Resolved: 2019-12-09

## 2019-12-09 PROCEDURE — 99215 OFFICE O/P EST HI 40 MIN: CPT | Performed by: INTERNAL MEDICINE

## 2019-12-09 RX ORDER — RIFAMPIN 300 MG/1
300 CAPSULE ORAL 2 TIMES DAILY
Qty: 60 CAP | Refills: 3 | Status: SHIPPED
Start: 2019-12-09 | End: 2020-05-21

## 2019-12-09 NOTE — PROGRESS NOTES
Chief Complaint   Patient presents with   • Follow-Up       HISTORY OF PRESENT ILLNESS: Patient is a 84 y.o. 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 +UTI and foot ulceration  Ucx Ecoli and PSAR  Bone cx +Propionibacterium  Wound cx with enterococcus  Completed cefepime as scheduled for UTI and hip infection-now on ceftriaxone alone and doing well  Weathers dc'd while in rehab- sxs resolved  Hip is healing well and pain decreasing  Wounds on left foot nearly healed  Still has wound dorsum right foot, but improved    Patient Active Problem List    Diagnosis Date Noted   • Degenerative joint disease of right hip 11/03/2019     Priority: High   • Bone infection (HCC) 11/21/2019   • Edema, lower extremity 11/19/2019   • Urinary retention 11/13/2019   • Vitamin D insufficiency 11/13/2019   • AVN (avascular necrosis of bone) ([facility]) 11/12/2019   • Status post total replacement of right hip 11/12/2019   • Anemia 11/12/2019   • Impaired mobility and ADLs 11/12/2019   • Hypertension 11/12/2019   • Hypoalbuminemia 11/11/2019   • Multiple open wounds of lower leg 11/03/2019       Allergies:Hydrocodone; Oxycodone; and Tramadol    Current Outpatient Medications   Medication Sig Dispense Refill   • riFAMPin (RIFADINE) 300 MG Cap Take 1 Cap by mouth 2 times a day. 60 Cap 3   • omeprazole (PRILOSEC) 20 MG delayed-release capsule Take 1 Cap by mouth every day. 30 Cap    • acetaminophen (TYLENOL) 325 MG Tab Take 2 Tabs by mouth every four hours as needed. 30 Tab 0   • amLODIPine (NORVASC) 5 MG Tab Take 1 Tab by mouth every day. 30 Tab    • cefTRIAXone (ROCEPHIN) 1 GM Recon Soln 2,000 mg by Intravenous route every day for 33 days.  0   • cyanocobalamin (VITAMIN B12) 1000 MCG Tab Take 1 Tab by mouth every day. 30 Tab    • furosemide (LASIX) 20 MG Tab Take 1 Tab by mouth every day. 60 Tab    • Calcium Carb-Cholecalciferol  "(OYSTER SHELL CALCIUM/VITAMIN D) 250-125 MG-UNIT Tab tablet Take 1 Tab by mouth every day. 60 Tab    • potassium chloride SA (K-DUR) 10 MEQ Tab CR Take 1 Tab by mouth every day. 60 Tab 11   • senna-docusate (PERICOLACE OR SENOKOT S) 8.6-50 MG Tab Take 2 Tabs by mouth 2 Times a Day. 30 Tab 0   • tamsulosin (FLOMAX) 0.4 MG capsule Take 1 Cap by mouth every bedtime. 30 Cap    • traZODone (DESYREL) 50 MG Tab Take 1 Tab by mouth at bedtime as needed. 30 Tab 3   • Cholecalciferol (VITAMIN D) 1000 UNIT Tab Take 1 Tab by mouth every day.     • aspirin (ASA) 81 MG Chew Tab chewable tablet Take 1 Tab by mouth 2 Times a Day. 100 Tab    • NS SOLN 100 mL with cefepime 2 GM SOLR 2 g 2 g by Intravenous route every 12 hours.     • gabapentin (NEURONTIN) 100 MG Cap Take 1 Cap by mouth 3 times a day. 90 Cap    • normal saline PF Solution 20 mL by Intravenous route every 12 hours.  0   • cyclobenzaprine (FLEXERIL) 5 MG tablet Take 5 mg by mouth 3 times a day as needed for Muscle Spasms.       No current facility-administered medications for this visit.        Social History     Tobacco Use   • Smoking status: Former Smoker     Packs/day: 0.00   • Smokeless tobacco: Never Used   Substance Use Topics   • Alcohol use: Yes     Alcohol/week: 0.6 oz     Types: 1 Glasses of wine per week     Frequency: 4 or more times a week     Drinks per session: 1 or 2     Binge frequency: Never     Comment: daily   • Drug use: Never       Family History   Problem Relation Age of Onset   • Alcohol abuse Mother    • Heart Disease Mother    • Heart Disease Brother    • Alcohol abuse Brother    • Cancer Maternal Aunt        ROS:  Review of Systems   Constitutional: Negative for fever, chills, weight loss and malaise/fatigue.   All other systems reviewed and are negative except as in HPI.      Exam:  /78 (BP Location: Right arm, Patient Position: Sitting)   Pulse 92   Temp 36.6 °C (97.8 °F)   Resp 14   Ht 1.626 m (5' 4\")   Wt 85.7 kg (189 lb)   " SpO2 97%   General:  Well nourished, well developed female in NAD. Pleasant and cooperative  Head: NCAT, Pupils are equal round reactive to light. Extraocular movements intact. Oropharynx is clear.  Neck: Supple.  No LAD  Pulmonary: Clear to ausculation.  No rales, rhonchi, or wheezing. Unlabored  Cardiovascular: Regular rate and rhythm   Abdominal: Soft, nontender, nondistended. Positive bowel sounds.   Skin: No rashes. Right hip surgical site well healed. Neraly healed eschars left foot 2 cm shallow ulcer with slough dorsum right foot  Extremities: no clubbing, cyanosis +edema. LUE PICC no erythema  Neurologic: Alert and oriented x4. Speech is fluent without dysarthria. Cranial nerves intact. Moving all extremities. Using walker  Lab Results   Component Value Date/Time    SODIUM 138 11/18/2019 05:20 AM    POTASSIUM 3.9 11/18/2019 05:20 AM    CHLORIDE 105 11/18/2019 05:20 AM    CO2 26 11/18/2019 05:20 AM    GLUCOSE 91 11/18/2019 05:20 AM    BUN 12 11/18/2019 05:20 AM    CREATININE 0.67 11/18/2019 05:20 AM      Lab Results   Component Value Date/Time    WBC 4.9 11/18/2019 05:20 AM    RBC 2.86 (L) 11/18/2019 05:20 AM    HEMOGLOBIN 9.3 (L) 11/18/2019 05:20 AM    HEMATOCRIT 29.5 (L) 11/18/2019 05:20 AM    .1 (H) 11/18/2019 05:20 AM    MCH 32.5 11/18/2019 05:20 AM    MCHC 31.5 (L) 11/18/2019 05:20 AM    MPV 9.3 11/18/2019 05:20 AM    NEUTSPOLYS 51.30 11/14/2019 05:09 AM    LYMPHOCYTES 22.00 11/14/2019 05:09 AM    MONOCYTES 11.80 11/14/2019 05:09 AM    EOSINOPHILS 13.70 (H) 11/14/2019 05:09 AM    BASOPHILS 0.90 11/14/2019 05:09 AM        Assessment/Plan:  1. Bone infection (HCC)     2. Multiple open wounds of lower leg, right, subsequent encounter     3. Urinary retention     4. AVN (avascular necrosis of bone) ([facility])     Infected right hip  S/p LYLA with screws 11/6 for AVN hip-in place  Bone culture +propionobact  Plan for 6 weeks IV abx followed by 3 months PO  Continue IV ceftriaxone 2 grams IV daily to complete  IV course  Add oral rif for potential biofilm and as adjunctive therapy  Stop date IV abx 12/29/2019-follow with PO for at least 3 months  Last CRP normal  Need recent labs-request today    Cath-UTI/urinary retention  Polymicrobial  Improved on abx  Completed cefepime through 11/25/2019  DC Weathers as soon as feasible     LE ulcerations, skin only  Colonized with mult bacteria-pictures reviewed and examined  Left dorsal ulcerations-superficial, shallow eschar  Right, dorsum improved-still has some slough. Continue to monitor closely until healed  Abx as above  Wound care  If fail worsens, treat enterococcus    ADDENDUM:  No record of labs-will order     Zamzam Mora M.D.

## 2019-12-11 ENCOUNTER — TELEPHONE (OUTPATIENT)
Dept: INFECTIOUS DISEASES | Facility: MEDICAL CENTER | Age: 84
End: 2019-12-11

## 2019-12-11 NOTE — TELEPHONE ENCOUNTER
Pt is calling and asking for a letter stating that she can't fly to [facility] with a PICC line in her arm. Spoke with Dr. Mora and she will write a letter. Pt will  the letter from the clinic. HARJINDER

## 2019-12-24 ENCOUNTER — TELEPHONE (OUTPATIENT)
Dept: INFECTIOUS DISEASES | Facility: MEDICAL CENTER | Age: 84
End: 2019-12-24

## 2019-12-24 ENCOUNTER — HOSPITAL ENCOUNTER (OUTPATIENT)
Dept: LAB | Facility: MEDICAL CENTER | Age: 84
End: 2019-12-24
Attending: INTERNAL MEDICINE
Payer: MEDICARE

## 2019-12-24 LAB
ANION GAP SERPL CALC-SCNC: 8 MMOL/L (ref 0–11.9)
BASOPHILS # BLD AUTO: 0.5 % (ref 0–1.8)
BASOPHILS # BLD: 0.02 K/UL (ref 0–0.12)
BUN SERPL-MCNC: 19 MG/DL (ref 8–22)
CALCIUM SERPL-MCNC: 9.5 MG/DL (ref 8.5–10.5)
CHLORIDE SERPL-SCNC: 98 MMOL/L (ref 96–112)
CO2 SERPL-SCNC: 28 MMOL/L (ref 20–33)
CREAT SERPL-MCNC: 0.75 MG/DL (ref 0.5–1.4)
EOSINOPHIL # BLD AUTO: 0.14 K/UL (ref 0–0.51)
EOSINOPHIL NFR BLD: 3.3 % (ref 0–6.9)
ERYTHROCYTE [DISTWIDTH] IN BLOOD BY AUTOMATED COUNT: 46.6 FL (ref 35.9–50)
FASTING STATUS PATIENT QL REPORTED: NORMAL
GLUCOSE SERPL-MCNC: 115 MG/DL (ref 65–99)
HCT VFR BLD AUTO: 43 % (ref 37–47)
HGB BLD-MCNC: 13.9 G/DL (ref 12–16)
IMM GRANULOCYTES # BLD AUTO: 0.02 K/UL (ref 0–0.11)
IMM GRANULOCYTES NFR BLD AUTO: 0.5 % (ref 0–0.9)
LYMPHOCYTES # BLD AUTO: 1.11 K/UL (ref 1–4.8)
LYMPHOCYTES NFR BLD: 26.2 % (ref 22–41)
MCH RBC QN AUTO: 32.5 PG (ref 27–33)
MCHC RBC AUTO-ENTMCNC: 32.3 G/DL (ref 33.6–35)
MCV RBC AUTO: 100.5 FL (ref 81.4–97.8)
MONOCYTES # BLD AUTO: 0.33 K/UL (ref 0–0.85)
MONOCYTES NFR BLD AUTO: 7.8 % (ref 0–13.4)
NEUTROPHILS # BLD AUTO: 2.61 K/UL (ref 2–7.15)
NEUTROPHILS NFR BLD: 61.7 % (ref 44–72)
NRBC # BLD AUTO: 0 K/UL
NRBC BLD-RTO: 0 /100 WBC
PLATELET # BLD AUTO: 228 K/UL (ref 164–446)
PMV BLD AUTO: 10.8 FL (ref 9–12.9)
POTASSIUM SERPL-SCNC: 4.3 MMOL/L (ref 3.6–5.5)
RBC # BLD AUTO: 4.28 M/UL (ref 4.2–5.4)
SODIUM SERPL-SCNC: 134 MMOL/L (ref 135–145)
WBC # BLD AUTO: 4.2 K/UL (ref 4.8–10.8)

## 2019-12-24 PROCEDURE — 85025 COMPLETE CBC W/AUTO DIFF WBC: CPT

## 2019-12-24 PROCEDURE — 80048 BASIC METABOLIC PNL TOTAL CA: CPT

## 2019-12-24 PROCEDURE — 36415 COLL VENOUS BLD VENIPUNCTURE: CPT

## 2019-12-24 NOTE — TELEPHONE ENCOUNTER
Pt was discharged from [facility] to [facility], seen in clinic on 12/09/19. [facility] discharged pt on a later date ([place]) with [facility] for home infusions and [facility]. No lab work had been recieved by [facility] clinic.   In atticipation of pt's appointment on 12/26/19 I called [facility], [facility], and [facility] medical records. Per [facility] they received orders from a provider at the SNF the specifies pt is not to have lab work done. As far as I can tell the Home Health is not doing regular lab work. I have left at least  two messages with medical records at [facility] and have not heard back.  Dr. Mora did order labs on pt which I faxed to [facility] today (12/24/20//19).  Dr. Judd notified.  Called pt and explained situation. Pt said if she can't get the home health to draw the labs she will go to a [facility] facility to have them drawn.  -AMP

## 2019-12-26 ENCOUNTER — OFFICE VISIT (OUTPATIENT)
Dept: INFECTIOUS DISEASES | Facility: MEDICAL CENTER | Age: 84
End: 2019-12-26
Payer: MEDICARE

## 2019-12-26 VITALS
TEMPERATURE: 99 F | HEART RATE: 108 BPM | HEIGHT: 64 IN | OXYGEN SATURATION: 93 % | SYSTOLIC BLOOD PRESSURE: 130 MMHG | WEIGHT: 186 LBS | DIASTOLIC BLOOD PRESSURE: 70 MMHG | BODY MASS INDEX: 31.76 KG/M2

## 2019-12-26 DIAGNOSIS — T84.50XD INFECTION OF PROSTHETIC JOINT, SUBSEQUENT ENCOUNTER: ICD-10-CM

## 2019-12-26 DIAGNOSIS — R60.0 EDEMA, LOWER EXTREMITY: ICD-10-CM

## 2019-12-26 DIAGNOSIS — M87.00 AVN (AVASCULAR NECROSIS OF BONE) (HCC): ICD-10-CM

## 2019-12-26 DIAGNOSIS — S81.801D MULTIPLE OPEN WOUNDS OF LOWER LEG, RIGHT, SUBSEQUENT ENCOUNTER: ICD-10-CM

## 2019-12-26 DIAGNOSIS — D64.9 ANEMIA, UNSPECIFIED TYPE: ICD-10-CM

## 2019-12-26 DIAGNOSIS — Z96.641 STATUS POST TOTAL REPLACEMENT OF RIGHT HIP: ICD-10-CM

## 2019-12-26 PROCEDURE — 99213 OFFICE O/P EST LOW 20 MIN: CPT | Performed by: INTERNAL MEDICINE

## 2019-12-26 RX ORDER — AMOXICILLIN AND CLAVULANATE POTASSIUM 875; 125 MG/1; MG/1
1 TABLET, FILM COATED ORAL 2 TIMES DAILY
Qty: 60 TAB | Refills: 4 | Status: SHIPPED
Start: 2019-12-26 | End: 2020-05-21

## 2019-12-27 NOTE — PROGRESS NOTES
"Infectious Disease Clinic    Subjective:     Chief Complaint   Patient presents with   • Follow-Up     Right Hip OM     Patient is a 84 y.o. 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 Bone cx +Propionibacterium  Wound cx with enterococcus.  She also had a UTI and completed cefepime as scheduled.  She was then transitioned to ceftriaxone and rifampin with plan to continue until 12/29/2019 and then transition to oral antibiotics.     Hospital records reviewed    Today, 12/26/2019: Patient reports feeling well and has been tolerating the ceftriaxone and rifampin without adverse effect.  Denies feeling generally ill, fevers/chills, general malaise, headache, n/v/d, abdominal pain, chest pain or shortness of breath.  Pt stating that the wound is healing well.  She has no significant pain, edema, erythema or drainage from the hip.  She does have occasional right leg cramping which seems to be positional in nature.     ROS  As documented above in my HPI.    Past Medical History:   Diagnosis Date   • Hypertension        Social History     Tobacco Use   • Smoking status: Former Smoker     Packs/day: 0.00   • Smokeless tobacco: Never Used   Substance Use Topics   • Alcohol use: Yes     Alcohol/week: 0.6 oz     Types: 1 Glasses of wine per week     Frequency: 4 or more times a week     Drinks per session: 1 or 2     Binge frequency: Never     Comment: daily   • Drug use: Never       Allergies: Hydrocodone; Oxycodone; and Tramadol    Pt's medication and problem list reviewed.     Objective:     PE:  /70 (BP Location: Right arm, Patient Position: Sitting, BP Cuff Size: Adult)   Pulse (!) 108   Temp 37.2 °C (99 °F) (Temporal)   Ht 1.626 m (5' 4\")   Wt 84.4 kg (186 lb)   SpO2 93%   Breastfeeding? No   BMI 31.93 kg/m²     Vital signs reviewed    Constitutional: Appears well-developed and well-nourished. No acute " distress.  Speech fluent.    Eyes: Conjunctivae normal and EOM are normal. Pupils are equal, round, and reactive to light.   ENMT: Lips without lesions, good dentition.  Oropharynx is clear and moist.  Neck: Trachea midline. Normal range of motion. Neck supple. No masses.  No JVD.  Cardiovascular: Normal rate, regular rhythm, normal heart sounds and intact distal pulses. No murmur, gallop, or friction rub. No edema.  Respiratory: No respiratory distress, unlabored respiratory effort.  Lungs clear to auscultation bilaterally. No wheezes or rales.   Abdomen: Soft, non tender, non-distended. BS + x 4. No masses or hepatosplenomegaly.   Musculoskeletal: Right hip with surgical incision well-healed and no sign of infection.  No significant erythema, edema or tenderness.  No drainage noted.  Skin: Warm and dry. Good turgor. No visible rashes or lesions.  Neurological: No cranial nerve deficit. Coordination normal.  Sensation intact.  Psychiatric: Alert and oriented to person, place, and time. Normal mood, calm affect.  Normal behavior and judgment.     Labs:  WBC   Date/Time Value Ref Range Status   12/24/2019 11:46 AM 4.2 (L) 4.8 - 10.8 K/uL Final     RBC   Date/Time Value Ref Range Status   12/24/2019 11:46 AM 4.28 4.20 - 5.40 M/uL Final     Hemoglobin   Date/Time Value Ref Range Status   12/24/2019 11:46 AM 13.9 12.0 - 16.0 g/dL Final     Hematocrit   Date/Time Value Ref Range Status   12/24/2019 11:46 AM 43.0 37.0 - 47.0 % Final     MCV   Date/Time Value Ref Range Status   12/24/2019 11:46 .5 (H) 81.4 - 97.8 fL Final     MCH   Date/Time Value Ref Range Status   12/24/2019 11:46 AM 32.5 27.0 - 33.0 pg Final     MCHC   Date/Time Value Ref Range Status   12/24/2019 11:46 AM 32.3 (L) 33.6 - 35.0 g/dL Final     MPV   Date/Time Value Ref Range Status   12/24/2019 11:46 AM 10.8 9.0 - 12.9 fL Final        Sodium   Date/Time Value Ref Range Status   12/24/2019 11:46  (L) 135 - 145 mmol/L Final     Potassium    Date/Time Value Ref Range Status   12/24/2019 11:46 AM 4.3 3.6 - 5.5 mmol/L Final     Chloride   Date/Time Value Ref Range Status   12/24/2019 11:46 AM 98 96 - 112 mmol/L Final     Co2   Date/Time Value Ref Range Status   12/24/2019 11:46 AM 28 20 - 33 mmol/L Final     Glucose   Date/Time Value Ref Range Status   12/24/2019 11:46  (H) 65 - 99 mg/dL Final     Bun   Date/Time Value Ref Range Status   12/24/2019 11:46 AM 19 8 - 22 mg/dL Final     Creatinine   Date/Time Value Ref Range Status   12/24/2019 11:46 AM 0.75 0.50 - 1.40 mg/dL Final       Alkaline Phosphatase   Date/Time Value Ref Range Status   11/13/2019 05:33 AM 67 30 - 99 U/L Final     AST(SGOT)   Date/Time Value Ref Range Status   11/13/2019 05:33 AM 15 12 - 45 U/L Final     ALT(SGPT)   Date/Time Value Ref Range Status   11/13/2019 05:33 AM 9 2 - 50 U/L Final     Total Bilirubin   Date/Time Value Ref Range Status   11/13/2019 05:33 AM 0.6 0.1 - 1.5 mg/dL Final        No results found for: CPKTOTAL     Assessment and Plan:   The following treatment plan was discussed with patient at length:    1. Infection of prosthetic joint, subsequent encounter     2. Status post total replacement of right hip     3. AVN (avascular necrosis of bone) (HCC)     4. Anemia, unspecified type     5. Edema, lower extremity     6. Multiple open wounds of lower leg, right, subsequent encounter       --- The patient's severe pain in the right hip is likely due to her avascular necrosis per op note there was atrophy the musculature as well as complete collapse of the femoral head.  She therefore underwent total hip arthroplasty on 11/6/19.  OR cultures were obtained-  1 which grew Propionibacterium acnes.  It is difficult to assess whether this was a true infection or contaminant. ID saw the patient while inpatient and elected to treat as a true infection with 6 weeks of IV antibiotics.     --- We will plan to continue ceftriaxone and rifampin as planned until 12/29/2019  and then stop.  At this point will transition to Augmentin 875/125 to complete a 3-month course.  After she completes his course we will need to assess whether ongoing antibiotics would be beneficial as she still has hardware in place.  It may be reasonable to continue antibiotics if she is tolerating but would have low threshold to stop.     --- Labs prior to visit-ordered CBC, BMP, ESR and CRP    --- Discussed dosing and side effects of medications being prescribed.  Pt instructed to call clinic with any adverse effects or to discontinue the medication and go to the ER should difficulty breathing, SOB, CP, facial swelling and/or gross rash/itching occurs.     Follow up: 3 months, RTC sooner if needed. FU with PCP for ongoing chronic medical conditions.     Amena Chinchilla M.D.       Please note that this dictation was created using voice recognition software. I have  worked with technical experts from [facility] to optimize the interface.  I have made every reasonable attempt to correct obvious errors, but there may be errors of grammar and possibly content that I did not discover before finalizing the note.

## 2020-01-02 LAB
MYCOBACTERIUM SPEC CULT: NORMAL
RHODAMINE-AURAMINE STN SPEC: NORMAL
SIGNIFICANT IND 70042: NORMAL
SITE SITE: NORMAL
SOURCE SOURCE: NORMAL

## 2020-02-28 ENCOUNTER — HOSPITAL ENCOUNTER (OUTPATIENT)
Dept: LAB | Facility: MEDICAL CENTER | Age: 85
End: 2020-02-28
Attending: FAMILY MEDICINE
Payer: MEDICARE

## 2020-02-28 DIAGNOSIS — T84.50XD INFECTION OF PROSTHETIC JOINT, SUBSEQUENT ENCOUNTER: ICD-10-CM

## 2020-02-28 LAB
ANION GAP SERPL CALC-SCNC: 8 MMOL/L (ref 0–11.9)
BASOPHILS # BLD AUTO: 0.7 % (ref 0–1.8)
BASOPHILS # BLD: 0.03 K/UL (ref 0–0.12)
BUN SERPL-MCNC: 17 MG/DL (ref 8–22)
CALCIUM SERPL-MCNC: 9.2 MG/DL (ref 8.5–10.5)
CHLORIDE SERPL-SCNC: 102 MMOL/L (ref 96–112)
CO2 SERPL-SCNC: 29 MMOL/L (ref 20–33)
CREAT SERPL-MCNC: 0.87 MG/DL (ref 0.5–1.4)
CRP SERPL HS-MCNC: 0.32 MG/DL (ref 0–0.75)
EOSINOPHIL # BLD AUTO: 0.14 K/UL (ref 0–0.51)
EOSINOPHIL NFR BLD: 3 % (ref 0–6.9)
ERYTHROCYTE [DISTWIDTH] IN BLOOD BY AUTOMATED COUNT: 46.1 FL (ref 35.9–50)
ERYTHROCYTE [SEDIMENTATION RATE] IN BLOOD BY WESTERGREN METHOD: 13 MM/HOUR (ref 0–30)
GLUCOSE SERPL-MCNC: 134 MG/DL (ref 65–99)
HCT VFR BLD AUTO: 44.8 % (ref 37–47)
HGB BLD-MCNC: 15.2 G/DL (ref 12–16)
IMM GRANULOCYTES # BLD AUTO: 0.01 K/UL (ref 0–0.11)
IMM GRANULOCYTES NFR BLD AUTO: 0.2 % (ref 0–0.9)
LYMPHOCYTES # BLD AUTO: 1.38 K/UL (ref 1–4.8)
LYMPHOCYTES NFR BLD: 30 % (ref 22–41)
MCH RBC QN AUTO: 31.8 PG (ref 27–33)
MCHC RBC AUTO-ENTMCNC: 33.9 G/DL (ref 33.6–35)
MCV RBC AUTO: 93.7 FL (ref 81.4–97.8)
MONOCYTES # BLD AUTO: 0.42 K/UL (ref 0–0.85)
MONOCYTES NFR BLD AUTO: 9.1 % (ref 0–13.4)
NEUTROPHILS # BLD AUTO: 2.62 K/UL (ref 2–7.15)
NEUTROPHILS NFR BLD: 57 % (ref 44–72)
NRBC # BLD AUTO: 0 K/UL
NRBC BLD-RTO: 0 /100 WBC
PLATELET # BLD AUTO: 244 K/UL (ref 164–446)
PMV BLD AUTO: 9.8 FL (ref 9–12.9)
POTASSIUM SERPL-SCNC: 3.9 MMOL/L (ref 3.6–5.5)
RBC # BLD AUTO: 4.78 M/UL (ref 4.2–5.4)
SODIUM SERPL-SCNC: 139 MMOL/L (ref 135–145)
WBC # BLD AUTO: 4.6 K/UL (ref 4.8–10.8)

## 2020-02-28 PROCEDURE — 80048 BASIC METABOLIC PNL TOTAL CA: CPT

## 2020-02-28 PROCEDURE — 36415 COLL VENOUS BLD VENIPUNCTURE: CPT

## 2020-02-28 PROCEDURE — 85025 COMPLETE CBC W/AUTO DIFF WBC: CPT

## 2020-02-28 PROCEDURE — 86140 C-REACTIVE PROTEIN: CPT

## 2020-02-28 PROCEDURE — 85652 RBC SED RATE AUTOMATED: CPT

## 2020-03-18 ENCOUNTER — APPOINTMENT (OUTPATIENT)
Dept: INFECTIOUS DISEASES | Facility: MEDICAL CENTER | Age: 85
End: 2020-03-18
Payer: MEDICARE

## 2020-04-28 ENCOUNTER — HOSPITAL ENCOUNTER (OUTPATIENT)
Dept: LAB | Facility: MEDICAL CENTER | Age: 85
End: 2020-04-28
Attending: PSYCHIATRY & NEUROLOGY
Payer: MEDICARE

## 2020-04-28 ENCOUNTER — HOSPITAL ENCOUNTER (OUTPATIENT)
Dept: LAB | Facility: MEDICAL CENTER | Age: 85
End: 2020-04-28
Attending: INTERNAL MEDICINE
Payer: MEDICARE

## 2020-04-28 LAB
ANION GAP SERPL CALC-SCNC: 13 MMOL/L (ref 7–16)
BASOPHILS # BLD AUTO: 1.1 % (ref 0–1.8)
BASOPHILS # BLD: 0.05 K/UL (ref 0–0.12)
BUN SERPL-MCNC: 22 MG/DL (ref 8–22)
CALCIUM SERPL-MCNC: 9.2 MG/DL (ref 8.5–10.5)
CARBAMAZEPINE SERPL-MCNC: 8 UG/ML (ref 4–12)
CHLORIDE SERPL-SCNC: 95 MMOL/L (ref 96–112)
CO2 SERPL-SCNC: 26 MMOL/L (ref 20–33)
CREAT SERPL-MCNC: 0.57 MG/DL (ref 0.5–1.4)
EOSINOPHIL # BLD AUTO: 0.12 K/UL (ref 0–0.51)
EOSINOPHIL NFR BLD: 2.6 % (ref 0–6.9)
ERYTHROCYTE [DISTWIDTH] IN BLOOD BY AUTOMATED COUNT: 47.4 FL (ref 35.9–50)
FASTING STATUS PATIENT QL REPORTED: NORMAL
GLUCOSE SERPL-MCNC: 102 MG/DL (ref 65–99)
HCT VFR BLD AUTO: 42.7 % (ref 37–47)
HGB BLD-MCNC: 14.2 G/DL (ref 12–16)
IMM GRANULOCYTES # BLD AUTO: 0 K/UL (ref 0–0.11)
IMM GRANULOCYTES NFR BLD AUTO: 0 % (ref 0–0.9)
LYMPHOCYTES # BLD AUTO: 1 K/UL (ref 1–4.8)
LYMPHOCYTES NFR BLD: 21.5 % (ref 22–41)
MCH RBC QN AUTO: 31.8 PG (ref 27–33)
MCHC RBC AUTO-ENTMCNC: 33.3 G/DL (ref 33.6–35)
MCV RBC AUTO: 95.5 FL (ref 81.4–97.8)
MONOCYTES # BLD AUTO: 0.48 K/UL (ref 0–0.85)
MONOCYTES NFR BLD AUTO: 10.3 % (ref 0–13.4)
NEUTROPHILS # BLD AUTO: 3.01 K/UL (ref 2–7.15)
NEUTROPHILS NFR BLD: 64.5 % (ref 44–72)
NRBC # BLD AUTO: 0 K/UL
NRBC BLD-RTO: 0 /100 WBC
PLATELET # BLD AUTO: 237 K/UL (ref 164–446)
PMV BLD AUTO: 10.2 FL (ref 9–12.9)
POTASSIUM SERPL-SCNC: 4.4 MMOL/L (ref 3.6–5.5)
RBC # BLD AUTO: 4.47 M/UL (ref 4.2–5.4)
SODIUM SERPL-SCNC: 134 MMOL/L (ref 135–145)
THYROPEROXIDASE AB SERPL-ACNC: <9 IU/ML (ref 0–9)
TSH SERPL DL<=0.005 MIU/L-ACNC: 2.25 UIU/ML (ref 0.38–5.33)
VIT B12 SERPL-MCNC: 287 PG/ML (ref 211–911)
WBC # BLD AUTO: 4.7 K/UL (ref 4.8–10.8)

## 2020-04-28 PROCEDURE — 84432 ASSAY OF THYROGLOBULIN: CPT

## 2020-04-28 PROCEDURE — 86376 MICROSOMAL ANTIBODY EACH: CPT

## 2020-04-28 PROCEDURE — 86038 ANTINUCLEAR ANTIBODIES: CPT

## 2020-04-28 PROCEDURE — 85025 COMPLETE CBC W/AUTO DIFF WBC: CPT

## 2020-04-28 PROCEDURE — 80048 BASIC METABOLIC PNL TOTAL CA: CPT

## 2020-04-28 PROCEDURE — 86800 THYROGLOBULIN ANTIBODY: CPT

## 2020-04-28 PROCEDURE — 36415 COLL VENOUS BLD VENIPUNCTURE: CPT

## 2020-04-28 PROCEDURE — 82607 VITAMIN B-12: CPT

## 2020-04-28 PROCEDURE — 80156 ASSAY CARBAMAZEPINE TOTAL: CPT

## 2020-04-28 PROCEDURE — 84443 ASSAY THYROID STIM HORMONE: CPT

## 2020-04-30 LAB
NUCLEAR IGG SER QL IA: NORMAL
THYROGLOB AB SERPL-ACNC: <0.9 IU/ML (ref 0–4)
THYROGLOB SERPL-MCNC: 4.2 NG/ML (ref 1.3–31.8)
THYROGLOB SERPL-MCNC: NORMAL NG/ML (ref 1.3–31.8)

## 2020-05-01 ENCOUNTER — HOSPITAL ENCOUNTER (OUTPATIENT)
Dept: LAB | Facility: MEDICAL CENTER | Age: 85
End: 2020-05-01
Attending: INTERNAL MEDICINE
Payer: MEDICARE

## 2020-05-01 LAB
ANION GAP SERPL CALC-SCNC: 16 MMOL/L (ref 7–16)
BUN SERPL-MCNC: 13 MG/DL (ref 8–22)
CALCIUM SERPL-MCNC: 9.5 MG/DL (ref 8.5–10.5)
CHLORIDE SERPL-SCNC: 90 MMOL/L (ref 96–112)
CO2 SERPL-SCNC: 24 MMOL/L (ref 20–33)
CREAT SERPL-MCNC: 0.55 MG/DL (ref 0.5–1.4)
FASTING STATUS PATIENT QL REPORTED: NORMAL
GLUCOSE SERPL-MCNC: 124 MG/DL (ref 65–99)
POTASSIUM SERPL-SCNC: 4.1 MMOL/L (ref 3.6–5.5)
SODIUM SERPL-SCNC: 130 MMOL/L (ref 135–145)

## 2020-05-01 PROCEDURE — 36415 COLL VENOUS BLD VENIPUNCTURE: CPT

## 2020-05-01 PROCEDURE — 80048 BASIC METABOLIC PNL TOTAL CA: CPT

## 2020-05-07 ENCOUNTER — OFFICE VISIT (OUTPATIENT)
Dept: URGENT CARE | Facility: PHYSICIAN GROUP | Age: 85
End: 2020-05-07
Payer: MEDICARE

## 2020-05-07 ENCOUNTER — HOSPITAL ENCOUNTER (OUTPATIENT)
Dept: LAB | Facility: MEDICAL CENTER | Age: 85
End: 2020-05-07
Attending: INTERNAL MEDICINE
Payer: MEDICARE

## 2020-05-07 VITALS
WEIGHT: 176 LBS | RESPIRATION RATE: 18 BRPM | SYSTOLIC BLOOD PRESSURE: 130 MMHG | DIASTOLIC BLOOD PRESSURE: 78 MMHG | BODY MASS INDEX: 30.05 KG/M2 | OXYGEN SATURATION: 92 % | TEMPERATURE: 98.2 F | HEIGHT: 64 IN | HEART RATE: 81 BPM

## 2020-05-07 DIAGNOSIS — H61.20 IMPACTED CERUMEN, UNSPECIFIED LATERALITY: ICD-10-CM

## 2020-05-07 LAB
ANION GAP SERPL CALC-SCNC: 13 MMOL/L (ref 7–16)
BUN SERPL-MCNC: 18 MG/DL (ref 8–22)
CALCIUM SERPL-MCNC: 9.5 MG/DL (ref 8.5–10.5)
CHLORIDE SERPL-SCNC: 89 MMOL/L (ref 96–112)
CO2 SERPL-SCNC: 25 MMOL/L (ref 20–33)
CREAT SERPL-MCNC: 0.56 MG/DL (ref 0.5–1.4)
FASTING STATUS PATIENT QL REPORTED: NORMAL
GLUCOSE SERPL-MCNC: 92 MG/DL (ref 65–99)
POTASSIUM SERPL-SCNC: 4.8 MMOL/L (ref 3.6–5.5)
SODIUM SERPL-SCNC: 127 MMOL/L (ref 135–145)

## 2020-05-07 PROCEDURE — 99213 OFFICE O/P EST LOW 20 MIN: CPT | Performed by: NURSE PRACTITIONER

## 2020-05-07 PROCEDURE — 80048 BASIC METABOLIC PNL TOTAL CA: CPT

## 2020-05-07 PROCEDURE — 36415 COLL VENOUS BLD VENIPUNCTURE: CPT

## 2020-05-07 ASSESSMENT — FIBROSIS 4 INDEX: FIB4 SCORE: 1.75

## 2020-05-07 ASSESSMENT — ENCOUNTER SYMPTOMS
CARDIOVASCULAR NEGATIVE: 1
FEVER: 0
CONSTITUTIONAL NEGATIVE: 1
NEUROLOGICAL NEGATIVE: 1
RESPIRATORY NEGATIVE: 1

## 2020-05-07 NOTE — PROGRESS NOTES
Subjective:     Janessa Cain is a 85 y.o. female who presents for Hearing Problem (R ear plugged, cant hear, x2 weeks )       Hearing Problem   This is a new problem. Episode onset: 4 weeks ago. The problem has been gradually worsening. Pertinent negatives include no fever.     Patient reports gradually losing her hearing in the right ear.  Reports a history of cerumen impaction with similar symptoms.  Left ear unaffected.  Does not use hearing aids.  Reports she tried to flush out her ears but it seemed to make the symptoms worse.    PMH:  has a past medical history of Hypertension.    MEDS:   Current Outpatient Medications:   •  amoxicillin-clavulanate (AUGMENTIN) 875-125 MG Tab, Take 1 Tab by mouth 2 times a day., Disp: 60 Tab, Rfl: 4  •  riFAMPin (RIFADINE) 300 MG Cap, Take 1 Cap by mouth 2 times a day., Disp: 60 Cap, Rfl: 3  •  omeprazole (PRILOSEC) 20 MG delayed-release capsule, Take 1 Cap by mouth every day., Disp: 30 Cap, Rfl:   •  acetaminophen (TYLENOL) 325 MG Tab, Take 2 Tabs by mouth every four hours as needed., Disp: 30 Tab, Rfl: 0  •  amLODIPine (NORVASC) 5 MG Tab, Take 1 Tab by mouth every day., Disp: 30 Tab, Rfl:   •  NS SOLN 100 mL with cefepime 2 GM SOLR 2 g, 2 g by Intravenous route every 12 hours., Disp: , Rfl:   •  cyanocobalamin (VITAMIN B12) 1000 MCG Tab, Take 1 Tab by mouth every day., Disp: 30 Tab, Rfl:   •  furosemide (LASIX) 20 MG Tab, Take 1 Tab by mouth every day., Disp: 60 Tab, Rfl:   •  gabapentin (NEURONTIN) 100 MG Cap, Take 1 Cap by mouth 3 times a day., Disp: 90 Cap, Rfl:   •  normal saline PF Solution, 20 mL by Intravenous route every 12 hours., Disp: , Rfl: 0  •  Calcium Carb-Cholecalciferol (OYSTER SHELL CALCIUM/VITAMIN D) 250-125 MG-UNIT Tab tablet, Take 1 Tab by mouth every day., Disp: 60 Tab, Rfl:   •  potassium chloride SA (K-DUR) 10 MEQ Tab CR, Take 1 Tab by mouth every day., Disp: 60 Tab, Rfl: 11  •  senna-docusate (PERICOLACE OR SENOKOT S) 8.6-50 MG Tab,  "Take 2 Tabs by mouth 2 Times a Day., Disp: 30 Tab, Rfl: 0  •  tamsulosin (FLOMAX) 0.4 MG capsule, Take 1 Cap by mouth every bedtime., Disp: 30 Cap, Rfl:   •  traZODone (DESYREL) 50 MG Tab, Take 1 Tab by mouth at bedtime as needed., Disp: 30 Tab, Rfl: 3  •  Cholecalciferol (VITAMIN D) 1000 UNIT Tab, Take 1 Tab by mouth every day., Disp: , Rfl:   •  aspirin (ASA) 81 MG Chew Tab chewable tablet, Take 1 Tab by mouth 2 Times a Day., Disp: 100 Tab, Rfl:   •  cyclobenzaprine (FLEXERIL) 5 MG tablet, Take 5 mg by mouth 3 times a day as needed for Muscle Spasms., Disp: , Rfl:     ALLERGIES:   Allergies   Allergen Reactions   • Apap-Fd&C Red #40 [place]-Oxycodone    • Hydrocodone Vomiting   • Oxycodone    • Tramadol      SURGHX:   Past Surgical History:   Procedure Laterality Date   • PB TOTAL HIP ARTHROPLASTY Right 11/6/2019    Procedure: ARTHROPLASTY, HIP, TOTAL;  Surgeon: Pauline Mo M.D.;  Location: SURGERY [facility];  Service: Orthopedics   • HIP ARTHROPLASTY TOTAL Left     \"resurfacing\"   • TONSILLECTOMY       SOCHX:  reports that she has quit smoking. She smoked 0.00 packs per day. She has never used smokeless tobacco. She reports current alcohol use of about 0.6 oz of alcohol per week. She reports that she does not use drugs.     FH: Reviewed with patient, not pertinent to this visit.    Review of Systems   Constitutional: Negative.  Negative for fever and malaise/fatigue.   HENT: Positive for hearing loss (Right). Negative for ear pain.    Respiratory: Negative.    Cardiovascular: Negative.    Neurological: Negative.    All other systems reviewed and are negative.    Additional details per HPI.    Objective:     /78 (BP Location: Right arm, Patient Position: Sitting, BP Cuff Size: Large adult)   Pulse 81   Temp 36.8 °C (98.2 °F) (Temporal)   Resp 18   Ht 1.626 m (5' 4\")   Wt 79.8 kg (176 lb)   SpO2 92%   BMI 30.21 kg/m²     Physical Exam  Vitals signs reviewed.   Constitutional:       General: " She is not in acute distress.     Appearance: She is well-developed. She is not ill-appearing, toxic-appearing or diaphoretic.   HENT:      Head: Normocephalic.      Right Ear: External ear normal. There is impacted cerumen.      Left Ear: External ear normal.      Ears:      Comments: Excessive cerumen in left auditory canal     Nose: Nose normal.   Eyes:      Extraocular Movements: Extraocular movements intact.      Conjunctiva/sclera: Conjunctivae normal.   Neck:      Musculoskeletal: Normal range of motion.   Cardiovascular:      Rate and Rhythm: Normal rate.   Pulmonary:      Effort: Pulmonary effort is normal. No respiratory distress.   Musculoskeletal: Normal range of motion.         General: No deformity.   Skin:     General: Skin is warm and dry.      Coloration: Skin is not pale.   Neurological:      Mental Status: She is alert and oriented to person, place, and time.      Sensory: No sensory deficit.      Coordination: Coordination normal.   Psychiatric:         Behavior: Behavior normal. Behavior is cooperative.          Assessment/Plan:     1. Impacted cerumen, unspecified laterality    Ear lavage was performed in bilateraly auditory canals with a large amount of cerumen removed. Patient tolerated well. No complications. Re-examination reveals auditory canal mostly clear of cerumen. TMs intact with no erythema, bulging, or perforation. Pt reports hearing has improved in both ears.    Recommend using OTC Debrox in both ears for the next 3 days. Return to clinic after for repeat lavage.    Patient advised to: Return for 1) Symptoms don't improve or worsen, or go to ER, 2) Follow up with primary care in 7-10 days.    Differential diagnosis, natural history, supportive care, and indications for immediate follow-up discussed. All questions answered. Patient agrees with the plan of care.

## 2020-05-11 ENCOUNTER — OFFICE VISIT (OUTPATIENT)
Dept: URGENT CARE | Facility: PHYSICIAN GROUP | Age: 85
End: 2020-05-11
Payer: MEDICARE

## 2020-05-11 VITALS
BODY MASS INDEX: 29.71 KG/M2 | WEIGHT: 174 LBS | HEIGHT: 64 IN | RESPIRATION RATE: 16 BRPM | DIASTOLIC BLOOD PRESSURE: 80 MMHG | OXYGEN SATURATION: 93 % | SYSTOLIC BLOOD PRESSURE: 150 MMHG | TEMPERATURE: 97.1 F | HEART RATE: 80 BPM

## 2020-05-11 DIAGNOSIS — H61.23 BILATERAL IMPACTED CERUMEN: ICD-10-CM

## 2020-05-11 PROCEDURE — 69210 REMOVE IMPACTED EAR WAX UNI: CPT | Performed by: NURSE PRACTITIONER

## 2020-05-11 ASSESSMENT — ENCOUNTER SYMPTOMS
VOMITING: 0
CONSTIPATION: 0
ORTHOPNEA: 0
BACK PAIN: 0
TINGLING: 0
FEVER: 0
NAUSEA: 0
DIARRHEA: 0
COUGH: 0
DIZZINESS: 0
PALPITATIONS: 0
SHORTNESS OF BREATH: 0
WHEEZING: 0
CHILLS: 0
HEADACHES: 0
MYALGIAS: 0
SORE THROAT: 0
MEMORY LOSS: 0
HEARTBURN: 0

## 2020-05-11 ASSESSMENT — FIBROSIS 4 INDEX: FIB4 SCORE: 1.75

## 2020-05-11 NOTE — PROGRESS NOTES
"Subjective:      Janessa Cain is a 85 y.o. female who presents with Earache (bilateral ear discomfort, pt told to return to take a look, pt states that they feel better, x4 weeks )            Cerumen Impaction   This is a recurrent problem. The problem occurs constantly. The problem has been unchanged. Pertinent negatives include no chest pain, chills, coughing, fever, headaches, myalgias, nausea, rash, sore throat or vomiting. Nothing aggravates the symptoms. Treatments tried: Debrox drops. The treatment provided mild relief.       Review of Systems   Constitutional: Negative for chills and fever.   HENT: Positive for hearing loss ( Cerumen impaction). Negative for ear pain and sore throat.    Respiratory: Negative for cough, shortness of breath and wheezing.    Cardiovascular: Negative for chest pain, palpitations, orthopnea and leg swelling.   Gastrointestinal: Negative for constipation, diarrhea, heartburn, nausea and vomiting.   Musculoskeletal: Negative for back pain, joint pain and myalgias.   Skin: Negative for rash.   Neurological: Negative for dizziness, tingling and headaches.   Psychiatric/Behavioral: Negative for memory loss and suicidal ideas.   All other systems reviewed and are negative.         Objective:     /80 (BP Location: Left arm, Patient Position: Sitting, BP Cuff Size: Adult)   Pulse 80   Temp 36.2 °C (97.1 °F) (Temporal)   Resp 16   Ht 1.626 m (5' 4\")   Wt 78.9 kg (174 lb)   SpO2 93%   BMI 29.87 kg/m²      Physical Exam  Vitals signs reviewed.   Constitutional:       General: She is not in acute distress.     Appearance: Normal appearance.   HENT:      Head: Normocephalic and atraumatic.      Right Ear: Ear canal and external ear normal. There is impacted cerumen.      Left Ear: Ear canal and external ear normal. There is impacted cerumen.   Eyes:      Pupils: Pupils are equal, round, and reactive to light.   Neck:      Musculoskeletal: Normal range of motion. "   Cardiovascular:      Rate and Rhythm: Normal rate.      Pulses: Normal pulses.   Pulmonary:      Effort: Pulmonary effort is normal.   Skin:     General: Skin is warm and dry.   Neurological:      Mental Status: She is alert and oriented to person, place, and time.   Psychiatric:         Mood and Affect: Mood normal.         Behavior: Behavior normal.                 Assessment/Plan:       1. Bilateral impacted cerumen  Procedure note    Procedure note    Procedure: Cerumen removal  Indications: Impacted cerumen    Patient was prepped and bilateral ears were lavaged by medical assistant.  Residual wax was curetted by APRN with resolution of impaction. TM visualized with otoscope.    Code 26153

## 2020-05-20 NOTE — PROGRESS NOTES
[facility]    Subjective:     Chief Complaint   Patient presents with   • Follow-Up     Infection of prosthetic joint, subsequent encounter      This is my first time meeting Ms. Emeterio Cain.  Accompanied by her , Almas.    Interval History: 85-year-old female with a PMH of hypertension, degenerative disease and right hip resurfacing in 2008.  Hospitalized at [facility] from 11/12-11/21/2019.  Patient noted that in September 2019 she had a cortisone injection to the right hip due to persistent pain, that not only failed to improve the pain, but stated that the pain got much worse.  Was seen by an orthopedic surgeon in [place], but because she had plan to move to [place], surgery was deferred.  Pain became so severe that she became dehydrated because she was unable to ambulate to get water.  She was forced to sleep in a wheelchair because she was unable to lie flat and was requiring the use of a walker because of the hip pain.  Patient had been admitted at [facility] on 11/3/19, seen by orthopedics and underwent  right total hip arthroplasty with Dr. 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  Three screws were placed.  She was noted to have atrophy and the musculature, complete collapse of the femoral head.  OR bone culture + Propionibacterium acnes.  Patient was then found to have UTI, urine culture on 11/17 + E. coli and Pseudomonas.  Was treated with IV cefepime through 11/25 and then transitioned over to IV ceftriaxone and p.o. rifampin to complete a 6-week course of antibiotics for the infected right hip, end date 12/29/2019.  On 12/30, patient started a 3-month course of p.o. Augmentin 875/125 mg twice daily.  Overall duration to be based on rate of healing, in addition to determining if ongoing antibiotics would be beneficial due to retained infected hardware.    Records reviewed    Today, 5/21/2020: Patient reports  feeling well and has been tolerating the p.o. Augmentin without adverse effect.  Denies feeling generally ill, fevers/chills, general malaise, n/v/d, abdominal pain, chest pain or shortness of breath.  Pt stating that the right hip is well-healed without any breakdown or drainage.  She denies any pain to the right hip.  Continues to use a cane, noting her frustration with the need for the support.  Says she is fairly active she works with physical therapy, rides her bike a mile a day and walks.  Was seen by Dr. Mo approximately 4 weeks ago, x-rays were done at that time and he was very happy with her progress.  She was told that it would take approximately 1 year for her to completely heal and he encouraged her to take her time getting off of the cane as she had already come along so far.  Patient states that she did have a fall approximately 3 weeks ago getting out of her bed, this was after she saw Dr. Mo, said the right hip was a little tender with bruising, but did not feel that she broke anything.  Notes that her PCP and neurologist are monitoring her sodium level, which may be a contributing factor to her intermittent headaches.    Review of Systems   Constitutional: Negative for chills, fever and malaise/fatigue.   HENT: Negative for congestion and sore throat.    Respiratory: Negative for cough and shortness of breath.    Cardiovascular: Negative for chest pain and leg swelling.   Gastrointestinal: Positive for constipation. Negative for abdominal pain, diarrhea, nausea and vomiting.   Genitourinary: Negative for dysuria.   Musculoskeletal: Negative for joint pain and myalgias.   Skin: Negative for rash.   Neurological: Positive for weakness and headaches.   Psychiatric/Behavioral: The patient is not nervous/anxious.        Past Medical History:   Diagnosis Date   • Hypertension        Family History   Problem Relation Age of Onset   • Alcohol abuse Mother    • Heart Disease Mother    • Heart Disease  "Brother    • Alcohol abuse Brother    • Cancer Maternal Aunt        Social History     Tobacco Use   • Smoking status: Former Smoker     Packs/day: 0.00   • Smokeless tobacco: Never Used   Substance Use Topics   • Alcohol use: Not Currently     Alcohol/week: 0.6 oz     Types: 1 Glasses of wine per week     Frequency: 4 or more times a week     Drinks per session: 1 or 2     Binge frequency: Never     Comment: daily   • Drug use: Never       Allergies: Apap-fd&c red #40 [place]-oxycodone; Hydrocodone; Oxycodone; and Tramadol    Pt's medication and problem list reviewed.     Objective:     /62 (BP Location: Left arm, Patient Position: Sitting, BP Cuff Size: Adult)   Pulse 79   Temp 36.2 °C (97.1 °F) (Temporal)   Ht 1.626 m (5' 4\")   Wt 81.6 kg (180 lb)   SpO2 92%   BMI 30.90 kg/m²     Physical Exam   Constitutional: She is oriented to person, place, and time and well-developed, well-nourished, and in no distress. No distress.   Elderly  Obese   HENT:   Head: Normocephalic and atraumatic.   Eyes: Pupils are equal, round, and reactive to light. Conjunctivae and EOM are normal. No scleral icterus.   Neck: Normal range of motion. Neck supple. No tracheal deviation present.   Cardiovascular: Normal rate, regular rhythm and normal heart sounds.   No murmur heard.  Pulmonary/Chest: Effort normal and breath sounds normal. No respiratory distress. She has no wheezes. She has no rales.   Abdominal: Soft. Bowel sounds are normal. She exhibits no distension. There is no abdominal tenderness. There is no rebound and no guarding.   Musculoskeletal: Normal range of motion.         General: No tenderness or edema.      Comments: Right hip surgical site-well-healed and approximated without any breakdown or drainage, nontender to palpation, no induration or fluctuance.  Large bruise distal to surgical incision.   Lymphadenopathy:     She has no cervical adenopathy.   Neurological: She is alert and oriented to person, place, " and time. She exhibits normal muscle tone. Gait normal.   Skin: Skin is warm and dry. No rash noted. She is not diaphoretic. No erythema.   Psychiatric: Mood, memory, affect and judgment normal.   Pleasant   Vitals reviewed.      Labs:  WBC   Date/Time Value Ref Range Status   04/28/2020 01:47 PM 4.7 (L) 4.8 - 10.8 K/uL Final     RBC   Date/Time Value Ref Range Status   04/28/2020 01:47 PM 4.47 4.20 - 5.40 M/uL Final     Hemoglobin   Date/Time Value Ref Range Status   04/28/2020 01:47 PM 14.2 12.0 - 16.0 g/dL Final     Hematocrit   Date/Time Value Ref Range Status   04/28/2020 01:47 PM 42.7 37.0 - 47.0 % Final     MCV   Date/Time Value Ref Range Status   04/28/2020 01:47 PM 95.5 81.4 - 97.8 fL Final     MCH   Date/Time Value Ref Range Status   04/28/2020 01:47 PM 31.8 27.0 - 33.0 pg Final     MCHC   Date/Time Value Ref Range Status   04/28/2020 01:47 PM 33.3 (L) 33.6 - 35.0 g/dL Final     MPV   Date/Time Value Ref Range Status   04/28/2020 01:47 PM 10.2 9.0 - 12.9 fL Final        Sodium   Date/Time Value Ref Range Status   05/07/2020 02:28  (L) 135 - 145 mmol/L Final     Potassium   Date/Time Value Ref Range Status   05/07/2020 02:28 PM 4.8 3.6 - 5.5 mmol/L Final     Chloride   Date/Time Value Ref Range Status   05/07/2020 02:28 PM 89 (L) 96 - 112 mmol/L Final     Co2   Date/Time Value Ref Range Status   05/07/2020 02:28 PM 25 20 - 33 mmol/L Final     Glucose   Date/Time Value Ref Range Status   05/07/2020 02:28 PM 92 65 - 99 mg/dL Final     Bun   Date/Time Value Ref Range Status   05/07/2020 02:28 PM 18 8 - 22 mg/dL Final     Creatinine   Date/Time Value Ref Range Status   05/07/2020 02:28 PM 0.56 0.50 - 1.40 mg/dL Final     ESR 13 on 2/28/20  CRP 0.32 on 2/28/20    Assessment and Plan:   The following treatment plan was discussed with patient at length:    1. Infection of prosthetic joint, subsequent encounter  amoxicillin-clavulanate (AUGMENTIN) 500-125 MG Tab    Sed Rate    -Continue PO Augmentin for at  least 12 months total, earliest end date 12/30/2020.  Will decrease dose to 500/125 mg BID.  Consider lifelong suppression d/t retained HW that poses risk for infection as it was placed when infection was present.  If able to tolerate PO Augmentin, then consider continuing for life d/t risk for PJI that would require additional surgery; however, if she is having issues on the Augmentin or would like to come off, it would be reasonable to monitor her closely off abx.  -ESR to trend inflammatory markers, complete now and every 3 months.   2. Status post total replacement of right hip      As above   3. AVN (avascular necrosis of bone) (HCC)      As above   4. Bone infection (HCC)      As above   5. Propionibacterium infection      As above     Follow up: 6 months, RTC sooner if needed. FU with PCP for ongoing chronic medical conditions.     ANUPAM Hancock.       Please note that this dictation was created using voice recognition software. I have  worked with technical experts from [facility]  [employer] to optimize the interface.  I have made every reasonable attempt to correct obvious errors, but there may be errors of grammar and possibly content that I did not discover before finalizing the note.

## 2020-05-21 ENCOUNTER — OFFICE VISIT (OUTPATIENT)
Dept: INFECTIOUS DISEASES | Facility: MEDICAL CENTER | Age: 85
End: 2020-05-21
Payer: MEDICARE

## 2020-05-21 VITALS
WEIGHT: 180 LBS | DIASTOLIC BLOOD PRESSURE: 62 MMHG | HEIGHT: 64 IN | TEMPERATURE: 97.1 F | SYSTOLIC BLOOD PRESSURE: 136 MMHG | HEART RATE: 79 BPM | OXYGEN SATURATION: 92 % | BODY MASS INDEX: 30.73 KG/M2

## 2020-05-21 DIAGNOSIS — T84.50XD INFECTION OF PROSTHETIC JOINT, SUBSEQUENT ENCOUNTER: ICD-10-CM

## 2020-05-21 DIAGNOSIS — Z96.641 STATUS POST TOTAL REPLACEMENT OF RIGHT HIP: ICD-10-CM

## 2020-05-21 DIAGNOSIS — M87.00 AVN (AVASCULAR NECROSIS OF BONE) (HCC): ICD-10-CM

## 2020-05-21 DIAGNOSIS — M86.9 BONE INFECTION (HCC): ICD-10-CM

## 2020-05-21 DIAGNOSIS — A49.8 PROPIONIBACTERIUM INFECTION: ICD-10-CM

## 2020-05-21 PROCEDURE — 99214 OFFICE O/P EST MOD 30 MIN: CPT | Performed by: NURSE PRACTITIONER

## 2020-05-21 RX ORDER — LISINOPRIL 10 MG/1
TABLET ORAL
COMMUNITY
Start: 2020-05-01 | End: 2021-11-17

## 2020-05-21 RX ORDER — CARBAMAZEPINE 200 MG/1
1 CAPSULE, EXTENDED RELEASE ORAL 2 TIMES DAILY
COMMUNITY
Start: 2020-04-28 | End: 2022-01-17

## 2020-05-21 RX ORDER — AMOXICILLIN AND CLAVULANATE POTASSIUM 500; 125 MG/1; MG/1
1 TABLET, FILM COATED ORAL 2 TIMES DAILY
Qty: 180 TAB | Refills: 1 | Status: SHIPPED | OUTPATIENT
Start: 2020-05-21 | End: 2020-08-19

## 2020-05-21 RX ORDER — DOCUSATE SODIUM 250 MG
250 CAPSULE ORAL 2 TIMES DAILY
COMMUNITY
End: 2021-03-19

## 2020-05-21 RX ORDER — AMOXICILLIN AND CLAVULANATE POTASSIUM 500; 125 MG/1; MG/1
1 TABLET, FILM COATED ORAL 2 TIMES DAILY
Qty: 42 TAB | Refills: 0 | Status: SHIPPED | OUTPATIENT
Start: 2020-05-21 | End: 2020-06-11

## 2020-05-21 RX ORDER — ESOMEPRAZOLE MAGNESIUM 20 MG/1
GRANULE, DELAYED RELEASE ORAL
COMMUNITY
End: 2021-11-17

## 2020-05-21 ASSESSMENT — ENCOUNTER SYMPTOMS
HEADACHES: 1
VOMITING: 0
FEVER: 0
NERVOUS/ANXIOUS: 0
DIARRHEA: 0
MYALGIAS: 0
WEAKNESS: 1
NAUSEA: 0
CONSTIPATION: 1
SORE THROAT: 0
CHILLS: 0
ABDOMINAL PAIN: 0
SHORTNESS OF BREATH: 0
COUGH: 0

## 2020-05-21 ASSESSMENT — FIBROSIS 4 INDEX: FIB4 SCORE: 1.75

## 2020-05-28 ENCOUNTER — HOSPITAL ENCOUNTER (OUTPATIENT)
Dept: LAB | Facility: MEDICAL CENTER | Age: 85
End: 2020-05-28
Attending: INTERNAL MEDICINE
Payer: MEDICARE

## 2020-05-28 ENCOUNTER — HOSPITAL ENCOUNTER (OUTPATIENT)
Dept: LAB | Facility: MEDICAL CENTER | Age: 85
End: 2020-05-28
Attending: NURSE PRACTITIONER
Payer: MEDICARE

## 2020-05-28 DIAGNOSIS — T84.50XD INFECTION OF PROSTHETIC JOINT, SUBSEQUENT ENCOUNTER: ICD-10-CM

## 2020-05-28 LAB
ANION GAP SERPL CALC-SCNC: 13 MMOL/L (ref 7–16)
BUN SERPL-MCNC: 13 MG/DL (ref 8–22)
CALCIUM SERPL-MCNC: 9.5 MG/DL (ref 8.5–10.5)
CHLORIDE SERPL-SCNC: 95 MMOL/L (ref 96–112)
CO2 SERPL-SCNC: 24 MMOL/L (ref 20–33)
CREAT SERPL-MCNC: 0.52 MG/DL (ref 0.5–1.4)
ERYTHROCYTE [SEDIMENTATION RATE] IN BLOOD BY WESTERGREN METHOD: 2 MM/HOUR (ref 0–30)
GLUCOSE SERPL-MCNC: 94 MG/DL (ref 65–99)
OSMOLALITY SERPL: 279 MOSM/KG H2O (ref 278–298)
OSMOLALITY UR: 352 MOSM/KG H2O (ref 300–900)
POTASSIUM SERPL-SCNC: 4.4 MMOL/L (ref 3.6–5.5)
SODIUM SERPL-SCNC: 132 MMOL/L (ref 135–145)
SODIUM UR-SCNC: 61 MMOL/L

## 2020-05-28 PROCEDURE — 80048 BASIC METABOLIC PNL TOTAL CA: CPT

## 2020-05-28 PROCEDURE — 36415 COLL VENOUS BLD VENIPUNCTURE: CPT

## 2020-05-28 PROCEDURE — 83935 ASSAY OF URINE OSMOLALITY: CPT

## 2020-05-28 PROCEDURE — 83930 ASSAY OF BLOOD OSMOLALITY: CPT

## 2020-05-28 PROCEDURE — 85652 RBC SED RATE AUTOMATED: CPT

## 2020-05-28 PROCEDURE — 84300 ASSAY OF URINE SODIUM: CPT

## 2020-06-04 ENCOUNTER — HOSPITAL ENCOUNTER (OUTPATIENT)
Facility: MEDICAL CENTER | Age: 85
End: 2020-06-04
Attending: INTERNAL MEDICINE
Payer: MEDICARE

## 2020-06-04 PROCEDURE — 83497 ASSAY OF 5-HIAA: CPT

## 2020-06-07 LAB
5HIAA & CREATININE UR-IMP: NORMAL
5OH-INDOLEACETATE 24H UR-MCNC: 2.9 MG/L
5OH-INDOLEACETATE 24H UR-MRATE: 4 MG/D (ref 0–15)
5OH-INDOLEACETATE/CREAT 24H UR: 4 MG/GCR (ref 0–14)
COLLECT DURATION TIME SPEC: 24 HRS
CREAT 24H UR-MCNC: 65 MG/DL
CREAT 24H UR-MRATE: 812 MG/D (ref 400–1300)
SPECIMEN VOL ?TM UR: 1250 ML

## 2020-07-22 NOTE — CARE PLAN
Problem: Mobility  Goal: Risk for activity intolerance will decrease  Outcome: PROGRESSING AS EXPECTED  Note:   Pt educated to call for assistance prior to ambulating to bathroom.      Problem: Pain Management  Goal: Pain level will decrease to patient's comfort goal  Outcome: PROGRESSING AS EXPECTED  Flowsheets (Taken 11/6/2019 0028)  Non Verbal Scale : Calm; Sleeping; Unlabored Breathing      No

## 2020-09-01 RX ORDER — AMOXICILLIN AND CLAVULANATE POTASSIUM 500; 125 MG/1; MG/1
1 TABLET, FILM COATED ORAL 2 TIMES DAILY
Qty: 60 TAB | Refills: 0 | Status: SHIPPED | OUTPATIENT
Start: 2020-09-01 | End: 2020-10-01

## 2020-09-01 RX ORDER — AMOXICILLIN AND CLAVULANATE POTASSIUM 500; 125 MG/1; MG/1
TABLET, FILM COATED ORAL
COMMUNITY
Start: 2020-06-18 | End: 2020-09-01 | Stop reason: SDUPTHER

## 2020-09-01 NOTE — TELEPHONE ENCOUNTER
Patient called requesting a 30 day supply of Augmentin 500/125 BID be sent to [facility] pharmacy on file. Patient t requested a refill from mail order however it wont be here before the holiday

## 2020-11-25 ENCOUNTER — OFFICE VISIT (OUTPATIENT)
Dept: INFECTIOUS DISEASES | Facility: MEDICAL CENTER | Age: 85
End: 2020-11-25
Payer: MEDICARE

## 2020-11-25 VITALS
OXYGEN SATURATION: 91 % | BODY MASS INDEX: 30.56 KG/M2 | HEIGHT: 64 IN | TEMPERATURE: 97.8 F | DIASTOLIC BLOOD PRESSURE: 90 MMHG | WEIGHT: 179 LBS | SYSTOLIC BLOOD PRESSURE: 160 MMHG | HEART RATE: 84 BPM

## 2020-11-25 DIAGNOSIS — Z96.641 STATUS POST TOTAL REPLACEMENT OF RIGHT HIP: ICD-10-CM

## 2020-11-25 DIAGNOSIS — A49.8 PROPIONIBACTERIUM INFECTION: ICD-10-CM

## 2020-11-25 DIAGNOSIS — T84.50XD INFECTION OF PROSTHETIC JOINT, SUBSEQUENT ENCOUNTER: ICD-10-CM

## 2020-11-25 DIAGNOSIS — M86.9 BONE INFECTION (HCC): ICD-10-CM

## 2020-11-25 DIAGNOSIS — M87.00 AVN (AVASCULAR NECROSIS OF BONE) (HCC): ICD-10-CM

## 2020-11-25 PROBLEM — N95.1 HOT FLASHES DUE TO MENOPAUSE: Status: ACTIVE | Noted: 2020-06-02

## 2020-11-25 PROBLEM — T46.4X5A COUGH DUE TO ACE INHIBITOR: Status: ACTIVE | Noted: 2020-08-20

## 2020-11-25 PROBLEM — S82.091S: Status: ACTIVE | Noted: 2020-01-03

## 2020-11-25 PROBLEM — J30.2 SEASONAL ALLERGIC RHINITIS: Status: ACTIVE | Noted: 2020-08-20

## 2020-11-25 PROBLEM — F32.A DEPRESSION: Status: ACTIVE | Noted: 2020-06-02

## 2020-11-25 PROBLEM — M25.559 ARTHRALGIA OF HIP: Status: ACTIVE | Noted: 2019-11-21

## 2020-11-25 PROBLEM — R25.2 MUSCLE CRAMPS: Status: ACTIVE | Noted: 2020-06-18

## 2020-11-25 PROBLEM — R05.8 COUGH DUE TO ACE INHIBITOR: Status: ACTIVE | Noted: 2020-08-20

## 2020-11-25 PROBLEM — F51.01 PRIMARY INSOMNIA: Status: ACTIVE | Noted: 2020-07-23

## 2020-11-25 PROCEDURE — 99213 OFFICE O/P EST LOW 20 MIN: CPT | Performed by: NURSE PRACTITIONER

## 2020-11-25 RX ORDER — ESTRADIOL 0.5 MG/1
TABLET ORAL
COMMUNITY
Start: 2020-07-15 | End: 2022-01-12

## 2020-11-25 RX ORDER — TRAZODONE HYDROCHLORIDE 50 MG/1
TABLET ORAL
COMMUNITY
Start: 2020-07-23 | End: 2021-10-07 | Stop reason: SDUPTHER

## 2020-11-25 RX ORDER — LOSARTAN POTASSIUM 50 MG/1
50 TABLET ORAL DAILY
COMMUNITY
End: 2021-10-07 | Stop reason: CLARIF

## 2020-11-25 RX ORDER — AMOXICILLIN 500 MG
CAPSULE ORAL
COMMUNITY
Start: 2020-06-16 | End: 2021-03-19

## 2020-11-25 RX ORDER — AMOXICILLIN AND CLAVULANATE POTASSIUM 500; 125 MG/1; MG/1
1 TABLET, FILM COATED ORAL 2 TIMES DAILY
Qty: 180 TAB | Refills: 3 | Status: SHIPPED | OUTPATIENT
Start: 2020-11-25 | End: 2021-02-23

## 2020-11-25 RX ORDER — ACETAMINOPHEN AND CODEINE PHOSPHATE 300; 15 MG/1; MG/1
TABLET ORAL
COMMUNITY
Start: 2020-06-18 | End: 2021-03-19

## 2020-11-25 RX ORDER — AMOXICILLIN AND CLAVULANATE POTASSIUM 500; 125 MG/1; MG/1
1 TABLET, FILM COATED ORAL 3 TIMES DAILY
COMMUNITY
End: 2020-11-25 | Stop reason: SDUPTHER

## 2020-11-25 RX ORDER — TRAMADOL HYDROCHLORIDE 50 MG/1
50 TABLET ORAL EVERY 4 HOURS PRN
COMMUNITY
End: 2021-03-19

## 2020-11-25 RX ORDER — FLUTICASONE PROPIONATE 50 MCG
SPRAY, SUSPENSION (ML) NASAL
COMMUNITY
Start: 2020-08-20 | End: 2021-08-02

## 2020-11-25 ASSESSMENT — ENCOUNTER SYMPTOMS
MYALGIAS: 0
NAUSEA: 0
SHORTNESS OF BREATH: 0
CHILLS: 0
WEAKNESS: 1
DIARRHEA: 0
HEADACHES: 0
COUGH: 0
VOMITING: 0
CONSTIPATION: 0
SORE THROAT: 0
ABDOMINAL PAIN: 0
NERVOUS/ANXIOUS: 0
FEVER: 0
DIAPHORESIS: 0

## 2020-11-25 ASSESSMENT — PAIN SCALES - GENERAL: PAINLEVEL: NO PAIN

## 2020-11-25 ASSESSMENT — FIBROSIS 4 INDEX: FIB4 SCORE: 1.75

## 2020-11-25 NOTE — PROGRESS NOTES
[facility]    Subjective:     Chief Complaint   Patient presents with   • Follow-Up     infection of prosthetic joint     Interval History: 85-year-old female with a PMH of hypertension, degenerative disease and right hip resurfacing in 2008.  Hospitalized at [facility] from 11/12-11/21/2019.  Patient noted that in September 2019 she had a cortisone injection to the right hip due to persistent pain, that not only failed to improve the pain, but stated that the pain got much worse.  Was seen by an orthopedic surgeon in [place], but because she had plan to move to [place], surgery was deferred.  Pain became so severe that she became dehydrated because she was unable to ambulate to get water.  She was forced to sleep in a wheelchair because she was unable to lie flat and was requiring the use of a walker because of the hip pain.  Patient had been admitted at [facility] on 11/3/19, seen by orthopedics and underwent  right total hip arthroplasty with Dr. 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  Three screws were placed.  She was noted to have atrophy and the musculature, complete collapse of the femoral head.  OR bone culture + Propionibacterium acnes.  Patient was then found to have UTI, urine culture on 11/17 + E. coli and Pseudomonas.  Was treated with IV cefepime through 11/25 and then transitioned over to IV ceftriaxone and p.o. rifampin to complete a 6-week course of antibiotics for the infected right hip, end date 12/29/2019.  On 12/30, patient started a 3-month course of p.o. Augmentin 875/125 mg twice daily.  Overall duration to be based on rate of healing, in addition to determining if ongoing antibiotics would be beneficial due to retained infected hardware.    5/21/2020: Seen by TAMIA Lockhart.  Right hip is well-healed without any breakdown or drainage.  Was seen by Dr. Mo approximately 4 weeks ago, x-rays were done  at that time and he was very happy with her progress.  She was told that it would take approximately 1 year for her to completely heal and he encouraged her to take her time getting off of the cane as she had already come along so far.  Patient states that she did have a fall approximately 3 weeks ago getting out of her bed, this was after she saw Dr. Mo, said the right hip was a little tender with bruising, but did not feel that she broke anything.  Continue PO Augmentin for at least 12 months total, earliest end date 12/30/2020.  Will decrease dose to 500/125 mg BID.  Consider lifelong suppression d/t retained HW that poses risk for infection as it was placed when infection was present.  If able to tolerate PO Augmentin, then consider continuing for life d/t risk for PJI that would require additional surgery; however, if she is having issues on the Augmentin or would like to come off, it would be reasonable to monitor her closely off abx.    Today, 11/25/2020: Has been tolerating the p.o. Augmentin without issue.  Denies feeling generally ill, fevers/chills, general malaise, headache, n/v/d, abdominal pain, chest pain or shortness of breath.  Right hip remains well-healed without any breakdown or drainage.  She denies any pain to the right hip, but notes that she still has weakness to the back muscle and thigh.  She still needs to use a cane and up until 3 weeks ago was still working with physical therapy.  The only reason she is not currently working with physical therapy is due to a Covid exposure in the office.  She does need to schedule follow-up with Dr. Mo.  Notes that she did her labs initially after last appointment, but did not continue to do them thereafter because the lab was too crowded and she had concerns about going with Covid.      Review of Systems   Constitutional: Negative for chills, diaphoresis, fever and malaise/fatigue.   HENT: Negative for congestion and sore throat.    Respiratory:  "Negative for cough and shortness of breath.    Cardiovascular: Negative for chest pain and leg swelling.   Gastrointestinal: Negative for abdominal pain, constipation, diarrhea, nausea and vomiting.   Genitourinary: Negative for dysuria.   Musculoskeletal: Negative for joint pain and myalgias.   Skin: Negative for itching and rash.   Neurological: Positive for weakness (RLE). Negative for headaches.   Psychiatric/Behavioral: The patient is not nervous/anxious.        Past Medical History:   Diagnosis Date   • Hypertension        Family History   Problem Relation Age of Onset   • Alcohol abuse Mother    • Heart Disease Mother    • Heart Disease Brother    • Alcohol abuse Brother    • Cancer Maternal Aunt        Social History     Tobacco Use   • Smoking status: Former Smoker     Packs/day: 0.00   • Smokeless tobacco: Never Used   Substance Use Topics   • Alcohol use: Not Currently     Alcohol/week: 0.6 oz     Types: 1 Glasses of wine per week     Frequency: 4 or more times a week     Drinks per session: 1 or 2     Binge frequency: Never     Comment: daily   • Drug use: Never       Allergies: Apap-fd&c red #40 [place]-oxycodone; Hydrocodone; Oxycodone; and Tramadol    Pt's medication and problem list reviewed.     Objective:     /90 (BP Location: Left arm, Patient Position: Sitting, BP Cuff Size: Adult)   Pulse 84   Temp 36.6 °C (97.8 °F) (Temporal)   Ht 1.626 m (5' 4\") Comment: pt reported  Wt 81.2 kg (179 lb)   SpO2 91%   BMI 30.73 kg/m²     Physical Exam   Constitutional: She is oriented to person, place, and time and well-developed, well-nourished, and in no distress. No distress.   Elderly  Obese   HENT:   Head: Normocephalic and atraumatic.   Right Ear: External ear normal.   Left Ear: External ear normal.   Eyes: Pupils are equal, round, and reactive to light. EOM are normal. No scleral icterus.   Neck: Normal range of motion. Neck supple. No JVD present. No tracheal deviation present. "   Cardiovascular: Normal rate, regular rhythm and normal heart sounds. Exam reveals no friction rub.   No murmur heard.  Pulmonary/Chest: Effort normal and breath sounds normal. No respiratory distress. She has no wheezes.   Abdominal: Soft. Bowel sounds are normal. She exhibits no distension. There is no abdominal tenderness.   Musculoskeletal: Normal range of motion.         General: No tenderness or edema.      Comments: Right hip surgical site- remains well-healed and approximated without any breakdown or drainage, nontender to palpation, no induration or fluctuance.     Neurological: She is alert and oriented to person, place, and time. No cranial nerve deficit. She exhibits normal muscle tone.   Semishuffling gait, cane used for ambulatory assistance.   Skin: Skin is warm and dry. No rash noted. She is not diaphoretic. No erythema.   Psychiatric: Mood, memory, affect and judgment normal.   Pleasant   Vitals reviewed.      Assessment and Plan:   The following treatment plan was discussed with patient at length:    1. Infection of prosthetic joint, subsequent encounter  amoxicillin-clavulanate (AUGMENTIN) 500-125 MG Tab    -Continue p.o. Augmentin 500/125 mg twice daily for chronic lifelong suppression due to infected retained hardware.   -Continue to monitor for signs and symptoms of infection: Fevers, chills, increased swelling to right hip, decreased range of motion to right hip, breakdown of surgical site, etc.  -Okay to have patient's PCP take over p.o. Augmentin if willing; otherwise, we will see her back in 1 year.  -Follow-up with Dr. Mo as directed.   2. Status post total replacement of right hip      As above.   3. AVN (avascular necrosis of bone) (HCC)      As above.   4. Bone infection (HCC)      As above.   5. Propionibacterium infection      As above.     Follow up: 12 months, RTC sooner if needed. FU with PCP for ongoing chronic medical conditions.     ANUPAM Hancock.       Please  note that this dictation was created using voice recognition software. I have  worked with technical experts from [facility] to optimize the interface.  I have made every reasonable attempt to correct obvious errors, but there may be errors of grammar and possibly content that I did not discover before finalizing the note.

## 2021-01-15 DIAGNOSIS — Z23 NEED FOR VACCINATION: ICD-10-CM

## 2021-02-16 ENCOUNTER — IMMUNIZATION (OUTPATIENT)
Dept: FAMILY PLANNING/WOMEN'S HEALTH CLINIC | Facility: IMMUNIZATION CENTER | Age: 86
End: 2021-02-16
Attending: INTERNAL MEDICINE
Payer: MEDICARE

## 2021-02-16 DIAGNOSIS — Z23 NEED FOR VACCINATION: ICD-10-CM

## 2021-02-16 DIAGNOSIS — Z23 ENCOUNTER FOR VACCINATION: Primary | ICD-10-CM

## 2021-02-16 PROCEDURE — 0001A PFIZER SARS-COV-2 VACCINE: CPT

## 2021-02-16 PROCEDURE — 91300 PFIZER SARS-COV-2 VACCINE: CPT

## 2021-02-19 ENCOUNTER — HOSPITAL ENCOUNTER (OUTPATIENT)
Dept: LAB | Facility: MEDICAL CENTER | Age: 86
End: 2021-02-19
Attending: NURSE PRACTITIONER
Payer: MEDICARE

## 2021-02-19 LAB
ALBUMIN SERPL BCP-MCNC: 4.3 G/DL (ref 3.2–4.9)
ALP SERPL-CCNC: 87 U/L (ref 30–99)
ALT SERPL-CCNC: 25 U/L (ref 2–50)
ANION GAP SERPL CALC-SCNC: 10 MMOL/L (ref 7–16)
AST SERPL-CCNC: 19 U/L (ref 12–45)
BILIRUB CONJ SERPL-MCNC: <0.2 MG/DL (ref 0.1–0.5)
BILIRUB INDIRECT SERPL-MCNC: NORMAL MG/DL (ref 0–1)
BILIRUB SERPL-MCNC: 0.3 MG/DL (ref 0.1–1.5)
BUN SERPL-MCNC: 18 MG/DL (ref 8–22)
CALCIUM SERPL-MCNC: 8.8 MG/DL (ref 8.5–10.5)
CHLORIDE SERPL-SCNC: 87 MMOL/L (ref 96–112)
CO2 SERPL-SCNC: 26 MMOL/L (ref 20–33)
CREAT SERPL-MCNC: 0.6 MG/DL (ref 0.5–1.4)
FASTING STATUS PATIENT QL REPORTED: NORMAL
GLUCOSE SERPL-MCNC: 83 MG/DL (ref 65–99)
POTASSIUM SERPL-SCNC: 4.5 MMOL/L (ref 3.6–5.5)
PROT SERPL-MCNC: 6.6 G/DL (ref 6–8.2)
SODIUM SERPL-SCNC: 123 MMOL/L (ref 135–145)

## 2021-02-19 PROCEDURE — 80076 HEPATIC FUNCTION PANEL: CPT

## 2021-02-19 PROCEDURE — 36415 COLL VENOUS BLD VENIPUNCTURE: CPT

## 2021-02-19 PROCEDURE — 80048 BASIC METABOLIC PNL TOTAL CA: CPT

## 2021-03-06 ENCOUNTER — IMMUNIZATION (OUTPATIENT)
Dept: FAMILY PLANNING/WOMEN'S HEALTH CLINIC | Facility: IMMUNIZATION CENTER | Age: 86
End: 2021-03-06
Attending: INTERNAL MEDICINE
Payer: MEDICARE

## 2021-03-06 DIAGNOSIS — Z23 ENCOUNTER FOR VACCINATION: Primary | ICD-10-CM

## 2021-03-06 PROCEDURE — 0002A PFIZER SARS-COV-2 VACCINE: CPT

## 2021-03-06 PROCEDURE — 91300 PFIZER SARS-COV-2 VACCINE: CPT

## 2021-03-19 ENCOUNTER — OFFICE VISIT (OUTPATIENT)
Dept: URGENT CARE | Facility: PHYSICIAN GROUP | Age: 86
End: 2021-03-19
Payer: MEDICARE

## 2021-03-19 VITALS
WEIGHT: 179 LBS | DIASTOLIC BLOOD PRESSURE: 80 MMHG | OXYGEN SATURATION: 97 % | TEMPERATURE: 97 F | SYSTOLIC BLOOD PRESSURE: 140 MMHG | HEART RATE: 60 BPM | BODY MASS INDEX: 30.56 KG/M2 | RESPIRATION RATE: 18 BRPM | HEIGHT: 64 IN

## 2021-03-19 DIAGNOSIS — H61.23 BILATERAL IMPACTED CERUMEN: ICD-10-CM

## 2021-03-19 PROCEDURE — 99213 OFFICE O/P EST LOW 20 MIN: CPT | Performed by: NURSE PRACTITIONER

## 2021-03-19 ASSESSMENT — ENCOUNTER SYMPTOMS
TINGLING: 0
JOINT SWELLING: 0
CONSTIPATION: 0
DIARRHEA: 0
NAUSEA: 0
MEMORY LOSS: 0
VERTIGO: 0
HEARTBURN: 0
VOMITING: 0
PALPITATIONS: 0
WHEEZING: 0
BACK PAIN: 0
FEVER: 0
SINUS PAIN: 0
NECK PAIN: 0
ORTHOPNEA: 0
VISUAL CHANGE: 0
SHORTNESS OF BREATH: 0
DIZZINESS: 0

## 2021-03-19 ASSESSMENT — FIBROSIS 4 INDEX: FIB4 SCORE: 1.33

## 2021-03-19 NOTE — PROGRESS NOTES
"Subjective:      Janessa Cain is a 85 y.o. female who presents with Cerumen Impaction (right ear mostly )        Patient presents to urgent care with complaint of bilateral hearing loss. She is uncertain but believes that she may have impacted cerumen. She has had to have her ears irrigated in the past.     Ear Fullness  This is a new problem. The current episode started in the past 7 days. The problem occurs constantly. The problem has been gradually worsening. Pertinent negatives include no fever, joint swelling, nausea, neck pain, vertigo, visual change or vomiting. Nothing aggravates the symptoms. She has tried nothing for the symptoms. The treatment provided no relief.       Review of Systems   Constitutional: Negative for fever and malaise/fatigue.   HENT: Positive for hearing loss. Negative for ear pain and sinus pain.    Respiratory: Negative for shortness of breath and wheezing.    Cardiovascular: Negative for palpitations, orthopnea and leg swelling.   Gastrointestinal: Negative for constipation, diarrhea, heartburn, nausea and vomiting.   Musculoskeletal: Negative for back pain, joint pain, joint swelling and neck pain.   Neurological: Negative for dizziness, vertigo and tingling.   Psychiatric/Behavioral: Negative for memory loss and suicidal ideas.   All other systems reviewed and are negative.         Objective:     /80   Pulse 60   Temp 36.1 °C (97 °F) (Temporal)   Resp 18   Ht 1.626 m (5' 4\")   Wt 81.2 kg (179 lb)   SpO2 97%   BMI 30.73 kg/m²      Physical Exam  Vitals reviewed.   Constitutional:       Appearance: Normal appearance.   HENT:      Head: Normocephalic.      Right Ear: External ear normal. There is impacted cerumen.      Left Ear: External ear normal. There is impacted cerumen.      Nose: Nose normal. No congestion or rhinorrhea.      Mouth/Throat:      Mouth: Mucous membranes are moist.   Eyes:      Extraocular Movements: Extraocular movements intact.      " Conjunctiva/sclera: Conjunctivae normal.   Cardiovascular:      Rate and Rhythm: Normal rate and regular rhythm.      Pulses: Normal pulses.      Heart sounds: Normal heart sounds. No murmur.   Pulmonary:      Effort: Pulmonary effort is normal.      Breath sounds: Normal breath sounds. No wheezing.   Abdominal:      General: Abdomen is flat. Bowel sounds are normal.      Palpations: Abdomen is soft.      Tenderness: There is no abdominal tenderness.   Musculoskeletal:         General: Normal range of motion.      Cervical back: Normal range of motion.   Lymphadenopathy:      Cervical: No cervical adenopathy.   Skin:     General: Skin is warm and dry.      Capillary Refill: Capillary refill takes less than 2 seconds.   Neurological:      Mental Status: She is alert and oriented to person, place, and time.   Psychiatric:         Mood and Affect: Mood normal.         Behavior: Behavior normal.                 Assessment/Plan:        1. Bilateral impacted cerumen       I have discussed the potential risks of this procedure including but not limited to mild discomfort with a sensation of ear fullness and wetness, dizziness, ear canal inflammation, ear canal abrasion with bleeding, and/or ear drum perforation.    Patient is aware and consents to the procedure    Patient was prepped and bilateral ears were lavaged by medical assistant.  Residual wax was curetted by APRN with resolution of impaction in left ear. TM visualized with otoscope. Right ear still with significant impaction. I have advised patient to use OTC ear wax softening drops x 1 week and return to urgent care or PCP for right ear lavage when the wax has significant time to soften. Patient verbalized understanding.

## 2021-03-22 ENCOUNTER — HOSPITAL ENCOUNTER (OUTPATIENT)
Facility: MEDICAL CENTER | Age: 86
End: 2021-03-22
Attending: NURSE PRACTITIONER
Payer: MEDICARE

## 2021-03-22 ENCOUNTER — OFFICE VISIT (OUTPATIENT)
Dept: URGENT CARE | Facility: PHYSICIAN GROUP | Age: 86
End: 2021-03-22
Payer: MEDICARE

## 2021-03-22 VITALS
HEART RATE: 76 BPM | OXYGEN SATURATION: 94 % | WEIGHT: 170 LBS | HEIGHT: 65 IN | DIASTOLIC BLOOD PRESSURE: 82 MMHG | SYSTOLIC BLOOD PRESSURE: 128 MMHG | BODY MASS INDEX: 28.32 KG/M2 | RESPIRATION RATE: 16 BRPM | TEMPERATURE: 97.8 F

## 2021-03-22 DIAGNOSIS — R30.0 DYSURIA: ICD-10-CM

## 2021-03-22 DIAGNOSIS — N30.00 ACUTE CYSTITIS WITHOUT HEMATURIA: ICD-10-CM

## 2021-03-22 LAB
APPEARANCE UR: CLEAR
BILIRUB UR STRIP-MCNC: NORMAL MG/DL
COLOR UR AUTO: NORMAL
GLUCOSE UR STRIP.AUTO-MCNC: 100 MG/DL
KETONES UR STRIP.AUTO-MCNC: NORMAL MG/DL
LEUKOCYTE ESTERASE UR QL STRIP.AUTO: NORMAL
NITRITE UR QL STRIP.AUTO: NORMAL
PH UR STRIP.AUTO: 6 [PH] (ref 5–8)
PROT UR QL STRIP: NORMAL MG/DL
RBC UR QL AUTO: NORMAL
SP GR UR STRIP.AUTO: 1.02
UROBILINOGEN UR STRIP-MCNC: 1 MG/DL

## 2021-03-22 PROCEDURE — 81002 URINALYSIS NONAUTO W/O SCOPE: CPT | Performed by: NURSE PRACTITIONER

## 2021-03-22 PROCEDURE — 99213 OFFICE O/P EST LOW 20 MIN: CPT | Performed by: NURSE PRACTITIONER

## 2021-03-22 PROCEDURE — 87086 URINE CULTURE/COLONY COUNT: CPT

## 2021-03-22 RX ORDER — CEFDINIR 300 MG/1
300 CAPSULE ORAL EVERY 12 HOURS
Qty: 10 CAPSULE | Refills: 0 | Status: SHIPPED | OUTPATIENT
Start: 2021-03-22 | End: 2021-03-27

## 2021-03-22 ASSESSMENT — FIBROSIS 4 INDEX: FIB4 SCORE: 1.33

## 2021-03-22 ASSESSMENT — ENCOUNTER SYMPTOMS
FEVER: 0
NAUSEA: 0
FLANK PAIN: 0
WEAKNESS: 0
CHILLS: 0
MYALGIAS: 0
DIARRHEA: 0
HEADACHES: 0
DIZZINESS: 0
BACK PAIN: 0
CONSTIPATION: 0
VOMITING: 0
ABDOMINAL PAIN: 0

## 2021-03-23 DIAGNOSIS — N30.00 ACUTE CYSTITIS WITHOUT HEMATURIA: ICD-10-CM

## 2021-03-23 NOTE — PROGRESS NOTES
"Subjective:      Janessa Cain is a 85 y.o. female who presents with UTI (started yesterday morning. having spasms )            HPI  Experiencing dysuria and bladder spasms since yesterday. Denies fever, n/v, low pelvic pressure or back pain. Using AZO started today. Takes Augmentin for infection of prosthetic joint daily for chronic condition.    PMH:  has a past medical history of Hypertension.  MEDS:   Current Outpatient Medications:   •  cefdinir (OMNICEF) 300 MG Cap, Take 1 capsule by mouth every 12 hours for 5 days., Disp: 10 capsule, Rfl: 0  •  estradiol (ESTRACE) 0.5 MG tablet, ESTRADIOL 0.5 MG TABS, Disp: , Rfl:   •  losartan (COZAAR) 50 MG Tab, Take 50 mg by mouth every day., Disp: , Rfl:   •  b complex vitamins tablet, B COMPLEX TABS, Disp: , Rfl:   •  fluticasone (FLONASE) 50 MCG/ACT nasal spray, FLONASE ALLERGY RELIEF 50 MCG/ACT SUSP, Disp: , Rfl:   •  traZODone (DESYREL) 50 MG Tab, TRAZODONE HCL 50 MG TABS, Disp: , Rfl:   •  carbamazepine (CARBATROL) 200 MG CR capsule, CARBAMAZEPINE  MG XR12H-CAP, Disp: , Rfl:   •  Esomeprazole Magnesium 20 MG Pack, Take  by mouth., Disp: , Rfl:   •  lisinopril (PRINIVIL) 10 MG Tab, LISINOPRIL 10 MG TABS, Disp: , Rfl:   •  senna-docusate (PERICOLACE OR SENOKOT S) 8.6-50 MG Tab, Take 2 Tabs by mouth 2 Times a Day., Disp: 30 Tab, Rfl: 0  ALLERGIES:   Allergies   Allergen Reactions   • Apap-Fd&C Red #40 [place]-Oxycodone    • Hydrocodone Vomiting   • Oxycodone    • Tramadol      SURGHX:   Past Surgical History:   Procedure Laterality Date   • PB TOTAL HIP ARTHROPLASTY Right 11/6/2019    Procedure: ARTHROPLASTY, HIP, TOTAL;  Surgeon: Pauline Mo M.D.;  Location: SURGERY [facility];  Service: Orthopedics   • HIP ARTHROPLASTY TOTAL Left     \"resurfacing\"   • TONSILLECTOMY       SOCHX:  reports that she has quit smoking. She smoked 0.00 packs per day. She has never used smokeless tobacco. She reports previous alcohol use of about 0.6 oz of " "alcohol per week. She reports that she does not use drugs.  FH: Family history was reviewed, no pertinent findings to report    Review of Systems   Constitutional: Negative for chills, fever and malaise/fatigue.   Gastrointestinal: Negative for abdominal pain, constipation, diarrhea, nausea and vomiting.   Genitourinary: Positive for dysuria, frequency and urgency. Negative for flank pain and hematuria.   Musculoskeletal: Negative for back pain and myalgias.   Skin: Negative for itching and rash.   Neurological: Negative for dizziness, weakness and headaches.   All other systems reviewed and are negative.         Objective:     /82   Pulse 76   Temp 36.6 °C (97.8 °F) (Temporal)   Resp 16   Ht 1.651 m (5' 5\")   Wt 77.1 kg (170 lb)   SpO2 94%   BMI 28.29 kg/m²      Physical Exam  Vitals reviewed.   Constitutional:       General: She is awake. She is not in acute distress.     Appearance: Normal appearance. She is well-developed. She is not ill-appearing, toxic-appearing or diaphoretic.   HENT:      Head: Normocephalic.   Eyes:      Conjunctiva/sclera: Conjunctivae normal.      Pupils: Pupils are equal, round, and reactive to light.   Cardiovascular:      Rate and Rhythm: Normal rate.   Pulmonary:      Effort: Pulmonary effort is normal.   Abdominal:      General: There is no distension.      Palpations: Abdomen is soft.      Tenderness: There is no abdominal tenderness. There is no guarding or rebound.   Musculoskeletal:         General: Normal range of motion.      Cervical back: Normal range of motion and neck supple.   Skin:     General: Skin is warm and dry.   Neurological:      Mental Status: She is alert and oriented to person, place, and time.      GCS: GCS eye subscore is 4. GCS verbal subscore is 5. GCS motor subscore is 6.   Psychiatric:         Attention and Perception: Attention and perception normal.         Mood and Affect: Mood and affect normal.         Speech: Speech normal.         " Behavior: Behavior normal. Behavior is cooperative.         Thought Content: Thought content normal.         Cognition and Memory: Cognition and memory normal.         Judgment: Judgment normal.                 Assessment/Plan:        1. Acute cystitis without hematuria    - Urine Culture; Future  - cefdinir (OMNICEF) 300 MG Cap; Take 1 capsule by mouth every 12 hours for 5 days.  Dispense: 10 capsule; Refill: 0    2. Dysuria    - POCT Urinalysis    Increase water intake  Urinate more frequently and empty bladder completely  Practice good toileting hygiene after bowel movements  Monitor for back/flank pain, difficulty urinating, blood in urine- need re-evaluation  MyChart release of urine culture

## 2021-03-25 LAB
BACTERIA UR CULT: NORMAL
SIGNIFICANT IND 70042: NORMAL
SITE SITE: NORMAL
SOURCE SOURCE: NORMAL

## 2021-03-26 ENCOUNTER — OFFICE VISIT (OUTPATIENT)
Dept: URGENT CARE | Facility: PHYSICIAN GROUP | Age: 86
End: 2021-03-26
Payer: MEDICARE

## 2021-03-26 ENCOUNTER — NON-PROVIDER VISIT (OUTPATIENT)
Dept: URGENT CARE | Facility: PHYSICIAN GROUP | Age: 86
End: 2021-03-26
Payer: MEDICARE

## 2021-03-26 VITALS
TEMPERATURE: 97.5 F | RESPIRATION RATE: 16 BRPM | BODY MASS INDEX: 28.32 KG/M2 | SYSTOLIC BLOOD PRESSURE: 122 MMHG | HEIGHT: 65 IN | HEART RATE: 72 BPM | WEIGHT: 170 LBS | DIASTOLIC BLOOD PRESSURE: 80 MMHG

## 2021-03-26 VITALS
HEART RATE: 72 BPM | HEIGHT: 65 IN | WEIGHT: 170 LBS | RESPIRATION RATE: 16 BRPM | SYSTOLIC BLOOD PRESSURE: 122 MMHG | TEMPERATURE: 97.5 F | BODY MASS INDEX: 28.32 KG/M2 | DIASTOLIC BLOOD PRESSURE: 80 MMHG

## 2021-03-26 DIAGNOSIS — H91.91 DECREASED HEARING OF RIGHT EAR: ICD-10-CM

## 2021-03-26 DIAGNOSIS — H61.21 IMPACTED CERUMEN OF RIGHT EAR: ICD-10-CM

## 2021-03-26 PROCEDURE — 99213 OFFICE O/P EST LOW 20 MIN: CPT | Performed by: PHYSICIAN ASSISTANT

## 2021-03-26 ASSESSMENT — FIBROSIS 4 INDEX
FIB4 SCORE: 1.33
FIB4 SCORE: 1.33

## 2021-03-26 NOTE — PROGRESS NOTES
"Subjective:      Janessa Cain is a 85 y.o. female who presents with Ear Drainage (PT states follow up x5 days to drain right ear, and she cant hear out of right ear. )            Pt is an 86 y/o female who returns today for cerumen removal attempt- Patient has had 2 previous visits to  with decreased hearing to the right ear in particular- and since has been using Debrox drops at home with anticipation of today.   Pt. Denies any pain- only decreased hearing on the right.     Other  This is a new problem. The current episode started more than 1 month ago. The problem occurs constantly. The problem has been unchanged. Pertinent negatives include no headaches or vertigo. Nothing aggravates the symptoms. Treatments tried: As above.        Review of Systems   HENT: Positive for hearing loss. Negative for ear discharge and ear pain.    Neurological: Negative for vertigo and headaches.   All other systems reviewed and are negative.         Objective:     /80   Pulse 72   Temp 36.4 °C (97.5 °F) (Temporal)   Resp 16   Ht 1.651 m (5' 5\")   Wt 77.1 kg (170 lb)   BMI 28.29 kg/m²    PMH:  has a past medical history of Hypertension.  MEDS: Reviewed .   ALLERGIES:   Allergies   Allergen Reactions   • Apap-Fd&C Red #40 [place]-Oxycodone    • Hydrocodone Vomiting   • Oxycodone    • Tramadol      SURGHX:   Past Surgical History:   Procedure Laterality Date   • PB TOTAL HIP ARTHROPLASTY Right 11/6/2019    Procedure: ARTHROPLASTY, HIP, TOTAL;  Surgeon: Pauline Mo M.D.;  Location: SURGERY [facility];  Service: Orthopedics   • HIP ARTHROPLASTY TOTAL Left     \"resurfacing\"   • TONSILLECTOMY       SOCHX:  reports that she has quit smoking. She smoked 0.00 packs per day. She has never used smokeless tobacco. She reports previous alcohol use of about 0.6 oz of alcohol per week. She reports that she does not use drugs.  FH: Family history was reviewed, no pertinent findings to report  s  Physical " Exam  Vitals reviewed.   Constitutional:       General: She is not in acute distress.     Appearance: She is well-developed.   HENT:      Head: Normocephalic and atraumatic.      Comments: Right canal- noted cerumen impaction-   Scant cerumen to the left.   Both removed with ear lavage- recheck after such- TM intact without evidence of infection.   Eyes:      Conjunctiva/sclera: Conjunctivae normal.      Pupils: Pupils are equal, round, and reactive to light.   Neck:      Trachea: No tracheal deviation.   Cardiovascular:      Rate and Rhythm: Normal rate.   Pulmonary:      Effort: Pulmonary effort is normal. No respiratory distress.      Breath sounds: Normal breath sounds.   Musculoskeletal:      Cervical back: Normal range of motion and neck supple.   Skin:     General: Skin is warm.   Neurological:      Mental Status: She is alert and oriented to person, place, and time.   Psychiatric:         Behavior: Behavior normal.         Thought Content: Thought content normal.         Judgment: Judgment normal.                 Assessment/Plan:        1. Decreased hearing of right ear  2. Impacted cerumen of right ear    Resolved.   Encouraged MA visit in the future- every 3-6 months for lavage.   Continue with Debrox drops in the future prior to lavage to assist with procedure.   RTC PRN.

## 2021-03-28 ASSESSMENT — ENCOUNTER SYMPTOMS
VERTIGO: 0
HEADACHES: 0

## 2021-04-12 ENCOUNTER — HOSPITAL ENCOUNTER (OUTPATIENT)
Dept: RADIOLOGY | Facility: MEDICAL CENTER | Age: 86
End: 2021-04-12
Payer: MEDICARE

## 2021-04-12 DIAGNOSIS — Z78.0 ASYMPTOMATIC MENOPAUSAL STATE: ICD-10-CM

## 2021-04-12 PROCEDURE — 77080 DXA BONE DENSITY AXIAL: CPT

## 2021-07-07 ENCOUNTER — OFFICE VISIT (OUTPATIENT)
Dept: PHYSICAL MEDICINE AND REHAB | Facility: MEDICAL CENTER | Age: 86
End: 2021-07-07
Payer: MEDICARE

## 2021-07-07 VITALS
BODY MASS INDEX: 29.86 KG/M2 | SYSTOLIC BLOOD PRESSURE: 132 MMHG | TEMPERATURE: 96.9 F | OXYGEN SATURATION: 92 % | HEART RATE: 91 BPM | HEIGHT: 65 IN | WEIGHT: 179.23 LBS | DIASTOLIC BLOOD PRESSURE: 72 MMHG

## 2021-07-07 DIAGNOSIS — M17.0 OSTEOARTHRITIS OF BOTH KNEES, UNSPECIFIED OSTEOARTHRITIS TYPE: ICD-10-CM

## 2021-07-07 DIAGNOSIS — G89.29 CHRONIC PAIN OF RIGHT KNEE: ICD-10-CM

## 2021-07-07 DIAGNOSIS — M25.561 CHRONIC PAIN OF RIGHT KNEE: ICD-10-CM

## 2021-07-07 DIAGNOSIS — M25.461 EFFUSION OF BURSA OF RIGHT KNEE: ICD-10-CM

## 2021-07-07 PROCEDURE — 99204 OFFICE O/P NEW MOD 45 MIN: CPT | Performed by: PHYSICAL MEDICINE & REHABILITATION

## 2021-07-07 RX ORDER — AMOXICILLIN AND CLAVULANATE POTASSIUM 500; 125 MG/1; MG/1
1 TABLET, FILM COATED ORAL 3 TIMES DAILY
COMMUNITY
End: 2022-01-26

## 2021-07-07 RX ORDER — MELOXICAM 15 MG/1
15 TABLET ORAL DAILY
COMMUNITY
End: 2021-07-16

## 2021-07-07 ASSESSMENT — PATIENT HEALTH QUESTIONNAIRE - PHQ9
SUM OF ALL RESPONSES TO PHQ QUESTIONS 1-9: 2
5. POOR APPETITE OR OVEREATING: 0 - NOT AT ALL
CLINICAL INTERPRETATION OF PHQ2 SCORE: 1

## 2021-07-07 ASSESSMENT — PAIN SCALES - GENERAL: PAINLEVEL: 2=MINIMAL-SLIGHT

## 2021-07-07 ASSESSMENT — FIBROSIS 4 INDEX: FIB4 SCORE: 1.35

## 2021-07-07 NOTE — PROGRESS NOTES
New patient note    Physiatry (physical medicine and  Rehabilitation), interventional spine and sports medicine    Date of service: See epic    Chief complaint:   Chief Complaint   Patient presents with   • New Patient     Knee pain        Referring provider: Pauline Mo M.D.     HISTORY    HPI: Janessa Cain 86 y.o.  who presents today with Diagnoses of Osteoarthritis of both knees, unspecified osteoarthritis type, Chronic pain of right knee, and Effusion of bursa of right knee were pertinent to this visit.    HPI    The patient is having chronic right knee pain which is typically responded to steroid injections.  She notes that after the steroid injection in the past she had greater than 3 months of greater than 80% pain relief.  She recently had a steroid injection in her right knee done by Dr. Mo approximately 1 month ago and the patient is noted a dramatic improvement in her pain.  Now the pain in the right knee is 2 out of 10 intensity aching quality nonradiating.      Medical records review:  I reviewed the note from the referring provider Pauline Mo M.D. including the note dated 7/2/2021.           ROS:   Red Flags ROS:   Fever, Chills, Sweats: Denies  Involuntary Weight Loss: Denies  Bladder Incontinence: Denies  Bowel Incontinence: denies  Saddle Anesthesia: Denies    All other systems reviewed and negative.       PMHx:   Past Medical History:   Diagnosis Date   • Hypertension          Current Outpatient Medications on File Prior to Visit   Medication Sig Dispense Refill   • meloxicam (MOBIC) 15 MG tablet Take 15 mg by mouth every day.     • amoxicillin-clavulanate (AUGMENTIN) 500-125 MG Tab Take 1 tablet by mouth 3 times a day.     • CALCIUM-VITAMIN D PO Take  by mouth.     • Fexofenadine HCl (MUCINEX ALLERGY PO) Take  by mouth.     • estradiol (ESTRACE) 0.5 MG tablet ESTRADIOL 0.5 MG TABS     • losartan (COZAAR) 50 MG Tab Take 50 mg by mouth every day.     • b complex vitamins  "tablet B COMPLEX TABS     • traZODone (DESYREL) 50 MG Tab TRAZODONE HCL 50 MG TABS     • carbamazepine (CARBATROL) 200 MG CR capsule CARBAMAZEPINE  MG XR12H-CAP     • fluticasone (FLONASE) 50 MCG/ACT nasal spray FLONASE ALLERGY RELIEF 50 MCG/ACT SUSP     • Esomeprazole Magnesium 20 MG Pack Take  by mouth.     • lisinopril (PRINIVIL) 10 MG Tab LISINOPRIL 10 MG TABS (Patient not taking: Reported on 7/7/2021)     • senna-docusate (PERICOLACE OR SENOKOT S) 8.6-50 MG Tab Take 2 Tabs by mouth 2 Times a Day. 30 Tab 0     No current facility-administered medications on file prior to visit.        PSHx:   Past Surgical History:   Procedure Laterality Date   • PB TOTAL HIP ARTHROPLASTY Right 11/6/2019    Procedure: ARTHROPLASTY, HIP, TOTAL;  Surgeon: Pauline Mo M.D.;  Location: SURGERY [facility];  Service: Orthopedics   • HIP ARTHROPLASTY TOTAL Left     \"resurfacing\"   • TONSILLECTOMY         Family history   Family History   Problem Relation Age of Onset   • Alcohol abuse Mother    • Heart Disease Mother    • Heart Disease Brother    • Alcohol abuse Brother    • Cancer Maternal Aunt          Medications: reviewed on epic.   Outpatient Medications Marked as Taking for the 7/7/21 encounter (Office Visit) with Layo Gonzalez M.D.   Medication Sig Dispense Refill   • meloxicam (MOBIC) 15 MG tablet Take 15 mg by mouth every day.     • amoxicillin-clavulanate (AUGMENTIN) 500-125 MG Tab Take 1 tablet by mouth 3 times a day.     • CALCIUM-VITAMIN D PO Take  by mouth.     • Fexofenadine HCl (MUCINEX ALLERGY PO) Take  by mouth.     • estradiol (ESTRACE) 0.5 MG tablet ESTRADIOL 0.5 MG TABS     • losartan (COZAAR) 50 MG Tab Take 50 mg by mouth every day.     • b complex vitamins tablet B COMPLEX TABS     • traZODone (DESYREL) 50 MG Tab TRAZODONE HCL 50 MG TABS     • carbamazepine (CARBATROL) 200 MG CR capsule CARBAMAZEPINE  MG XR12H-CAP          Allergies:   Allergies   Allergen Reactions   • Apap-Fd&C Red #40 " Al Diamond-Oxycodone    • Hydrocodone Vomiting   • Oxycodone    • Tramadol        Social Hx:   Social History     Socioeconomic History   • Marital status:      Spouse name: Not on file   • Number of children: Not on file   • Years of education: Not on file   • Highest education level: Not on file   Occupational History   • Not on file   Tobacco Use   • Smoking status: Former Smoker     Packs/day: 0.00   • Smokeless tobacco: Never Used   Vaping Use   • Vaping Use: Never used   Substance and Sexual Activity   • Alcohol use: Not Currently     Alcohol/week: 0.6 oz     Types: 1 Glasses of wine per week     Comment: daily   • Drug use: Never   • Sexual activity: Not on file   Other Topics Concern   •  Service No   • Blood Transfusions No   • Caffeine Concern No   • Occupational Exposure No   • Hobby Hazards No   • Sleep Concern No   • Stress Concern No   • Weight Concern No   • Special Diet No   • Back Care No   • Exercise No   • Bike Helmet No   • Seat Belt Yes   • Self-Exams Yes   Social History Narrative   • Not on file     Social Determinants of Health     Financial Resource Strain:    • Difficulty of Paying Living Expenses:    Food Insecurity:    • Worried About Running Out of Food in the Last Year:    • Ran Out of Food in the Last Year:    Transportation Needs:    • Lack of Transportation (Medical):    • Lack of Transportation (Non-Medical):    Physical Activity:    • Days of Exercise per Week:    • Minutes of Exercise per Session:    Stress:    • Feeling of Stress :    Social Connections:    • Frequency of Communication with Friends and Family:    • Frequency of Social Gatherings with Friends and Family:    • Attends Church Services:    • Active Member of Clubs or Organizations:    • Attends Club or Organization Meetings:    • Marital Status:    Intimate Partner Violence:    • Fear of Current or Ex-Partner:    • Emotionally Abused:    • Physically Abused:    • Sexually Abused:          EXAMINATION  "    Physical Exam:   Vitals: /72 (BP Location: Left arm, Patient Position: Sitting, BP Cuff Size: Adult)   Pulse 91   Temp 36.1 °C (96.9 °F) (Temporal)   Ht 1.645 m (5' 4.75\")   Wt 81.3 kg (179 lb 3.7 oz)   SpO2 92%     Constitutional:   Body Habitus: Body mass index is 30.06 kg/m².  Cooperation: Fully cooperates with exam  Appearance: Well-groomed, well-nourished, not disheveled     Eyes: No scleral icterus to suggest severe liver disease, no proptosis to suggest severe hyperthyroid    ENT -no obvious auditory deficits, no obvious tongue lesions, tongue midline, no facial droop     Skin -no rashes or lesions noted     Respiratory-  breathing comfortable on room air, no audible wheezing    Cardiovascular- capillary refills less than 2 seconds. No lower extremity edema is noted.     Gastrointestinal - no obvious abdominal masses, No tenderness to palpation in the abdomen    Psychiatric- alert and oriented ×3. Normal affect.       Musculoskeletal and Neuro -       RIGHT  Knee:   Inspection there is a right suprapatellar effusion.  No other swelling, rashes or deformities,   Palpation no significant tenderness to palpation throughout the knee including the medial joint line, lateral joint line, quadriceps tendon, patellar tendon, patellar, medial patellar facets, lateral patellar facets, tibial tuberosity, fibular head.  Range of motion normal range of motion in flexion and extension  Special tests:   Varus stress tests: Negative  Valgus stress tests: Negative  Lockman's test: Negative  Anterior drawer: Negative  Posterior drawer: Negative  Tej's: negative    LEFT  Knee:   Inspection no swelling, rashes or deformities,   Palpation no significant tenderness to palpation throughout the knee including the medial joint line, lateral joint line, quadriceps tendon, patellar tendon, patellar, medial patellar facets, lateral patellar facets, tibial tuberosity, fibular head.  Range of motion normal range of " motion in flexion and extension  Special tests:   Varus stress tests: Negative  Valgus stress tests: Negative  Lockman's test: Negative  Anterior drawer: Negative  Posterior drawer: Negative  Tej's: negative                Key points for the international standards for neurological classification of spinal cord injury (ISNCSCI) to light touch.     Dermatome R L                                      L2 2 2   L3 2 2   L4 2 2   L5 2 2   S1 2 2   S2 2 2       Motor Exam Lower Extremities    ? Myotome R L   Hip flexion L2 5 5   Knee extension L3 5 5   Ankle dorsiflexion L4 5 5   Toe extension L5 5 5   Ankle plantarflexion S1 5 5             MEDICAL DECISION MAKING    Medical records review: see under HPI section.     DATA    Labs:   Lab Results   Component Value Date/Time    SODIUM 123 (L) 02/19/2021 02:14 PM    POTASSIUM 4.5 02/19/2021 02:14 PM    CHLORIDE 87 (L) 02/19/2021 02:14 PM    CO2 26 02/19/2021 02:14 PM    ANION 10.0 02/19/2021 02:14 PM    GLUCOSE 83 02/19/2021 02:14 PM    BUN 18 02/19/2021 02:14 PM    CREATININE 0.60 02/19/2021 02:14 PM    CALCIUM 8.8 02/19/2021 02:14 PM    ASTSGOT 19 02/19/2021 02:14 PM    ALTSGPT 25 02/19/2021 02:14 PM    TBILIRUBIN 0.3 02/19/2021 02:14 PM    ALBUMIN 4.3 02/19/2021 02:14 PM    TOTPROTEIN 6.6 02/19/2021 02:14 PM    GLOBULIN 2.4 11/13/2019 05:33 AM    AGRATIO 1.2 11/13/2019 05:33 AM   ]    No results found for: PROTHROMBTM, INR     Lab Results   Component Value Date/Time    WBC 4.7 (L) 04/28/2020 01:47 PM    RBC 4.47 04/28/2020 01:47 PM    HEMOGLOBIN 14.2 04/28/2020 01:47 PM    HEMATOCRIT 42.7 04/28/2020 01:47 PM    MCV 95.5 04/28/2020 01:47 PM    MCH 31.8 04/28/2020 01:47 PM    MCHC 33.3 (L) 04/28/2020 01:47 PM    MPV 10.2 04/28/2020 01:47 PM    NEUTSPOLYS 64.50 04/28/2020 01:47 PM    LYMPHOCYTES 21.50 (L) 04/28/2020 01:47 PM    MONOCYTES 10.30 04/28/2020 01:47 PM    EOSINOPHILS 2.60 04/28/2020 01:47 PM    BASOPHILS 1.10 04/28/2020 01:47 PM        No results found for:  HBA1C     Imaging:   I personally reviewed following images, these are my reads  X-ray bilateral knees 7/2/2021 there is significant bilateral degenerative changes in the knees right versus left.      IMAGING radiology reads. I reviewed the following radiology reads      X-ray bilateral knees 7/2/2021   4 views of her right knee are taken today which show evidence of arthritic changes including joint space narrowing osteophyte formation subchondral sclerosis                              Results for orders placed in visit on 07/02/21    DX-KNEE COMPLETE 4+ RIGHT     Results for orders placed during the hospital encounter of 11/03/19    DX-PELVIS-1 OR 2 VIEWS    Impression  Interval right hip arthroplasty without evident complication.                       Diagnosis   Visit Diagnoses     ICD-10-CM   1. Osteoarthritis of both knees, unspecified osteoarthritis type  M17.0   2. Chronic pain of right knee  M25.561    G89.29   3. Effusion of bursa of right knee  M25.461           ASSESSMENT AND PLAN:  Janessa Storm Payton 86 y.o. female      Janessa was seen today for new patient.    Diagnoses and all orders for this visit:    Osteoarthritis of both knees, unspecified osteoarthritis type    Chronic pain of right knee    Effusion of bursa of right knee        The patient had excellent pain relief after the steroid injection previously done by Dr. Mo.  She has no significant pain in the knee at this point.  We discussed additions the patient's home exercise program and she has a referral to physical therapy which is pending.    I believe meloxicam is reasonable for now but ideally the patient cannot be on chronic NSAIDs which are taken every day.      Interventional program: I will plan to follow-up with the patient in 3 months.  At that time I would consider the patient for a intra-articular injection versus genicular nerve blocks versus other nerve block depending on how she is done with her conservative  treatments.        Follow-up: 3 months          Please note that this dictation was created using voice recognition software. I have made every reasonable attempt to correct obvious errors but there may be errors of grammar and content that I may have overlooked prior to finalization of this note.      Layo Gonzalez MD  Physical Medicine and Rehabilitation  Interventional Spine and Sports Physiatry  [facility]           Pauline Cerda M.D. CC Sujatha Pitani, M.D.

## 2021-07-07 NOTE — Clinical Note
Pauline,     Thanks for the referral.  Janessa is doing well after the previous injection.  I will continue to follow-up with her.  If her knee pain worsens and she would like to consider surgery then I would plan to refer her back to that time.  Layo

## 2021-08-02 ENCOUNTER — OFFICE VISIT (OUTPATIENT)
Dept: URGENT CARE | Facility: PHYSICIAN GROUP | Age: 86
End: 2021-08-02
Payer: MEDICARE

## 2021-08-02 VITALS
SYSTOLIC BLOOD PRESSURE: 126 MMHG | WEIGHT: 179 LBS | RESPIRATION RATE: 16 BRPM | OXYGEN SATURATION: 94 % | HEIGHT: 65 IN | HEART RATE: 81 BPM | TEMPERATURE: 97 F | BODY MASS INDEX: 29.82 KG/M2 | DIASTOLIC BLOOD PRESSURE: 78 MMHG

## 2021-08-02 DIAGNOSIS — H10.13 ALLERGIC CONJUNCTIVITIS OF BOTH EYES: ICD-10-CM

## 2021-08-02 DIAGNOSIS — H57.89 EYE SWELLING, BILATERAL: ICD-10-CM

## 2021-08-02 DIAGNOSIS — H57.9 ITCHY EYES: ICD-10-CM

## 2021-08-02 PROBLEM — Z78.0 ASYMPTOMATIC MENOPAUSAL STATE: Status: ACTIVE | Noted: 2021-01-22

## 2021-08-02 PROBLEM — Z00.00 WELL ADULT HEALTH CHECK: Status: ACTIVE | Noted: 2021-01-22

## 2021-08-02 PROBLEM — R35.1 NOCTURIA: Status: ACTIVE | Noted: 2021-06-25

## 2021-08-02 PROCEDURE — 99214 OFFICE O/P EST MOD 30 MIN: CPT | Performed by: NURSE PRACTITIONER

## 2021-08-02 RX ORDER — OLOPATADINE HYDROCHLORIDE 1 MG/ML
1 SOLUTION/ DROPS OPHTHALMIC 2 TIMES DAILY
Qty: 5 ML | Refills: 0 | Status: SHIPPED
Start: 2021-08-02 | End: 2021-11-17

## 2021-08-02 RX ORDER — METHYLPREDNISOLONE 4 MG/1
TABLET ORAL
Qty: 1 EACH | Refills: 0 | Status: SHIPPED
Start: 2021-08-02 | End: 2021-09-01

## 2021-08-02 ASSESSMENT — ENCOUNTER SYMPTOMS
BLURRED VISION: 0
EYE DISCHARGE: 1
FEVER: 0
FOREIGN BODY SENSATION: 0
CONSTITUTIONAL NEGATIVE: 1
INSOMNIA: 1
EYE REDNESS: 1
PHOTOPHOBIA: 0
EYE ITCHING: 1
EYE PAIN: 1

## 2021-08-02 ASSESSMENT — FIBROSIS 4 INDEX: FIB4 SCORE: 1.35

## 2021-08-02 ASSESSMENT — VISUAL ACUITY: OU: 1

## 2021-08-02 NOTE — PATIENT INSTRUCTIONS
Allergic Conjunctivitis, Adult    Allergic conjunctivitis is inflammation of the clear membrane that covers the white part of your eye and the inner surface of your eyelid (conjunctiva). The inflammation is caused by allergies. The blood vessels in the conjunctiva become inflamed and this causes the eyes to become red or pink. The eyes often feel itchy. Allergic conjunctivitis cannot be spread from one person to another person (is not contagious).  What are the causes?  This condition is caused by an allergic reaction. Common causes of an allergic reaction (allergens) include:  · Outdoor allergens, such as:  ? Pollen.  ? Grass and weeds.  ? Mold spores.  · Indoor allergens, such as:  ? Dust.  ? Smoke.  ? Mold.  ? Pet dander.  ? Animal hair.  What increases the risk?  You may be more likely to develop this condition if you have a family history of allergies, such as:  · Allergic rhinitis.  · Bronchial asthma.  · Atopic dermatitis.  What are the signs or symptoms?  Symptoms of this condition include eyes that are:  · Itchy.  · Red.  · Watery.  · Puffy.  Your eyes may also:  · Sting or burn.  · Have clear drainage coming from them.  How is this diagnosed?  This condition may be diagnosed by medical history and physical exam. If you have drainage from your eyes, it may be tested to rule out other causes of conjunctivitis. You may also need to see a health care provider who specializes in treating allergies (allergist) or eye conditions (ophthalmologist) for tests to confirm the diagnosis. You may have:  · Skin tests to see which allergens are causing your symptoms. These tests involve pricking the skin with a tiny needle and exposing the skin to small amounts of potential allergens to see if your skin reacts.  · Blood tests.  · Tissue scrapings from your eyelid. These will be examined under a microscope.  How is this treated?  Treatments for this condition may include:  · Cold cloths (compresses) to soothe itching and  swelling.  · Washing the face to remove allergens.  · Eye drops. These may be prescription or over-the-counter. There are several different types. You may need to try different types to see which one works best for you. Your may need:  ? Eye drops that block the allergic reaction (antihistamine).  ? Eye drops that reduce swelling and irritation (anti-inflammatory).  ? Steroid eye drops to lessen a severe reaction (vernal conjunctivitis).  · Oral antihistamine medicines to reduce your allergic reaction. You may need these if eye drops do not help or are difficult to use.  Follow these instructions at home:  · Avoid known allergens whenever possible.  · Take or apply over-the-counter and prescription medicines only as told by your health care provider. These include any eye drops.  · Apply a cool, clean washcloth to your eye for 10-20 minutes, 3-4 times a day.  · Do not touch or rub your eyes.  · Do not wear contact lenses until the inflammation is gone. Wear glasses instead.  · Do not wear eye makeup until the inflammation is gone.  · Keep all follow-up visits as told by your health care provider. This is important.  Contact a health care provider if:  · Your symptoms get worse or do not improve with treatment.  · You have mild eye pain.  · You have sensitivity to light.  · You have spots or blisters on your eyes.  · You have pus draining from your eye.  · You have a fever.  Get help right away if:  · You have redness, swelling, or other symptoms in only one eye.  · Your vision is blurred or you have vision changes.  · You have severe eye pain.  This information is not intended to replace advice given to you by your health care provider. Make sure you discuss any questions you have with your health care provider.  Document Released: 03/09/2004 Document Revised: 11/30/2018 Document Reviewed: 06/30/2017  Elsevier Patient Education © 2020 [employer]

## 2021-08-02 NOTE — PROGRESS NOTES
Subjective:     Janessa Cain is a 86 y.o. female who presents for Eye Swelling (bilateral eye swelling, itching, redness, irritated  (allergies) x11 days )       Eye Problem   Both eyes are affected.This is a new problem. The problem has been gradually worsening. There was no injury mechanism. Associated symptoms include an eye discharge, eye redness and itching. Pertinent negatives include no blurred vision, fever, foreign body sensation or photophobia.     Patient reports that over the past 11 days, she has been experiencing worsening bilateral eye itching, swelling, redness, and irritation of the eyes and the skin below.  Has been using OTC Benadryl, Claritin, Sudafed, and ketotifen eyedrops with no improvement in the symptoms.  Reports symptoms keep her up at night.    Denies exposure to new substances.  Has not changed her soaps or detergents.  Is unsure of environmental allergies, but reports symptoms started after being near some pine trees.  She doubts infectious process.  Reports similar symptoms have occurred in the past.    Patient was screened prior to rooming and denied COVID-19 diagnosis or contact with a person who has been diagnosed or is suspected to have COVID-19. During this visit, appropriate PPE was worn, hand hygiene was performed, and the patient and any visitors were masked.     PMH:  has a past medical history of Hypertension.    MEDS:   Current Outpatient Medications:   •  methylPREDNISolone (MEDROL DOSEPAK) 4 MG Tablet Therapy Pack, Use as directed on package. May take all of Day 1 as a single dose (24 mg) when starting., Disp: 1 Each, Rfl: 0  •  olopatadine (PATANOL) 0.1 % ophthalmic solution, Administer 1 Drop into both eyes 2 times a day., Disp: 5 mL, Rfl: 0  •  meloxicam (MOBIC) 15 MG tablet, TAKE ONE TABLET BY MOUTH DAILY, Disp: 30 tablet, Rfl: 0  •  amoxicillin-clavulanate (AUGMENTIN) 500-125 MG Tab, Take 1 tablet by mouth 3 times a day., Disp: , Rfl:   •   "CALCIUM-VITAMIN D PO, Take  by mouth., Disp: , Rfl:   •  Fexofenadine HCl (MUCINEX ALLERGY PO), Take  by mouth., Disp: , Rfl:   •  estradiol (ESTRACE) 0.5 MG tablet, ESTRADIOL 0.5 MG TABS, Disp: , Rfl:   •  losartan (COZAAR) 50 MG Tab, Take 50 mg by mouth every day., Disp: , Rfl:   •  traZODone (DESYREL) 50 MG Tab, TRAZODONE HCL 50 MG TABS, Disp: , Rfl:   •  carbamazepine (CARBATROL) 200 MG CR capsule, CARBAMAZEPINE  MG XR12H-CAP, Disp: , Rfl:   •  Esomeprazole Magnesium 20 MG Pack, Take  by mouth., Disp: , Rfl:   •  b complex vitamins tablet, B COMPLEX TABS (Patient not taking: Reported on 8/2/2021), Disp: , Rfl:   •  lisinopril (PRINIVIL) 10 MG Tab, LISINOPRIL 10 MG TABS (Patient not taking: Reported on 7/7/2021), Disp: , Rfl:     ALLERGIES:   Allergies   Allergen Reactions   • Apap-Fd&C Red #40 Al Diamond-Oxycodone    • Hydrocodone Vomiting   • Oxycodone    • Tramadol      SURGHX:   Past Surgical History:   Procedure Laterality Date   • PB TOTAL HIP ARTHROPLASTY Right 11/6/2019    Procedure: ARTHROPLASTY, HIP, TOTAL;  Surgeon: Pauline Mo M.D.;  Location: [facility];  Service: Orthopedics   • HIP ARTHROPLASTY TOTAL Left     \"resurfacing\"   • TONSILLECTOMY       SOCHX:  reports that she has quit smoking. She smoked 0.00 packs per day. She has never used smokeless tobacco. She reports previous alcohol use of about 0.6 oz of alcohol per week. She reports that she does not use drugs.     FH: Reviewed with patient, not pertinent to this visit.    Review of Systems   Constitutional: Negative.  Negative for fever and malaise/fatigue.   Eyes: Positive for pain (Irritation), discharge, redness and itching. Negative for blurred vision and photophobia.   Psychiatric/Behavioral: The patient has insomnia.    All other systems reviewed and are negative.    Additional details per HPI.      Objective:     /78 (BP Location: Right arm, Patient Position: Sitting, BP Cuff Size: Large adult)   Pulse 81   " "Temp 36.1 °C (97 °F) (Temporal)   Resp 16   Ht 1.645 m (5' 4.75\")   Wt 81.2 kg (179 lb)   SpO2 94%   BMI 30.02 kg/m²     Physical Exam  Vitals reviewed.   Constitutional:       General: She is not in acute distress.     Appearance: She is well-developed. She is not ill-appearing or toxic-appearing.   HENT:      Head: Normocephalic.      Right Ear: External ear normal.      Left Ear: External ear normal.      Nose: Nose normal.   Eyes:      General: Lids are normal. Vision grossly intact. Allergic shiner present.         Right eye: No discharge.         Left eye: No discharge.      Extraocular Movements: Extraocular movements intact.      Conjunctiva/sclera:      Right eye: Right conjunctiva is injected. No chemosis.     Left eye: Left conjunctiva is injected. No chemosis.  Cardiovascular:      Rate and Rhythm: Normal rate.   Pulmonary:      Effort: Pulmonary effort is normal. No respiratory distress.   Musculoskeletal:         General: No deformity. Normal range of motion.      Cervical back: Normal range of motion.   Skin:     General: Skin is warm and dry.      Coloration: Skin is not pale.   Neurological:      Mental Status: She is alert and oriented to person, place, and time.      Sensory: No sensory deficit.      Motor: No weakness.      Coordination: Coordination normal.   Psychiatric:         Behavior: Behavior normal. Behavior is cooperative.       Assessment/Plan:     1. Itchy eyes  - methylPREDNISolone (MEDROL DOSEPAK) 4 MG Tablet Therapy Pack; Use as directed on package. May take all of Day 1 as a single dose (24 mg) when starting.  Dispense: 1 Each; Refill: 0  - olopatadine (PATANOL) 0.1 % ophthalmic solution; Administer 1 Drop into both eyes 2 times a day.  Dispense: 5 mL; Refill: 0    2. Eye swelling, bilateral  - methylPREDNISolone (MEDROL DOSEPAK) 4 MG Tablet Therapy Pack; Use as directed on package. May take all of Day 1 as a single dose (24 mg) when starting.  Dispense: 1 Each; Refill: " 0    3. Allergic conjunctivitis of both eyes  - olopatadine (PATANOL) 0.1 % ophthalmic solution; Administer 1 Drop into both eyes 2 times a day.  Dispense: 5 mL; Refill: 0    Rx as above sent electronically.  Continue with Claritin once a day.    Differential diagnosis, natural history, supportive care, over-the-counter symptom management per 's instructions, close monitoring, and indications for immediate follow-up discussed.     Patient advised to: Return for 1) Symptoms that worsen/don't improve, or go to ER, 2) Follow up with primary care in 7-10 days.    All questions answered. Patient agrees with the plan of care.    Discharge summary provided.     Billing note: 30 minutes was allotted and spent for patient care and coordination of care (not reported separately) including preparing for the visit, obtaining/reviewing history, performing an exam/evaluation, ordering prescriptions, developing a plan of care, counseling/educating the patient, developing/printing/going over the discharge summary with the patient, updating the medical record, reconciling outside information, and documentation. Care specific to this encounter was summarized here. Please refer to the chart for additional details on the care provided.

## 2021-08-24 PROBLEM — M17.11 UNILATERAL PRIMARY OSTEOARTHRITIS, RIGHT KNEE: Status: ACTIVE | Noted: 2021-08-24

## 2021-09-01 ENCOUNTER — OFFICE VISIT (OUTPATIENT)
Dept: URGENT CARE | Facility: PHYSICIAN GROUP | Age: 86
End: 2021-09-01
Payer: MEDICARE

## 2021-09-01 VITALS
SYSTOLIC BLOOD PRESSURE: 138 MMHG | TEMPERATURE: 97.6 F | RESPIRATION RATE: 16 BRPM | HEIGHT: 65 IN | BODY MASS INDEX: 28.66 KG/M2 | DIASTOLIC BLOOD PRESSURE: 84 MMHG | HEART RATE: 94 BPM | WEIGHT: 172 LBS | OXYGEN SATURATION: 93 %

## 2021-09-01 DIAGNOSIS — H57.9 ITCHY EYES: ICD-10-CM

## 2021-09-01 DIAGNOSIS — H57.89 EYE SWELLING, BILATERAL: ICD-10-CM

## 2021-09-01 DIAGNOSIS — T78.40XA ALLERGIC REACTION, INITIAL ENCOUNTER: ICD-10-CM

## 2021-09-01 PROCEDURE — 99213 OFFICE O/P EST LOW 20 MIN: CPT | Performed by: PHYSICIAN ASSISTANT

## 2021-09-01 RX ORDER — PREDNISONE 20 MG/1
TABLET ORAL
Qty: 8 TABLET | Refills: 0 | Status: SHIPPED
Start: 2021-09-01 | End: 2021-10-07

## 2021-09-01 RX ORDER — HYDROCORTISONE 25 MG/ML
LOTION TOPICAL
Qty: 59 ML | Refills: 0 | Status: SHIPPED
Start: 2021-09-01 | End: 2021-11-17

## 2021-09-01 ASSESSMENT — ENCOUNTER SYMPTOMS
PHOTOPHOBIA: 0
CHILLS: 0
DOUBLE VISION: 0
EYE PAIN: 0
BLURRED VISION: 0
EYE REDNESS: 1
EYE DISCHARGE: 0
HEADACHES: 0
SHORTNESS OF BREATH: 0
FEVER: 0
DIZZINESS: 0
SINUS PAIN: 0

## 2021-09-01 ASSESSMENT — FIBROSIS 4 INDEX: FIB4 SCORE: 1.35

## 2021-09-01 NOTE — PROGRESS NOTES
Subjective     Janessa Cain is a 86 y.o. female who presents with Rash (Bilateral eye swelling, Face swelling,  redness, itchy,  x1 week )    HPI:  Janessa Cain is a 86 y.o. female who presents today for a red rash around her eyes with associated swelling.  Patient was initially seen for the same symptoms on 8/2/2021.  At that time he had been gradually worsening over a week and a half.  She has been using OTC Benadryl, Claritin, Sudafed, and OTC allergy eyedrops without relief.  She came into the urgent care and was prescribed a Medrol Dosepak and olopatadine eyedrops.  She states that as soon as she started using the medications that she was prescribed her symptoms started to improve.  She says that symptoms were improved for a few weeks but 7 days ago they started to get worse again.  She has been doing the same over-the-counter regimen of using Benadryl, Claritin, and Sudafed but symptoms are not improving.  Patient denies the use of any new medications, lotions, soaps, detergents, etc.  She is not having any visual changes, foreign body sensation, fever/chills, headache, lightheadedness/dizziness.      Review of Systems   Constitutional: Negative for chills and fever.   HENT: Negative for congestion and sinus pain.    Eyes: Positive for redness. Negative for blurred vision, double vision, photophobia, pain and discharge.   Respiratory: Negative for shortness of breath.    Skin: Positive for itching and rash.   Neurological: Negative for dizziness and headaches.       PMH:  has a past medical history of Hypertension.  MEDS:   Current Outpatient Medications:   •  predniSONE (DELTASONE) 20 MG Tab, Take 2 tabs daily for 4 days, Disp: 8 Tablet, Rfl: 0  •  hydrocortisone 2.5 % lotion, Apply small amount to affected areas twice daily, Disp: 59 mL, Rfl: 0  •  meloxicam (MOBIC) 15 MG tablet, TAKE ONE TABLET BY MOUTH DAILY, Disp: 30 tablet, Rfl: 0  •  amoxicillin-clavulanate (AUGMENTIN)  "500-125 MG Tab, Take 1 tablet by mouth 3 times a day., Disp: , Rfl:   •  CALCIUM-VITAMIN D PO, Take  by mouth., Disp: , Rfl:   •  Fexofenadine HCl (MUCINEX ALLERGY PO), Take  by mouth., Disp: , Rfl:   •  estradiol (ESTRACE) 0.5 MG tablet, ESTRADIOL 0.5 MG TABS, Disp: , Rfl:   •  losartan (COZAAR) 50 MG Tab, Take 50 mg by mouth every day., Disp: , Rfl:   •  carbamazepine (CARBATROL) 200 MG CR capsule, CARBAMAZEPINE  MG XR12H-CAP, Disp: , Rfl:   •  Esomeprazole Magnesium 20 MG Pack, Take  by mouth., Disp: , Rfl:   •  lisinopril (PRINIVIL) 10 MG Tab, LISINOPRIL 10 MG TABS, Disp: , Rfl:   •  olopatadine (PATANOL) 0.1 % ophthalmic solution, Administer 1 Drop into both eyes 2 times a day. (Patient not taking: Reported on 9/1/2021), Disp: 5 mL, Rfl: 0  •  b complex vitamins tablet, B COMPLEX TABS (Patient not taking: Reported on 8/2/2021), Disp: , Rfl:   •  traZODone (DESYREL) 50 MG Tab, TRAZODONE HCL 50 MG TABS (Patient not taking: Reported on 9/1/2021), Disp: , Rfl:   ALLERGIES:   Allergies   Allergen Reactions   • Apap-Fd&C Red #40 Al Diamond-Oxycodone    • Hydrocodone Vomiting   • Oxycodone    • Tramadol      SURGHX:   Past Surgical History:   Procedure Laterality Date   • PB TOTAL HIP ARTHROPLASTY Right 11/6/2019    Procedure: ARTHROPLASTY, HIP, TOTAL;  Surgeon: Pauline Mo M.D.;  Location: SURGERY [facility];  Service: Orthopedics   • HIP ARTHROPLASTY TOTAL Left     \"resurfacing\"   • TONSILLECTOMY       SOCHX:  reports that she has quit smoking. She smoked 0.00 packs per day. She has never used smokeless tobacco. She reports previous alcohol use of about 0.6 oz of alcohol per week. She reports that she does not use drugs.  FH: Family history was reviewed, no pertinent findings to report      Objective     /84 (BP Location: Right arm, Patient Position: Sitting, BP Cuff Size: Large adult)   Pulse 94   Temp 36.4 °C (97.6 °F) (Temporal)   Resp 16   Ht 1.645 m (5' 4.75\")   Wt 78 kg (172 lb)   SpO2 " 93%   BMI 28.84 kg/m²      Physical Exam  Constitutional:       Appearance: She is well-developed.   HENT:      Head: Normocephalic and atraumatic.        Comments: Face exhibits an erythematous rash blanches with pressure noted on the bilateral cheeks and periorbital regions bilaterally.  There is some overlying dry skin noted on the cheeks bilaterally.  There is also some periorbital swelling noted without induration.     Right Ear: External ear normal.      Left Ear: External ear normal.   Eyes:      Extraocular Movements: Extraocular movements intact.      Conjunctiva/sclera: Conjunctivae normal.      Pupils: Pupils are equal, round, and reactive to light.      Comments: No ecchymosis or conjunctival injection noted.   Cardiovascular:      Rate and Rhythm: Normal rate and regular rhythm.      Heart sounds: Normal heart sounds. No murmur heard.     Pulmonary:      Effort: Pulmonary effort is normal.      Breath sounds: Normal breath sounds. No wheezing.   Musculoskeletal:      Cervical back: Normal range of motion.   Lymphadenopathy:      Cervical: No cervical adenopathy.   Skin:     General: Skin is warm and dry.      Capillary Refill: Capillary refill takes less than 2 seconds.   Neurological:      Mental Status: She is alert and oriented to person, place, and time.   Psychiatric:         Behavior: Behavior normal.         Judgment: Judgment normal.         Assessment & Plan     1. Allergic reaction, initial encounter  - predniSONE (DELTASONE) 20 MG Tab; Take 2 tabs daily for 4 days  Dispense: 8 Tablet; Refill: 0  - hydrocortisone 2.5 % lotion; Apply small amount to affected areas twice daily  Dispense: 59 mL; Refill: 0    2. Eye swelling, bilateral  - predniSONE (DELTASONE) 20 MG Tab; Take 2 tabs daily for 4 days  Dispense: 8 Tablet; Refill: 0  - hydrocortisone 2.5 % lotion; Apply small amount to affected areas twice daily  Dispense: 59 mL; Refill: 0    3. Itchy eyes  *Discussed with patient that she may  continues to use the olopatadine eyedrops.  Also recommend that she apply cool compresses to the affected area multiple times per day and continue to use an OTC antihistamine such as Claritin or Zyrtec.  Recommend that she follow-up with her primary care provider within the next week for reevaluation.  This is a second time patient has had an allergic reaction causing redness, swelling, and itching around the eyes in the past month.  She will likely end up needing allergy testing to figure out what is causing the symptoms.          Differential Diagnosis, natural history, and supportive care discussed. Return to the Urgent Care or follow up with your PCP if symptoms fail to resolve, or for any new or worsening symptoms. Emergency room precautions discussed. Patient and/or family appears understanding of information.

## 2021-09-28 ENCOUNTER — OFFICE VISIT (OUTPATIENT)
Dept: URGENT CARE | Facility: PHYSICIAN GROUP | Age: 86
End: 2021-09-28
Payer: MEDICARE

## 2021-09-28 VITALS
RESPIRATION RATE: 16 BRPM | HEART RATE: 90 BPM | SYSTOLIC BLOOD PRESSURE: 140 MMHG | TEMPERATURE: 98 F | HEIGHT: 65 IN | WEIGHT: 172 LBS | OXYGEN SATURATION: 93 % | DIASTOLIC BLOOD PRESSURE: 86 MMHG | BODY MASS INDEX: 28.66 KG/M2

## 2021-09-28 DIAGNOSIS — L30.9 DERMATITIS: ICD-10-CM

## 2021-09-28 DIAGNOSIS — R21 RASH OF FACE: ICD-10-CM

## 2021-09-28 PROCEDURE — 99213 OFFICE O/P EST LOW 20 MIN: CPT | Performed by: NURSE PRACTITIONER

## 2021-09-28 RX ORDER — PREDNISONE 20 MG/1
40 TABLET ORAL DAILY
Qty: 8 TABLET | Refills: 0 | Status: SHIPPED | OUTPATIENT
Start: 2021-09-28 | End: 2021-10-02

## 2021-09-28 ASSESSMENT — ENCOUNTER SYMPTOMS
TINGLING: 0
CHILLS: 0
EYE REDNESS: 0
MYALGIAS: 0
DIZZINESS: 0
EYE DISCHARGE: 0
ABDOMINAL PAIN: 0
SENSORY CHANGE: 0
ORTHOPNEA: 0
DIARRHEA: 0
CONSTIPATION: 0
WHEEZING: 0
BLURRED VISION: 0
PHOTOPHOBIA: 0
PALPITATIONS: 0
DOUBLE VISION: 0
HEADACHES: 0
FEVER: 0
SHORTNESS OF BREATH: 0
EYE PAIN: 0
NAUSEA: 0
VOMITING: 0
WEAKNESS: 0

## 2021-09-28 ASSESSMENT — FIBROSIS 4 INDEX: FIB4 SCORE: 1.35

## 2021-09-28 NOTE — PROGRESS NOTES
Subjective     Janessa Cian is a 86 y.o. female who presents with Eye Swelling (bilateral eye swelling, itchy, painful, x3 days )            HPI  States has been experiencing bilat under eye redness, swelling, itchiness again. Unknown cause. Has had this occur twice before in the last 2 months. Has been prescribed allergy eye drops, oral steroids as well as topical steroid. States oral steroids work best. Has appt with PCP on 10/8/21. No new meds, topical face creams, foods or soaps. Has sun sensitivity but wears sun glasses outdoors.     PMH:  has a past medical history of Hypertension.  MEDS:   Current Outpatient Medications:   •  predniSONE (DELTASONE) 20 MG Tab, Take 2 Tablets by mouth every day for 4 days., Disp: 8 Tablet, Rfl: 0  •  hydrocortisone 2.5 % lotion, Apply small amount to affected areas twice daily, Disp: 59 mL, Rfl: 0  •  meloxicam (MOBIC) 15 MG tablet, 1 po qd, Disp: 30 Tablet, Rfl: 2  •  meloxicam (MOBIC) 15 MG tablet, TAKE ONE TABLET BY MOUTH DAILY, Disp: 30 tablet, Rfl: 0  •  amoxicillin-clavulanate (AUGMENTIN) 500-125 MG Tab, Take 1 tablet by mouth 3 times a day., Disp: , Rfl:   •  CALCIUM-VITAMIN D PO, Take  by mouth., Disp: , Rfl:   •  Fexofenadine HCl (MUCINEX ALLERGY PO), Take  by mouth., Disp: , Rfl:   •  estradiol (ESTRACE) 0.5 MG tablet, ESTRADIOL 0.5 MG TABS, Disp: , Rfl:   •  losartan (COZAAR) 50 MG Tab, Take 50 mg by mouth every day., Disp: , Rfl:   •  carbamazepine (CARBATROL) 200 MG CR capsule, CARBAMAZEPINE  MG XR12H-CAP, Disp: , Rfl:   •  Esomeprazole Magnesium 20 MG Pack, Take  by mouth., Disp: , Rfl:   •  predniSONE (DELTASONE) 20 MG Tab, Take 2 tabs daily for 4 days (Patient not taking: Reported on 9/28/2021), Disp: 8 Tablet, Rfl: 0  •  olopatadine (PATANOL) 0.1 % ophthalmic solution, Administer 1 Drop into both eyes 2 times a day. (Patient not taking: Reported on 9/1/2021), Disp: 5 mL, Rfl: 0  •  b complex vitamins tablet, B COMPLEX TABS (Patient not  "taking: Reported on 8/2/2021), Disp: , Rfl:   •  traZODone (DESYREL) 50 MG Tab, TRAZODONE HCL 50 MG TABS (Patient not taking: Reported on 9/1/2021), Disp: , Rfl:   •  lisinopril (PRINIVIL) 10 MG Tab, LISINOPRIL 10 MG TABS (Patient not taking: Reported on 9/28/2021), Disp: , Rfl:   ALLERGIES:   Allergies   Allergen Reactions   • Apap-Fd&C Red #40 Al Diamond-Oxycodone    • Hydrocodone Vomiting   • Oxycodone    • Tramadol      SURGHX:   Past Surgical History:   Procedure Laterality Date   • PB TOTAL HIP ARTHROPLASTY Right 11/6/2019    Procedure: ARTHROPLASTY, HIP, TOTAL;  Surgeon: Pauline Mo M.D.;  Location: SURGERY [facility];  Service: Orthopedics   • HIP ARTHROPLASTY TOTAL Left     \"resurfacing\"   • TONSILLECTOMY       SOCHX:  reports that she has quit smoking. She smoked 0.00 packs per day. She has never used smokeless tobacco. She reports previous alcohol use of about 0.6 oz of alcohol per week. She reports that she does not use drugs.  FH: Family history was reviewed, no pertinent findings to report    Review of Systems   Constitutional: Negative for chills, fever and malaise/fatigue.   Eyes: Negative for blurred vision, double vision, photophobia, pain, discharge and redness.   Respiratory: Negative for shortness of breath and wheezing.    Cardiovascular: Negative for chest pain, palpitations and orthopnea.   Gastrointestinal: Negative for abdominal pain, constipation, diarrhea, nausea and vomiting.   Musculoskeletal: Negative for myalgias.   Skin: Positive for itching and rash.        Facial swelling under eyes/cheeks.   Neurological: Negative for dizziness, tingling, sensory change, weakness and headaches.   Endo/Heme/Allergies: Negative for environmental allergies.   All other systems reviewed and are negative.             Objective     /86 (BP Location: Right arm, Patient Position: Sitting, BP Cuff Size: Large adult)   Pulse 90   Temp 36.7 °C (98 °F) (Temporal)   Resp 16   Ht 1.645 m (5' " "4.75\")   Wt 78 kg (172 lb)   SpO2 93%   BMI 28.84 kg/m²      Physical Exam  Vitals reviewed.   Constitutional:       General: She is awake.      Appearance: Normal appearance. She is well-developed.   HENT:      Head: Normocephalic. No raccoon eyes, Ohara's sign, abrasion, contusion, masses, right periorbital erythema, left periorbital erythema or laceration.        Nose: Nose normal.   Eyes:      General: Lids are normal.         Right eye: No discharge.         Left eye: No discharge.      Extraocular Movements: Extraocular movements intact.      Conjunctiva/sclera: Conjunctivae normal.   Cardiovascular:      Rate and Rhythm: Normal rate.   Pulmonary:      Effort: Pulmonary effort is normal.   Skin:     General: Skin is warm and dry.      Findings: Erythema and rash present. No abrasion, burn or petechiae. Rash is macular and urticarial. Rash is not crusting, nodular, papular, purpuric, pustular, scaling or vesicular.      Comments: Slapped face appearance, generalized angioedema of face at both cheek bones. No periorbital swelling. Skin dryness present.    Neurological:      Mental Status: She is alert and oriented to person, place, and time.   Psychiatric:         Attention and Perception: Attention normal.         Mood and Affect: Mood normal.         Speech: Speech normal.         Behavior: Behavior normal. Behavior is cooperative.                             Assessment & Plan        1. Rash of face    - REFERRAL TO DERMATOLOGY  - REFERRAL TO ALLERGY    2. Dermatitis    - REFERRAL TO DERMATOLOGY  - REFERRAL TO ALLERGY  - predniSONE (DELTASONE) 20 MG Tab; Take 2 Tablets by mouth every day for 4 days.  Dispense: 8 Tablet; Refill: 0        Discussed dermatitis vs allergic reaction vs sun sensitivity with steroids, unknown cause  May clean area with mild soap, do not scrub, wash with tepid water, pat dry  Cool compress as needed  May apply neutral face moisturizer/aloe  Monitor for increase in rash size or " areas affected, pain, itchiness, redness with blistering, fever, SOB in next 24 hours- re-evaluate  Follow up with PCP next month  Follow up with Specialist for further evaluation

## 2021-10-07 ENCOUNTER — OFFICE VISIT (OUTPATIENT)
Dept: INTERNAL MEDICINE | Facility: OTHER | Age: 86
End: 2021-10-07
Payer: MEDICARE

## 2021-10-07 VITALS
HEART RATE: 88 BPM | WEIGHT: 186 LBS | SYSTOLIC BLOOD PRESSURE: 204 MMHG | DIASTOLIC BLOOD PRESSURE: 88 MMHG | BODY MASS INDEX: 31.19 KG/M2 | OXYGEN SATURATION: 92 % | TEMPERATURE: 98.5 F

## 2021-10-07 DIAGNOSIS — M17.11 UNILATERAL PRIMARY OSTEOARTHRITIS, RIGHT KNEE: ICD-10-CM

## 2021-10-07 DIAGNOSIS — M86.60 CHRONIC OSTEOMYELITIS (HCC): ICD-10-CM

## 2021-10-07 DIAGNOSIS — E87.1 HYPONATREMIA: ICD-10-CM

## 2021-10-07 DIAGNOSIS — F43.22 ADJUSTMENT DISORDER WITH ANXIETY: ICD-10-CM

## 2021-10-07 DIAGNOSIS — Z51.81 THERAPEUTIC DRUG MONITORING: ICD-10-CM

## 2021-10-07 DIAGNOSIS — I10 PRIMARY HYPERTENSION: ICD-10-CM

## 2021-10-07 DIAGNOSIS — M86.9 OSTEOMYELITIS HIP (HCC): ICD-10-CM

## 2021-10-07 DIAGNOSIS — R25.2 MUSCLE CRAMPS: ICD-10-CM

## 2021-10-07 DIAGNOSIS — M62.838 MUSCLE SPASM OF RIGHT LOWER EXTREMITY: ICD-10-CM

## 2021-10-07 PROBLEM — T46.4X5A COUGH DUE TO ACE INHIBITOR: Status: RESOLVED | Noted: 2020-08-20 | Resolved: 2021-10-07

## 2021-10-07 PROBLEM — E88.09 HYPOALBUMINEMIA: Status: RESOLVED | Noted: 2019-11-11 | Resolved: 2021-10-07

## 2021-10-07 PROBLEM — R33.9 RETENTION OF URINE: Status: RESOLVED | Noted: 2019-11-13 | Resolved: 2021-10-07

## 2021-10-07 PROBLEM — H61.23 BILATERAL IMPACTED CERUMEN: Status: RESOLVED | Noted: 2020-05-11 | Resolved: 2021-10-07

## 2021-10-07 PROBLEM — S81.809A MULTIPLE OPEN WOUNDS OF LOWER LEG: Status: RESOLVED | Noted: 2019-11-03 | Resolved: 2021-10-07

## 2021-10-07 PROBLEM — R35.1 NOCTURIA: Status: RESOLVED | Noted: 2021-06-25 | Resolved: 2021-10-07

## 2021-10-07 PROBLEM — R05.8 COUGH DUE TO ACE INHIBITOR: Status: RESOLVED | Noted: 2020-08-20 | Resolved: 2021-10-07

## 2021-10-07 PROBLEM — F32.A DEPRESSION: Status: RESOLVED | Noted: 2020-06-02 | Resolved: 2021-10-07

## 2021-10-07 PROBLEM — J30.2 SEASONAL ALLERGIC RHINITIS: Status: RESOLVED | Noted: 2020-08-20 | Resolved: 2021-10-07

## 2021-10-07 PROCEDURE — 99214 OFFICE O/P EST MOD 30 MIN: CPT | Mod: GC | Performed by: INTERNAL MEDICINE

## 2021-10-07 RX ORDER — ACETAMINOPHEN 325 MG/1
650 TABLET ORAL EVERY 4 HOURS PRN
COMMUNITY

## 2021-10-07 RX ORDER — TRAZODONE HYDROCHLORIDE 50 MG/1
50 TABLET ORAL
Qty: 30 TABLET | Refills: 3 | Status: SHIPPED | OUTPATIENT
Start: 2021-10-07 | End: 2022-01-25

## 2021-10-07 RX ORDER — IBUPROFEN 200 MG
200 TABLET ORAL EVERY 6 HOURS PRN
COMMUNITY
End: 2022-02-25

## 2021-10-07 ASSESSMENT — FIBROSIS 4 INDEX: FIB4 SCORE: 1.35

## 2021-10-07 NOTE — TELEPHONE ENCOUNTER
Last seen: 10/07/21 by Dr. West   Next appt: 11/07/21 with Dr. West     Was the patient seen in the last year in this department? Yes   Does patient have an active prescription for medications requested? No   Received Request Via: Pharmacy

## 2021-10-07 NOTE — PATIENT INSTRUCTIONS
- Please increase your blood pressure medication to whole tablet daily (new dose lisinopril 10mg)  - Keep a log of your blood pressure for the next 2 weeks. Take your blood pressure once the AM, once the in evening. At each time, take 3 different measurements, space them out one minute apartment   - Please get lab work done within one week   - Follow up in 3 weeks

## 2021-10-07 NOTE — PROGRESS NOTES
Janessa Cain is a 86 y.o. female here for Follow-Up    HPI:     Patient is here for six month follow up, c/o of worsening knee pain. Patient reports currently seeing Dr. Gonzalez (physiatry), previously received steroid injections which improved pain, however, is planing for right knee replacement. Reports pain is affecting her sleep and mood significantly.     BP in office today 204/88. Patient denies any new headache, chest pain or SOB. Repeat bp 180/96 (L arm) and 176/92 (R arm). Patient has not been checking bp at home, previous bp has been normal in office. Patient attributes bp to her pain level. Currently taking meloxicam and ibuprofen as needed. Denies feeling depressed, however becomes significantly tearful when discussing recent stressors in her life.       Current medicines (including changes today)  Current Outpatient Medications   Medication Sig Dispense Refill   • ibuprofen (MOTRIN) 200 MG Tab Take 200 mg by mouth every 6 hours as needed.     • acetaminophen (TYLENOL) 325 MG Tab Take 650 mg by mouth every four hours as needed.     • meloxicam (MOBIC) 15 MG tablet TAKE ONE TABLET BY MOUTH DAILY 30 tablet 0   • amoxicillin-clavulanate (AUGMENTIN) 500-125 MG Tab Take 1 tablet by mouth 3 times a day.     • CALCIUM-VITAMIN D PO Take  by mouth.     • estradiol (ESTRACE) 0.5 MG tablet ESTRADIOL 0.5 MG TABS     • carbamazepine (CARBATROL) 200 MG CR capsule Take 1 Capsule by mouth 2 times a day.     • Esomeprazole Magnesium 20 MG Pack Take  by mouth.     • lisinopril (PRINIVIL) 10 MG Tab LISINOPRIL 10 MG TABS     • traZODone (DESYREL) 50 MG Tab Take 1 Tablet by mouth 1 time a day as needed for Sleep. 30 Tablet 3   • hydrocortisone 2.5 % lotion Apply small amount to affected areas twice daily (Patient not taking: Reported on 10/7/2021) 59 mL 0   • olopatadine (PATANOL) 0.1 % ophthalmic solution Administer 1 Drop into both eyes 2 times a day. (Patient not taking: Reported on 9/1/2021) 5 mL 0   • b  complex vitamins tablet B COMPLEX TABS (Patient not taking: Reported on 8/2/2021)       No current facility-administered medications for this visit.     She  has a past medical history of Depression (6/2/2020), Hypertension, Hypoalbuminemia (11/11/2019), Multiple open wounds of lower leg (11/3/2019), Nocturia (6/25/2021), and Urinary retention (11/13/2019).  She  has a past surgical history that includes pr total hip arthroplasty (Right, 11/6/2019); hip arthroplasty total (Left); and tonsillectomy.  Social History     Tobacco Use   • Smoking status: Former Smoker     Packs/day: 0.00   • Smokeless tobacco: Never Used   Vaping Use   • Vaping Use: Never used   Substance Use Topics   • Alcohol use: Not Currently     Alcohol/week: 0.6 oz     Types: 1 Glasses of wine per week     Comment: daily   • Drug use: Never     Social History     Social History Narrative   • Not on file     Family History   Problem Relation Age of Onset   • Alcohol abuse Mother    • Heart Disease Mother    • Heart Disease Brother    • Alcohol abuse Brother    • Cancer Maternal Aunt      Family Status   Relation Name Status   • Mo  (Not Specified)   • Bro  (Not Specified)   • MAunt  (Not Specified)       ROS    The pertinent  ROS findings can be seen in the HPI above.     All other systems reviewed and are negative     Objective:     BP (!) 204/88 (BP Location: Left arm, Patient Position: Sitting, BP Cuff Size: Adult)   Pulse 88   Temp 36.9 °C (98.5 °F) (Temporal)   Wt 84.4 kg (186 lb)   SpO2 92%  Body mass index is 31.19 kg/m².      Physical Exam:    Constitutional: Alert, no distress.  Skin: No suspicious lesions  Eye: Equal, round and reactive, conjunctiva clear, lids normal.  ENMT: Lips without lesions, good dentition, oropharynx clear.  Neck: Trachea midline, no masses, no thyromegaly. No cervical or supraclavicular lymphadenopathy.  Respiratory: Unlabored respiratory effort, lungs clear to auscultation, no wheezes, no  "meghan.  Cardiovascular: Normal S1, S2, no murmur, mild bilateral edema to the knee, no JVD  Abdomen: Soft, non-tender, no masses, no hepatosplenomegaly.  Musculoskeletal: Adequately aligned spine. ROM intact spine and extremities. No joint erythema or tenderness. Normal muscular development. Normal gait  Neuro: CN II-XII intact. Reflexes 2+ throughout. Cerebellar testing normal. Normal and equal strength in all extremities  Psych: Mood stable, affect appropriate, thoughts and speech appropriate        Assessment and Plan:   Hypertension  -goal BP: <130/80.  Comorbidities:     -BP today in office: 180/96 (on manual repeat), does not check bp at home. EKG and last echo reviewed  -RFs: Age, obesity, chronic pain, increased stress, high sodium, antidepressant  -Meds: Lisinopril 5mg. Will increase to 10mg daily considering LVH on EKG  -Advised patient to check home Bps, will review log at next visit and adjust medications accordingly . Check CMP and UA for end-organ involvement   -Lifestyle modification counselling: Weight loss/ Dietary changes/ Sodium restriction <1.5g/d/limit alcohol intake (women (women < 1drink, men <2 drinks)  -Consider screening for EDSON at next visit      Hyponatremia  - mild, chronic hyponatremia per EMR, last Na in Feb 127. No known liver disease or HF  - possibly secondary to Carbamezapine therapy. Will repeat panel, if persistent, will pursue further workup   - patient is agreeable to tapering down Carbamezapine     Muscle cramps  - reports started on Carbamezapine 2 years ago for presumed \"restless leg syndrome\" due to c/o post-operative muscle cramping   - currently asymptomatic, has been self tapering, currently on 200mg daily (rx'd twice daily). Discussed risks and benefits, patient would like to try tapering down futher  - Ca2+ and electrolyte levels WNL, will repeat    Osteomyelitis hip (HCC)  - on lifetime suppressive Abx per ID   - follow up with ID as scheduled    Adjustment disorder " with anxiety  - Stressors: death of good friend, building house   - Discussed stress reduction methods: encourage physical activity, meditations, spiritual practices  - referral for psychotherapy    Unilateral primary osteoarthritis, right knee  - continue with NSAIDs as needed for pain control   - follow up with PM&R as scheduled  - follow up in 3 weeks      1. Primary hypertension  - Comp Metabolic Panel; Future  - CBC WITH DIFFERENTIAL; Future  - VITAMIN D,25 HYDROXY; Future  - PHOSPHORUS; Future  - HEMOGLOBIN A1C; Future  - MICROALB/CREAT RATIO RAND. UR    2. Chronic osteomyelitis (HCC)  - VITAMIN D,25 HYDROXY; Future  - PHOSPHORUS; Future  - HEMOGLOBIN A1C; Future    3. Therapeutic drug monitoring  - Comp Metabolic Panel; Future  - CBC WITH DIFFERENTIAL; Future  - VITAMIN D,25 HYDROXY; Future  - PHOSPHORUS; Future  - HEMOGLOBIN A1C; Future  - MICROALB/CREAT RATIO RAND. UR  - MAGNESIUM; Future    4. Muscle spasm of right lower extremity  - MAGNESIUM; Future    5. Hyponatremia  - MAGNESIUM; Future    6. Adjustment disorder with anxiety  - REFERRAL TO PSYCHOLOGY      Instructed to Follow up in clinic or ER for worsening symptoms, difficulty breathing, lack of expected recovery, or should new symptoms or problems arise.    Followup: Return in about 3 weeks (around 10/28/2021).

## 2021-10-10 PROBLEM — Z00.00 WELL ADULT HEALTH CHECK: Status: RESOLVED | Noted: 2021-01-22 | Resolved: 2021-10-10

## 2021-10-10 PROBLEM — D64.9 ANEMIA: Status: RESOLVED | Noted: 2019-11-12 | Resolved: 2021-10-10

## 2021-10-10 PROBLEM — N95.1 HOT FLASHES DUE TO MENOPAUSE: Status: RESOLVED | Noted: 2020-06-02 | Resolved: 2021-10-10

## 2021-10-11 NOTE — ASSESSMENT & PLAN NOTE
-goal BP: <130/80.  Comorbidities:     -BP today in office: 180/96 (on manual repeat), does not check bp at home. EKG and last echo reviewed  -RFs: Age, obesity, chronic pain, increased stress, high sodium, antidepressant  -Meds: Lisinopril 5mg. Will increase to 10mg daily considering LVH on EKG  -Advised patient to check home Bps, will review log at next visit and adjust medications accordingly . Check CMP and UA for end-organ involvement   -Lifestyle modification counselling: Weight loss/ Dietary changes/ Sodium restriction <1.5g/d/limit alcohol intake (women (women < 1drink, men <2 drinks)  -Consider screening for EDSON at next visit

## 2021-10-11 NOTE — ASSESSMENT & PLAN NOTE
"- reports started on Carbamezapine 2 years ago for presumed \"restless leg syndrome\" due to c/o post-operative muscle cramping   - currently asymptomatic, has been self tapering, currently on 200mg daily (rx'd twice daily). Discussed risks and benefits, patient would like to try tapering down futher  - Ca2+ and electrolyte levels WNL, will repeat  "

## 2021-10-11 NOTE — ASSESSMENT & PLAN NOTE
- mild, chronic hyponatremia per EMR, last Na in Feb 127. No known liver disease or HF  - possibly secondary to Carbamezapine therapy. Will repeat panel, if persistent, will pursue further workup   - patient is agreeable to tapering down Carbamezapine

## 2021-10-11 NOTE — ASSESSMENT & PLAN NOTE
- Stressors: death of good friend, building house   - Discussed stress reduction methods: encourage physical activity, meditations, spiritual practices  - referral for psychotherapy

## 2021-10-11 NOTE — ASSESSMENT & PLAN NOTE
- continue with NSAIDs as needed for pain control   - follow up with PM&R as scheduled  - follow up in 3 weeks

## 2021-11-17 ENCOUNTER — OFFICE VISIT (OUTPATIENT)
Dept: INTERNAL MEDICINE | Facility: OTHER | Age: 86
End: 2021-11-17
Payer: MEDICARE

## 2021-11-17 VITALS
HEIGHT: 64 IN | WEIGHT: 184.6 LBS | OXYGEN SATURATION: 94 % | BODY MASS INDEX: 31.51 KG/M2 | TEMPERATURE: 97.4 F | HEART RATE: 88 BPM | SYSTOLIC BLOOD PRESSURE: 177 MMHG | DIASTOLIC BLOOD PRESSURE: 89 MMHG

## 2021-11-17 DIAGNOSIS — I10 PRIMARY HYPERTENSION: ICD-10-CM

## 2021-11-17 DIAGNOSIS — E87.1 HYPONATREMIA: ICD-10-CM

## 2021-11-17 DIAGNOSIS — R60.0 EDEMA, LOWER EXTREMITY: ICD-10-CM

## 2021-11-17 DIAGNOSIS — R06.02 SHORTNESS OF BREATH: ICD-10-CM

## 2021-11-17 DIAGNOSIS — M86.9 OSTEOMYELITIS HIP (HCC): ICD-10-CM

## 2021-11-17 DIAGNOSIS — U07.1 COVID-19 VIRUS INFECTION: ICD-10-CM

## 2021-11-17 DIAGNOSIS — R05.9 COUGH: ICD-10-CM

## 2021-11-17 DIAGNOSIS — I27.20 PULMONARY HYPERTENSION (HCC): ICD-10-CM

## 2021-11-17 PROCEDURE — 99213 OFFICE O/P EST LOW 20 MIN: CPT | Mod: GC,CS | Performed by: STUDENT IN AN ORGANIZED HEALTH CARE EDUCATION/TRAINING PROGRAM

## 2021-11-17 RX ORDER — OMEPRAZOLE 20 MG/1
20 CAPSULE, DELAYED RELEASE ORAL DAILY
Qty: 30 CAPSULE | Refills: 1 | Status: SHIPPED | OUTPATIENT
Start: 2021-11-17 | End: 2024-01-24

## 2021-11-17 RX ORDER — ALBUTEROL SULFATE 90 UG/1
2 AEROSOL, METERED RESPIRATORY (INHALATION) EVERY 4 HOURS PRN
Qty: 1 EACH | Refills: 3 | Status: SHIPPED
Start: 2021-11-17 | End: 2022-02-25

## 2021-11-17 RX ORDER — VALSARTAN AND HYDROCHLOROTHIAZIDE 160; 12.5 MG/1; MG/1
1 TABLET, FILM COATED ORAL DAILY
Qty: 30 TABLET | Refills: 3 | Status: SHIPPED | OUTPATIENT
Start: 2021-11-17 | End: 2022-04-04

## 2021-11-17 ASSESSMENT — FIBROSIS 4 INDEX: FIB4 SCORE: 1.35

## 2021-11-17 NOTE — ASSESSMENT & PLAN NOTE
- chronic, mild. Suspect from [facility]  - Carbamezapine was started for restless leg when she was admitted years ago, denies hx of sezures  - discussed risk and benefit, start titrating down

## 2021-11-17 NOTE — PROGRESS NOTES
Janessa Cain is a 86 y.o. female here for HTN (Controlled) (hospital follow up covid)    HPI:     Janessa Houston  is here for follow up on HTN, patient also reports that she tested positive for COVID-19 on 10/7/21.     HTN- home bp 160-180s, also reports several episode of HA with elevated bp. Denies any associated visual changes, chest pain, SOB with the episodes. Reports that her emotional stress is less, although still with significant knee pain. Taking Tylenol and IBU (2 tabs up to 3x daily) for pain.     COVID-19 infection- Daughter symptomatic and tested positive, patient and  tested positive on home test. Reports SOB and cough, her  was admitted for treatment. Cough is nonproductive. Denies any fever, chills, hypoxia, GI symptoms.     Current medicines (including changes today)    Current Outpatient Medications   Medication Sig Dispense Refill   • valsartan-hydrochlorothiazide (DIOVAN-HCT) 160-12.5 MG per tablet Take 1 Tablet by mouth every day. 30 Tablet 3   • omeprazole (PRILOSEC) 20 MG delayed-release capsule Take 1 Capsule by mouth every day. 30 Capsule 1   • albuterol 108 (90 Base) MCG/ACT Aero Soln inhalation aerosol Inhale 2 Puffs every four hours as needed for Shortness of Breath. 1 Each 3   • ibuprofen (MOTRIN) 200 MG Tab Take 200 mg by mouth every 6 hours as needed.     • acetaminophen (TYLENOL) 325 MG Tab Take 650 mg by mouth every four hours as needed.     • traZODone (DESYREL) 50 MG Tab Take 1 Tablet by mouth 1 time a day as needed for Sleep. 30 Tablet 3   • meloxicam (MOBIC) 15 MG tablet TAKE ONE TABLET BY MOUTH DAILY 30 tablet 0   • amoxicillin-clavulanate (AUGMENTIN) 500-125 MG Tab Take 1 tablet by mouth 3 times a day.     • estradiol (ESTRACE) 0.5 MG tablet ESTRADIOL 0.5 MG TABS     • carbamazepine (CARBATROL) 200 MG CR capsule Take 1 Capsule by mouth 2 times a day.     • CALCIUM-VITAMIN D PO Take  by mouth. (Patient not taking: Reported on 11/17/2021)       No  "current facility-administered medications for this visit.     She  has a past medical history of Depression (6/2/2020), Hypertension, Hypoalbuminemia (11/11/2019), Multiple open wounds of lower leg (11/3/2019), Nocturia (6/25/2021), and Urinary retention (11/13/2019).  She  has a past surgical history that includes pr total hip arthroplasty (Right, 11/6/2019); hip arthroplasty total (Left); and tonsillectomy.  Social History     Tobacco Use   • Smoking status: Former Smoker     Packs/day: 0.00   • Smokeless tobacco: Never Used   Vaping Use   • Vaping Use: Never used   Substance Use Topics   • Alcohol use: Not Currently     Alcohol/week: 0.6 oz     Types: 1 Glasses of wine per week     Comment: daily   • Drug use: Never     Social History     Social History Narrative   • Not on file     Family History   Problem Relation Age of Onset   • Alcohol abuse Mother    • Heart Disease Mother    • Heart Disease Brother    • Alcohol abuse Brother    • Cancer Maternal Aunt      Family Status   Relation Name Status   • Mo  (Not Specified)   • Bro  (Not Specified)   • MAunt  (Not Specified)       ROS    The pertinent  ROS findings can be seen in the HPI above.     All other systems reviewed and are negative     Objective:     BP (!) 177/89 (BP Location: Left arm, Patient Position: Sitting, BP Cuff Size: Adult)   Pulse 88   Temp 36.3 °C (97.4 °F) (Temporal)   Ht 1.633 m (5' 4.3\")   Wt 83.7 kg (184 lb 9.6 oz)   SpO2 94%  Body mass index is 31.39 kg/m².        Physical Exam:    Constitutional: Alert, no distress. Ambulates with cane  Skin: No suspicious lesions  Eye: Equal, round and reactive, conjunctiva clear, lids normal.  ENMT: Lips without lesions, good dentition, oropharynx clear.  Neck: Trachea midline, no masses, no thyromegaly. No cervical or supraclavicular lymphadenopathy.  Respiratory: Unlabored respiratory effort, lungs clear to auscultation, no wheezes, no ronchi.  Cardiovascular: Normal S1, S2, no murmur, mild " bilateral edema to the knee, no JVD  Abdomen: Soft, non-tender, no masses, no hepatosplenomegaly.  Musculoskeletal: Adequately aligned spine. ROM intact spine and extremities. No joint erythema or tenderness. Normal muscular development. Normal gait  Neuro: CN II-XII intact. Reflexes 2+ throughout. Cerebellar testing normal. Normal and equal strength in all extremities  Psych: Mood stable, affect appropriate, thoughts and speech appropriate        Assessment and Plan:   Hyponatremia  - chronic, mild. Suspect from AED  - Carbamezapine was started for restless leg when she was admitted years ago, denies hx of sezures  - discussed risk and benefit, start titrating down     Osteomyelitis hip (HCC)  Complication from hip surgery, evaluated by Dr. Mora, ID. On lifetime suppressive Abx with Augmentin   Stable  Continue with therapy    COVID-19 virus infection  Mild symptomatic, home test positive on 10/7/21  With persistent dry cough, possibly from infection vs reactive airway disease vs ACEi induce  Will get chest Xray to r/o superimposed bacterial infection, other processes   Trial of albuterol for cough and SOB    Pulmonary hypertension (HCC)  Moderate pulmonary HTN on echo in 2019, unknown class although echo without significant cardiac dysfunction or valvular pathology   OHS vs EDSON? Will need PFT for further evaluation   See A&P for LE edema    Cough  Non productive, subacute   COVID-19 infection vs ACEi induced vs reactive airway disease   Switching lisinopril to valsartan   Trial of albuterol inhaler, will need PFT     Edema, lower extremity  Chronic, bilateral, no JVD on exam   Likely venous stasis, advise leg elevation. Unable to tolerate compression stocking   Does have pulm HTN on echo although no other signs of overload, will get BNP for evaluation     Hypertension  -goal BP: <130/80.     -BP today in office: 177/89, at home: 160-180s. Last CMP- mild hyponatremia otherwise WNL   -RFs: Age, obesity, FHx, pain,  NSAIDs use  -Fhx: CVD in mother and brother   -Meds: Lisinopril   - Uncontrolled, will increase medication dose, monitor home bp and titrate to goal, switching to ARBs due to unproductive cough, start Diovan for combination therapy  -Lifestyle modification counselling: Weight loss/ Dietary changes/ Sodium restriction <1.5g/d/limit alcohol intake (women (women < 1drink, men <2 drinks). Add Tylenol to pain control to decrease NSAID use  -Encourage continued weight loss, exercise      1. Primary hypertension  - Comp Metabolic Panel; Future  - CBC WITH DIFFERENTIAL; Future  - VITAMIN D,25 HYDROXY; Future  - PHOSPHORUS; Future  - HEMOGLOBIN A1C; Future  - MICROALB/CREAT RATIO RAND. UR    2. Chronic osteomyelitis (HCC)  - VITAMIN D,25 HYDROXY; Future  - PHOSPHORUS; Future  - HEMOGLOBIN A1C; Future    3. Therapeutic drug monitoring  - Comp Metabolic Panel; Future  - CBC WITH DIFFERENTIAL; Future  - VITAMIN D,25 HYDROXY; Future  - PHOSPHORUS; Future  - HEMOGLOBIN A1C; Future  - MICROALB/CREAT RATIO RAND. UR  - MAGNESIUM; Future    4. Muscle spasm of right lower extremity  - MAGNESIUM; Future    5. Hyponatremia  - MAGNESIUM; Future      Instructed to Follow up in clinic or ER for worsening symptoms, difficulty breathing, lack of expected recovery, or should new symptoms or problems arise.    Followup: No follow-ups on file.

## 2021-11-17 NOTE — PATIENT INSTRUCTIONS
- You can start the new medication for blood pressure tomorrow  - Use albuterol inhaler as needed for the cough   - Take omeprazole for your stomach protection while on ibuprofen   - Follow up in 2 months ,please get lab work (non fasting) one week prior to your next appointment

## 2021-11-18 ENCOUNTER — APPOINTMENT (OUTPATIENT)
Dept: RADIOLOGY | Facility: IMAGING CENTER | Age: 86
End: 2021-11-18
Attending: STUDENT IN AN ORGANIZED HEALTH CARE EDUCATION/TRAINING PROGRAM
Payer: MEDICARE

## 2021-11-18 ENCOUNTER — HOSPITAL ENCOUNTER (OUTPATIENT)
Dept: LAB | Facility: MEDICAL CENTER | Age: 86
End: 2021-11-18
Attending: STUDENT IN AN ORGANIZED HEALTH CARE EDUCATION/TRAINING PROGRAM
Payer: MEDICARE

## 2021-11-18 DIAGNOSIS — M62.838 MUSCLE SPASM OF RIGHT LOWER EXTREMITY: ICD-10-CM

## 2021-11-18 DIAGNOSIS — I10 PRIMARY HYPERTENSION: ICD-10-CM

## 2021-11-18 DIAGNOSIS — U07.1 COVID-19 VIRUS INFECTION: ICD-10-CM

## 2021-11-18 DIAGNOSIS — M86.60 CHRONIC OSTEOMYELITIS (HCC): ICD-10-CM

## 2021-11-18 DIAGNOSIS — E87.1 HYPONATREMIA: ICD-10-CM

## 2021-11-18 DIAGNOSIS — Z51.81 THERAPEUTIC DRUG MONITORING: ICD-10-CM

## 2021-11-18 DIAGNOSIS — R05.9 COUGH: ICD-10-CM

## 2021-11-18 DIAGNOSIS — R06.02 SHORTNESS OF BREATH: ICD-10-CM

## 2021-11-18 PROBLEM — J30.2 SEASONAL ALLERGIC RHINITIS: Status: ACTIVE | Noted: 2020-08-20

## 2021-11-18 PROBLEM — N95.1 HOT FLASHES DUE TO MENOPAUSE: Status: ACTIVE | Noted: 2020-06-02

## 2021-11-18 PROBLEM — R35.1 NOCTURIA: Status: ACTIVE | Noted: 2021-06-25

## 2021-11-18 LAB
ALBUMIN SERPL BCP-MCNC: 4.2 G/DL (ref 3.2–4.9)
ALBUMIN/GLOB SERPL: 1.6 G/DL
ALP SERPL-CCNC: 77 U/L (ref 30–99)
ALT SERPL-CCNC: 15 U/L (ref 2–50)
ANION GAP SERPL CALC-SCNC: 10 MMOL/L (ref 7–16)
AST SERPL-CCNC: 18 U/L (ref 12–45)
BASOPHILS # BLD AUTO: 0.6 % (ref 0–1.8)
BASOPHILS # BLD: 0.03 K/UL (ref 0–0.12)
BILIRUB SERPL-MCNC: 0.3 MG/DL (ref 0.1–1.5)
BUN SERPL-MCNC: 15 MG/DL (ref 8–22)
CALCIUM SERPL-MCNC: 9.2 MG/DL (ref 8.5–10.5)
CHLORIDE SERPL-SCNC: 97 MMOL/L (ref 96–112)
CO2 SERPL-SCNC: 27 MMOL/L (ref 20–33)
CREAT SERPL-MCNC: 0.7 MG/DL (ref 0.5–1.4)
EOSINOPHIL # BLD AUTO: 0.08 K/UL (ref 0–0.51)
EOSINOPHIL NFR BLD: 1.6 % (ref 0–6.9)
ERYTHROCYTE [DISTWIDTH] IN BLOOD BY AUTOMATED COUNT: 44 FL (ref 35.9–50)
EST. AVERAGE GLUCOSE BLD GHB EST-MCNC: 105 MG/DL
GLOBULIN SER CALC-MCNC: 2.7 G/DL (ref 1.9–3.5)
GLUCOSE SERPL-MCNC: 136 MG/DL (ref 65–99)
HBA1C MFR BLD: 5.3 % (ref 4–5.6)
HCT VFR BLD AUTO: 43.4 % (ref 37–47)
HGB BLD-MCNC: 14.5 G/DL (ref 12–16)
IMM GRANULOCYTES # BLD AUTO: 0.01 K/UL (ref 0–0.11)
IMM GRANULOCYTES NFR BLD AUTO: 0.2 % (ref 0–0.9)
LYMPHOCYTES # BLD AUTO: 0.98 K/UL (ref 1–4.8)
LYMPHOCYTES NFR BLD: 19.9 % (ref 22–41)
MAGNESIUM SERPL-MCNC: 2 MG/DL (ref 1.5–2.5)
MCH RBC QN AUTO: 33.5 PG (ref 27–33)
MCHC RBC AUTO-ENTMCNC: 33.4 G/DL (ref 33.6–35)
MCV RBC AUTO: 100.2 FL (ref 81.4–97.8)
MONOCYTES # BLD AUTO: 0.5 K/UL (ref 0–0.85)
MONOCYTES NFR BLD AUTO: 10.1 % (ref 0–13.4)
NEUTROPHILS # BLD AUTO: 3.33 K/UL (ref 2–7.15)
NEUTROPHILS NFR BLD: 67.6 % (ref 44–72)
NRBC # BLD AUTO: 0 K/UL
NRBC BLD-RTO: 0 /100 WBC
NT-PROBNP SERPL IA-MCNC: 420 PG/ML (ref 0–125)
PHOSPHATE SERPL-MCNC: 3.3 MG/DL (ref 2.5–4.5)
PLATELET # BLD AUTO: 219 K/UL (ref 164–446)
PMV BLD AUTO: 10.2 FL (ref 9–12.9)
POTASSIUM SERPL-SCNC: 4.3 MMOL/L (ref 3.6–5.5)
PROT SERPL-MCNC: 6.9 G/DL (ref 6–8.2)
RBC # BLD AUTO: 4.33 M/UL (ref 4.2–5.4)
SODIUM SERPL-SCNC: 134 MMOL/L (ref 135–145)
WBC # BLD AUTO: 4.9 K/UL (ref 4.8–10.8)

## 2021-11-18 PROCEDURE — 83880 ASSAY OF NATRIURETIC PEPTIDE: CPT

## 2021-11-18 PROCEDURE — 83036 HEMOGLOBIN GLYCOSYLATED A1C: CPT | Mod: GA

## 2021-11-18 PROCEDURE — 85025 COMPLETE CBC W/AUTO DIFF WBC: CPT

## 2021-11-18 PROCEDURE — 84100 ASSAY OF PHOSPHORUS: CPT

## 2021-11-18 PROCEDURE — 36415 COLL VENOUS BLD VENIPUNCTURE: CPT

## 2021-11-18 PROCEDURE — 83735 ASSAY OF MAGNESIUM: CPT

## 2021-11-18 PROCEDURE — 80053 COMPREHEN METABOLIC PANEL: CPT

## 2021-11-18 PROCEDURE — 71046 X-RAY EXAM CHEST 2 VIEWS: CPT | Mod: TC | Performed by: STUDENT IN AN ORGANIZED HEALTH CARE EDUCATION/TRAINING PROGRAM

## 2021-11-18 NOTE — ASSESSMENT & PLAN NOTE
Moderate pulmonary HTN on echo in 2019, unknown class although echo without significant cardiac dysfunction or valvular pathology   OHS vs EDSON? Will need PFT for further evaluation   See A&P for LE edema

## 2021-11-18 NOTE — ASSESSMENT & PLAN NOTE
Chronic, bilateral, no JVD on exam   Likely venous stasis, advise leg elevation. Unable to tolerate compression stocking   Does have pulm HTN on echo although no other signs of overload, will get BNP for evaluation

## 2021-11-18 NOTE — ASSESSMENT & PLAN NOTE
Mild symptomatic, home test positive on 10/7/21  With persistent dry cough, possibly from infection vs reactive airway disease vs ACEi induce  Will get chest Xray to r/o superimposed bacterial infection, other processes   Trial of albuterol for cough and SOB

## 2021-11-18 NOTE — ASSESSMENT & PLAN NOTE
Complication from hip surgery, evaluated by Dr. Mora, ID. On lifetime suppressive Abx with Augmentin   Stable  Continue with therapy

## 2021-11-18 NOTE — ASSESSMENT & PLAN NOTE
-goal BP: <130/80.     -BP today in office: 177/89, at home: 160-180s. Last CMP- mild hyponatremia otherwise WNL   -RFs: Age, obesity, FHx, pain, NSAIDs use  -Fhx: CVD in mother and brother   -Meds: Lisinopril   - Uncontrolled, will increase medication dose, monitor home bp and titrate to goal, switching to ARBs due to unproductive cough, start Diovan for combination therapy  -Lifestyle modification counselling: Weight loss/ Dietary changes/ Sodium restriction <1.5g/d/limit alcohol intake (women (women < 1drink, men <2 drinks). Add Tylenol to pain control to decrease NSAID use  -Encourage continued weight loss, exercise

## 2021-11-18 NOTE — ASSESSMENT & PLAN NOTE
Non productive, subacute   COVID-19 infection vs ACEi induced vs reactive airway disease   Switching lisinopril to valsartan   Trial of albuterol inhaler, will need PFT

## 2021-11-22 ENCOUNTER — HOSPITAL ENCOUNTER (OUTPATIENT)
Dept: LAB | Facility: MEDICAL CENTER | Age: 86
End: 2021-11-22
Attending: STUDENT IN AN ORGANIZED HEALTH CARE EDUCATION/TRAINING PROGRAM
Payer: MEDICARE

## 2021-11-22 PROCEDURE — 36415 COLL VENOUS BLD VENIPUNCTURE: CPT | Mod: GA

## 2021-11-22 PROCEDURE — 82306 VITAMIN D 25 HYDROXY: CPT | Mod: GA

## 2021-11-23 ENCOUNTER — OFFICE VISIT (OUTPATIENT)
Dept: INTERNAL MEDICINE | Facility: OTHER | Age: 86
End: 2021-11-23
Payer: MEDICARE

## 2021-11-23 VITALS
HEART RATE: 86 BPM | DIASTOLIC BLOOD PRESSURE: 72 MMHG | OXYGEN SATURATION: 92 % | HEIGHT: 65 IN | BODY MASS INDEX: 29.66 KG/M2 | SYSTOLIC BLOOD PRESSURE: 137 MMHG | WEIGHT: 178 LBS | TEMPERATURE: 98.1 F

## 2021-11-23 DIAGNOSIS — I27.20 PULMONARY HYPERTENSION (HCC): ICD-10-CM

## 2021-11-23 DIAGNOSIS — R79.89 ELEVATED BRAIN NATRIURETIC PEPTIDE (BNP) LEVEL: ICD-10-CM

## 2021-11-23 DIAGNOSIS — I10 PRIMARY HYPERTENSION: ICD-10-CM

## 2021-11-23 PROCEDURE — 99213 OFFICE O/P EST LOW 20 MIN: CPT | Mod: GE | Performed by: STUDENT IN AN ORGANIZED HEALTH CARE EDUCATION/TRAINING PROGRAM

## 2021-11-23 ASSESSMENT — ENCOUNTER SYMPTOMS
MYALGIAS: 0
PALPITATIONS: 0
COUGH: 0
NAUSEA: 0
ABDOMINAL PAIN: 0
DIZZINESS: 0
DOUBLE VISION: 0
HEADACHES: 0
DEPRESSION: 0
NERVOUS/ANXIOUS: 0
HEARTBURN: 0
FEVER: 0
SHORTNESS OF BREATH: 0
CHILLS: 0
BLURRED VISION: 0
NECK PAIN: 0
VOMITING: 0
TINGLING: 0

## 2021-11-23 ASSESSMENT — FIBROSIS 4 INDEX: FIB4 SCORE: 1.78

## 2021-11-23 NOTE — PATIENT INSTRUCTIONS
Thank you for coming in today!  Please keep your future appointment with Dr. West  Please complete pulmonary function test (PFT) prior to meeting with Dr. West  Please keep a record of your blood pressure medicines readings every morning and evening and bring the log with you at your next appointment.  Please call our office should you have any issues or need anything else prior to next appointment.

## 2021-11-24 LAB — 25(OH)D3 SERPL-MCNC: 15 NG/ML (ref 30–80)

## 2021-11-24 NOTE — PROGRESS NOTES
No chief complaint on file.      HISTORY OF PRESENT ILLNESS: Patient is a 86 y.o. female established patient who presents today for the following.      Patient here to discuss her most recent lab work.  Patient was noted to have elevated BNP of 420 on recent labs  From 11-.  CBC and CMP with no significant abnormalities.  At this time patient denies any shortness of breath, leg swelling (actually states that her leg swelling has improved now that she is on combination of valsartan-hydrochlorothiazide), chest pain, palpitations.  Patient does have history of pulmonary hypertension, hypertension.    Regarding patient's hypertension, patient states that she does not routinely monitor or log her blood pressure.  Patient denies any blurry vision, headaches, dizziness, nausea, vomiting.      Past Medical History:   Diagnosis Date   • Depression 6/2/2020   • Hypertension    • Hypoalbuminemia 11/11/2019   • Multiple open wounds of lower leg 11/3/2019   • Nocturia 6/25/2021   • Urinary retention 11/13/2019     IMO load March 2020       Patient Active Problem List    Diagnosis Date Noted   • Pulmonary hypertension ([facility]) 11/17/2021   • COVID-19 virus infection 11/17/2021   • Cough 11/17/2021   • Shortness of breath 11/17/2021   • Adjustment disorder with anxiety 10/07/2021   • Unilateral primary osteoarthritis, right knee 08/24/2021   • Nocturia 06/25/2021   • Asymptomatic menopausal state 01/22/2021   • Seasonal allergic rhinitis 08/20/2020   • Primary insomnia 07/23/2020   • Muscle cramps 06/18/2020   • Hot flashes due to menopause 06/02/2020   • Other fracture of right patella, sequela 01/03/2020   • Osteomyelitis hip ([facility]) 11/21/2019   • Arthralgia of hip 11/21/2019   • Edema, lower extremity 11/19/2019   • Vitamin D insufficiency 11/13/2019   • AVN (avascular necrosis of bone) ([facility]) 11/12/2019   • 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 30 Tablet 3   • omeprazole (PRILOSEC) 20 MG delayed-release capsule Take 1 Capsule by mouth every day. 30 Capsule 1   • albuterol 108 (90 Base) MCG/ACT Aero Soln inhalation aerosol Inhale 2 Puffs every four hours as needed for Shortness of Breath. 1 Each 3   • ibuprofen (MOTRIN) 200 MG Tab Take 200 mg by mouth every 6 hours as needed.     • acetaminophen (TYLENOL) 325 MG Tab Take 650 mg by mouth every four hours as needed.     • traZODone (DESYREL) 50 MG Tab Take 1 Tablet by mouth 1 time a day as needed for Sleep. 30 Tablet 3   • meloxicam (MOBIC) 15 MG tablet TAKE ONE TABLET BY MOUTH DAILY 30 tablet 0   • amoxicillin-clavulanate (AUGMENTIN) 500-125 MG Tab Take 1 tablet by mouth 3 times a day.     • CALCIUM-VITAMIN D PO Take  by mouth. (Patient not taking: Reported on 11/17/2021)     • estradiol (ESTRACE) 0.5 MG tablet ESTRADIOL 0.5 MG TABS     • carbamazepine (CARBATROL) 200 MG CR capsule Take 1 Capsule by mouth 2 times a day.       No current facility-administered medications for this visit.       Social History     Tobacco Use   • Smoking status: Former Smoker     Packs/day: 0.00   • Smokeless tobacco: Never Used   Vaping Use   • Vaping Use: Never used   Substance Use Topics   • Alcohol use: Not Currently     Alcohol/week: 0.6 oz     Types: 1 Glasses of wine per week     Comment: daily   • Drug use: Never       Family History   Problem Relation Age of Onset   • Alcohol abuse Mother    • Heart Disease Mother    • Heart Disease Brother    • Alcohol abuse Brother    • Cancer Maternal Aunt          Review of Systems   Review of Systems  "  Constitutional: Negative for chills and fever.   HENT: Negative for ear pain, hearing loss and tinnitus.    Eyes: Negative for blurred vision and double vision.   Respiratory: Negative for cough and shortness of breath.    Cardiovascular: Negative for chest pain and palpitations.   Gastrointestinal: Negative for abdominal pain, heartburn, nausea and vomiting.   Musculoskeletal: Negative for myalgias and neck pain.   Neurological: Negative for dizziness, tingling and headaches.   Psychiatric/Behavioral: Negative for depression. The patient is not nervous/anxious.        Exam:  /72   Pulse 86   Temp 36.7 °C (98.1 °F)   Ht 1.645 m (5' 4.75\")   Wt 80.7 kg (178 lb)   SpO2 92%  Body mass index is 29.85 kg/m².    Constitutional:  Not in acute distress, well appearing.  HEENT:   NC/AT  Cardiovascular: Regular rate and rhythm. No murmurs or gallops.      Lungs: Mild crackles appreciated bilateral lung bases.. No respiratory distress.  Abdomen: Not distended, soft, not tender. No guarding or rigidity. No masses.  Extremities:  No cyanosis/clubbing/edema. No obvious deformities.  Skin:  Warm and dry.  No visible rashes.  Neurologic: Alert & oriented x 3, CN II-XII grossly intact, strength and sensation grossly intact.  No focal deficits noted.  Psychiatric:  Affect normal, mood normal, judgment normal.    Assessment/Plan:     1. Pulmonary hypertension (HCC)  Echocardiogram on 11-4-20 19 revealing moderate pulmonary hypertension.  - PULMONARY FUNCTION TESTS -Test requested: Complete Pulmonary Function Test; Future    2. Elevated brain natriuretic peptide (BNP) level  Suspect elevated BNP likely trace secondary to pulmonary hypertension.  Patient with no clinical signs of CHF at this time.  -Continue patient is valsartan-hydrochlorothiazide combination pill.    3. Primary hypertension  Patient with history of hypertension.  Initial blood pressure on arrival patient systolic blood pressure in 170s, repeat blood " pressure with systolics in the 130s.  Patient reporting good compliance with new hypertension regimen.  -Encourage patient to record daily blood pressures and to bring log with her on next encounter.      All imaging results and lab results and consult notes are reviewed at this visit.  Followup: No follow-ups on file.    Please note that this dictation was created using voice recognition software. I have made every reasonable attempt to correct obvious errors, but I expect that there are errors of grammar and possibly content that I did not discover before finalizing the note.    Luiz Meraz MD  PGY-1  Internal Medicine

## 2021-11-29 RX ORDER — ERGOCALCIFEROL 1.25 MG/1
50000 CAPSULE ORAL
Qty: 5 CAPSULE | Refills: 1 | Status: SHIPPED | OUTPATIENT
Start: 2021-11-29 | End: 2022-02-25

## 2021-12-20 ENCOUNTER — APPOINTMENT (OUTPATIENT)
Dept: PHARMACY | Facility: MEDICAL CENTER | Age: 86
End: 2021-12-20
Payer: MEDICARE

## 2021-12-20 ENCOUNTER — PHARMACY VISIT (OUTPATIENT)
Dept: PHARMACY | Facility: MEDICAL CENTER | Age: 86
End: 2021-12-20
Payer: COMMERCIAL

## 2021-12-20 PROCEDURE — RXMED WILLOW AMBULATORY MEDICATION CHARGE: Performed by: INTERNAL MEDICINE

## 2021-12-20 RX ORDER — RNA INGREDIENT BNT-162B2 0.23 G/1.8ML
0.3 INJECTION, SUSPENSION INTRAMUSCULAR
Qty: 0.3 ML | Refills: 0 | Status: SHIPPED | OUTPATIENT
Start: 2021-12-20 | End: 2022-01-17

## 2022-01-06 ENCOUNTER — HOSPITAL ENCOUNTER (OUTPATIENT)
Dept: PULMONOLOGY | Facility: MEDICAL CENTER | Age: 87
End: 2022-01-06
Attending: STUDENT IN AN ORGANIZED HEALTH CARE EDUCATION/TRAINING PROGRAM
Payer: MEDICARE

## 2022-01-06 DIAGNOSIS — I27.20 PULMONARY HYPERTENSION (HCC): ICD-10-CM

## 2022-01-06 DIAGNOSIS — I10 PRIMARY HYPERTENSION: ICD-10-CM

## 2022-01-06 PROCEDURE — 94729 DIFFUSING CAPACITY: CPT | Mod: 26 | Performed by: INTERNAL MEDICINE

## 2022-01-06 PROCEDURE — 94726 PLETHYSMOGRAPHY LUNG VOLUMES: CPT

## 2022-01-06 PROCEDURE — 94060 EVALUATION OF WHEEZING: CPT | Mod: 26 | Performed by: INTERNAL MEDICINE

## 2022-01-06 PROCEDURE — 94060 EVALUATION OF WHEEZING: CPT

## 2022-01-06 PROCEDURE — 94726 PLETHYSMOGRAPHY LUNG VOLUMES: CPT | Mod: 26 | Performed by: INTERNAL MEDICINE

## 2022-01-06 PROCEDURE — 94729 DIFFUSING CAPACITY: CPT

## 2022-01-06 ASSESSMENT — PULMONARY FUNCTION TESTS
FEV1/FVC_PERCENT_LLN: 63.96
FEV1_PERCENT_CHANGE: -6
FEV1/FVC_PERCENT_PREDICTED: 93
FEV1/FVC_PERCENT_CHANGE: -117
FVC_PERCENT_PREDICTED: 72
FEV1/FVC_PERCENT_LLN: 63.96
FVC: 1.88
FEV1/FVC: 72
FEV1_LLN: 1.53
FEV1/FVC_PERCENT_CHANGE: 14
FEV1_PERCENT_CHANGE: 7
FEV1_PERCENT_PREDICTED: 73
FVC_PERCENT_PREDICTED: 77
FEV1/FVC: 81.36
FVC_LLN: 2.03
FVC_PREDICTED: 2.43
FEV1/FVC_PERCENT_PREDICTED: 108
FEV1/FVC_PERCENT_PREDICTED: 76
FEV1/FVC_PERCENT_PREDICTED: 95
FEV1/FVC: 71.49
FEV1: 1.35
FVC: 1.77
FEV1/FVC_PERCENT_PREDICTED: 106
FEV1_PREDICTED: 1.84
FEV1/FVC: 81.75
FEV1_LLN: 1.53
FEV1: 1.44
FEV1/FVC_PREDICTED: 76.60
FVC_LLN: 2.03
FEV1_PERCENT_PREDICTED: 78

## 2022-01-09 NOTE — PROCEDURES
DATE OF SERVICE:  01/06/2022     PULMONARY FUNCTION TEST INTERPRETATION     REQUESTING PROVIDER:  Jefe Ryan MD     REASON FOR REQUEST:  Pulmonary hypertension, COVID in 10/2021.     INTERPRETATION:  1.  Acceptable and reproducible.  2.  FEV1 of 1.35 liters (73%), FVC 1.88 liters (77%), ratio 71%.  3.  Flow volume loops consistent with obstruction and restriction.  4.  TLC 3.9 liters (77%).  5.  DLCO corrected for hemoglobin 16.35 mL per minute mmHg (88%).     IMPRESSION:  Reduced forced vital capacity with no response to bronchodilator   except for mid flow.  Evidence of mild restriction and normal gas transfer.    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        ______________________________  MD JUAN DANIEL Ray/NAUN/SELENE    DD:  01/08/2022 15:44  DT:  01/08/2022 16:25    Job#:  [id number]    CC:Jefe Ryan MD(User)

## 2022-01-17 ENCOUNTER — OFFICE VISIT (OUTPATIENT)
Dept: INTERNAL MEDICINE | Facility: OTHER | Age: 87
End: 2022-01-17
Payer: MEDICARE

## 2022-01-17 VITALS
BODY MASS INDEX: 31.18 KG/M2 | HEIGHT: 64 IN | WEIGHT: 182.6 LBS | OXYGEN SATURATION: 93 % | DIASTOLIC BLOOD PRESSURE: 96 MMHG | TEMPERATURE: 98.3 F | HEART RATE: 80 BPM | SYSTOLIC BLOOD PRESSURE: 178 MMHG

## 2022-01-17 DIAGNOSIS — M17.11 UNILATERAL PRIMARY OSTEOARTHRITIS, RIGHT KNEE: ICD-10-CM

## 2022-01-17 DIAGNOSIS — Z79.890 HORMONE REPLACEMENT THERAPY: ICD-10-CM

## 2022-01-17 DIAGNOSIS — E55.9 VITAMIN D INSUFFICIENCY: ICD-10-CM

## 2022-01-17 DIAGNOSIS — I10 PRIMARY HYPERTENSION: ICD-10-CM

## 2022-01-17 DIAGNOSIS — E87.1 HYPONATREMIA: ICD-10-CM

## 2022-01-17 DIAGNOSIS — I27.20 PULMONARY HYPERTENSION (HCC): ICD-10-CM

## 2022-01-17 PROCEDURE — 99214 OFFICE O/P EST MOD 30 MIN: CPT | Mod: GC | Performed by: STUDENT IN AN ORGANIZED HEALTH CARE EDUCATION/TRAINING PROGRAM

## 2022-01-17 RX ORDER — DULOXETIN HYDROCHLORIDE 30 MG/1
30 CAPSULE, DELAYED RELEASE ORAL DAILY
Qty: 30 CAPSULE | Refills: 3 | Status: SHIPPED | OUTPATIENT
Start: 2022-01-17 | End: 2022-07-01

## 2022-01-17 ASSESSMENT — FIBROSIS 4 INDEX: FIB4 SCORE: 1.78

## 2022-01-17 NOTE — PATIENT INSTRUCTIONS
- start with decreasing your estradiol to 1/2 pills daily x 4 weeks, decrease to 1/2 every other day x 4 weeks  - starting taking duloxetine for your knees and to help with hot flashes  - please get lab work after 4 weeks of starting duloxetine  - follow up in 6-8 weeks

## 2022-01-20 PROBLEM — Z79.890 HORMONE REPLACEMENT THERAPY: Status: ACTIVE | Noted: 2022-01-20

## 2022-01-20 NOTE — PROGRESS NOTES
Janessa Cain is a 86 y.o. female here for Lab Results and Follow-Up (BP Check)    HPI:     Janessa Houston  is here for follow up on HTN and requesting pre-op clearance for Right Knee Replacement     HTN- home bp 160-120 systolic, median 120s-130. Repeat manual bp 140/80.   Reports that she has decreased amount of NSAIDs use     SOB- improved, symptoms previously likely due to COVID-19 infection. She is back to her base line functions. She is able to walk up one flight of stairs without SOB  She also reports improvement in her leg swelling after stopping carbamazepine    Hormone replacement therapy-   Started 3 years ago due to hot flash symptoms, menopause 30+ years ago   We discussed risks of HRT at her current age and limited benefit considering it was started >10 yrs after menopause.   Patient is amendable to weaning off of HRT      Current medicines (including changes today)    Current Outpatient Medications   Medication Sig Dispense Refill   • DULoxetine (CYMBALTA) 30 MG Cap DR Particles Take 1 Capsule by mouth every day. 30 Capsule 3   • estradiol (ESTRACE) 0.5 MG tablet TAKE ONE TABLET BY MOUTH DAILY 30 Tablet 2   • vitamin D2, Ergocalciferol, (DRISDOL) 1.25 MG (98879 UT) Cap capsule Take 1 Capsule by mouth every 7 days. 5 Capsule 1   • omeprazole (PRILOSEC) 20 MG delayed-release capsule Take 1 Capsule by mouth every day. 30 Capsule 1   • ibuprofen (MOTRIN) 200 MG Tab Take 200 mg by mouth every 6 hours as needed.     • acetaminophen (TYLENOL) 325 MG Tab Take 650 mg by mouth every four hours as needed.     • traZODone (DESYREL) 50 MG Tab Take 1 Tablet by mouth 1 time a day as needed for Sleep. 30 Tablet 3   • amoxicillin-clavulanate (AUGMENTIN) 500-125 MG Tab Take 1 tablet by mouth 3 times a day.     • valsartan-hydrochlorothiazide (DIOVAN-HCT) 160-12.5 MG per tablet Take 1 Tablet by mouth every day. 30 Tablet 3   • albuterol 108 (90 Base) MCG/ACT Aero Soln inhalation aerosol Inhale 2 Puffs every  "four hours as needed for Shortness of Breath. (Patient not taking: Reported on 1/17/2022) 1 Each 3   • CALCIUM-VITAMIN D PO Take  by mouth. (Patient not taking: Reported on 11/17/2021)       No current facility-administered medications for this visit.     She  has a past medical history of Depression (6/2/2020), Hypertension, Hypoalbuminemia (11/11/2019), Multiple open wounds of lower leg (11/3/2019), Nocturia (6/25/2021), and Urinary retention (11/13/2019).  She  has a past surgical history that includes pr total hip arthroplasty (Right, 11/6/2019); hip arthroplasty total (Left); and tonsillectomy.  Social History     Tobacco Use   • Smoking status: Former Smoker     Packs/day: 0.00   • Smokeless tobacco: Never Used   Vaping Use   • Vaping Use: Never used   Substance Use Topics   • Alcohol use: Not Currently     Alcohol/week: 0.6 oz     Types: 1 Glasses of wine per week     Comment: daily   • Drug use: Never     Social History     Social History Narrative   • Not on file     Family History   Problem Relation Age of Onset   • Alcohol abuse Mother    • Heart Disease Mother    • Heart Disease Brother    • Alcohol abuse Brother    • Cancer Maternal Aunt      Family Status   Relation Name Status   • Mo  (Not Specified)   • Bro  (Not Specified)   • MAunt  (Not Specified)       ROS    The pertinent  ROS findings can be seen in the HPI above.     All other systems reviewed and are negative     Objective:     BP (!) 178/96 (BP Location: Left arm, Patient Position: Sitting, BP Cuff Size: Large adult)   Pulse 80   Temp 36.8 °C (98.3 °F) (Temporal)   Ht 1.626 m (5' 4\")   Wt 82.8 kg (182 lb 9.6 oz)   SpO2 93%  Body mass index is 31.34 kg/m².        Physical Exam:    Constitutional: Alert, no distress. Ambulates with cane  Skin: No suspicious lesions  Eye: Equal, round and reactive, conjunctiva clear, lids normal.  ENMT: Lips without lesions, good dentition, oropharynx clear.  Neck: Trachea midline, no masses, no " thyromegaly. No cervical or supraclavicular lymphadenopathy.  Respiratory: Unlabored respiratory effort, lungs clear to auscultation, no wheezes, no ronchi.  Cardiovascular: Normal S1, S2, no murmur, mild bilateral edema to the knee, no JVD  Abdomen: Soft, non-tender, no masses, no hepatosplenomegaly.  Musculoskeletal: Adequately aligned spine. ROM intact spine and extremities. No joint erythema or tenderness. Normal muscular development. Normal gait  Neuro: CN II-XII intact. Reflexes 2+ throughout. Cerebellar testing normal. Normal and equal strength in all extremities  Psych: Mood stable, affect appropriate, thoughts and speech appropriate        Assessment and Plan:   Hypertension  -goal BP: <130/80.     -BP today in office: 140/80, at home: systolic 120s-130s. Last CMP- mild hyponatremia otherwise WNL   -RFs: Age, obesity, FHx, pain, NSAIDs use  -Fhx: CVD in mother and brother   -Meds: valsartan-hctz 160-12.5  - Improved, home bp at goal. Continue with current medication, continue with home bp monitoring  -Lifestyle modification counselling: Weight loss/ Dietary changes/ Sodium restriction <1.5g/d. Continue to wean down NSAIDs use  -Encourage continued weight loss, exercise      Pulmonary hypertension (HCC)  Moderate pulmonary HTN on echo in 2019  BNP normal when adjusted for age group  No signs or symptoms of CHF   PFT with mild restriction and normal gas transfer (obesity vs post-COVID). Will repeat in six month- one year to monitor    Hormone replacement therapy  - will start tapering down estrace   - add duloxetine for knee pain which would also help with hot flashes symptoms    Unilateral primary osteoarthritis, right knee  - add Duloxetine to help limit NSAIDs use  - surgery deferred due to uncontrolled HTN previously, bp now improved and stable.   - Revised Cardiac Risk Index risk score of 0,  METs = 4.7. Patient is medically optimized.   - Pre-Op Clearance form completed and will send to [facility]        Vitamin D insufficiency  - vit D-15, currently on 00019X weekly supplement   - repeat level next month to monitor progress      Instructed to Follow up in clinic or ER for worsening symptoms, difficulty breathing, lack of expected recovery, or should new symptoms or problems arise.    Followup: Return in about 7 weeks (around 3/7/2022).

## 2022-01-21 NOTE — ASSESSMENT & PLAN NOTE
Moderate pulmonary HTN on echo in 2019  BNP normal when adjusted for age group  No signs or symptoms of CHF   PFT with mild restriction and normal gas transfer (obesity vs post-COVID). Will repeat in six month- one year to monitor

## 2022-01-21 NOTE — ASSESSMENT & PLAN NOTE
- add Duloxetine to help limit NSAIDs use  - surgery deferred due to uncontrolled HTN previously, bp now improved and stable.   - Revised Cardiac Risk Index risk score of 0,  METs = 4.7. Patient is medically optimized.   - Pre-Op Clearance form completed and will send to [facility]

## 2022-01-21 NOTE — ASSESSMENT & PLAN NOTE
- will start tapering down estrace   - add duloxetine for knee pain which would also help with hot flashes symptoms

## 2022-01-21 NOTE — ASSESSMENT & PLAN NOTE
-goal BP: <130/80.     -BP today in office: 140/80, at home: systolic 120s-130s. Last CMP- mild hyponatremia otherwise WNL   -RFs: Age, obesity, FHx, pain, NSAIDs use  -Fhx: CVD in mother and brother   -Meds: valsartan-hctz 160-12.5  - Improved, home bp at goal. Continue with current medication, continue with home bp monitoring  -Lifestyle modification counselling: Weight loss/ Dietary changes/ Sodium restriction <1.5g/d. Continue to wean down NSAIDs use  -Encourage continued weight loss, exercise

## 2022-01-25 RX ORDER — TRAZODONE HYDROCHLORIDE 50 MG/1
TABLET ORAL
Qty: 30 TABLET | Refills: 0 | Status: SHIPPED | OUTPATIENT
Start: 2022-01-25 | End: 2022-02-22

## 2022-01-25 NOTE — TELEPHONE ENCOUNTER
Last seen: 01/17/22 by Dr. West  Next appt: 03/14/22 with Dr. West    Was the patient seen in the last year in this department? Yes   Does patient have an active prescription for medications requested? No   Received Request Via: Pharmacy

## 2022-01-26 ENCOUNTER — DOCUMENTATION (OUTPATIENT)
Dept: INFECTIOUS DISEASES | Facility: MEDICAL CENTER | Age: 87
End: 2022-01-26

## 2022-01-26 RX ORDER — AMOXICILLIN AND CLAVULANATE POTASSIUM 500; 125 MG/1; MG/1
1 TABLET, FILM COATED ORAL 2 TIMES DAILY
Qty: 60 TABLET | Refills: 0 | Status: SHIPPED | OUTPATIENT
Start: 2022-01-26 | End: 2022-02-24 | Stop reason: SDUPTHER

## 2022-01-26 NOTE — PROGRESS NOTES
Refilled Augmentin for 30 days and gave 1 refill.  Patient will need to be seen in clinic prior to extending further.    Amena Chinchilla MD

## 2022-01-26 NOTE — TELEPHONE ENCOUNTER
Patient on lifelong abx last seen X1year ago  I called pt and left a voicemail - advised follow  appt is needed.

## 2022-02-09 ENCOUNTER — HOSPITAL ENCOUNTER (OUTPATIENT)
Dept: LAB | Facility: MEDICAL CENTER | Age: 87
End: 2022-02-09
Attending: STUDENT IN AN ORGANIZED HEALTH CARE EDUCATION/TRAINING PROGRAM
Payer: MEDICARE

## 2022-02-09 DIAGNOSIS — E55.9 VITAMIN D INSUFFICIENCY: ICD-10-CM

## 2022-02-09 DIAGNOSIS — E87.1 HYPONATREMIA: ICD-10-CM

## 2022-02-09 LAB
25(OH)D3 SERPL-MCNC: 30 NG/ML (ref 30–100)
ALBUMIN SERPL BCP-MCNC: 4.5 G/DL (ref 3.2–4.9)
ALBUMIN/GLOB SERPL: 1.9 G/DL
ALP SERPL-CCNC: 79 U/L (ref 30–99)
ALT SERPL-CCNC: 14 U/L (ref 2–50)
ANION GAP SERPL CALC-SCNC: 9 MMOL/L (ref 7–16)
AST SERPL-CCNC: 14 U/L (ref 12–45)
BILIRUB SERPL-MCNC: 0.6 MG/DL (ref 0.1–1.5)
BUN SERPL-MCNC: 33 MG/DL (ref 8–22)
CALCIUM SERPL-MCNC: 9.1 MG/DL (ref 8.5–10.5)
CHLORIDE SERPL-SCNC: 98 MMOL/L (ref 96–112)
CO2 SERPL-SCNC: 27 MMOL/L (ref 20–33)
CREAT SERPL-MCNC: 0.93 MG/DL (ref 0.5–1.4)
GLOBULIN SER CALC-MCNC: 2.4 G/DL (ref 1.9–3.5)
GLUCOSE SERPL-MCNC: 96 MG/DL (ref 65–99)
POTASSIUM SERPL-SCNC: 5.2 MMOL/L (ref 3.6–5.5)
PROT SERPL-MCNC: 6.9 G/DL (ref 6–8.2)
SODIUM SERPL-SCNC: 134 MMOL/L (ref 135–145)

## 2022-02-09 PROCEDURE — 36415 COLL VENOUS BLD VENIPUNCTURE: CPT

## 2022-02-09 PROCEDURE — 82306 VITAMIN D 25 HYDROXY: CPT

## 2022-02-09 PROCEDURE — 80053 COMPREHEN METABOLIC PANEL: CPT

## 2022-02-24 ENCOUNTER — OFFICE VISIT (OUTPATIENT)
Dept: INFECTIOUS DISEASES | Facility: MEDICAL CENTER | Age: 87
End: 2022-02-24
Payer: MEDICARE

## 2022-02-24 VITALS
RESPIRATION RATE: 16 BRPM | WEIGHT: 181 LBS | BODY MASS INDEX: 30.9 KG/M2 | OXYGEN SATURATION: 96 % | SYSTOLIC BLOOD PRESSURE: 150 MMHG | HEIGHT: 64 IN | HEART RATE: 92 BPM | TEMPERATURE: 97.8 F | DIASTOLIC BLOOD PRESSURE: 86 MMHG

## 2022-02-24 DIAGNOSIS — T84.51XD INFECTION ASSOCIATED WITH INTERNAL RIGHT HIP PROSTHESIS, SUBSEQUENT ENCOUNTER: ICD-10-CM

## 2022-02-24 DIAGNOSIS — M87.00 AVN (AVASCULAR NECROSIS OF BONE) (HCC): ICD-10-CM

## 2022-02-24 DIAGNOSIS — Z96.641 STATUS POST TOTAL REPLACEMENT OF RIGHT HIP: ICD-10-CM

## 2022-02-24 PROBLEM — T84.7XXA HARDWARE COMPLICATING WOUND INFECTION (HCC): Status: ACTIVE | Noted: 2022-02-24

## 2022-02-24 PROCEDURE — 99213 OFFICE O/P EST LOW 20 MIN: CPT | Performed by: INTERNAL MEDICINE

## 2022-02-24 RX ORDER — AMOXICILLIN AND CLAVULANATE POTASSIUM 500; 125 MG/1; MG/1
1 TABLET, FILM COATED ORAL 2 TIMES DAILY
Qty: 180 TABLET | Refills: 4 | Status: SHIPPED | OUTPATIENT
Start: 2022-02-24 | End: 2022-05-25

## 2022-02-24 ASSESSMENT — FIBROSIS 4 INDEX: FIB4 SCORE: 1.44

## 2022-02-24 NOTE — PROGRESS NOTES
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 female patient.  Paraphrasing from documentation by Shital CANTRELL: \"85-year-old female with a PMH of hypertension, degenerative disease and right hip resurfacing in 2008.  Hospitalized at [facility] rehabilitation from 11/12-11/21/2019.  Patient noted that in September 2019 she had a cortisone injection to the right hip due to persistent pain, but stated that the pain got much worse.  Pain became so severe that she became dehydrated because she was unable to ambulate to get water.  She was forced to sleep in a wheelchair because of the hip pain.  Patient had been admitted at [facility] on 11/3/19, seen by orthopedics and underwent  right total hip arthroplasty with Dr. 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  Three screws were placed.  She was noted to have atrophy and the musculature, complete collapse of the femoral head.  OR bone culture + Propionibacterium acnes.  Patient was then found to have UTI, urine culture on 11/17 + E. coli and Pseudomonas.  Was treated with IV cefepime through 11/25 and then transitioned over to IV ceftriaxone and p.o. rifampin to complete a 6-week course of antibiotics for the infected right hip, end date 12/29/2019.  On 12/30, patient started a 3-month course of p.o. Augmentin 875/125 mg twice daily.  Overall duration to be based on rate of healing, in addition to determining if ongoing antibiotics would be beneficial due to retained infected hardware.     Plan was to continue PO Augmentin for at least 12 months total, earliest end date 12/30/2020.  Decrease dose to 500/125 mg BID.  Since that time, patient has been on this dose, with plan to transition to lifelong suppression given " "presence of retained hardware that was placed in an infected bed.    Patient has been doing well with Augmentin with no issues whatsoever, has had occasional lab work with no acute concerns. 4 days from now she is undergoing a total knee arthroplasty.     Review of Systems:  All other systems reviewed and are negative expect as noted in HPI    Past Medical History:   Diagnosis Date   • Depression 6/2/2020   • Hypertension    • Hypoalbuminemia 11/11/2019   • Multiple open wounds of lower leg 11/3/2019   • Nocturia 6/25/2021   • Urinary retention 11/13/2019     IMO load March 2020       Past Surgical History:   Procedure Laterality Date   • PB TOTAL HIP ARTHROPLASTY Right 11/6/2019    Procedure: ARTHROPLASTY, HIP, TOTAL;  Surgeon: Pauline Mo M.D.;  Location: SURGERY [facility];  Service: Orthopedics   • HIP ARTHROPLASTY TOTAL Left     \"resurfacing\"   • TONSILLECTOMY         Family History   Problem Relation Age of Onset   • Alcohol abuse Mother    • Heart Disease Mother    • Heart Disease Brother    • Alcohol abuse Brother    • Cancer Maternal Aunt        Social History     Socioeconomic History   • Marital status:      Spouse name: Not on file   • Number of children: Not on file   • Years of education: Not on file   • Highest education level: Not on file   Occupational History   • Not on file   Tobacco Use   • Smoking status: Former Smoker     Packs/day: 0.00   • Smokeless tobacco: Never Used   Vaping Use   • Vaping Use: Never used   Substance and Sexual Activity   • Alcohol use: Not Currently     Alcohol/week: 0.6 oz     Types: 1 Glasses of wine per week     Comment: daily   • Drug use: Never   • Sexual activity: Not on file   Other Topics Concern   •  Service No   • Blood Transfusions No   • Caffeine Concern No   • Occupational Exposure No   • Hobby Hazards No   • Sleep Concern No   • Stress Concern No   • Weight Concern No   • Special Diet No   • Back Care No   • Exercise No   • Bike " Helmet No   • Seat Belt Yes   • Self-Exams Yes   Social History Narrative   • Not on file     Social Determinants of Health     Financial Resource Strain: Not on file   Food Insecurity: Not on file   Transportation Needs: Not on file   Physical Activity: Not on file   Stress: Not on file   Social Connections: Not on file   Intimate Partner Violence: Not on file   Housing Stability: Not on file       Allergies   Allergen Reactions   • Apap-Fd&C Red #40 [place]-Oxycodone    • Percocet [Perloxx]    • Hydrocodone Vomiting   • Oxycodone    • Tramadol        Medications:  Current Outpatient Medications on File Prior to Visit   Medication Sig Dispense Refill   • amoxicillin-clavulanate (AUGMENTIN) 500-125 MG Tab Take 1 Tablet by mouth 2 times a day for 30 days. 60 Tablet 0   • traZODone (DESYREL) 50 MG Tab TAKE ONE TABLET BY MOUTH DAILY AS NEEDED FOR SLEEP 30 Tablet 0   • DULoxetine (CYMBALTA) 30 MG Cap DR Particles Take 1 Capsule by mouth every day. 30 Capsule 3   • estradiol (ESTRACE) 0.5 MG tablet TAKE ONE TABLET BY MOUTH DAILY 30 Tablet 2   • valsartan-hydrochlorothiazide (DIOVAN-HCT) 160-12.5 MG per tablet Take 1 Tablet by mouth every day. 30 Tablet 3   • omeprazole (PRILOSEC) 20 MG delayed-release capsule Take 1 Capsule by mouth every day. 30 Capsule 1   • acetaminophen (TYLENOL) 325 MG Tab Take 650 mg by mouth every four hours as needed.     • vitamin D2, Ergocalciferol, (DRISDOL) 1.25 MG (18918 UT) Cap capsule Take 1 Capsule by mouth every 7 days. 5 Capsule 1   • albuterol 108 (90 Base) MCG/ACT Aero Soln inhalation aerosol Inhale 2 Puffs every four hours as needed for Shortness of Breath. (Patient not taking: Reported on 1/17/2022) 1 Each 3   • ibuprofen (MOTRIN) 200 MG Tab Take 200 mg by mouth every 6 hours as needed.     • CALCIUM-VITAMIN D PO Take  by mouth. (Patient not taking: Reported on 2/24/2022)       No current facility-administered medications on file prior to visit.       Physical Exam:   Vital Signs:  "/86 (BP Location: Left arm, Patient Position: Sitting, BP Cuff Size: Adult)   Pulse 92   Temp 36.6 °C (97.8 °F)   Resp 16   Ht 1.626 m (5' 4\")   Wt 82.1 kg (181 lb)   SpO2 96%   BMI 31.07 kg/m²   Vital signs reviewed  Constitutional: Patient is oriented to person, place, and time. Appears well-developed and well-nourished. No distress  Head: Atraumatic, normocephalic  Eyes: Conjunctivae normal, EOM intact.   Mouth/Throat: Wearing a mask  Neck: Neck supple. No masses/lymphadenopathy  Cardiovascular: Normal rate, regular rhythm, No pedal edema.  Pulmonary/Chest: No respiratory distress. Unlabored respiratory effort, no stridor  Abdominal: Soft, non tender. BS + x 4. No masses or hepatosplenomegaly.   Musculoskeletal: Right lateral hip with well-healed incision with no gross external evidence of infection  Neurological: Alert and oriented to person, place, and time. No gross cranial nerve deficit. No focal neural deficit noted.  Walking with a cane  Skin: Skin is warm and dry. No rashes or embolic phenomena noted on exposed skin  Psychiatric: Pleasant.  Normal mood and affect. Behavior is normal.     LABS:  WBC   Date/Time Value Ref Range Status   11/18/2021 04:24 PM 4.9 4.8 - 10.8 K/uL Final     RBC   Date/Time Value Ref Range Status   11/18/2021 04:24 PM 4.33 4.20 - 5.40 M/uL Final     Hemoglobin   Date/Time Value Ref Range Status   11/18/2021 04:24 PM 14.5 12.0 - 16.0 g/dL Final     Hematocrit   Date/Time Value Ref Range Status   11/18/2021 04:24 PM 43.4 37.0 - 47.0 % Final     MCV   Date/Time Value Ref Range Status   11/18/2021 04:24 .2 (H) 81.4 - 97.8 fL Final     MCH   Date/Time Value Ref Range Status   11/18/2021 04:24 PM 33.5 (H) 27.0 - 33.0 pg Final     MCHC   Date/Time Value Ref Range Status   11/18/2021 04:24 PM 33.4 (L) 33.6 - 35.0 g/dL Final     MPV   Date/Time Value Ref Range Status   11/18/2021 04:24 PM 10.2 9.0 - 12.9 fL Final     Sodium   Date/Time Value Ref Range Status   02/09/2022 " 09:09  (L) 135 - 145 mmol/L Final     Potassium   Date/Time Value Ref Range Status   02/09/2022 09:09 AM 5.2 3.6 - 5.5 mmol/L Final     Chloride   Date/Time Value Ref Range Status   02/09/2022 09:09 AM 98 96 - 112 mmol/L Final     Co2   Date/Time Value Ref Range Status   02/09/2022 09:09 AM 27 20 - 33 mmol/L Final     Glucose   Date/Time Value Ref Range Status   02/09/2022 09:09 AM 96 65 - 99 mg/dL Final     Bun   Date/Time Value Ref Range Status   02/09/2022 09:09 AM 33 (H) 8 - 22 mg/dL Final     Creatinine   Date/Time Value Ref Range Status   02/09/2022 09:09 AM 0.93 0.50 - 1.40 mg/dL Final     Alkaline Phosphatase   Date/Time Value Ref Range Status   02/09/2022 09:09 AM 79 30 - 99 U/L Final     AST(SGOT)   Date/Time Value Ref Range Status   02/09/2022 09:09 AM 14 12 - 45 U/L Final     ALT(SGPT)   Date/Time Value Ref Range Status   02/09/2022 09:09 AM 14 2 - 50 U/L Final     Total Bilirubin   Date/Time Value Ref Range Status   02/09/2022 09:09 AM 0.6 0.1 - 1.5 mg/dL Final      No results found for: CPKTOTAL     MICRO:  No results found for: BLOODCULTU, BLDCULT, BCHOLD      Latest pertinent labs were reviewed    IMAGING STUDIES:  Last chest x-ray from November 2021 with no acute process    Assessment:   Janessa Cain is a pleasant 86 y.o. 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  Discussed possibility of going off the medication versus staying on this lifelong.  Pros and cons of both approaches discussed, patient prefers to stay on this to avoid risk of recurrence of infection that may result in need for additional procedures.  Patient has been tolerating Augmentin quite well and this approach is acceptable.  Patient also with planned total knee arthroplasty so we will continue the Augmentin as planned  -Refilled p.o. Augmentin 500 mg twice  daily for a year.  This prescription can be taken over by patient's PCP with plan to continue chronic lifelong suppression.  Recommend at least yearly CBC with differential and CMP.  -Monitor closely for any evidence of recurrence of infection    ID clinic follow-up as needed    Carlyle Warren M.D.    Please note that this dictation was created using voice recognition software. I have worked with technical experts from [facility]  [place] to optimize the interface.  I have made every reasonable attempt to correct obvious errors, but there may be errors of grammar and possibly content that I did not discover before finalizing the note.

## 2022-02-25 ENCOUNTER — PRE-ADMISSION TESTING (OUTPATIENT)
Dept: ADMISSIONS | Facility: MEDICAL CENTER | Age: 87
End: 2022-02-25
Attending: ORTHOPAEDIC SURGERY
Payer: MEDICARE

## 2022-02-25 DIAGNOSIS — Z01.818 PRE-OP EXAMINATION: ICD-10-CM

## 2022-02-25 DIAGNOSIS — Z01.810 PRE-OPERATIVE CARDIOVASCULAR EXAMINATION: ICD-10-CM

## 2022-02-25 DIAGNOSIS — Z01.812 PRE-OPERATIVE LABORATORY EXAMINATION: ICD-10-CM

## 2022-02-25 LAB
EKG IMPRESSION: NORMAL
ERYTHROCYTE [DISTWIDTH] IN BLOOD BY AUTOMATED COUNT: 46.8 FL (ref 35.9–50)
HCT VFR BLD AUTO: 44.1 % (ref 37–47)
HGB BLD-MCNC: 14.4 G/DL (ref 12–16)
MCH RBC QN AUTO: 33.1 PG (ref 27–33)
MCHC RBC AUTO-ENTMCNC: 32.7 G/DL (ref 33.6–35)
MCV RBC AUTO: 101.4 FL (ref 81.4–97.8)
PLATELET # BLD AUTO: 205 K/UL (ref 164–446)
PMV BLD AUTO: 9.5 FL (ref 9–12.9)
RBC # BLD AUTO: 4.35 M/UL (ref 4.2–5.4)
WBC # BLD AUTO: 5.5 K/UL (ref 4.8–10.8)

## 2022-02-25 PROCEDURE — 93010 ELECTROCARDIOGRAM REPORT: CPT | Performed by: INTERNAL MEDICINE

## 2022-02-25 PROCEDURE — C9803 HOPD COVID-19 SPEC COLLECT: HCPCS

## 2022-02-25 PROCEDURE — U0003 INFECTIOUS AGENT DETECTION BY NUCLEIC ACID (DNA OR RNA); SEVERE ACUTE RESPIRATORY SYNDROME CORONAVIRUS 2 (SARS-COV-2) (CORONAVIRUS DISEASE [COVID-19]), AMPLIFIED PROBE TECHNIQUE, MAKING USE OF HIGH THROUGHPUT TECHNOLOGIES AS DESCRIBED BY CMS-2020-01-R: HCPCS

## 2022-02-25 PROCEDURE — 85027 COMPLETE CBC AUTOMATED: CPT

## 2022-02-25 PROCEDURE — 93005 ELECTROCARDIOGRAM TRACING: CPT

## 2022-02-25 PROCEDURE — 36415 COLL VENOUS BLD VENIPUNCTURE: CPT

## 2022-02-25 PROCEDURE — 87640 STAPH A DNA AMP PROBE: CPT | Mod: XU

## 2022-02-25 PROCEDURE — U0005 INFEC AGEN DETEC AMPLI PROBE: HCPCS

## 2022-02-25 PROCEDURE — 87641 MR-STAPH DNA AMP PROBE: CPT

## 2022-02-25 RX ORDER — TRAZODONE HYDROCHLORIDE 50 MG/1
TABLET ORAL
Qty: 90 TABLET | Refills: 2 | Status: SHIPPED | OUTPATIENT
Start: 2022-02-25 | End: 2023-10-03

## 2022-02-25 RX ORDER — CEFAZOLIN SODIUM IN 0.9 % NACL 2 G/100 ML
2 PLASTIC BAG, INJECTION (ML) INTRAVENOUS ONCE
Status: CANCELLED | OUTPATIENT
Start: 2022-02-28 | End: 2022-02-28

## 2022-02-25 ASSESSMENT — FIBROSIS 4 INDEX: FIB4 SCORE: 1.44

## 2022-02-25 NOTE — OR NURSING
"Preadmit appointment: \" Preparing for your Procedure information\" sheet given to patient with verbal and written instructions. Patient instructed to continue prescribed medications through the day before surgery, instructed to take the following medications the day of surgery per anesthesia protocol:TYLENOL, AUGMENTIN, PRILOSEC.    FALL RISK ordered and protocol initiated.    Pt states she watched the pre-admit video.                "

## 2022-02-25 NOTE — DISCHARGE PLANNING
DISCHARGE PLANNING NOTE - TOTAL JOINT    Procedure: Procedure(s):  ARTHROPLASTY, KNEE, TOTAL  Procedure Date: 2/28/2022  Insurance: Payor: MEDICARE / Plan: MEDICARE PART A & B / Product Type: *No Product type* /    Equipment currently available at home?  front-wheel walker and raised toilet seat  Steps into the home? 1  Steps within the home? 0  Toilet height? Standard  Type of shower? walk-in shower  Who will be with you during your recovery? Spouse.  Is Outpatient Physical Therapy set up after surgery? No.  Did you take the Total Joint Class and where? Yes  Planning same day discharge?No     This writer met with pt and spouse during her preadmission appt. Pt states she has all needed equipment and is requesting to go to [facility] postoperatively. Home safety checklist reviewed and copy given to pt. Pt educated to dc criteria. All questions answered and verbalizes understanding of all instructions. No dc needs identified at this time. Anticipate dc to home without barriers.

## 2022-02-25 NOTE — OR NURSING
covid screen  Left message to notify DR Mo if she is experiencing and s/s of Covid or any recent exposure.

## 2022-02-26 LAB
SARS-COV-2 RNA RESP QL NAA+PROBE: NOTDETECTED
SCCMEC + MECA PNL NOSE NAA+PROBE: NEGATIVE
SCCMEC + MECA PNL NOSE NAA+PROBE: NEGATIVE
SPECIMEN SOURCE: NORMAL

## 2022-02-28 ENCOUNTER — HOSPITAL ENCOUNTER (OUTPATIENT)
Facility: MEDICAL CENTER | Age: 87
End: 2022-03-02
Attending: ORTHOPAEDIC SURGERY | Admitting: ORTHOPAEDIC SURGERY
Payer: MEDICARE

## 2022-02-28 ENCOUNTER — ANESTHESIA EVENT (OUTPATIENT)
Dept: SURGERY | Facility: MEDICAL CENTER | Age: 87
End: 2022-02-28
Payer: MEDICARE

## 2022-02-28 ENCOUNTER — ANESTHESIA (OUTPATIENT)
Dept: SURGERY | Facility: MEDICAL CENTER | Age: 87
End: 2022-02-28
Payer: MEDICARE

## 2022-02-28 ENCOUNTER — APPOINTMENT (OUTPATIENT)
Dept: RADIOLOGY | Facility: MEDICAL CENTER | Age: 87
End: 2022-02-28
Attending: PHYSICIAN ASSISTANT
Payer: MEDICARE

## 2022-02-28 DIAGNOSIS — E55.9 VITAMIN D INSUFFICIENCY: ICD-10-CM

## 2022-02-28 DIAGNOSIS — Z96.641 STATUS POST TOTAL REPLACEMENT OF RIGHT HIP: ICD-10-CM

## 2022-02-28 DIAGNOSIS — Z79.890 HORMONE REPLACEMENT THERAPY: ICD-10-CM

## 2022-02-28 DIAGNOSIS — N95.1 HOT FLASHES DUE TO MENOPAUSE: ICD-10-CM

## 2022-02-28 DIAGNOSIS — M86.9 OSTEOMYELITIS HIP (HCC): ICD-10-CM

## 2022-02-28 DIAGNOSIS — M87.00 AVN (AVASCULAR NECROSIS OF BONE) (HCC): ICD-10-CM

## 2022-02-28 DIAGNOSIS — Z78.9 IMPAIRED MOBILITY AND ADLS: ICD-10-CM

## 2022-02-28 DIAGNOSIS — U07.1 COVID-19 VIRUS INFECTION: ICD-10-CM

## 2022-02-28 DIAGNOSIS — Z74.09 IMPAIRED MOBILITY AND ADLS: ICD-10-CM

## 2022-02-28 DIAGNOSIS — I27.20 PULMONARY HYPERTENSION (HCC): ICD-10-CM

## 2022-02-28 DIAGNOSIS — S82.091S: ICD-10-CM

## 2022-02-28 DIAGNOSIS — M17.11 ARTHRITIS OF KNEE, RIGHT: ICD-10-CM

## 2022-02-28 DIAGNOSIS — E87.1 HYPONATREMIA: ICD-10-CM

## 2022-02-28 DIAGNOSIS — F51.01 PRIMARY INSOMNIA: ICD-10-CM

## 2022-02-28 DIAGNOSIS — T84.7XXD HARDWARE COMPLICATING WOUND INFECTION, SUBSEQUENT ENCOUNTER: ICD-10-CM

## 2022-02-28 DIAGNOSIS — R35.1 NOCTURIA: ICD-10-CM

## 2022-02-28 DIAGNOSIS — R25.2 MUSCLE CRAMPS: ICD-10-CM

## 2022-02-28 DIAGNOSIS — R60.0 EDEMA, LOWER EXTREMITY: ICD-10-CM

## 2022-02-28 DIAGNOSIS — R06.02 SHORTNESS OF BREATH: ICD-10-CM

## 2022-02-28 DIAGNOSIS — R05.9 COUGH: ICD-10-CM

## 2022-02-28 DIAGNOSIS — M17.11 UNILATERAL PRIMARY OSTEOARTHRITIS, RIGHT KNEE: ICD-10-CM

## 2022-02-28 DIAGNOSIS — Z78.0 ASYMPTOMATIC MENOPAUSAL STATE: ICD-10-CM

## 2022-02-28 DIAGNOSIS — I10 PRIMARY HYPERTENSION: ICD-10-CM

## 2022-02-28 DIAGNOSIS — F43.22 ADJUSTMENT DISORDER WITH ANXIETY: ICD-10-CM

## 2022-02-28 DIAGNOSIS — M16.11 OSTEOARTHRITIS OF RIGHT HIP, UNSPECIFIED OSTEOARTHRITIS TYPE: ICD-10-CM

## 2022-02-28 PROCEDURE — 700111 HCHG RX REV CODE 636 W/ 250 OVERRIDE (IP): Performed by: INTERNAL MEDICINE

## 2022-02-28 PROCEDURE — 502000 HCHG MISC OR IMPLANTS RC 0278: Performed by: ORTHOPAEDIC SURGERY

## 2022-02-28 PROCEDURE — A9270 NON-COVERED ITEM OR SERVICE: HCPCS | Performed by: INTERNAL MEDICINE

## 2022-02-28 PROCEDURE — A9270 NON-COVERED ITEM OR SERVICE: HCPCS | Performed by: PHYSICIAN ASSISTANT

## 2022-02-28 PROCEDURE — 94669 MECHANICAL CHEST WALL OSCILL: CPT

## 2022-02-28 PROCEDURE — 502579 HCHG PACK, TOTAL KNEE: Performed by: ORTHOPAEDIC SURGERY

## 2022-02-28 PROCEDURE — 96376 TX/PRO/DX INJ SAME DRUG ADON: CPT | Mod: XU

## 2022-02-28 PROCEDURE — C1776 JOINT DEVICE (IMPLANTABLE): HCPCS | Performed by: ORTHOPAEDIC SURGERY

## 2022-02-28 PROCEDURE — 503339 HCHG DRESSSING PICO: Performed by: ORTHOPAEDIC SURGERY

## 2022-02-28 PROCEDURE — 700102 HCHG RX REV CODE 250 W/ 637 OVERRIDE(OP): Performed by: INTERNAL MEDICINE

## 2022-02-28 PROCEDURE — 97607 NEG PRS WND THR NDME<=50SQCM: CPT | Performed by: ORTHOPAEDIC SURGERY

## 2022-02-28 PROCEDURE — 700111 HCHG RX REV CODE 636 W/ 250 OVERRIDE (IP): Performed by: ORTHOPAEDIC SURGERY

## 2022-02-28 PROCEDURE — 97607 NEG PRS WND THR NDME<=50SQCM: CPT | Mod: ASROC | Performed by: PHYSICIAN ASSISTANT

## 2022-02-28 PROCEDURE — 160041 HCHG SURGERY MINUTES - EA ADDL 1 MIN LEVEL 4: Performed by: ORTHOPAEDIC SURGERY

## 2022-02-28 PROCEDURE — 27447 TOTAL KNEE ARTHROPLASTY: CPT | Mod: ASROC,RT | Performed by: PHYSICIAN ASSISTANT

## 2022-02-28 PROCEDURE — 160009 HCHG ANES TIME/MIN: Performed by: ORTHOPAEDIC SURGERY

## 2022-02-28 PROCEDURE — 27447 TOTAL KNEE ARTHROPLASTY: CPT | Mod: RT | Performed by: ORTHOPAEDIC SURGERY

## 2022-02-28 PROCEDURE — 700111 HCHG RX REV CODE 636 W/ 250 OVERRIDE (IP): Performed by: PHYSICIAN ASSISTANT

## 2022-02-28 PROCEDURE — 501838 HCHG SUTURE GENERAL: Performed by: ORTHOPAEDIC SURGERY

## 2022-02-28 PROCEDURE — 700102 HCHG RX REV CODE 250 W/ 637 OVERRIDE(OP): Performed by: PHYSICIAN ASSISTANT

## 2022-02-28 PROCEDURE — 700101 HCHG RX REV CODE 250: Performed by: ORTHOPAEDIC SURGERY

## 2022-02-28 PROCEDURE — 73560 X-RAY EXAM OF KNEE 1 OR 2: CPT | Mod: RT

## 2022-02-28 PROCEDURE — 160036 HCHG PACU - EA ADDL 30 MINS PHASE I: Performed by: ORTHOPAEDIC SURGERY

## 2022-02-28 PROCEDURE — 700101 HCHG RX REV CODE 250: Performed by: INTERNAL MEDICINE

## 2022-02-28 PROCEDURE — L8699 PROSTHETIC IMPLANT NOS: HCPCS | Performed by: ORTHOPAEDIC SURGERY

## 2022-02-28 PROCEDURE — 96375 TX/PRO/DX INJ NEW DRUG ADDON: CPT | Mod: XU

## 2022-02-28 PROCEDURE — G0378 HOSPITAL OBSERVATION PER HR: HCPCS

## 2022-02-28 PROCEDURE — 96365 THER/PROPH/DIAG IV INF INIT: CPT

## 2022-02-28 PROCEDURE — 160035 HCHG PACU - 1ST 60 MINS PHASE I: Performed by: ORTHOPAEDIC SURGERY

## 2022-02-28 PROCEDURE — 160029 HCHG SURGERY MINUTES - 1ST 30 MINS LEVEL 4: Performed by: ORTHOPAEDIC SURGERY

## 2022-02-28 PROCEDURE — 700105 HCHG RX REV CODE 258: Performed by: PHYSICIAN ASSISTANT

## 2022-02-28 PROCEDURE — 700101 HCHG RX REV CODE 250: Performed by: PHYSICIAN ASSISTANT

## 2022-02-28 PROCEDURE — 700105 HCHG RX REV CODE 258: Performed by: ORTHOPAEDIC SURGERY

## 2022-02-28 PROCEDURE — 64447 NJX AA&/STRD FEMORAL NRV IMG: CPT | Performed by: ORTHOPAEDIC SURGERY

## 2022-02-28 PROCEDURE — 94760 N-INVAS EAR/PLS OXIMETRY 1: CPT

## 2022-02-28 PROCEDURE — 160048 HCHG OR STATISTICAL LEVEL 1-5: Performed by: ORTHOPAEDIC SURGERY

## 2022-02-28 PROCEDURE — 160002 HCHG RECOVERY MINUTES (STAT): Performed by: ORTHOPAEDIC SURGERY

## 2022-02-28 DEVICE — IMPLANTABLE DEVICE: Type: IMPLANTABLE DEVICE | Site: KNEE | Status: FUNCTIONAL

## 2022-02-28 DEVICE — IMPLANTABLE DEVICE: Type: IMPLANTABLE DEVICE | Status: FUNCTIONAL

## 2022-02-28 DEVICE — IMPLANT GII OVAL RESURFACING PAT 29MM (1EA): Type: IMPLANTABLE DEVICE | Site: KNEE | Status: FUNCTIONAL

## 2022-02-28 DEVICE — BASE TIBIAL NONPOROUS JOURNEY II RIGHT SIZE 2 (1EA): Type: IMPLANTABLE DEVICE | Site: KNEE | Status: FUNCTIONAL

## 2022-02-28 DEVICE — BONE CEMENT SIMPLEX ANTIBIO - (10/PK): Type: IMPLANTABLE DEVICE | Site: KNEE | Status: FUNCTIONAL

## 2022-02-28 RX ORDER — TRAZODONE HYDROCHLORIDE 50 MG/1
50 TABLET ORAL
Status: DISCONTINUED | OUTPATIENT
Start: 2022-02-28 | End: 2022-03-02 | Stop reason: HOSPADM

## 2022-02-28 RX ORDER — TAMSULOSIN HYDROCHLORIDE 0.4 MG/1
0.4 CAPSULE ORAL
Status: DISCONTINUED | OUTPATIENT
Start: 2022-02-28 | End: 2022-03-02 | Stop reason: HOSPADM

## 2022-02-28 RX ORDER — HALOPERIDOL 5 MG/ML
1 INJECTION INTRAMUSCULAR
Status: DISCONTINUED | OUTPATIENT
Start: 2022-02-28 | End: 2022-02-28 | Stop reason: HOSPADM

## 2022-02-28 RX ORDER — VALSARTAN AND HYDROCHLOROTHIAZIDE 160; 12.5 MG/1; MG/1
1 TABLET, FILM COATED ORAL DAILY
Status: DISCONTINUED | OUTPATIENT
Start: 2022-02-28 | End: 2022-02-28

## 2022-02-28 RX ORDER — KETOROLAC TROMETHAMINE 30 MG/ML
INJECTION, SOLUTION INTRAMUSCULAR; INTRAVENOUS
Status: DISCONTINUED | OUTPATIENT
Start: 2022-02-28 | End: 2022-02-28 | Stop reason: HOSPADM

## 2022-02-28 RX ORDER — ACETAMINOPHEN 500 MG
1000 TABLET ORAL ONCE
Status: DISCONTINUED | OUTPATIENT
Start: 2022-02-28 | End: 2022-02-28 | Stop reason: HOSPADM

## 2022-02-28 RX ORDER — HYDROMORPHONE HYDROCHLORIDE 1 MG/ML
0.4 INJECTION, SOLUTION INTRAMUSCULAR; INTRAVENOUS; SUBCUTANEOUS
Status: DISCONTINUED | OUTPATIENT
Start: 2022-02-28 | End: 2022-02-28 | Stop reason: HOSPADM

## 2022-02-28 RX ORDER — OXYCODONE HCL 5 MG/5 ML
5 SOLUTION, ORAL ORAL
Status: DISCONTINUED | OUTPATIENT
Start: 2022-02-28 | End: 2022-02-28 | Stop reason: HOSPADM

## 2022-02-28 RX ORDER — VALSARTAN 80 MG/1
160 TABLET ORAL
Status: DISCONTINUED | OUTPATIENT
Start: 2022-03-01 | End: 2022-03-02 | Stop reason: HOSPADM

## 2022-02-28 RX ORDER — TRANEXAMIC ACID 100 MG/ML
INJECTION, SOLUTION INTRAVENOUS PRN
Status: DISCONTINUED | OUTPATIENT
Start: 2022-02-28 | End: 2022-02-28 | Stop reason: SURG

## 2022-02-28 RX ORDER — HALOPERIDOL 5 MG/ML
1 INJECTION INTRAMUSCULAR EVERY 6 HOURS PRN
Status: DISCONTINUED | OUTPATIENT
Start: 2022-02-28 | End: 2022-03-02 | Stop reason: HOSPADM

## 2022-02-28 RX ORDER — DIPHENHYDRAMINE HCL 25 MG
25 TABLET ORAL EVERY 6 HOURS PRN
Status: DISCONTINUED | OUTPATIENT
Start: 2022-02-28 | End: 2022-03-02 | Stop reason: HOSPADM

## 2022-02-28 RX ORDER — LIDOCAINE HYDROCHLORIDE 20 MG/ML
INJECTION, SOLUTION EPIDURAL; INFILTRATION; INTRACAUDAL; PERINEURAL PRN
Status: DISCONTINUED | OUTPATIENT
Start: 2022-02-28 | End: 2022-02-28 | Stop reason: SURG

## 2022-02-28 RX ORDER — ROPIVACAINE HYDROCHLORIDE 5 MG/ML
INJECTION, SOLUTION EPIDURAL; INFILTRATION; PERINEURAL
Status: COMPLETED | OUTPATIENT
Start: 2022-02-28 | End: 2022-02-28

## 2022-02-28 RX ORDER — ONDANSETRON 4 MG/1
4 TABLET, ORALLY DISINTEGRATING ORAL EVERY 4 HOURS PRN
Status: DISCONTINUED | OUTPATIENT
Start: 2022-02-28 | End: 2022-03-02 | Stop reason: HOSPADM

## 2022-02-28 RX ORDER — ONDANSETRON 2 MG/ML
4 INJECTION INTRAMUSCULAR; INTRAVENOUS EVERY 4 HOURS PRN
Status: DISCONTINUED | OUTPATIENT
Start: 2022-02-28 | End: 2022-03-02 | Stop reason: HOSPADM

## 2022-02-28 RX ORDER — OXYCODONE HCL 5 MG/5 ML
10 SOLUTION, ORAL ORAL
Status: DISCONTINUED | OUTPATIENT
Start: 2022-02-28 | End: 2022-02-28 | Stop reason: HOSPADM

## 2022-02-28 RX ORDER — DIPHENHYDRAMINE HYDROCHLORIDE 50 MG/ML
25 INJECTION INTRAMUSCULAR; INTRAVENOUS EVERY 6 HOURS PRN
Status: DISCONTINUED | OUTPATIENT
Start: 2022-02-28 | End: 2022-03-02 | Stop reason: HOSPADM

## 2022-02-28 RX ORDER — HYDROMORPHONE HYDROCHLORIDE 1 MG/ML
0.5 INJECTION, SOLUTION INTRAMUSCULAR; INTRAVENOUS; SUBCUTANEOUS
Status: DISCONTINUED | OUTPATIENT
Start: 2022-02-28 | End: 2022-03-02 | Stop reason: HOSPADM

## 2022-02-28 RX ORDER — HYDROMORPHONE HYDROCHLORIDE 2 MG/ML
INJECTION, SOLUTION INTRAMUSCULAR; INTRAVENOUS; SUBCUTANEOUS PRN
Status: DISCONTINUED | OUTPATIENT
Start: 2022-02-28 | End: 2022-02-28 | Stop reason: SURG

## 2022-02-28 RX ORDER — HYDROMORPHONE HYDROCHLORIDE 1 MG/ML
0.1 INJECTION, SOLUTION INTRAMUSCULAR; INTRAVENOUS; SUBCUTANEOUS
Status: DISCONTINUED | OUTPATIENT
Start: 2022-02-28 | End: 2022-02-28 | Stop reason: HOSPADM

## 2022-02-28 RX ORDER — DIPHENHYDRAMINE HYDROCHLORIDE 50 MG/ML
12.5 INJECTION INTRAMUSCULAR; INTRAVENOUS
Status: DISCONTINUED | OUTPATIENT
Start: 2022-02-28 | End: 2022-02-28 | Stop reason: HOSPADM

## 2022-02-28 RX ORDER — DEXTROSE MONOHYDRATE, SODIUM CHLORIDE, AND POTASSIUM CHLORIDE 50; 1.49; 4.5 G/1000ML; G/1000ML; G/1000ML
INJECTION, SOLUTION INTRAVENOUS CONTINUOUS
Status: DISPENSED | OUTPATIENT
Start: 2022-02-28 | End: 2022-02-28

## 2022-02-28 RX ORDER — ACETAMINOPHEN 325 MG/1
650 TABLET ORAL EVERY 6 HOURS
Status: DISCONTINUED | OUTPATIENT
Start: 2022-02-28 | End: 2022-03-02 | Stop reason: HOSPADM

## 2022-02-28 RX ORDER — HYDROCHLOROTHIAZIDE 12.5 MG/1
12.5 TABLET ORAL
Status: DISCONTINUED | OUTPATIENT
Start: 2022-03-01 | End: 2022-03-02 | Stop reason: HOSPADM

## 2022-02-28 RX ORDER — POLYETHYLENE GLYCOL 3350 17 G/17G
1 POWDER, FOR SOLUTION ORAL 2 TIMES DAILY PRN
Status: DISCONTINUED | OUTPATIENT
Start: 2022-02-28 | End: 2022-03-02 | Stop reason: HOSPADM

## 2022-02-28 RX ORDER — AMOXICILLIN 250 MG
1 CAPSULE ORAL NIGHTLY
Status: DISCONTINUED | OUTPATIENT
Start: 2022-02-28 | End: 2022-03-02 | Stop reason: HOSPADM

## 2022-02-28 RX ORDER — OXYCODONE HYDROCHLORIDE 5 MG/1
5 TABLET ORAL
Status: DISCONTINUED | OUTPATIENT
Start: 2022-02-28 | End: 2022-03-02 | Stop reason: HOSPADM

## 2022-02-28 RX ORDER — DIPHENHYDRAMINE HCL 25 MG
25 TABLET ORAL NIGHTLY PRN
Status: DISCONTINUED | OUTPATIENT
Start: 2022-03-01 | End: 2022-03-02 | Stop reason: HOSPADM

## 2022-02-28 RX ORDER — AMOXICILLIN 250 MG
1 CAPSULE ORAL
Status: DISCONTINUED | OUTPATIENT
Start: 2022-02-28 | End: 2022-03-02 | Stop reason: HOSPADM

## 2022-02-28 RX ORDER — OMEPRAZOLE 20 MG/1
20 CAPSULE, DELAYED RELEASE ORAL DAILY
Status: DISCONTINUED | OUTPATIENT
Start: 2022-03-01 | End: 2022-03-02 | Stop reason: HOSPADM

## 2022-02-28 RX ORDER — OMEPRAZOLE 20 MG/1
20 CAPSULE, DELAYED RELEASE ORAL 2 TIMES DAILY PRN
Status: DISCONTINUED | OUTPATIENT
Start: 2022-02-28 | End: 2022-03-02 | Stop reason: HOSPADM

## 2022-02-28 RX ORDER — CEFAZOLIN SODIUM 1 G/3ML
2 INJECTION, POWDER, FOR SOLUTION INTRAMUSCULAR; INTRAVENOUS ONCE
Status: COMPLETED | OUTPATIENT
Start: 2022-02-28 | End: 2022-02-28

## 2022-02-28 RX ORDER — CELECOXIB 200 MG/1
200 CAPSULE ORAL ONCE
Status: COMPLETED | OUTPATIENT
Start: 2022-02-28 | End: 2022-02-28

## 2022-02-28 RX ORDER — DEXAMETHASONE SODIUM PHOSPHATE 4 MG/ML
4 INJECTION, SOLUTION INTRA-ARTICULAR; INTRALESIONAL; INTRAMUSCULAR; INTRAVENOUS; SOFT TISSUE
Status: DISCONTINUED | OUTPATIENT
Start: 2022-02-28 | End: 2022-03-02 | Stop reason: HOSPADM

## 2022-02-28 RX ORDER — KETOROLAC TROMETHAMINE 30 MG/ML
15 INJECTION, SOLUTION INTRAMUSCULAR; INTRAVENOUS EVERY 6 HOURS
Status: DISCONTINUED | OUTPATIENT
Start: 2022-02-28 | End: 2022-03-02 | Stop reason: HOSPADM

## 2022-02-28 RX ORDER — HYDRALAZINE HYDROCHLORIDE 20 MG/ML
INJECTION INTRAMUSCULAR; INTRAVENOUS PRN
Status: DISCONTINUED | OUTPATIENT
Start: 2022-02-28 | End: 2022-02-28 | Stop reason: SURG

## 2022-02-28 RX ORDER — MEPERIDINE HYDROCHLORIDE 25 MG/ML
12.5 INJECTION INTRAMUSCULAR; INTRAVENOUS; SUBCUTANEOUS
Status: DISCONTINUED | OUTPATIENT
Start: 2022-02-28 | End: 2022-02-28 | Stop reason: HOSPADM

## 2022-02-28 RX ORDER — DEXAMETHASONE SODIUM PHOSPHATE 4 MG/ML
10 INJECTION, SOLUTION INTRA-ARTICULAR; INTRALESIONAL; INTRAMUSCULAR; INTRAVENOUS; SOFT TISSUE ONCE
Status: COMPLETED | OUTPATIENT
Start: 2022-03-01 | End: 2022-03-01

## 2022-02-28 RX ORDER — SCOLOPAMINE TRANSDERMAL SYSTEM 1 MG/1
1 PATCH, EXTENDED RELEASE TRANSDERMAL
Status: DISCONTINUED | OUTPATIENT
Start: 2022-02-28 | End: 2022-03-02 | Stop reason: HOSPADM

## 2022-02-28 RX ORDER — ONDANSETRON 2 MG/ML
4 INJECTION INTRAMUSCULAR; INTRAVENOUS
Status: DISCONTINUED | OUTPATIENT
Start: 2022-02-28 | End: 2022-02-28 | Stop reason: HOSPADM

## 2022-02-28 RX ORDER — ACETAMINOPHEN 325 MG/1
650 TABLET ORAL EVERY 6 HOURS PRN
Status: DISCONTINUED | OUTPATIENT
Start: 2022-03-05 | End: 2022-03-02 | Stop reason: HOSPADM

## 2022-02-28 RX ORDER — LABETALOL HYDROCHLORIDE 5 MG/ML
5 INJECTION, SOLUTION INTRAVENOUS
Status: DISCONTINUED | OUTPATIENT
Start: 2022-02-28 | End: 2022-02-28 | Stop reason: HOSPADM

## 2022-02-28 RX ORDER — HYDRALAZINE HYDROCHLORIDE 20 MG/ML
5 INJECTION INTRAMUSCULAR; INTRAVENOUS
Status: DISCONTINUED | OUTPATIENT
Start: 2022-02-28 | End: 2022-02-28 | Stop reason: HOSPADM

## 2022-02-28 RX ORDER — ENEMA 19; 7 G/133ML; G/133ML
1 ENEMA RECTAL
Status: DISCONTINUED | OUTPATIENT
Start: 2022-02-28 | End: 2022-03-02 | Stop reason: HOSPADM

## 2022-02-28 RX ORDER — CEFAZOLIN SODIUM IN 0.9 % NACL 2 G/100 ML
2 PLASTIC BAG, INJECTION (ML) INTRAVENOUS ONCE
Status: COMPLETED | OUTPATIENT
Start: 2022-02-28 | End: 2022-02-28

## 2022-02-28 RX ORDER — DEXAMETHASONE SODIUM PHOSPHATE 4 MG/ML
INJECTION, SOLUTION INTRA-ARTICULAR; INTRALESIONAL; INTRAMUSCULAR; INTRAVENOUS; SOFT TISSUE PRN
Status: DISCONTINUED | OUTPATIENT
Start: 2022-02-28 | End: 2022-02-28 | Stop reason: SURG

## 2022-02-28 RX ORDER — DULOXETIN HYDROCHLORIDE 30 MG/1
30 CAPSULE, DELAYED RELEASE ORAL DAILY
Status: DISCONTINUED | OUTPATIENT
Start: 2022-02-28 | End: 2022-02-28

## 2022-02-28 RX ORDER — HYDROMORPHONE HYDROCHLORIDE 1 MG/ML
0.2 INJECTION, SOLUTION INTRAMUSCULAR; INTRAVENOUS; SUBCUTANEOUS
Status: DISCONTINUED | OUTPATIENT
Start: 2022-02-28 | End: 2022-02-28 | Stop reason: HOSPADM

## 2022-02-28 RX ORDER — IBUPROFEN 400 MG/1
800 TABLET ORAL 3 TIMES DAILY PRN
Status: DISCONTINUED | OUTPATIENT
Start: 2022-03-03 | End: 2022-03-02 | Stop reason: HOSPADM

## 2022-02-28 RX ORDER — SODIUM CHLORIDE, SODIUM LACTATE, POTASSIUM CHLORIDE, CALCIUM CHLORIDE 600; 310; 30; 20 MG/100ML; MG/100ML; MG/100ML; MG/100ML
INJECTION, SOLUTION INTRAVENOUS CONTINUOUS
Status: ACTIVE | OUTPATIENT
Start: 2022-02-28 | End: 2022-02-28

## 2022-02-28 RX ORDER — GABAPENTIN 300 MG/1
300 CAPSULE ORAL 3 TIMES DAILY
Status: DISCONTINUED | OUTPATIENT
Start: 2022-02-28 | End: 2022-03-02 | Stop reason: HOSPADM

## 2022-02-28 RX ORDER — OXYCODONE HYDROCHLORIDE 10 MG/1
10 TABLET ORAL
Status: DISCONTINUED | OUTPATIENT
Start: 2022-02-28 | End: 2022-03-02 | Stop reason: HOSPADM

## 2022-02-28 RX ORDER — EPINEPHRINE 1 MG/ML(1)
AMPUL (ML) INJECTION
Status: DISCONTINUED | OUTPATIENT
Start: 2022-02-28 | End: 2022-02-28 | Stop reason: HOSPADM

## 2022-02-28 RX ORDER — ROPIVACAINE HYDROCHLORIDE 5 MG/ML
INJECTION, SOLUTION EPIDURAL; INFILTRATION; PERINEURAL
Status: DISCONTINUED | OUTPATIENT
Start: 2022-02-28 | End: 2022-02-28 | Stop reason: HOSPADM

## 2022-02-28 RX ORDER — BISACODYL 10 MG
10 SUPPOSITORY, RECTAL RECTAL
Status: DISCONTINUED | OUTPATIENT
Start: 2022-02-28 | End: 2022-03-02 | Stop reason: HOSPADM

## 2022-02-28 RX ORDER — DOCUSATE SODIUM 100 MG/1
100 CAPSULE, LIQUID FILLED ORAL 2 TIMES DAILY
Status: DISCONTINUED | OUTPATIENT
Start: 2022-02-28 | End: 2022-03-02 | Stop reason: HOSPADM

## 2022-02-28 RX ORDER — SODIUM CHLORIDE 9 MG/ML
INJECTION, SOLUTION INTRAMUSCULAR; INTRAVENOUS; SUBCUTANEOUS
Status: DISCONTINUED | OUTPATIENT
Start: 2022-02-28 | End: 2022-02-28 | Stop reason: HOSPADM

## 2022-02-28 RX ORDER — CEFAZOLIN SODIUM IN 0.9 % NACL 2 G/100 ML
2 PLASTIC BAG, INJECTION (ML) INTRAVENOUS ONCE
Status: DISCONTINUED | OUTPATIENT
Start: 2022-02-28 | End: 2022-02-28 | Stop reason: HOSPADM

## 2022-02-28 RX ADMIN — HYDROMORPHONE HYDROCHLORIDE 0.5 MG: 2 INJECTION INTRAMUSCULAR; INTRAVENOUS; SUBCUTANEOUS at 12:24

## 2022-02-28 RX ADMIN — FENTANYL CITRATE 50 MCG: 50 INJECTION, SOLUTION INTRAMUSCULAR; INTRAVENOUS at 14:16

## 2022-02-28 RX ADMIN — KETOROLAC TROMETHAMINE 15 MG: 30 INJECTION, SOLUTION INTRAMUSCULAR at 23:48

## 2022-02-28 RX ADMIN — DEXAMETHASONE SODIUM PHOSPHATE 8 MG: 4 INJECTION, SOLUTION INTRAMUSCULAR; INTRAVENOUS at 12:14

## 2022-02-28 RX ADMIN — LIDOCAINE HYDROCHLORIDE 0.5 ML: 10 INJECTION, SOLUTION EPIDURAL; INFILTRATION; INTRACAUDAL; PERINEURAL at 11:43

## 2022-02-28 RX ADMIN — SENNOSIDES AND DOCUSATE SODIUM 1 TABLET: 50; 8.6 TABLET ORAL at 20:37

## 2022-02-28 RX ADMIN — DEXTROSE MONOHYDRATE, SODIUM CHLORIDE, AND POTASSIUM CHLORIDE: 50; 4.5; 1.49 INJECTION, SOLUTION INTRAVENOUS at 17:38

## 2022-02-28 RX ADMIN — DOCUSATE SODIUM 100 MG: 100 CAPSULE, LIQUID FILLED ORAL at 17:37

## 2022-02-28 RX ADMIN — HYDROMORPHONE HYDROCHLORIDE 0.5 MG: 2 INJECTION INTRAMUSCULAR; INTRAVENOUS; SUBCUTANEOUS at 12:14

## 2022-02-28 RX ADMIN — HYDROMORPHONE HYDROCHLORIDE 0.4 MG: 1 INJECTION, SOLUTION INTRAMUSCULAR; INTRAVENOUS; SUBCUTANEOUS at 14:08

## 2022-02-28 RX ADMIN — SODIUM CHLORIDE 2 G: 9 INJECTION, SOLUTION INTRAVENOUS at 17:38

## 2022-02-28 RX ADMIN — HYDRALAZINE HYDROCHLORIDE 10 MG: 20 INJECTION INTRAMUSCULAR; INTRAVENOUS at 12:34

## 2022-02-28 RX ADMIN — HYDROMORPHONE HYDROCHLORIDE 0.5 MG: 2 INJECTION INTRAMUSCULAR; INTRAVENOUS; SUBCUTANEOUS at 12:35

## 2022-02-28 RX ADMIN — CEFAZOLIN 2 G: 330 INJECTION, POWDER, FOR SOLUTION INTRAMUSCULAR; INTRAVENOUS at 12:20

## 2022-02-28 RX ADMIN — TRANEXAMIC ACID 1000 MG: 100 INJECTION, SOLUTION INTRAVENOUS at 13:04

## 2022-02-28 RX ADMIN — CELECOXIB 200 MG: 200 CAPSULE ORAL at 11:43

## 2022-02-28 RX ADMIN — LIDOCAINE HYDROCHLORIDE 50 MG: 20 INJECTION, SOLUTION EPIDURAL; INFILTRATION; INTRACAUDAL; PERINEURAL at 12:14

## 2022-02-28 RX ADMIN — PROPOFOL 150 MG: 10 INJECTION, EMULSION INTRAVENOUS at 12:14

## 2022-02-28 RX ADMIN — GLYCOPYRROLATE 0.2 MG: 0.2 INJECTION, SOLUTION INTRAMUSCULAR; INTRAVENOUS at 12:37

## 2022-02-28 RX ADMIN — HYDROMORPHONE HYDROCHLORIDE 0.2 MG: 1 INJECTION, SOLUTION INTRAMUSCULAR; INTRAVENOUS; SUBCUTANEOUS at 14:19

## 2022-02-28 RX ADMIN — GABAPENTIN 300 MG: 300 CAPSULE ORAL at 17:36

## 2022-02-28 RX ADMIN — ROPIVACAINE HYDROCHLORIDE 20 ML: 5 INJECTION, SOLUTION EPIDURAL; INFILTRATION; PERINEURAL at 12:00

## 2022-02-28 RX ADMIN — FENTANYL CITRATE 50 MCG: 50 INJECTION, SOLUTION INTRAMUSCULAR; INTRAVENOUS at 12:14

## 2022-02-28 RX ADMIN — ACETAMINOPHEN 650 MG: 325 TABLET, FILM COATED ORAL at 17:37

## 2022-02-28 RX ADMIN — FENTANYL CITRATE 50 MCG: 50 INJECTION, SOLUTION INTRAMUSCULAR; INTRAVENOUS at 14:28

## 2022-02-28 RX ADMIN — ACETAMINOPHEN 650 MG: 325 TABLET, FILM COATED ORAL at 23:47

## 2022-02-28 RX ADMIN — FENTANYL CITRATE 50 MCG: 50 INJECTION, SOLUTION INTRAMUSCULAR; INTRAVENOUS at 12:00

## 2022-02-28 RX ADMIN — KETOROLAC TROMETHAMINE 15 MG: 30 INJECTION, SOLUTION INTRAMUSCULAR at 17:37

## 2022-02-28 RX ADMIN — TRANEXAMIC ACID 1000 MG: 100 INJECTION, SOLUTION INTRAVENOUS at 12:18

## 2022-02-28 RX ADMIN — SODIUM CHLORIDE, POTASSIUM CHLORIDE, SODIUM LACTATE AND CALCIUM CHLORIDE: 600; 310; 30; 20 INJECTION, SOLUTION INTRAVENOUS at 11:43

## 2022-02-28 ASSESSMENT — PAIN DESCRIPTION - PAIN TYPE
TYPE: SURGICAL PAIN

## 2022-02-28 ASSESSMENT — LIFESTYLE VARIABLES
TOTAL SCORE: 0
ON A TYPICAL DAY WHEN YOU DRINK ALCOHOL HOW MANY DRINKS DO YOU HAVE: 1
HAVE PEOPLE ANNOYED YOU BY CRITICIZING YOUR DRINKING: NO
ALCOHOL_USE: YES
EVER HAD A DRINK FIRST THING IN THE MORNING TO STEADY YOUR NERVES TO GET RID OF A HANGOVER: NO
CONSUMPTION TOTAL: NEGATIVE
EVER FELT BAD OR GUILTY ABOUT YOUR DRINKING: NO
HAVE YOU EVER FELT YOU SHOULD CUT DOWN ON YOUR DRINKING: NO
HOW MANY TIMES IN THE PAST YEAR HAVE YOU HAD 5 OR MORE DRINKS IN A DAY: 0
TOTAL SCORE: 0
TOTAL SCORE: 0
AVERAGE NUMBER OF DAYS PER WEEK YOU HAVE A DRINK CONTAINING ALCOHOL: 5

## 2022-02-28 ASSESSMENT — FIBROSIS 4 INDEX
FIB4 SCORE: 1.53

## 2022-02-28 NOTE — OR NURSING
1332 Pt arrived from OR s/p right knee replacement . Verified pt identity. Report received. Respirations 14. Surgical dressing to right knee CDI. pedal pulses intact, CMS intact. Pt is  Sedated with oral airway in place. on arrival.  1338 Oral airway removed.   1345 C/O pain rates it at 4/10  1408 Administered dilaudid 0.4 mg   1415 Patient continues to rate pain at a 4/10   Administered fentanyl 50 mcg   1419 Administered  Dilaudid 0.2 mg   1428 rates pain at 4/10.   Administered fentanyl 50 mcg   1445 Report called to Zamzam OCASIO  1500 patient remains unchanged.  1515 resting comfortably  1530 transferred to room 213 via transport.

## 2022-02-28 NOTE — ANESTHESIA PREPROCEDURE EVALUATION
Case: [id number] Date/Time: 02/28/22 1215    Procedure: ARTHROPLASTY, KNEE, TOTAL (Right )    Diagnosis: Arthritis of right knee [M17.11]    Pre-op diagnosis: Arthritis of right knee [M17.11]    Location: [place] [address] / SURGERY [facility]    Surgeons: Pauline Mo M.D.      Brief Past Medical History    Anesthesia: No Problems with Anesthesia  Cards: No History of CHF, CAD, or Stents. > 4 METS.  EKG: LVH noted  ECHO: ECHO in 2019 with normal left and RHF, RVSP 60.   Resp: No Asthma or COPD  GI: No Daily Symptomatic GERD. NPO per guidelines.  Neuro: No History of CVA , TIA, or Seizures.   Endo: No History of Diabetes  Renal: No active problems  MSK: No active problems      Relevant Problems   PULMONARY   (positive) Shortness of breath      CARDIAC   (positive) Hypertension   (positive) Pulmonary hypertension (HCC)      Other   (positive) AVN (avascular necrosis of bone) (HCC)   (positive) Degenerative joint disease of right hip   (positive) Osteomyelitis hip (HCC)   (positive) Status post total replacement of right hip   (positive) Unilateral primary osteoarthritis, right knee       Physical Exam    Airway   Mallampati: II  TM distance: >3 FB  Neck ROM: full       Cardiovascular - normal exam  Rhythm: regular  Rate: normal  (-) murmur     Dental - normal exam           Pulmonary - normal exam  Breath sounds clear to auscultation     Abdominal    Neurological - normal exam                 Anesthesia Plan    ASA 2       Plan - general and peripheral nerve block     Peripheral nerve block will be post-op pain control  Airway plan will be LMA          Induction: intravenous    Postoperative Plan: Postoperative administration of opioids is intended.    Pertinent diagnostic labs and testing reviewed    Informed Consent:    Anesthetic plan and risks discussed with patient.    Use of blood products discussed with: patient whom consented to blood products.

## 2022-02-28 NOTE — ANESTHESIA PROCEDURE NOTES
Peripheral Block    Date/Time: 2/28/2022 12:00 PM  Performed by: Zain Chauhan M.D.  Authorized by: Zain Chauhan M.D.     Patient Location:  Pre-op  Start Time:  2/28/2022 12:00 PM  End Time:  2/28/2022 12:01 PM  Reason for Block: at surgeon's request and post-op pain management ONLY    patient identified, IV checked, site marked, risks and benefits discussed, surgical consent, monitors and equipment checked, pre-op evaluation and timeout performed    Patient Position:  Supine  Prep: ChloraPrep    Monitoring:  Heart rate, continuous pulse ox and cardiac monitor  Block Region:  Lower Extremity  Lower Extremity - Block Type:  Selective FEMORAL nerve block at the Adductor Canal    Laterality:  Right  Procedures: ultrasound guided  Image captured, interpreted and electronically stored.  Local Infiltration:  Lidocaine  Strength:  1 %  Dose:  3 ml  Block Type:  Single-shot  Needle Length:  100mm  Needle Gauge:  21 G  Needle Localization:  Ultrasound guidance  Injection Assessment:  Negative aspiration for heme, no paresthesia on injection, incremental injection and local visualized surrounding nerve on ultrasound  Evidence of intravascular injection: No     US Guided Selective Femoral Nerve Block at Adductor Canal:   US probe placed at mid-thigh level on externally rotated leg and femur identified.  Probe directed medially until Sartorius Muscle (SM), Femoral Artery (FA) and Saphenous Nerve (SN) identified in Adductor Canal (AC).  Needle inserted anterolateral to probe in an in plane approach into a subsartorial perivascular perineural position.  After negative aspiration LA injected with ease and visualized spreading within the AC.

## 2022-02-28 NOTE — OP REPORT
Date of Surgery:  2-28-22  Pre-operative Diagnosis:  right knee arthritis    Post-operative Diagnosis:  right knee arthritis    Procedure:  right knee replacement using Patient Specific Instrumentation    Implants used:  A Smith Nephew Journey II CRtotal knee arthroplasty system with the following components  Femur: 2 right CR Oxinium  Tibia: 2 right  Polyethylene: 11 mm Dished CR  Patella: 29 Oval  Fixation: 2 packages Simplex HV    Surgeon:  Pauline Mo MD    1st Assistant:   SMITHA Gomez    Anesthesiologist:   JOSE RAMON Chauhan MD    EBL:  100 cc    Specimen:  none    Indications for procedure:  This patient is a 86 y.o. female with a long standing history of right knee arthritis. The patient had failed conservative therapy including anti-inflammatory medications, activity modifications, injections, physical therapy and assistive devices. she was indicated for a right total knee replacement. The patient expressed an understanding of the risks of pain, bleeding, infection, dislocation, malalignment, need for further surgery, damage to surrounding structures, neurovascular compromise, blood clots, leg-length discrepancy both apparent and actual, anesthetic complications, and serious medical consequences including but not limited to heart attack, stroke or even death. It was explained that the implants are man-made products and thus subject to possible failure.  The patient expressed an understanding and wished to proceed. Upon assessing the patient's individual medical history, they were felt to be an excellent candidate for patient specific instrumentation. The appropriate imaging was obtained and the preoperative plan was received, reviewed and verified. Specific risks based on the patient's medical history were addressed. A clearance was obtained by the medical physicians and the patient was taken to the operating room on the day of surgery for the above named procedure.     Description of procedure:  The patient was  "met in the pre-operative holding area. The right knee was marked as the appropriate surgical site with indelible ink. They were taken to the operating room where the anesthesia department started a adductor/general for intraoperative and post-operative anesthesia and pain control. The patient was placed on the OR table and a tourniquet was placed high on the operative thigh. All bony prominences were padded appropriately. The operative knee was prepped and draped in a standard sterile surgical fashion. A time out procedure was called to verify the side and site of surgery, the proposed surgical procedure, and the administration of pre-operative antibiotics. After successful completion of the time out procedure, our attention was turned to the right knee.  The tourniquet was inflated after exsanguination with an Esmarch bandage. The knee was flexed and a straight incision was made just medial to the midline. The capsule of the knee was cleared and a medial parapatellar arthrotomy was performed. The patella was subluxated laterally and a medial release was performed to properly visualize the posteromedial aspect of the tibial plateau. The knee was extended, the patellar tendon was protected and the infrapatellar fat pad was excised. A lateral release was performed. The patella was turned on its side and held in place with two penetrating towel clips. A free-hand patellar cut was made, the patella was sized and the appropriate lug holes were drilled. The patella was subluxed, the knee was flexed. The tourniquet was released. Hemostasis was obtained. Each hemijoint was cleared of soft tissue. The ACL was removed. The patient specific cutting block was verified and pinned into place, and our distal femur cut was made. On the preoperative plan, the femur was sized to a size 2 and the appropriate cutting jig was pinned in place. The cuts, sizing, and rotation were verified. The bony cuts were made, we noted a \"grand piano " "sign\" anteriorly.    Now the femur was retracted posteriorly with a lap sponge to protect the intercondylar area. The proximal tibia was exposed, and the PSI guide for the tibia was placed and verified with an extramedullary kosta. The rotation was set. We made our bony cuts and removed the tibial piece en bloc. The laminar spreaders were placed and the hypercurved osteotomes were used to remove posterior femoral osteophytes. The ligaments were balanced in flexion and extension.   The tibia was then sized to a size 2, as was predicted by the preoperative templating, and the trial component was placed in the proper amount of external rotation. A trial femoral component was placed and with the 11 mm dished polyethylene we noted full extension without recurvatum and greater than 130 degrees of flexion with appropriate patellar tracking. At this point we removed the trial components and thoroughly irrigated the wound. Osteophytes were removed. The tourniquet was reinflated.  The real components were opened in a sterile fashion on the back table and then cemented into place sequentially, first the tibia, then the femur, then the patella. The cement was allowed to cure with the trial polyethylene component in place. After the cement had hardened and all excess cement was removed, the knee was taken through a range of motion. Again we noted full extension and greater than 125 degrees of flexion with appropriate varus/valgus stability and patellar tracking. Therefore the real polyethylene was opened and inserted into the tibial tray using the appropriate insertion device. An audible click was heard as it engaged the tibia. Now a dilute betadine solution with 3 grams of tranexamic acid was instilled into the knee for 3 minutes before being pulse-lavaged out. The knee was flexed, the arthrotomy was closed with #1 Quill, followed by 2-0 Vicryl in the deep dermal layer in a buried interrupted fashion. The skin was closed with 3-0 " monocryl in a running subcuticular fashion, and a sterile Negative Pressure dressing was applied. The patient is currently in the operating room awaiting reversal of anesthesia.

## 2022-02-28 NOTE — ANESTHESIA TIME REPORT
Anesthesia Start and Stop Event Times     Date Time Event    2/28/2022 1200 Ready for Procedure     1210 Anesthesia Start     1334 Anesthesia Stop        Responsible Staff  02/28/22    Name Role Begin End    Zain Chauhan M.D. Anesth 1210 1334        Preop Diagnosis (Free Text):  Pre-op Diagnosis     Arthritis of right knee [M17.11]        Preop Diagnosis (Codes):  Diagnosis Information     Diagnosis Code(s): Arthritis of right knee [M17.11]        Premium Reason  Non-Premium    Comments: Block

## 2022-02-28 NOTE — ANESTHESIA PROCEDURE NOTES
Airway    Date/Time: 2/28/2022 12:15 PM  Performed by: Zain Chauhan M.D.  Authorized by: Zain Chauhan M.D.     Location:  OR  Urgency:  Elective  Indications for Airway Management:  Anesthesia      Spontaneous Ventilation: absent    Sedation Level:  Deep  Preoxygenated: Yes    Mask Difficulty Assessment:  0 - not attempted  Final Airway Type:  Supraglottic airway  Final Supraglottic Airway:  Standard LMA    SGA Size:  4  Number of Attempts at Approach:  1

## 2022-03-01 ENCOUNTER — PATIENT OUTREACH (OUTPATIENT)
Dept: HEALTH INFORMATION MANAGEMENT | Facility: OTHER | Age: 87
End: 2022-03-01

## 2022-03-01 LAB
HCT VFR BLD AUTO: 38 % (ref 37–47)
HGB BLD-MCNC: 12.4 G/DL (ref 12–16)

## 2022-03-01 PROCEDURE — 700111 HCHG RX REV CODE 636 W/ 250 OVERRIDE (IP): Performed by: PHYSICIAN ASSISTANT

## 2022-03-01 PROCEDURE — 85018 HEMOGLOBIN: CPT

## 2022-03-01 PROCEDURE — A9270 NON-COVERED ITEM OR SERVICE: HCPCS | Performed by: PHYSICIAN ASSISTANT

## 2022-03-01 PROCEDURE — 96376 TX/PRO/DX INJ SAME DRUG ADON: CPT

## 2022-03-01 PROCEDURE — 700102 HCHG RX REV CODE 250 W/ 637 OVERRIDE(OP): Performed by: PHYSICIAN ASSISTANT

## 2022-03-01 PROCEDURE — 97161 PT EVAL LOW COMPLEX 20 MIN: CPT

## 2022-03-01 PROCEDURE — 36415 COLL VENOUS BLD VENIPUNCTURE: CPT

## 2022-03-01 PROCEDURE — 97110 THERAPEUTIC EXERCISES: CPT

## 2022-03-01 PROCEDURE — 96375 TX/PRO/DX INJ NEW DRUG ADDON: CPT

## 2022-03-01 PROCEDURE — 700102 HCHG RX REV CODE 250 W/ 637 OVERRIDE(OP): Performed by: ORTHOPAEDIC SURGERY

## 2022-03-01 PROCEDURE — A9270 NON-COVERED ITEM OR SERVICE: HCPCS | Performed by: ORTHOPAEDIC SURGERY

## 2022-03-01 PROCEDURE — 85014 HEMATOCRIT: CPT

## 2022-03-01 PROCEDURE — G0378 HOSPITAL OBSERVATION PER HR: HCPCS

## 2022-03-01 RX ORDER — PSEUDOEPHEDRINE HCL 30 MG
100 TABLET ORAL 2 TIMES DAILY
Qty: 60 CAPSULE | Refills: 0 | Status: SHIPPED | OUTPATIENT
Start: 2022-03-01 | End: 2023-06-12

## 2022-03-01 RX ORDER — ASPIRIN 81 MG/1
81 TABLET ORAL 2 TIMES DAILY
Qty: 60 TABLET | Refills: 0 | Status: SHIPPED
Start: 2022-03-01 | End: 2022-06-16

## 2022-03-01 RX ORDER — OXYCODONE HYDROCHLORIDE 5 MG/1
5 TABLET ORAL EVERY 6 HOURS PRN
Qty: 40 TABLET | Refills: 0 | Status: SHIPPED | OUTPATIENT
Start: 2022-03-01 | End: 2022-03-11

## 2022-03-01 RX ORDER — ONDANSETRON 4 MG/1
4 TABLET, ORALLY DISINTEGRATING ORAL EVERY 4 HOURS PRN
Qty: 10 TABLET | Refills: 0 | Status: SHIPPED
Start: 2022-03-01 | End: 2022-06-16

## 2022-03-01 RX ADMIN — DOCUSATE SODIUM 100 MG: 100 CAPSULE, LIQUID FILLED ORAL at 05:40

## 2022-03-01 RX ADMIN — KETOROLAC TROMETHAMINE 15 MG: 30 INJECTION, SOLUTION INTRAMUSCULAR at 05:41

## 2022-03-01 RX ADMIN — GABAPENTIN 300 MG: 300 CAPSULE ORAL at 05:41

## 2022-03-01 RX ADMIN — HYDROCHLOROTHIAZIDE 12.5 MG: 12.5 TABLET ORAL at 05:41

## 2022-03-01 RX ADMIN — ASPIRIN 81 MG: 81 TABLET, COATED ORAL at 05:40

## 2022-03-01 RX ADMIN — ASPIRIN 81 MG: 81 TABLET, COATED ORAL at 18:09

## 2022-03-01 RX ADMIN — OMEPRAZOLE 20 MG: 20 CAPSULE, DELAYED RELEASE ORAL at 05:41

## 2022-03-01 RX ADMIN — GABAPENTIN 300 MG: 300 CAPSULE ORAL at 12:11

## 2022-03-01 RX ADMIN — ACETAMINOPHEN 650 MG: 325 TABLET, FILM COATED ORAL at 23:52

## 2022-03-01 RX ADMIN — KETOROLAC TROMETHAMINE 15 MG: 30 INJECTION, SOLUTION INTRAMUSCULAR at 12:11

## 2022-03-01 RX ADMIN — VALSARTAN 160 MG: 80 TABLET, FILM COATED ORAL at 05:41

## 2022-03-01 RX ADMIN — ACETAMINOPHEN 650 MG: 325 TABLET, FILM COATED ORAL at 18:09

## 2022-03-01 RX ADMIN — KETOROLAC TROMETHAMINE 15 MG: 30 INJECTION, SOLUTION INTRAMUSCULAR at 18:10

## 2022-03-01 RX ADMIN — ACETAMINOPHEN 650 MG: 325 TABLET, FILM COATED ORAL at 05:40

## 2022-03-01 RX ADMIN — SENNOSIDES AND DOCUSATE SODIUM 1 TABLET: 50; 8.6 TABLET ORAL at 21:48

## 2022-03-01 RX ADMIN — ACETAMINOPHEN 650 MG: 325 TABLET, FILM COATED ORAL at 12:10

## 2022-03-01 RX ADMIN — TAMSULOSIN HYDROCHLORIDE 0.4 MG: 0.4 CAPSULE ORAL at 08:37

## 2022-03-01 RX ADMIN — GABAPENTIN 300 MG: 300 CAPSULE ORAL at 18:09

## 2022-03-01 RX ADMIN — DEXAMETHASONE SODIUM PHOSPHATE 10 MG: 4 INJECTION, SOLUTION INTRA-ARTICULAR; INTRALESIONAL; INTRAMUSCULAR; INTRAVENOUS; SOFT TISSUE at 05:42

## 2022-03-01 RX ADMIN — KETOROLAC TROMETHAMINE 15 MG: 30 INJECTION, SOLUTION INTRAMUSCULAR at 23:51

## 2022-03-01 RX ADMIN — DOCUSATE SODIUM 100 MG: 100 CAPSULE, LIQUID FILLED ORAL at 18:09

## 2022-03-01 ASSESSMENT — COGNITIVE AND FUNCTIONAL STATUS - GENERAL
MOVING TO AND FROM BED TO CHAIR: A LITTLE
MOBILITY SCORE: 18
WALKING IN HOSPITAL ROOM: A LITTLE
MOVING TO AND FROM BED TO CHAIR: A LITTLE
DAILY ACTIVITIY SCORE: 19
TURNING FROM BACK TO SIDE WHILE IN FLAT BAD: A LITTLE
DRESSING REGULAR UPPER BODY CLOTHING: A LITTLE
CLIMB 3 TO 5 STEPS WITH RAILING: A LITTLE
TURNING FROM BACK TO SIDE WHILE IN FLAT BAD: A LITTLE
SUGGESTED CMS G CODE MODIFIER DAILY ACTIVITY: CK
HELP NEEDED FOR BATHING: A LITTLE
STANDING UP FROM CHAIR USING ARMS: A LITTLE
TOILETING: A LITTLE
STANDING UP FROM CHAIR USING ARMS: A LITTLE
DRESSING REGULAR LOWER BODY CLOTHING: A LITTLE
SUGGESTED CMS G CODE MODIFIER MOBILITY: CK
MOBILITY SCORE: 18
WALKING IN HOSPITAL ROOM: A LITTLE
CLIMB 3 TO 5 STEPS WITH RAILING: A LITTLE
MOVING FROM LYING ON BACK TO SITTING ON SIDE OF FLAT BED: A LITTLE
MOVING FROM LYING ON BACK TO SITTING ON SIDE OF FLAT BED: A LITTLE
SUGGESTED CMS G CODE MODIFIER MOBILITY: CK
EATING MEALS: A LITTLE

## 2022-03-01 ASSESSMENT — GAIT ASSESSMENTS
ASSISTIVE DEVICE: FRONT WHEEL WALKER
DEVIATION: DECREASED HEEL STRIKE;DECREASED TOE OFF
DISTANCE (FEET): 160
GAIT LEVEL OF ASSIST: STANDBY ASSIST

## 2022-03-01 ASSESSMENT — PAIN DESCRIPTION - PAIN TYPE
TYPE: SURGICAL PAIN

## 2022-03-01 NOTE — DISCHARGE INSTRUCTIONS
Dr. Mo's Discharge Instructions:  The first week after your joint replacement is a time to recover from the surgery. We expect light exercise to keep you active and mobile. Dr. Mo requires a walker or cane for most patients in the first few days following surgery to try to prevent falls or complications.     Most patients are prescribed two medications for pain control: a narcotic such as Oxycodone or Hydrocodone and a milder medication called Tramadol. These can be alternated for pain control, and the priority is to decrease use of the stronger narcotic as soon as tolerated.   Take your pain medication as appropriate to ensure that your pain is not limiting your recovery. You'll be seen in clinic in 7-10 days (this appointment has already been made, call our office if you're unsure of the time). At that time, we'll prescribe your physical therapy to help with the recovery phase.     -Keep dressing clean and dry. Leave dressing in place until follow up. If you have an incisional vac dressing, the battery may die before your first post operative appointment, but you can leave the surgical dressing in place and it will be removed at your clinic visit. The battery of the dressing may die, and the sponge will inflate because it is no longer holding suction. You can still leave the dressing in place and it will be removed at the office. Call the office if you notice drainage from the surgical dressing.    -OK to shower, keep dressing in place. Pat dressing dry, do not rub incision    -Do not soak incision in bath, hot tub or pool    -Follow up with Dr. Mo at regularly scheduled time    -Call [facility] office at [phone] for questions    -Weight bearing status: as tolerated with walker/cane    -Take medication as prescribed for DVT prophylaxis    Discharge Instructions    Discharged to other by medical transportation with self. Discharged via wheelchair, hospital escort: Yes.  Special equipment needed:  Walker    Be sure to schedule a follow-up appointment with your primary care doctor or any specialists as instructed.     Discharge Plan:   Influenza Vaccine Indication: Not indicated: Previously immunized this influenza season and > 8 years of age    I understand that a diet low in cholesterol, fat, and sodium is recommended for good health. Unless I have been given specific instructions below for another diet, I accept this instruction as my diet prescription.   Other diet: Regular    Special Instructions: Discharge instructions for the Orthopedic Patient    Follow up with Primary Care Physician within 2 weeks of discharge to home, regarding:  Review of medications and diagnostic testing.  Surveillance for medical complications.  Workup and treatment of osteoporosis, if appropriate.     -Is this a Hip/Knee/Shoulder Joint Replacement patient? Yes   TOTAL HIP REPLACEMENT, AFTER-CARE GUIDELINES     These instructions provide you with information on caring for yourself and your hip after surgery. Your health care provider may also give you instructions that are more specific. Your treatment was planned and performed according to current medical practices but problems sometimes occur. Call your health care provider if you have any problems or questions.     WHAT TO EXPECT AFTER THE PROCEDURE   After your procedure, your hip will typically be stiff, sore, and bruised. This will improve over time.     Pain   · Follow your home pain management plan as discussed with your nurse and as directed by your provider.   · It is important to follow any scheduled pain medications for maximal pain relief.   · If prescribed opioid medication, the goal is to use opioids only as needed and to wean off prescription pain medicine as soon as possible.   · Ice use for pain control.  · Put ice in a plastic bag.   · Place a towel between your skin and the bag.   · Leave the ice on for 20 minutes, 2-3 times a day at a minimum.   · Most  patients are off the pain pills by 3 weeks. If your pain continues to be severe, follow up with your provider.     Infection   Deep hip joint infections that require removal of the prostheses occur in less than 1.0% of patients. Lesser infections in the skin (cellulites) are more common and much more easily treated.   · Keep the incision as clean and dry as possible.   · Always wash your hands before touching your incision.   · Avoid dental care for 3 months after surgery. Your provider may recommend taking a dose of antibiotics an hour prior to any dental procedure. After 2 years, most providers recommend antibiotics only before an extensive procedure. Ask your provider what they recommend.   · Signs and symptoms of infection include low-grade fever, redness, pain, swelling and drainage from your incision. Notify your provider IMMEDIATELY if you develop ANY of these symptoms.     Post op Disturbances   · Bowel Habits - constipation is extremely common and caused by a combination of anesthesia, lack of mobility, dehydration and pain medicine. Use stool softeners or laxatives if necessary. It is important not to ignore this problem as bowel obstructions can be a serious complication after joint replacement surgery.   · Mood/Energy Level - Many patients experience a lack of energy and endurance for up to 2-3 months after surgery. Some people feel down and can even become depressed. This is likely due to postoperative anemia, change in activity level, lack of sleep, pain medicine and just the emotional reaction to the surgery itself that is a big disruption in a person’s life. This usually passes. If symptoms persist, follow up with your primary care provider.   · Returning to Work - Your provider will give you specific instructions based on your profession. Generally, if you work a sedentary job requiring little standing or walking, most patients may return within 2-6 weeks. Manual labor jobs involving walking,  lifting and standing may take 3-4 months. Your provider’s office can provide a release to part-time or light duty work early on in your recovery and progress you to full duty as able.   · Driving - You can begin driving once cleared by your provider, provided you are no longer taking narcotic pain medication or any other medications that impair driving. Discuss the length of time with your doctor as returning to driving will depend on things such as your vehicle, which hip was replaced (right or left) and if you do or do not have post-operative movement precautions.   · Avoiding falls - A fall during the first few weeks after surgery can damage your new hip and may result in a need for further surgery.  throw rugs and tack down loose carpeting. Be aware of floor hazards such as pets, small objects or uneven surfaces. Notify your provider of any falls.   · Airport Metal Detectors - The sensitivity of metal detectors varies and it is likely that your prosthesis will cause an alarm. Inform the  of your artificial joint.     Diet   · Resume your normal diet as tolerated.   · It is important to achieve a healthy nutritional status by eating a well-balanced diet on a regular basis.   · Your provider may recommend that you take iron and vitamin supplements.   · Continue to drink plenty of fluids.     Shower/Bathing   · You may shower as soon as you get home from the hospital unless otherwise instructed.   · Keep your incision out of water to prevent infection. To keep the incision dry when showering, cover it with a plastic bag or plastic wrap. If your bandage is waterproof, this may not be necessary.   · Pat incision dry if it gets wet. Do not rub. Notify your provider.   · Do not submerge in a bath until cleared by your provider. Your staples must be out and the incision completely healed.     Dressing Changes: Only change your dressing if directed by your provider.   · Wash hands.   · Open all  dressing change materials.   · Remove old dressing and discard.   · Inspect incision for signs of irritation or infection including redness, increase in clear drainage, yellow/green drainage, odor and surrounding skin hot to touch. Notify your provider if present.   ·  the new dressing by one corner and lay over the incision. Be careful not to touch the inside of the dressing that will lay over the incision.   · Secure in place as instructed.     Swelling/Bruising   · Swelling is normal after hip replacement and can involve the thigh, knee, calf and foot.   · Swelling can last from 3-6 months.   · To reduce swelling, elevate your leg higher than your heart while reclining. The first week you are home you should elevate your leg an equal amount of time as you are active.   · The swelling is usually worse after you go home since you are upright for longer periods of time.   · Bruising often does not appear until after you arrive home and can be quite dramatic- appearing purple, black, or green. Bruising is typically not concerning and will subside without any treatment.     Blood Clot Prevention   Your treatment plan includes multiple preventative measures to decrease the risk of blood clots in the legs (DVTs) and the less common, but serious, clots that travel to the lungs (pulmonary emboli). Most patients are at standard risk for them, but people who are at higher risk include those who have had previous clots, a family history of clotting, smoking, diabetes, obesity, advanced age, use estrogen and/or live a sedentary lifestyle.     · Signs of blood clots in legs include - Swelling in thigh, calf or ankle that does not go down with elevation. Pain, heat and tenderness in calf, back of calf or groin area. NOTE: blood clots can occur in either leg.   · Signs of blood clots in lungs include - Sudden increased shortness of breath, sudden onset of chest pain, and localized chest pain with coughing.   · If you  experience any of the above symptoms, notify your provider and seek medical attention immediately.   · You received anticoagulant therapy (blood thinners) in the hospital. Continue the prescribed blood-thinning medication at home, as directed by your provider.   · Your risk for developing a clot continues for up to 2-3 months after surgery. Avoid prolonged sitting and dehydration (long air trips and car trips). If you do take a trip during this time, please get up, move around every 1-1.5 hours, and discuss all travel plans with your provider.     Activity   Once you get home, stay active. The key is not to overdo it. While you can expect some good days and some bad days, you should notice a gradual improvement over time and a gradual increase in endurance over the next 6 to 12 months.     · Weight Bearing - You can begin to bear weight as tolerated unless otherwise directed by your provider. Use a walker, crutches or cane to assist with walking until you can walk smoothly (minimal or no limp) without assistance.   · Physical Precautions - To assure proper recovery and tissue healing, you may be asked to take special precautions when moving (sitting, bending or sleeping) - usually for the first 6 weeks after surgery. Precautions vary from patient to patient depending on surgical approach used by your provider. Follow any specific precautions provided by your provider and physical therapist until cleared by your provider.   · Sitting - Early on it is easier to get up from a tall chair or a chair with arms. The physical therapist will show you how to sit and stand from a chair keeping your affected leg out in front of you. Get up and move around on a regular basis--at least once every hour.   · Walking - Walk as much as you like once your doctor gives permission to proceed, but remember that walking is no substitute for your prescribed exercises.  · Follow the home exercise program prescribed during your hospital stay  as directed by your physical therapist or provider.   · Continued physical therapy may be prescribed after hospital discharge to strengthen your muscles and improve your gait/walking pattern. The decision to have therapy or not after the hospital stay is made by you and your provider prior to surgery or at your follow up appointment.   · Swimming is an excellent low impact activity; you can begin as soon as the wound is healed and your provider clears you. Using a pair of training fins may make swimming a more enjoyable and effective exercise.   · Sexual activity - Your provider can tell you when it is safe to resume sexual activity.   · Other activities - Low impact activities are preferred. If you have specific questions, consult your provider.     When to Call the Doctor   Call the provider if you experience:   · Fever over 100.5° F   · Increased pain, drainage, redness, odor or heat around the incision area   · Shaking chills   · Increased knee pain with activity and rest   · Increased pain in calf, tenderness or redness above or below the knee   · Increased swelling of calf, ankle, foot   · Sudden increased shortness of breath, sudden onset of chest pain, localized chest pain with coughing   · Incision opening   Or, if there are any questions or concerns about medications or care.     Infection statistic resource:   [email or web address]-[email or web address]-microbiology-[email or web address]-[email or web address]-diagnosis     -Is this patient being discharged with medication to prevent blood clots?  Yes, Aspirin Aspirin, ASA oral tablets  What is this medicine?  ASPIRIN (AS pir in) is a pain reliever. It is used to treat mild pain and fever. This medicine is also used as directed by a doctor to prevent and to treat heart attacks, to prevent strokes and blood clots, and to treat arthritis or inflammation.  This medicine may be used for other purposes; ask your health care provider or pharmacist  if you have questions.  COMMON BRAND NAME(S): Aspir-Low, Aspir-[place], Aspirtab, John Advanced Aspirin, John Aspirin, John Aspirin Extra Strength, John Aspirin Plus, John Extra Strength, John Extra Strength Plus, John Genuine Aspirin, John Womens Aspirin, Bufferin, Bufferin Extra Strength, Bufferin Low Dose  What should I tell my health care provider before I take this medicine?  They need to know if you have any of these conditions:  · anemia  · asthma  · bleeding problems  · child with chickenpox, the flu, or other viral infection  · diabetes  · gout  · if you frequently drink alcohol containing drinks  · kidney disease  · liver disease  · low level of vitamin K  · lupus  · smoke tobacco  · stomach ulcers or other problems  · an unusual or allergic reaction to aspirin, tartrazine dye, other medicines, dyes, or preservatives  · pregnant or trying to get pregnant  · breast-feeding  How should I use this medicine?  Take this medicine by mouth with a glass of water. Follow the directions on the package or prescription label. You can take this medicine with or without food. If it upsets your stomach, take it with food. Do not take your medicine more often than directed.  Talk to your pediatrician regarding the use of this medicine in children. While this drug may be prescribed for children as young as 12 years of age for selected conditions, precautions do apply. Children and teenagers should not use this medicine to treat chicken pox or flu symptoms unless directed by a doctor.  Patients over 65 years old may have a stronger reaction and need a smaller dose.  Overdosage: If you think you have taken too much of this medicine contact a poison control center or emergency room at once.  NOTE: This medicine is only for you. Do not share this medicine with others.  What if I miss a dose?  If you are taking this medicine on a regular schedule and miss a dose, take it as soon as you can. If it is almost time for your  next dose, take only that dose. Do not take double or extra doses.  What may interact with this medicine?  Do not take this medicine with any of the following medications:  · cidofovir  · ketorolac  · probenecid  This medicine may also interact with the following medications:  · alcohol  · alendronate  · bismuth subsalicylate  · flavocoxid  · herbal supplements like feverfew, garlic, luis armando, ginkgo biloba, horse chestnut  · medicines for diabetes or glaucoma like acetazolamide, methazolamide  · medicines for gout  · medicines that treat or prevent blood clots like enoxaparin, heparin, ticlopidine, warfarin  · other aspirin and aspirin-like medicines  · NSAIDs, medicines for pain and inflammation, like ibuprofen or naproxen  · pemetrexed  · sulfinpyrazone  · varicella live vaccine  This list may not describe all possible interactions. Give your health care provider a list of all the medicines, herbs, non-prescription drugs, or dietary supplements you use. Also tell them if you smoke, drink alcohol, or use illegal drugs. Some items may interact with your medicine.  What should I watch for while using this medicine?  If you are treating yourself for pain, tell your doctor or health care professional if the pain lasts more than 10 days, if it gets worse, or if there is a new or different kind of pain. Tell your doctor if you see redness or swelling. Also, check with your doctor if you have a fever that lasts for more than 3 days. Only take this medicine to prevent heart attacks or blood clotting if prescribed by your doctor or health care professional.  Do not take aspirin or aspirin-like medicines with this medicine. Too much aspirin can be dangerous. Always read the labels carefully.  This medicine can irritate your stomach or cause bleeding problems. Do not smoke cigarettes or drink alcohol while taking this medicine. Do not lie down for 30 minutes after taking this medicine to prevent irritation to your throat.  If  you are scheduled for any medical or dental procedure, tell your healthcare provider that you are taking this medicine. You may need to stop taking this medicine before the procedure.  This medicine may be used to treat migraines. If you take migraine medicines for 10 or more days a month, your migraines may get worse. Keep a diary of headache days and medicine use. Contact your healthcare professional if your migraine attacks occur more frequently.  What side effects may I notice from receiving this medicine?  Side effects that you should report to your doctor or health care professional as soon as possible:  · allergic reactions like skin rash, itching or hives, swelling of the face, lips, or tongue  · breathing problems  · changes in hearing, ringing in the ears  · confusion  · general ill feeling or flu-like symptoms  · pain on swallowing  · redness, blistering, peeling or loosening of the skin, including inside the mouth or nose  · signs and symptoms of bleeding such as bloody or black, tarry stools; red or dark-brown urine; spitting up blood or brown material that looks like coffee grounds; red spots on the skin; unusual bruising or bleeding from the eye, gums, or nose  · trouble passing urine or change in the amount of urine  · unusually weak or tired  · yellowing of the eyes or skin  Side effects that usually do not require medical attention (report to your doctor or health care professional if they continue or are bothersome):  · diarrhea or constipation  · headache  · nausea, vomiting  · stomach gas, heartburn  This list may not describe all possible side effects. Call your doctor for medical advice about side effects. You may report side effects to FDA at 5-378-ECS-3117.  Where should I keep my medicine?  Keep out of the reach of children.  Store at room temperature between 15 and 30 degrees C (59 and 86 degrees F). Protect from heat and moisture. Do not use this medicine if it has a strong vinegar smell.  Throw away any unused medicine after the expiration date.  NOTE: This sheet is a summary. It may not cover all possible information. If you have questions about this medicine, talk to your doctor, pharmacist, or health care provider.  © 2020 [employer] (2018-01-30 10:42:13)      · Is patient discharged on Warfarin / Coumadin?   No     Depression / Suicide Risk    As [facility] are [facility], it is important to learn how to keep safe from harming yourself.    Recognize the warning signs:  · Abrupt changes in personality, positive or negative- including increase in energy   · Giving away possessions  · Change in eating patterns- significant weight changes-  positive or negative  · Change in sleeping patterns- unable to sleep or sleeping all the time   · Unwillingness or inability to communicate  · Depression  · Unusual sadness, discouragement and loneliness  · Talk of wanting to die  · Neglect of personal appearance   · Rebelliousness- reckless behavior  · Withdrawal from people/activities they love  · Confusion- inability to concentrate     If you or a loved one observes any of these behaviors or has concerns about self-harm, here's what you can do:  · Talk about it- your feelings and reasons for harming yourself  · Remove any means that you might use to hurt yourself (examples: pills, rope, extension cords, firearm)  · Get professional help from the community (Mental Health, Substance Abuse, psychological counseling)  · Do not be alone:Call your Safe Contact- someone whom you trust who will be there for you.  · Call your local CRISIS HOTLINE 310-1562 or 953-599-2615  · Call your local Children's Mobile Crisis Response Team [place] (152) 535-9797 or www.[employer]  · Call the toll free National Suicide Prevention Hotlines   · National Suicide Prevention Lifeline 317-746-GPOO (2360)  · National Hope Line Network 800-SUICIDE (960-8380)

## 2022-03-01 NOTE — PROGRESS NOTES
Received report from AMARJIT Lopez.  Assumed Pt. Care  Pt. A&Ox4  No sign of cardiac and respiratory distress.  Pain is 3/10 dull pain on her right knee. Decline any pain intervention at this time. She said she is comfortable.  Pt. Is sitting up in bed eating breakfast.  Bed at lowest position.  Bed alarm is on. Call light and personal belonging within reach. Continue to monitor.    Pending PT/OT Evaluation. Plan discharge to SNF.

## 2022-03-01 NOTE — PROGRESS NOTES
4 Eyes Skin Assessment Completed by Jessica RN and Mateo RN.    Head WDL  Ears WDL  Nose WDL  Mouth WDL  Neck WDL  Breast/Chest WDL  Shoulder Blades WDL  Spine WDL  (R) Arm/Elbow/Hand Bruising  (L) Arm/Elbow/Hand WDL  Abdomen WDL  Groin WDL  Scrotum/Coccyx/Buttocks WDL  (R) Leg Incision  (L) Leg WDL  (R) Heel/Foot/Toe WDL  (L) Heel/Foot/Toe WDL          Devices In Places Pulse Ox, SCD's and Oxy Mask      Interventions In Place Pillows    Possible Skin Injury No    Pictures Uploaded Into Epic N/A  Wound Consult Placed N/A  RN Wound Prevention Protocol Ordered No

## 2022-03-01 NOTE — DISCHARGE SUMMARY
Janessa Cain was admitted on 2/28/2022 for Arthritis of right knee [M17.11]  Arthritis of knee, right [M17.11]  Patient was diagnosed with severe degenerative arthritis in the right knee and underwent a right total knee arthroplasty by Dr. Pauline Mo on the date of admission. Please see dictated operative note for further information.    Hospital course: standard    The patient has done well, with no complications.  Patient denies chest pain, calf pain or shortness of breath.   Pain is well-controlled at present.  Patient is ambulating well with the use of an assistive device, and progressing in physical therapy.   Patient is neurologically and vascularly intact with palpable pedal pulses bilaterally.   Narcotic dosages were chosen by taking into account the the patient's previous history of opoid use and to ensure proper pain control after surgery    Discharge date: 3-1-22    Patient is being discharged to SNF after am physical therapy.     Allergies:  Apap-fd&c red #40 [place]-oxycodone, Percocet [perloxx], Hydrocodone, Oxycodone, and Tramadol       Medication List      START taking these medications      Instructions   aspirin 81 MG EC tablet   Take 1 Tablet by mouth 2 times a day.  Dose: 81 mg     docusate sodium 100 MG Caps   Take 100 mg by mouth 2 times a day.  Dose: 100 mg     ondansetron 4 MG Tbdp  Commonly known as: ZOFRAN ODT   Take 1 Tablet by mouth every four hours as needed for Nausea.  Dose: 4 mg     oxyCODONE immediate-release 5 MG Tabs  Commonly known as: ROXICODONE   Take 1 Tablet by mouth every 6 hours as needed for Severe Pain for up to 10 days.  Dose: 5 mg        CHANGE how you take these medications      Instructions   estradiol 0.5 MG tablet  What changed:   · how much to take  · how to take this  Commonly known as: ESTRACE   TAKE ONE TABLET BY MOUTH DAILY        CONTINUE taking these medications      Instructions   acetaminophen 325 MG Tabs  Commonly known as: Tylenol   Take  650 mg by mouth every four hours as needed.  Dose: 650 mg     amoxicillin-clavulanate 500-125 MG Tabs  Commonly known as: AUGMENTIN   Take 1 Tablet by mouth 2 times a day for 90 days.  Dose: 1 Tablet     DULoxetine 30 MG Cpep  Commonly known as: CYMBALTA   Take 1 Capsule by mouth every day.  Dose: 30 mg     omeprazole 20 MG delayed-release capsule  Commonly known as: PRILOSEC   Take 1 Capsule by mouth every day.  Dose: 20 mg     traZODone 50 MG Tabs  Commonly known as: DESYREL   TAKE ONE TABLET BY MOUTH DAILY AS NEEDED FOR SLEEP     valsartan-hydrochlorothiazide 160-12.5 MG per tablet  Commonly known as: DIOVAN-HCT   Take 1 Tablet by mouth every day.  Dose: 1 Tablet            Discharge Instructions:     Patient is instructed to ambulate and weight bear as tolerated with the use of an assistive device, and to continue physical therapy exercises given during this hospital stay. Strict posterior hip precautions are to be observed for patients who underwent a total hip replacement.   Patient is to ice and elevate the surgical leg regularly, with pillows under the ankle, nothing is to be placed under the knee.   Patient was given detailed wound care instructions, and will leave the Incisional vac or silver dressing on until first post-op visit.   ASA daily for DVT prophylaxis.  Patient is to follow up with Dr. Mo's office in 1-2 weeks.

## 2022-03-01 NOTE — PROGRESS NOTES
Received report from day shift RN. Greeted patient and tended to immediate needs and informed patient I would be back within the next hour. Reviewed pt's chart to include medications due, lab values for the day and upcoming due times, and orders. Medicated per orders and reassessed within time frame; pt able to walk 50ft (back and forth to bathroom) using FWW. Bed in low locked position; with x2 side rails up, non-slip socks in place before ambulating; call bell and personal items within reach of patient. Reminded patient to use call bell before getting out of bed.

## 2022-03-01 NOTE — DISCHARGE PLANNING
Received Choice form at 1000  Agency/Facility Name: [facility]  Referral sent per Choice form @ [phone]

## 2022-03-01 NOTE — ANESTHESIA POSTPROCEDURE EVALUATION
Patient: Janessa Cain    Procedure Summary     Date: 02/28/22 Room / Location:  OR 06 / SURGERY [facility]    Anesthesia Start: 1210 Anesthesia Stop: 1334    Procedure: ARTHROPLASTY, KNEE, TOTAL (Right Knee) Diagnosis:       Arthritis of right knee      (Arthritis of right knee [M17.11])    Surgeons: Pauline Mo M.D. Responsible Provider: Zain Chauhan M.D.    Anesthesia Type: general, peripheral nerve block ASA Status: 2          Final Anesthesia Type: general, peripheral nerve block  Last vitals  BP   Blood Pressure : 135/79    Temp   36.4 °C (97.5 °F)    Pulse   84   Resp   16    SpO2   94 %      Anesthesia Post Evaluation    Patient location during evaluation: PACU  Patient participation: complete - patient participated  Level of consciousness: awake and alert    Airway patency: patent  Anesthetic complications: no  Cardiovascular status: hemodynamically stable  Respiratory status: acceptable  Hydration status: euvolemic    PONV: none          No complications documented.     Nurse Pain Score: 0 (NPRS)

## 2022-03-01 NOTE — DISCHARGE PLANNING
[facility] Transitional Care Coordination    Referral from:  Dr. Mo    Insurance Provider on Facesheet: MCR/TRI    Potential Rehab Diagnosis: TBD    Chart review indicates patient may have on going medical management and may have therapy needs to possibly meet inpatient rehab facility criteria with the goal of returning to community.    D/C support: TBD     Physiatry consultation pended per protocol.     TX pending.  Janessa nettles @ [facility] from 11/12/19-11/21/19 under the care of Dr. Ramirez and D/C'd to a SNF.     Thank you for the referral.

## 2022-03-01 NOTE — PROGRESS NOTES
Patient arrived from PACU admitted to room 213-2 at 1545. Pt is alert and oriented x 4, pain is 4/10 on her right knee, dressing CDI, CMS intact, dorsiflex and plantar flex both feet, cap refill less than 3 seconds, palpable pulses BLE, denies N/V, unlabored breathing, denies SOB, SCD's and polar ice in use, oriented to use of call light and importance of calling for assistance,  bed in lowest position, call light within reach, updated on plan of care.    Patient said that she has a FWW at home already. She also said that she will be staying overnight and wanted to be discharge to a Rehab place. Will communicate with Dr. Mo.    Called Dr. oM's office and page him. There was no call back. Txted Dr. Gomez to inform about patient plan and he texted back that they are aware that patient wanted to be admitted to inpatient rehab.

## 2022-03-01 NOTE — THERAPY
Physical Therapy   Initial Evaluation     Patient Name: Janessa Cain  Age:  86 y.o., Sex:  female  Medical Record #: [id number]  Today's Date: 3/1/2022     Precautions  Precautions: Weight Bearing As Tolerated Right Lower Extremity    Assessment  Patient is 86 y.o. female s/p R TKA POD#1. Pt is able to ambulate safely with FWW.  HEP reviewed with pt, handout issued, pt able toreturn demo all exs. Pt reports her spouse and her are temporarily staying with her dtr while her house gets built. Pt reports spouse can assist as needed, dtr works.  Pt has no further acute PT needs, recommend DC home with outpt PT follow-up.         Plan    Recommend Physical Therapy for Evaluation only     DC Equipment Recommendations: None  Discharge Recommendations: Recommend outpatient physical therapy services to address higher level deficits        03/01/22 1205   Prior Living Situation   Housing / Facility 1 Story House   Steps Into Home 1   Steps In Home 0   Equipment Owned 4-Wheel Walker;Front-Wheel Walker;Bed Side Commode;Reacher;Sock Aid;Single Point Cane   Lives with - Patient's Self Care Capacity Spouse;Adult Children   Comments Pt has supportive spouse and family at home   Prior Level of Functional Mobility   Bed Mobility Independent   Transfer Status Independent   Ambulation Independent   Assistive Devices Used Single Point Cane   Stairs Independent   Cognition    Level of Consciousness Alert   Comments Oriented x4   Passive ROM Lower Body   Passive ROM Lower Body X   Comments 0-85 deg R knee   Active ROM Lower Body    Active ROM Lower Body  X   Comments 0-70 deg R knee   Strength Lower Body   Lower Body Strength  X   Comments R knee NT   Sensation Lower Body   Lower Extremity Sensation   WDL   Balance Assessment   Sitting Balance (Static) Good   Sitting Balance (Dynamic) Fair +   Standing Balance (Static) Fair +   Standing Balance (Dynamic) Fair   Weight Shift Sitting Good   Weight Shift Standing Fair   Comments  stdg with FWW   Gait Analysis   Gait Level Of Assist Standby Assist   Assistive Device Front Wheel Walker   Distance (Feet) 160   # of Times Distance was Traveled 1   Deviation Decreased Heel Strike;Decreased Toe Off   # of Stairs Climbed 1   Level of Assist with Stairs Contact Guard Assist   Weight Bearing Status WBAT R LE   Bed Mobility    Supine to Sit Supervised   Sit to Supine Supervised   Functional Mobility   Sit to Stand Standby Assist   Bed, Chair, Wheelchair Transfer Standby Assist   Transfer Method Stand Step
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
## 2022-03-01 NOTE — PROGRESS NOTES
S:  s/p right TKA  Pain controlled  No N/V  No Chest Pain/SOB  Afebrile  Exam:    NAD A& O x3    RLE: +DF/PF/EHL SILT L4/L5/S1  LLE:  +DF/PF/EHL SILT L4/L5/S1    Plan:  Continue standard plan of care  Weight bearing: as tolerated with walker  DVT prophylaxis: SCD/Teds + ASA  Dispo: patient requesting SNF. Transfer when accepted

## 2022-03-01 NOTE — CARE PLAN
The patient is Stable - Low risk of patient condition declining or worsening    Shift Goals  Clinical Goals: Patient will be able to walk to the bathroom to void  Patient Goals: pain control    Progress made toward(s) clinical / shift goals:  Patient walk to the bathroom to void at the end of the shift using FWW. Less amount of urine output. Patient does not tolerate the ambulation well.      Problem: Pain - Standard  Goal: Alleviation of pain or a reduction in pain to the patient’s comfort goal  Outcome: Progressing     Problem: Knowledge Deficit - Standard  Goal: Patient and family/care givers will demonstrate understanding of plan of care, disease process/condition, diagnostic tests and medications  Outcome: Progressing

## 2022-03-01 NOTE — CARE PLAN
The patient is Stable - Low risk of patient condition declining or worsening    Shift Goals  Clinical Goals: pt will be at a tolerable level (5/10) throughout shift  Patient Goals: pain control and rest    Progress made toward(s) clinical / shift goals:    Problem: Pain - Standard  Goal: Alleviation of pain or a reduction in pain to the patient’s comfort goal  Outcome: Progressing  Flowsheets  Taken 3/1/2022 0359  Non Verbal Scale:   Calm   Sleeping   Unlabored Breathing  Taken 2/28/2022 234  Pain Rating Scale (NPRS): 4  Note: Assessed patient's pain every time entering room; medicated per MAR  in order to reach patient's comfort goal and reassessed within time frame. Will continue to monitor and assess.      Problem: Knowledge Deficit - Standard  Goal: Patient and family/care givers will demonstrate understanding of plan of care, disease process/condition, diagnostic tests and medications  Outcome: Progressing  Note: Updated patient on plan of care. Encouraged to ask questions and voice concerns. Answered all questions regarding care. Agrees with plan of care.      Problem: Post-Operative Knee Replacement  Goal: Patient will demonstrate understanding of knee replacement post-surgical weight bearing restrictions and DME usage during hospital stay and post discharge  Outcome: Progressing  Flowsheets (Taken 3/1/2022 0359)  Weight Bearing Status: Weight Bearing as Tolerated  Weight Bearing Status: Weight Bearing as Tolerated  Note: Pt knows she is weight bearing as tolerated to operative extremity; utilizes the FWW correctly (pt uses one at home). Will continue to monitor  Goal: Patient's neurovascular status will be maintained or improve  Outcome: Progressing  Note: Pt has a moderately strong dorsiflexion/flexion of operative extremity (4/5) +2 pulse; cap refill <3 secs; full sensation, no numbness/tingling to extremity; pain is mild-moderate (5-7/10); limited on pain medications d/t allergies but tolerating current  medications and ice. Will continue to monitor.  Goal: Early mobilization post surgery  Outcome: Met       Patient is not progressing towards the following goals:N/A

## 2022-03-02 VITALS
SYSTOLIC BLOOD PRESSURE: 124 MMHG | RESPIRATION RATE: 16 BRPM | OXYGEN SATURATION: 100 % | HEIGHT: 63 IN | HEART RATE: 85 BPM | WEIGHT: 189.82 LBS | TEMPERATURE: 97.3 F | BODY MASS INDEX: 33.63 KG/M2 | DIASTOLIC BLOOD PRESSURE: 67 MMHG

## 2022-03-02 PROCEDURE — 700111 HCHG RX REV CODE 636 W/ 250 OVERRIDE (IP): Performed by: PHYSICIAN ASSISTANT

## 2022-03-02 PROCEDURE — 97165 OT EVAL LOW COMPLEX 30 MIN: CPT

## 2022-03-02 PROCEDURE — A9270 NON-COVERED ITEM OR SERVICE: HCPCS | Performed by: ORTHOPAEDIC SURGERY

## 2022-03-02 PROCEDURE — G0378 HOSPITAL OBSERVATION PER HR: HCPCS

## 2022-03-02 PROCEDURE — 97535 SELF CARE MNGMENT TRAINING: CPT

## 2022-03-02 PROCEDURE — 700102 HCHG RX REV CODE 250 W/ 637 OVERRIDE(OP): Performed by: ORTHOPAEDIC SURGERY

## 2022-03-02 PROCEDURE — A9270 NON-COVERED ITEM OR SERVICE: HCPCS | Performed by: PHYSICIAN ASSISTANT

## 2022-03-02 PROCEDURE — 96376 TX/PRO/DX INJ SAME DRUG ADON: CPT

## 2022-03-02 PROCEDURE — 700102 HCHG RX REV CODE 250 W/ 637 OVERRIDE(OP): Performed by: PHYSICIAN ASSISTANT

## 2022-03-02 RX ADMIN — VALSARTAN 160 MG: 80 TABLET, FILM COATED ORAL at 05:03

## 2022-03-02 RX ADMIN — GABAPENTIN 300 MG: 300 CAPSULE ORAL at 02:08

## 2022-03-02 RX ADMIN — ACETAMINOPHEN 650 MG: 325 TABLET, FILM COATED ORAL at 11:49

## 2022-03-02 RX ADMIN — TAMSULOSIN HYDROCHLORIDE 0.4 MG: 0.4 CAPSULE ORAL at 08:54

## 2022-03-02 RX ADMIN — HYDROCHLOROTHIAZIDE 12.5 MG: 12.5 TABLET ORAL at 05:03

## 2022-03-02 RX ADMIN — KETOROLAC TROMETHAMINE 15 MG: 30 INJECTION, SOLUTION INTRAMUSCULAR at 11:50

## 2022-03-02 RX ADMIN — ASPIRIN 81 MG: 81 TABLET, COATED ORAL at 05:03

## 2022-03-02 RX ADMIN — OMEPRAZOLE 20 MG: 20 CAPSULE, DELAYED RELEASE ORAL at 05:06

## 2022-03-02 RX ADMIN — DOCUSATE SODIUM 100 MG: 100 CAPSULE, LIQUID FILLED ORAL at 05:06

## 2022-03-02 RX ADMIN — GABAPENTIN 300 MG: 300 CAPSULE ORAL at 11:50

## 2022-03-02 RX ADMIN — KETOROLAC TROMETHAMINE 15 MG: 30 INJECTION, SOLUTION INTRAMUSCULAR at 05:02

## 2022-03-02 RX ADMIN — ACETAMINOPHEN 650 MG: 325 TABLET, FILM COATED ORAL at 05:06

## 2022-03-02 ASSESSMENT — COGNITIVE AND FUNCTIONAL STATUS - GENERAL
SUGGESTED CMS G CODE MODIFIER DAILY ACTIVITY: CJ
DRESSING REGULAR LOWER BODY CLOTHING: A LITTLE
PERSONAL GROOMING: A LITTLE
HELP NEEDED FOR BATHING: A LITTLE
DAILY ACTIVITIY SCORE: 20
TOILETING: A LITTLE

## 2022-03-02 ASSESSMENT — PAIN DESCRIPTION - PAIN TYPE: TYPE: SURGICAL PAIN;NEUROPATHIC PAIN

## 2022-03-02 ASSESSMENT — GAIT ASSESSMENTS: DISTANCE (FEET): 25

## 2022-03-02 ASSESSMENT — ACTIVITIES OF DAILY LIVING (ADL): TOILETING: INDEPENDENT

## 2022-03-02 NOTE — CARE PLAN
Problem: Pain - Standard  Goal: Alleviation of pain or a reduction in pain to the patient’s comfort goal  Outcome: Met     Problem: Knowledge Deficit - Standard  Goal: Patient and family/care givers will demonstrate understanding of plan of care, disease process/condition, diagnostic tests and medications  Outcome: Met     Problem: Post-Operative Knee Replacement  Goal: Patient will demonstrate understanding of knee replacement post-surgical weight bearing restrictions and DME usage during hospital stay and post discharge  Outcome: Met  Goal: Patient's neurovascular status will be maintained or improve  Outcome: Met  Goal: Proper fit of orthotic device will be assessed and maintained  Outcome: Met   The patient is Stable - Low risk of patient condition declining or worsening    Shift Goals  Clinical Goals: wean off oxygen  Patient Goals: rest    Progress made toward(s) clinical / shift goals:  yes    Patient is not progressing towards the following goals:

## 2022-03-02 NOTE — DISCHARGE PLANNING
Agency/Facility Name: [facility]  Spoke To: Sabiha  Outcome: Per sabiha they can take the pt today and will  pt at 1200 via their transport

## 2022-03-02 NOTE — THERAPY
Occupational Therapy     Patient Name: Janessa Cain  Age:  86 y.o., Sex:  female  Medical Record #: [id number]  Today's Date: 3/1/2022    Discussed missed therapy with RN, PT       03/01/22 9408   Interdisciplinary Plan of Care Collaboration   Collaboration Comments OT orders rec'd.  Spoke with PT about pt's current level of mobility and d/c recs.  PT rec's d/c home with outpt therapy.  OT yessi held as pt anticipating d/c home tomorrow instead of today.  Will see in am prior to d/c home for ADL assessment and training in prep for d/c home.

## 2022-03-02 NOTE — CARE PLAN
The patient is Stable - Low risk of patient condition declining or worsening    Shift Goals  Clinical Goals: wean oxygen to RA; keep pain at a tolerable level (4-5/10); rest  Patient Goals: rest comfortably    Progress made toward(s) clinical / shift goals:    Problem: Pain - Standard  Goal: Alleviation of pain or a reduction in pain to the patient’s comfort goal  Flowsheets  Taken 3/1/2022 2343  Non Verbal Scale:   Calm   Unlabored Breathing  Taken 3/1/2022 2000  Pain Rating Scale (NPRS): 4  Note: Assessed patient's pain every time entering room; medicated per MAR  in order to reach patient's comfort goal and reassessed within time frame. Will continue to monitor and assess.      Problem: Knowledge Deficit - Standard  Goal: Patient and family/care givers will demonstrate understanding of plan of care, disease process/condition, diagnostic tests and medications  Note: Updated patient on plan of care. Encouraged to ask questions and voice concerns. Answered all questions regarding care. Agrees with plan of care.      Problem: Post-Operative Knee Replacement  Goal: Patient will demonstrate understanding of knee replacement post-surgical weight bearing restrictions and DME usage during hospital stay and post discharge  Outcome: Progressing  Goal: Patient's neurovascular status will be maintained or improve  Outcome: Progressing       Patient is not progressing towards the following goals:N/A

## 2022-03-02 NOTE — DISCHARGE PLANNING
Janessa is not requiring 2 of 3 disciplines.  TCC will no longer follow.  Please reach out to myself with any interval changes/questions.

## 2022-03-02 NOTE — THERAPY
"Occupational Therapy   Initial Evaluation     Patient Name: Janessa Cain  Age:  86 y.o., Sex:  female  Medical Record #: [id number]  Today's Date: 3/2/2022     Precautions  Precautions: Weight Bearing As Tolerated Right Lower Extremity    Assessment  Patient is 86 y.o. female admit for R TKA.  Pt tolerating simple ADl's but limited by pain.  Unsteady on feet with FWW and needs cues for safety and not abandoning walker.  Pt would benefit from further inpt post acute therapy or Home with HH depending on how much assist family is able to provide.  No further OT needs in the hospital setting, rec mobilize frequently with nursing.     Plan    Recommend Occupational Therapy for Evaluation only     DC Equipment Recommendations: None  Discharge Recommendations: Recommend post-acute placement for additional occupational therapy services prior to discharge home (vs home with HH therapy.  Pt wants to be fully indep, not reliant on family)     Subjective    \"Ughh...  I want to be able to be fully independent.  I need to go to Rehab.\"     Objective       03/02/22 0944   Prior Living Situation   Prior Services Home-Independent   Housing / Facility 1 Story House   Steps Into Home 1   Steps In Home 0   Bathroom Set up Walk In Shower;Shower Chair   Equipment Owned Front-Wheel Walker;4-Wheel Walker;Bed Side Commode;Sock Aid;Reacher   Lives with - Patient's Self Care Capacity Spouse;Adult Children   Comments Spouse supportive and in good health.  They are currently living with daughter who is a Professor at [employer] while they are having a home built.  Spouse is able to provide assist, but pt prefers to be as independent as possible prior to d/c home   Prior Level of ADL Function   Self Feeding Independent   Grooming / Hygiene Independent   Bathing Independent   Dressing Independent   Toileting Independent   Comments used sock aide regularly 2/2 knee pain   Prior Level of IADL Function   Medication Management Independent "   Laundry Independent   Kitchen Mobility Independent   Finances Independent   Home Management Independent   Shopping Independent   Prior Level Of Mobility Independent Without Device in Community   Driving / Transportation Driving Independent   Occupation (Pre-Hospital Vocational) Retired Due To Age   Leisure Interests Travel  (travels to [place] looking for rocks, likes to explore different geographic formations,  Daughter is a professor and often takes them on her exploration trips)   Precautions   Precautions Weight Bearing As Tolerated Right Lower Extremity   Vitals   O2 Delivery Device None - Room Air   Pain 0 - 10 Group   Location Knee   Location Orientation Right   Description Aching;Sore;Sharp   Therapist Pain Assessment 7;During Activity;Nurse Notified  (minimal options for pain 2/2 allergies)   Cognition    Cognition / Consciousness WDL   Level of Consciousness Alert   Comments Oriented x4, pt initially irritated with OT when it was suggested that she get dressed in the event she is discharged home.  At end of session she was told she was accepted to SNF Rehab and was very happy.  She also enjoyed talking about her leisure activities   Active ROM Upper Body   Active ROM Upper Body  WDL   Dominant Hand Right   Strength Upper Body   Upper Body Strength  WDL   Balance Assessment   Sitting Balance (Static) Good   Sitting Balance (Dynamic) Fair +   Standing Balance (Static) Fair -   Standing Balance (Dynamic) Fair -   Weight Shift Sitting Good   Weight Shift Standing Fair   Comments standing with FWW, slightly unsteady with walking melly when stepping on RLE.  Needs cues to keep both hands on the walker   Bed Mobility    Supine to Sit Supervised   Sit to Supine Supervised   ADL Assessment   Eating Independent   Grooming Supervision;Seated   Upper Body Dressing Supervision   Lower Body Dressing Minimal Assist   Toileting Contact Guard Assist   Comments has tools avail at home for LB dressing, unsteady in standing  to hike clothing   How much help from another person does the patient currently need...   Putting on and taking off regular lower body clothing? 3   Bathing (including washing, rinsing, and drying)? 3   Toileting, which includes using a toilet, bedpan, or urinal? 3   Putting on and taking off regular upper body clothing? 4   Taking care of personal grooming such as brushing teeth? 3   Eating meals? 4   6 Clicks Daily Activity Score 20   Functional Mobility   Sit to Stand Standby Assist   Bed, Chair, Wheelchair Transfer Contact Guard Assist   Toilet Transfers Contact Guard Assist   Transfer Method Stand Step   Mobility to BR and back to EOB, slightly unsteady today, lets go of walker and tries to grab furniture   Distance (Feet) 25   # of Times Distance was Traveled 2   Comments limited by pain, using FWW   Visual Perception   Visual Perception  WDL   Edema / Skin Assessment   Edema / Skin  WDL   Comments ace wrap and CANDELARIA dressing intact   Activity Tolerance   Sitting in Chair toilet 8 min   Sitting Edge of Bed 20 min   Standing 5 min, 2 min   Patient / Family Goals   Patient / Family Goal #1 go to rehab to get fully better before I go home   Education Group   Education Provided Role of Occupational Therapist;Activities of Daily Living;Adaptive Equipment;Home Safety   Role of Occupational Therapist Patient Response Patient;Acceptance;Explanation;Verbal Demonstration   Home Safety Patient Response Patient;Acceptance;Explanation;Verbal Demonstration   ADL Patient Response Patient;Acceptance;Explanation;Verbal Demonstration   Adaptive Equipment Patient Response Patient;Acceptance;Explanation;Verbal Demonstration   Additional Comments Pt educated on safety post TKA, CANDELARIA dressing mgmt, approp use of AE

## 2022-03-02 NOTE — PROGRESS NOTES
"Orthopaedic Progress Note  POD2 s/p R TKA    Patient seen and examined this AM.  Patient reports doing well. Patient denies chest pain, calf pain, shortness of breath.  Pain is currently under control. Patient is ambulating well with the use of an assistive device.      Examination:   /64   Pulse 82   Temp 36.1 °C (97 °F) (Oral)   Resp 16   Ht 1.61 m (5' 3.39\")   Wt 86.1 kg (189 lb 13.1 oz)   SpO2 95%     Recent Labs     03/01/22  0637   HEMOGLOBIN 12.4   HEMATOCRIT 38.0             Intake/Output Summary (Last 24 hours) at 3/2/2022 0958  Last data filed at 3/2/2022 0753  Gross per 24 hour   Intake 240 ml   Output --   Net 240 ml       No acute distress  Breathing unlabored  Dressing clean dry and intact, holding suction  Motor: fires ehl, tibialis anterior, gastrocnemius 5/5 strength  Sensation intact to light touch sural, saphenous, superficial peroneal, deep peroneal, tibial distributions  Toes warm and well perfused, cap refill <2      A/P: POD#2 s/p the above    Continue standard plan of care  Weight bearing: as tolerated with walker  DVT prophylaxis: SCD/Teds + ASA  Dispo: patient requesting SNF. Transfer when accepted    Yinka Claire M.D.        "

## 2022-03-03 PROBLEM — Z96.651 S/P TOTAL KNEE ARTHROPLASTY, RIGHT: Status: ACTIVE | Noted: 2022-03-03

## 2022-03-04 PROBLEM — G89.18 POST-OP PAIN: Status: ACTIVE | Noted: 2022-03-04

## 2022-05-04 RX ORDER — VALSARTAN AND HYDROCHLOROTHIAZIDE 160; 12.5 MG/1; MG/1
TABLET, FILM COATED ORAL
Qty: 90 TABLET | Refills: 1 | Status: SHIPPED | OUTPATIENT
Start: 2022-05-04 | End: 2023-03-03 | Stop reason: SDUPTHER

## 2022-05-04 NOTE — TELEPHONE ENCOUNTER
Last seen: 1/17/2022 by Dr. West  Next appt: None    Was the patient seen in the last year in this department? Yes   Does patient have an active prescription for medications requested? No   Received Request Via: Pharmacy

## 2022-06-16 ENCOUNTER — HOSPITAL ENCOUNTER (OUTPATIENT)
Facility: MEDICAL CENTER | Age: 87
End: 2022-06-16
Attending: PHYSICIAN ASSISTANT
Payer: MEDICARE

## 2022-06-16 ENCOUNTER — OFFICE VISIT (OUTPATIENT)
Dept: URGENT CARE | Facility: PHYSICIAN GROUP | Age: 87
End: 2022-06-16
Payer: MEDICARE

## 2022-06-16 VITALS
WEIGHT: 176 LBS | RESPIRATION RATE: 20 BRPM | DIASTOLIC BLOOD PRESSURE: 82 MMHG | HEIGHT: 65 IN | BODY MASS INDEX: 29.32 KG/M2 | SYSTOLIC BLOOD PRESSURE: 138 MMHG | OXYGEN SATURATION: 93 % | HEART RATE: 86 BPM | TEMPERATURE: 97.8 F

## 2022-06-16 DIAGNOSIS — N30.00 ACUTE CYSTITIS WITHOUT HEMATURIA: ICD-10-CM

## 2022-06-16 DIAGNOSIS — R30.0 DYSURIA: ICD-10-CM

## 2022-06-16 LAB
APPEARANCE UR: CLEAR
BILIRUB UR STRIP-MCNC: NEGATIVE MG/DL
COLOR UR AUTO: NORMAL
GLUCOSE UR STRIP.AUTO-MCNC: NEGATIVE MG/DL
KETONES UR STRIP.AUTO-MCNC: NEGATIVE MG/DL
LEUKOCYTE ESTERASE UR QL STRIP.AUTO: NORMAL
NITRITE UR QL STRIP.AUTO: POSITIVE
PH UR STRIP.AUTO: 5.5 [PH] (ref 5–8)
PROT UR QL STRIP: NEGATIVE MG/DL
RBC UR QL AUTO: NEGATIVE
SP GR UR STRIP.AUTO: 1.01
UROBILINOGEN UR STRIP-MCNC: 0.2 MG/DL

## 2022-06-16 PROCEDURE — 81002 URINALYSIS NONAUTO W/O SCOPE: CPT | Performed by: PHYSICIAN ASSISTANT

## 2022-06-16 PROCEDURE — 99213 OFFICE O/P EST LOW 20 MIN: CPT | Performed by: PHYSICIAN ASSISTANT

## 2022-06-16 PROCEDURE — 87086 URINE CULTURE/COLONY COUNT: CPT

## 2022-06-16 RX ORDER — CEFDINIR 300 MG/1
300 CAPSULE ORAL EVERY 12 HOURS
Qty: 10 CAPSULE | Refills: 0 | Status: SHIPPED | OUTPATIENT
Start: 2022-06-16 | End: 2022-06-21

## 2022-06-16 ASSESSMENT — FIBROSIS 4 INDEX: FIB4 SCORE: 1.55

## 2022-06-16 ASSESSMENT — ENCOUNTER SYMPTOMS
CHILLS: 0
FEVER: 0
FLANK PAIN: 0

## 2022-06-18 LAB
BACTERIA UR CULT: NORMAL
SIGNIFICANT IND 70042: NORMAL
SITE SITE: NORMAL
SOURCE SOURCE: NORMAL

## 2022-06-29 ENCOUNTER — OFFICE VISIT (OUTPATIENT)
Dept: INTERNAL MEDICINE | Facility: OTHER | Age: 87
End: 2022-06-29
Payer: MEDICARE

## 2022-06-29 VITALS
SYSTOLIC BLOOD PRESSURE: 172 MMHG | HEIGHT: 65 IN | WEIGHT: 186 LBS | OXYGEN SATURATION: 92 % | TEMPERATURE: 98 F | DIASTOLIC BLOOD PRESSURE: 121 MMHG | BODY MASS INDEX: 30.99 KG/M2 | HEART RATE: 86 BPM

## 2022-06-29 DIAGNOSIS — I10 PRIMARY HYPERTENSION: ICD-10-CM

## 2022-06-29 DIAGNOSIS — R05.9 COUGH: ICD-10-CM

## 2022-06-29 PROCEDURE — 99213 OFFICE O/P EST LOW 20 MIN: CPT | Mod: GE | Performed by: STUDENT IN AN ORGANIZED HEALTH CARE EDUCATION/TRAINING PROGRAM

## 2022-06-29 RX ORDER — AMLODIPINE BESYLATE 10 MG/1
10 TABLET ORAL DAILY
Qty: 30 TABLET | Refills: 0 | Status: CANCELLED | OUTPATIENT
Start: 2022-06-29

## 2022-06-29 ASSESSMENT — FIBROSIS 4 INDEX: FIB4 SCORE: 1.55

## 2022-06-29 ASSESSMENT — ENCOUNTER SYMPTOMS
COUGH: 1
FEVER: 0
SPUTUM PRODUCTION: 0
WHEEZING: 0
HEMOPTYSIS: 0
CHILLS: 0
SHORTNESS OF BREATH: 0

## 2022-06-29 NOTE — PROGRESS NOTES
"Subjective     Janessa Celso Cain is a 87 y.o. female who presents with Cough (X 3-4 weeks )            HPI  Per patient noted to have dry cough w/o postnasal drip and allergy in the last 3-4 weeks. She also notes her cough is non productive that bothers her in the middle of the night. She stopped taking her valsatan-HCTZ combination pill because of the cough. Otherwise, she is using omeprazole and has no hx of childhood asthma. No use of tobacco quit 38 years ago. No fever, no chills and no malaise, no sick contact. vacinated with [email or web address].     Her clinic BP was 172/121 but on repeat her blood pressure was 150/100. She has no new symptoms. Advised pt to take again her blood pressure medication.       Review of Systems   Constitutional: Negative for chills and fever.   Respiratory: Positive for cough. Negative for hemoptysis, sputum production, shortness of breath and wheezing.    Cardiovascular: Negative for chest pain.              Objective     BP (!) 172/121 (BP Location: Right arm, Patient Position: Sitting, BP Cuff Size: Adult)   Pulse 86   Temp 36.7 °C (98 °F) (Temporal)   Ht 1.645 m (5' 4.75\")   Wt 84.4 kg (186 lb)   SpO2 92%   BMI 31.19 kg/m²      Physical Exam  Constitutional:       General: She is not in acute distress.     Appearance: Normal appearance. She is obese. She is not ill-appearing, toxic-appearing or diaphoretic.   HENT:      Head: Normocephalic.   Eyes:      General: No scleral icterus.  Cardiovascular:      Rate and Rhythm: Normal rate and regular rhythm.      Pulses: Normal pulses.   Pulmonary:      Effort: Pulmonary effort is normal.      Breath sounds: No wheezing or rales.   Abdominal:      General: Abdomen is flat.   Musculoskeletal:      Right lower leg: No edema.      Left lower leg: No edema.   Neurological:      General: No focal deficit present.      Mental Status: She is alert and oriented to person, place, and time.      Cranial Nerves: No cranial nerve deficit. "   Psychiatric:         Mood and Affect: Mood normal.         Behavior: Behavior normal.         Thought Content: Thought content normal.         Judgment: Judgment normal.                  Assessment & Plan   In summary, Payton is a 88 y/o F with essential HTN on Valsatan-HCTZ who comes in here for 3-4 week cough likely will try to rule out infection and check mediastinium via CXR. She was advised to have  Humidifier at home to improve her cough. She will continue to use her blood pressure medications and will check her again in 1 week      1. Primary hypertension  Pt stopped her Valsatran 160mg and HCTZ 12.5 mg qdaily due to her cough. No symptoms today. Likely this is hypertensive urgency    Plan  -ordered BMP  -ordered UA  -ordered microalbumin/crea ratio   -continue Valsatran 160 mg qdaily  -continue HCTZ 12.5 mg qdaily    2. Cough  Ruling out infectious/bronchitis, no allergic rhintiis/postnasal drip in the last 3-4 weeks. Pt will have humidifer at home and will continue her valsatan 160mg qdaily    Plan  -ordered CXR  -will switch once we rule out infectious etiology of dry cough  -continue GERD omeprazole

## 2022-06-29 NOTE — PATIENT INSTRUCTIONS
Please have your CXR done  Please have your labs done   Please follow up in 1 week for your blood pressure and cough. We will try to switch you to another blood pressure medication but in the meantime continue taking your valsartan and have humidifier at home.

## 2022-06-30 NOTE — TELEPHONE ENCOUNTER
Last seen: 6/29/22 by Dr. Mohan    Next appt: 7/6/22 with Dr. Centeno    Was the patient seen in the last year in this department? Yes   Does patient have an active prescription for medications requested? No   Received Request Via: Pharmacy

## 2022-07-01 ENCOUNTER — HOSPITAL ENCOUNTER (OUTPATIENT)
Dept: LAB | Facility: MEDICAL CENTER | Age: 87
End: 2022-07-01
Attending: STUDENT IN AN ORGANIZED HEALTH CARE EDUCATION/TRAINING PROGRAM
Payer: MEDICARE

## 2022-07-01 ENCOUNTER — APPOINTMENT (OUTPATIENT)
Dept: RADIOLOGY | Facility: IMAGING CENTER | Age: 87
End: 2022-07-01
Attending: STUDENT IN AN ORGANIZED HEALTH CARE EDUCATION/TRAINING PROGRAM
Payer: MEDICARE

## 2022-07-01 DIAGNOSIS — I10 PRIMARY HYPERTENSION: ICD-10-CM

## 2022-07-01 DIAGNOSIS — R05.9 COUGH: ICD-10-CM

## 2022-07-01 LAB
ANION GAP SERPL CALC-SCNC: 11 MMOL/L (ref 7–16)
APPEARANCE UR: CLEAR
BACTERIA #/AREA URNS HPF: NEGATIVE /HPF
BILIRUB UR QL STRIP.AUTO: NEGATIVE
BUN SERPL-MCNC: 18 MG/DL (ref 8–22)
CALCIUM SERPL-MCNC: 9.4 MG/DL (ref 8.5–10.5)
CHLORIDE SERPL-SCNC: 96 MMOL/L (ref 96–112)
CO2 SERPL-SCNC: 27 MMOL/L (ref 20–33)
COLOR UR: YELLOW
CREAT SERPL-MCNC: 0.72 MG/DL (ref 0.5–1.4)
CREAT UR-MCNC: 45.46 MG/DL
EPI CELLS #/AREA URNS HPF: NORMAL /HPF
GFR SERPLBLD CREATININE-BSD FMLA CKD-EPI: 82 ML/MIN/1.73 M 2
GLUCOSE SERPL-MCNC: 94 MG/DL (ref 65–99)
GLUCOSE UR STRIP.AUTO-MCNC: NEGATIVE MG/DL
HYALINE CASTS #/AREA URNS LPF: NORMAL /LPF
KETONES UR STRIP.AUTO-MCNC: NEGATIVE MG/DL
LEUKOCYTE ESTERASE UR QL STRIP.AUTO: NEGATIVE
MICRO URNS: ABNORMAL
MICROALBUMIN UR-MCNC: <1.2 MG/DL
MICROALBUMIN/CREAT UR: NORMAL MG/G (ref 0–30)
NITRITE UR QL STRIP.AUTO: NEGATIVE
PH UR STRIP.AUTO: 7 [PH] (ref 5–8)
POTASSIUM SERPL-SCNC: 4.2 MMOL/L (ref 3.6–5.5)
PROT UR QL STRIP: 30 MG/DL
RBC # URNS HPF: NORMAL /HPF
RBC UR QL AUTO: NEGATIVE
SODIUM SERPL-SCNC: 134 MMOL/L (ref 135–145)
SP GR UR STRIP.AUTO: 1.01
UROBILINOGEN UR STRIP.AUTO-MCNC: 0.2 MG/DL
WBC #/AREA URNS HPF: NORMAL /HPF

## 2022-07-01 PROCEDURE — 82570 ASSAY OF URINE CREATININE: CPT

## 2022-07-01 PROCEDURE — 71046 X-RAY EXAM CHEST 2 VIEWS: CPT | Mod: TC | Performed by: PHYSICIAN ASSISTANT

## 2022-07-01 PROCEDURE — 82043 UR ALBUMIN QUANTITATIVE: CPT

## 2022-07-01 PROCEDURE — 80048 BASIC METABOLIC PNL TOTAL CA: CPT

## 2022-07-01 PROCEDURE — 81001 URINALYSIS AUTO W/SCOPE: CPT

## 2022-07-01 PROCEDURE — 36415 COLL VENOUS BLD VENIPUNCTURE: CPT

## 2022-07-01 RX ORDER — DULOXETIN HYDROCHLORIDE 30 MG/1
CAPSULE, DELAYED RELEASE ORAL
Qty: 30 CAPSULE | Refills: 3 | Status: SHIPPED | OUTPATIENT
Start: 2022-07-01 | End: 2023-01-12 | Stop reason: SDUPTHER

## 2022-07-06 ENCOUNTER — OFFICE VISIT (OUTPATIENT)
Dept: INTERNAL MEDICINE | Facility: OTHER | Age: 87
End: 2022-07-06
Payer: MEDICARE

## 2022-07-06 VITALS
DIASTOLIC BLOOD PRESSURE: 75 MMHG | BODY MASS INDEX: 30.74 KG/M2 | SYSTOLIC BLOOD PRESSURE: 161 MMHG | WEIGHT: 184.5 LBS | OXYGEN SATURATION: 91 % | TEMPERATURE: 97.3 F | HEART RATE: 79 BPM | HEIGHT: 65 IN

## 2022-07-06 DIAGNOSIS — R05.9 COUGH: ICD-10-CM

## 2022-07-06 DIAGNOSIS — I10 PRIMARY HYPERTENSION: ICD-10-CM

## 2022-07-06 PROBLEM — U07.1 COVID-19 VIRUS INFECTION: Status: RESOLVED | Noted: 2021-11-17 | Resolved: 2022-07-06

## 2022-07-06 PROBLEM — R60.0 EDEMA, LOWER EXTREMITY: Status: RESOLVED | Noted: 2019-11-19 | Resolved: 2022-07-06

## 2022-07-06 PROBLEM — R06.02 SHORTNESS OF BREATH: Status: RESOLVED | Noted: 2021-11-17 | Resolved: 2022-07-06

## 2022-07-06 PROBLEM — T84.7XXA HARDWARE COMPLICATING WOUND INFECTION (HCC): Status: RESOLVED | Noted: 2022-02-24 | Resolved: 2022-07-06

## 2022-07-06 PROCEDURE — 99213 OFFICE O/P EST LOW 20 MIN: CPT | Mod: GE | Performed by: HOSPITALIST

## 2022-07-06 ASSESSMENT — ENCOUNTER SYMPTOMS
VOMITING: 0
FEVER: 0
BLURRED VISION: 0
DIARRHEA: 0
WHEEZING: 0
ABDOMINAL PAIN: 0
DIZZINESS: 0
PALPITATIONS: 0
NAUSEA: 0
CHILLS: 0
SHORTNESS OF BREATH: 0
HEADACHES: 0
TINGLING: 0
COUGH: 0
CONSTIPATION: 0
SENSORY CHANGE: 0
EYE PAIN: 0
DOUBLE VISION: 0

## 2022-07-06 ASSESSMENT — FIBROSIS 4 INDEX: FIB4 SCORE: 1.55

## 2022-07-06 NOTE — PROGRESS NOTES
"Subjective     Janessa Cain is a 87 y.o. female who presents with Hypertension (Patient here to follow up htn-labs-x-rays)            HPI    1. Cough  Presented 6/29/2022 reporting cough with 3 to 4 weeks duration that was present dominantly at night and was nonproductive. CXR was ordered and found \"No radiographic evidence of acute cardiopulmonary disease\".  Patient reports that when her cough appeared, she stopped taking the valsartan- HCTZ combination pill because she thought it was causing her cough.  Patient reports that her cough resolved approximately 6 days ago and afterwards she resumed taking her medication for blood pressure.    2. Primary hypertension  In office blood pressure upon repeat is elevated at 161/75; patient reports she has not been able to check her blood pressures regularly at home so she is unaware of what her home blood pressures are.  Patient denies any lightheadedness, dizziness, headaches, tunnel vision, blurry vision or double vision as well as chest pain or palpitations.  Patient denies any dietary changes that could cause blood pressure elevations.  Patient reports that her and her  generally avoid canned foods and generally have solids of protein in addition of vegetables for each meal.  Patient reports poor exercise secondary to knee replacement 2/1/2022.  Patient reports that she will try to integrate more exercise by riding her bike more.    Review of Systems   Constitutional: Negative for chills, fever and malaise/fatigue.   HENT: Negative for hearing loss and tinnitus.    Eyes: Negative for blurred vision, double vision and pain.   Respiratory: Negative for cough, shortness of breath and wheezing.    Cardiovascular: Negative for chest pain and palpitations.   Gastrointestinal: Negative for abdominal pain, constipation, diarrhea, nausea and vomiting.   Genitourinary: Negative for dysuria and hematuria.   Neurological: Negative for dizziness, tingling, " "sensory change and headaches.              Objective     BP (!) 161/75 (BP Location: Right arm, Patient Position: Sitting, BP Cuff Size: Large adult) Comment: repeated bp 5 mins  Pulse 79   Temp 36.3 °C (97.3 °F) (Temporal)   Ht 1.645 m (5' 4.75\")   Wt 83.7 kg (184 lb 8 oz)   SpO2 91%   BMI 30.94 kg/m²      Physical Exam  Constitutional:       General: She is not in acute distress.     Appearance: Normal appearance. She is not ill-appearing or toxic-appearing.   HENT:      Head: Normocephalic and atraumatic.      Nose: No rhinorrhea.   Eyes:      General: No scleral icterus.        Right eye: No discharge.         Left eye: No discharge.   Cardiovascular:      Rate and Rhythm: Normal rate.      Pulses: Normal pulses.   Pulmonary:      Effort: Pulmonary effort is normal. No respiratory distress.      Breath sounds: Normal breath sounds.   Abdominal:      General: There is no distension.      Tenderness: There is no guarding.   Musculoskeletal:         General: No swelling, tenderness, deformity or signs of injury.      Cervical back: Normal range of motion. No rigidity.      Right lower leg: No edema.      Left lower leg: No edema.   Skin:     General: Skin is warm and dry.      Coloration: Skin is not jaundiced.   Neurological:      Sensory: No sensory deficit.      Motor: No weakness.      Gait: Gait normal.      Comments: Needs walker For ambulation.   Psychiatric:         Mood and Affect: Mood normal.         Behavior: Behavior normal.                     Assessment & Plan        1. Cough  Patient reports her cough resolved approximately 6 days ago; she was able to resume her valsartan-hydrochlorothiazide without her cough re presenting.  Patient reports that she will remain compliant with the blood pressure medication and will monitor to see if her cough reappears and the if there are any identifiable triggers.  Stable.  -Continue to monitor    2. Primary hypertension  In office blood pressure elevated " upon repeat at 161/75.  Patient denying any headaches, lightheadedness, chest pain, palpitations or vision changes.  Patient just resumed her valsartan-hydrochlorothiazide 6 days.  Per literature takes 1 to 2 weeks to receive optimal effect of the medication on blood pressure.  Patient advised to use the home blood pressure log provided in office to monitor her blood pressures and to reach out to our office with blood pressure readings in 7 to 10 days.  At this time it will be determined whether or not patient would benefit from a dosage increase of her valsartan-hydrochlorothiazide.  Per literature review dosage can be increased after 1 to 2weeks to 320/25 tablets; max effect should be seen 2 to 4 weeks after dosage change.  -Continue valsartan-hydrochlorothiazide 160-12.5 mg by mouth daily  -Use blood pressure log provided in office to monitor blood pressures daily patient educated on proper home blood pressure measurements per below  -Home blood pressure monitoring:  --check your blood pressure every day and put it in a log  --pick a different time each day so we can see how it varies throughout the day  --when you check your blood pressure: make sure you sit quietly for 5-10 minutes beforehand, keep both feet flat on the ground, and make sure you use an arm cuff at heart height    Lifestyle factors to help manage blood pressure:  1) reduce stress with daily activity, consider yoga, breathing exercises (like 4-7-8 breathing technique)   2) reduce sodium to <2000 mg/day  3) DASH diet     4) 200-300 min/week cardio, which you can build up to.       Follow up: Please implement low-sodium and diet options we discussed and call our office in 7-10 days with your home blood pressures, if elevated we can decide to titrate your blood pressure meds.  Please proceed to the emergency department should he develop headache, lightheadedness, change in vision, or double vision.

## 2022-07-06 NOTE — PATIENT INSTRUCTIONS
Thank you for allowing us to participate in your care today.  Please use the blood pressure log to record your home blood pressures.  Please implement low-sodium and diet options we discussed and call our office in 7-10 days with your home blood pressures, if elevated we can decide to titrate your blood pressure meds.  Please proceed to the emergency department should he develop headache, lightheadedness, change in vision, or double vision.

## 2022-08-17 ENCOUNTER — APPOINTMENT (OUTPATIENT)
Dept: INTERNAL MEDICINE | Facility: OTHER | Age: 87
End: 2022-08-17
Payer: MEDICARE

## 2022-10-03 ENCOUNTER — APPOINTMENT (OUTPATIENT)
Dept: INTERNAL MEDICINE | Facility: OTHER | Age: 87
End: 2022-10-03
Payer: MEDICARE

## 2022-11-09 ENCOUNTER — PATIENT MESSAGE (OUTPATIENT)
Dept: HEALTH INFORMATION MANAGEMENT | Facility: OTHER | Age: 87
End: 2022-11-09

## 2022-11-22 ENCOUNTER — HOSPITAL ENCOUNTER (OUTPATIENT)
Facility: MEDICAL CENTER | Age: 87
End: 2022-11-22
Attending: STUDENT IN AN ORGANIZED HEALTH CARE EDUCATION/TRAINING PROGRAM
Payer: MEDICARE

## 2022-11-22 ENCOUNTER — OFFICE VISIT (OUTPATIENT)
Dept: URGENT CARE | Facility: PHYSICIAN GROUP | Age: 87
End: 2022-11-22
Payer: MEDICARE

## 2022-11-22 VITALS
DIASTOLIC BLOOD PRESSURE: 88 MMHG | BODY MASS INDEX: 29.49 KG/M2 | HEART RATE: 84 BPM | SYSTOLIC BLOOD PRESSURE: 170 MMHG | RESPIRATION RATE: 14 BRPM | TEMPERATURE: 97.1 F | HEIGHT: 65 IN | WEIGHT: 177 LBS | OXYGEN SATURATION: 93 %

## 2022-11-22 DIAGNOSIS — I10 PRIMARY HYPERTENSION: ICD-10-CM

## 2022-11-22 DIAGNOSIS — R30.0 DYSURIA: ICD-10-CM

## 2022-11-22 LAB
APPEARANCE UR: CLEAR
BILIRUB UR STRIP-MCNC: NEGATIVE MG/DL
COLOR UR AUTO: NORMAL
GLUCOSE UR STRIP.AUTO-MCNC: NEGATIVE MG/DL
KETONES UR STRIP.AUTO-MCNC: NEGATIVE MG/DL
LEUKOCYTE ESTERASE UR QL STRIP.AUTO: NEGATIVE
NITRITE UR QL STRIP.AUTO: POSITIVE
PH UR STRIP.AUTO: 6.5 [PH] (ref 5–8)
PROT UR QL STRIP: NEGATIVE MG/DL
RBC UR QL AUTO: NEGATIVE
SP GR UR STRIP.AUTO: 1.01
UROBILINOGEN UR STRIP-MCNC: 0.2 MG/DL

## 2022-11-22 PROCEDURE — 81002 URINALYSIS NONAUTO W/O SCOPE: CPT | Performed by: STUDENT IN AN ORGANIZED HEALTH CARE EDUCATION/TRAINING PROGRAM

## 2022-11-22 PROCEDURE — 87660 TRICHOMONAS VAGIN DIR PROBE: CPT

## 2022-11-22 PROCEDURE — 99214 OFFICE O/P EST MOD 30 MIN: CPT | Performed by: STUDENT IN AN ORGANIZED HEALTH CARE EDUCATION/TRAINING PROGRAM

## 2022-11-22 PROCEDURE — 87086 URINE CULTURE/COLONY COUNT: CPT

## 2022-11-22 PROCEDURE — 87510 GARDNER VAG DNA DIR PROBE: CPT

## 2022-11-22 PROCEDURE — 87480 CANDIDA DNA DIR PROBE: CPT

## 2022-11-22 RX ORDER — SULFAMETHOXAZOLE AND TRIMETHOPRIM 800; 160 MG/1; MG/1
1 TABLET ORAL 2 TIMES DAILY
Qty: 6 TABLET | Refills: 0 | Status: SHIPPED | OUTPATIENT
Start: 2022-11-22 | End: 2022-11-25

## 2022-11-22 ASSESSMENT — FIBROSIS 4 INDEX: FIB4 SCORE: 1.55

## 2022-11-22 NOTE — PROGRESS NOTES
Subjective:   CHIEF COMPLAINT  Chief Complaint   Patient presents with    UTI     Cramping, freq urination, started yesterday   Pt took cefdinir from left over prescription        HPI  Janessa Cain is a 87 y.o. female who presents with a chief complaint of dysuria since yesterday.  She took AZO yesterday.  Also reports she had an old prescription for cefdinir which she took at 0 400 earlier this morning which seemed to help.  She not experiencing any hematuria, urgency, vaginal discharge, odor, pruritus, fevers, nausea or vomiting.  Says she has had UTIs in the past and current symptoms are consistent with her previous urinary tract infection.  Patient is currently on moxifloxacin for osteomyelitis.    REVIEW OF SYSTEMS  General: no fever or chills  GI: no nausea or vomiting  See HPI for further details.    PAST MEDICAL HISTORY  Patient Active Problem List    Diagnosis Date Noted    Post-op pain 03/04/2022    S/P total knee arthroplasty, right 03/03/2022    Arthritis of knee, right 02/28/2022    Hormone replacement therapy 01/20/2022    Pulmonary hypertension (HCC) 11/17/2021    Cough 11/17/2021    Adjustment disorder with anxiety 10/07/2021    Unilateral primary osteoarthritis, right knee 08/24/2021    Nocturia 06/25/2021    Asymptomatic menopausal state 01/22/2021    Seasonal allergic rhinitis 08/20/2020    Primary insomnia 07/23/2020    Muscle cramps 06/18/2020    Hot flashes due to menopause 06/02/2020    Other fracture of right patella, sequela 01/03/2020    Osteomyelitis hip (formerly [facility]) 11/21/2019    Arthralgia of hip 11/21/2019    Vitamin D insufficiency 11/13/2019    AVN (avascular necrosis of bone) (formerly [facility]) 11/12/2019    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    ALLERGIES  Allergies   Allergen Reactions    Apap-Fd&C Red #40 [place]-Oxycodone     Percocet [Perloxx]     Hydrocodone Vomiting    Oxycodone     Tramadol        CURRENT MEDICATIONS  Home Medications       Reviewed by Fabien Garcia Ass't (Medical Assistant) on 22 at 1315  Med List Status: <None>     Medication Last Dose Status   acetaminophen (TYLENOL) 325 MG Tab PRN Active   docusate sodium 100 MG Cap  Active   DULoxetine (CYMBALTA) 30 MG Cap DR Particles Taking Active   estradiol (ESTRACE) 0.5 MG tablet  Active   omeprazole (PRILOSEC) 20 MG delayed-release capsule Taking Active   traZODone (DESYREL) 50 MG Tab PRN Active   valsartan-hydrochlorothiazide (DIOVAN-HCT) 160-12.5 MG per tablet Taking Active                    SOCIAL HISTORY  Social History     Tobacco Use    Smoking status: Former     Packs/day: 1.00     Years: 30.00     Pack years: 30.00     Types: Cigarettes     Quit date:      Years since quittin.9    Smokeless tobacco: Never   Vaping Use    Vaping Use: Never used   Substance and Sexual Activity    Alcohol use: Yes     Alcohol/week: 0.6 oz     Types: 1 Glasses of wine per week     Comment: 1 drink every day    Drug use: Never    Sexual activity: Not on file       FAMILY HISTORY  Family History   Problem Relation Age of Onset    Alcohol abuse Mother     Heart Disease Mother     Heart Disease Brother     Alcohol abuse Brother     Cancer Maternal Aunt           Objective:   PHYSICAL EXAM  VITAL SIGNS: BP (!) 170/88 (BP Location: Right arm, Patient Position: Sitting, BP Cuff Size: Adult)   Pulse 84   Temp 36.2 °C (97.1 °F) (Temporal)   Resp 14   Ht 1.638 m (5' 4.5\")   Wt 80.3 kg (177 lb)   SpO2 93%   BMI 29.91 kg/m²     Gen: no acute distress, normal voice  Skin: dry, intact, moist mucosal membranes  Lungs: CTAB w/ symmetric expansion  CV: " RRR w/o murmurs or clicks  : No CVAT bilaterally  Psych: normal affect, normal judgement, alert, awake    UA: Invalid; patient on AZO.    Assessment/Plan:     1. Dysuria  URINE CULTURE(NEW)    POCT Urinalysis    VAGINAL PATHOGENS DNA PANEL    sulfamethoxazole-trimethoprim (BACTRIM DS) 800-160 MG tablet      1)Doubt secondary to acute cystitis given patient is currently taking moxifloxacin for chronic osteomyelitis.  Possibly secondary to atrophic vaginitis versus yeast infection.  Counseled against initiation of additional antibiotics and follow Ucx and vag path swab b4 initiation of treatment however patient says she is rather uncomfortable and prefers to start oral antibiotics at this time which is reasonable.  - Ordered Rx for Bactrim  - Ordered urine culture  - Ordered vaginal pathogens..  Contact patient at [phone] only if she needs changes to antibiotics.  Okay to leave a voicemail.  Otherwise she will view the results in [facility].  If urine culture is negative patient was instructed to discontinue Bactrim.  - 2 L H2O daily  - Return to urgent care any new/worsening symptoms or further questions or concerns.  Patient understood everything discussed.  All questions were answered.    2) chronic issue.  BP currently elevated 170/80.  Says she has not taking her antihypertensive medication today.  Instructed her to begin medication when returning home.      Differential diagnosis, natural history, supportive care, and indications for immediate follow-up discussed. All questions answered. Patient agrees with the plan of care.    Follow-up as needed if symptoms worsen or fail to improve to PCP, Urgent care or Emergency Room.    Please note that this dictation was created using voice recognition software. I have made a reasonable attempt to correct obvious errors, but I expect that there are errors of grammar and possibly content that I did not discover before finalizing the note.

## 2022-11-23 LAB
CANDIDA DNA VAG QL PROBE+SIG AMP: NEGATIVE
G VAGINALIS DNA VAG QL PROBE+SIG AMP: NEGATIVE
T VAGINALIS DNA VAG QL PROBE+SIG AMP: NEGATIVE

## 2022-11-25 LAB
BACTERIA UR CULT: NORMAL
SIGNIFICANT IND 70042: NORMAL
SITE SITE: NORMAL
SOURCE SOURCE: NORMAL

## 2022-12-09 ENCOUNTER — OFFICE VISIT (OUTPATIENT)
Dept: INTERNAL MEDICINE | Facility: OTHER | Age: 87
End: 2022-12-09
Payer: MEDICARE

## 2022-12-09 VITALS
DIASTOLIC BLOOD PRESSURE: 92 MMHG | BODY MASS INDEX: 31.86 KG/M2 | HEART RATE: 68 BPM | TEMPERATURE: 97.7 F | HEIGHT: 65 IN | SYSTOLIC BLOOD PRESSURE: 162 MMHG | OXYGEN SATURATION: 93 % | WEIGHT: 191.2 LBS

## 2022-12-09 DIAGNOSIS — F51.01 PRIMARY INSOMNIA: ICD-10-CM

## 2022-12-09 DIAGNOSIS — E87.1 HYPONATREMIA: ICD-10-CM

## 2022-12-09 DIAGNOSIS — R05.9 COUGH, UNSPECIFIED TYPE: ICD-10-CM

## 2022-12-09 DIAGNOSIS — I10 PRIMARY HYPERTENSION: ICD-10-CM

## 2022-12-09 PROCEDURE — 99214 OFFICE O/P EST MOD 30 MIN: CPT | Mod: GC | Performed by: STUDENT IN AN ORGANIZED HEALTH CARE EDUCATION/TRAINING PROGRAM

## 2022-12-09 ASSESSMENT — ENCOUNTER SYMPTOMS
DOUBLE VISION: 0
DIAPHORESIS: 0
SEIZURES: 0
SORE THROAT: 0
SPUTUM PRODUCTION: 0
MYALGIAS: 0
STRIDOR: 0
BLURRED VISION: 0
COUGH: 0
HEMOPTYSIS: 0
PALPITATIONS: 0
SINUS PAIN: 0
WEIGHT LOSS: 0
DIZZINESS: 0
SHORTNESS OF BREATH: 0
ABDOMINAL PAIN: 0
HEADACHES: 0
VOMITING: 0
FEVER: 0
CHILLS: 0
ORTHOPNEA: 0
WEAKNESS: 0
NAUSEA: 0
HEARTBURN: 0

## 2022-12-09 ASSESSMENT — FIBROSIS 4 INDEX: FIB4 SCORE: 1.55

## 2022-12-09 NOTE — ASSESSMENT & PLAN NOTE
-Continuing Valsartan-HCTX 160-12.5mg PO daily  -/75 at prior clinic visit in 7/22; /92 at today's visit  -Patient encouraged and advised to use blood pressure log sheet provided in office to record blood pressures at least 2 times daily, for at least 3 days per week; instructed to follow up next week  -Advised to sit quietly for 5-10 minutes prior to recording blood pressures, with feet flat on ground using arm cuff at heart height  -Patient's diet predominantly Mediterranean; emphasized to keep sodium <2000 mg/day, consider DASH diet, and incorporate cardio into her routine  -Will evaluate blood pressure trends and decide if appropriate to titrate blood pressure medications at next visit

## 2022-12-09 NOTE — ASSESSMENT & PLAN NOTE
-Previously had recurrent symptoms of URI which have since resolved while on Losartan-HCTZ 160-12.5mg  -Continue to monitor for symptoms, especially if uptitrate dose  -Does not seem to be concordant with course of Losartan-HCTZ adherent at this time; continue to reevaluate as needed

## 2022-12-09 NOTE — PATIENT INSTRUCTIONS
-Please log your Blood Pressures twice daily 3 days per week  -Please return to our office in 1 weeks time, at which point we will evaluate whether we would like to increase your dosage of blood pressure medication

## 2022-12-09 NOTE — PROGRESS NOTES
Established Patient    Chief Complaint   Patient presents with    Cough     Chest congestion, no fever    Follow-Up     Unable to sleep due to cough, taking Nyquil and Dayquil OTC going on 2 weeks now       HISTORY OF PRESENT ILLNESS:     Mrs. Janessa Cain is our 87 year old female patient with a past medical history significant for essential hypertension, status post total right hip replacement, hyponatremia, anxiety who presents to clinic today joined by her  for routine follow up regarding her blood pressure. Patient last seen in clinic July 2022, when her BP was 161/75 in office. At that time, patient had recently resumed her home dose of Valsartan/HCTZ 160-12.5 after missing 6 doses because of likely URI symptoms of cough and congestion; had resumed the medication without recurrence of these symptoms. Was provided with home blood pressure log to monitor her blood pressures and reach out to clinic with readings 7-10 days after her last appointment, with goal of evaluating whether medication dosage of Valsartan/HCTZ should be increased to 320-25. Patient, however, had not recorded blood pressures at home and her blood pressure in office today is 162/92 still on Valsartan-HCTZ 160-12.5. Patient denies lightheadedness, chest pain, shortness of breath, changes in vision.    Patient was again provided with new blood pressure log, and encouraged to please record her blood pressure at least 2 times per day for 3 days per week and follow up with our clinic next week to evaluate optimization of her anti-hypertensive regimen. She agreed and demonstrated her understanding of the instruction via the teach back method. She endorsed having a working blood pressure cuff at home and seemed motivated to adhere to follow up.    Of note, patient still does not exercise but denies excessive salt intake in diet and mentions that she and her  have a predominantly mediterranean based diet.    Past Medical  History:   Diagnosis Date    2019 novel coronavirus disease (COVID-19) 10/06/2021    COVID-19 virus infection 11/17/2021    Depression 6/2/2020 02/25/22 Resolved per problem list and pt.    Edema, lower extremity 11/19/2019    Hardware complicating wound infection (HCC) 2/24/2022    Hypertension     Hypoalbuminemia 11/11/2019    Multiple open wounds of lower leg 11/3/2019    Nocturia 6/25/2021    Pneumonia     Viral simon 30 yrs ago    Rheumatic fever 1943    Shortness of breath 11/17/2021    Urinary retention 11/13/2019     IMO load March 2020       Patient Active Problem List    Diagnosis Date Noted    Post-op pain 03/04/2022    S/P total knee arthroplasty, right 03/03/2022    Arthritis of knee, right 02/28/2022    Hormone replacement therapy 01/20/2022    Pulmonary hypertension (HCC) 11/17/2021    Cough 11/17/2021    Adjustment disorder with anxiety 10/07/2021    Unilateral primary osteoarthritis, right knee 08/24/2021    Nocturia 06/25/2021    Asymptomatic menopausal state 01/22/2021    Seasonal allergic rhinitis 08/20/2020    Primary insomnia 07/23/2020    Muscle cramps 06/18/2020    Hot flashes due to menopause 06/02/2020    Other fracture of right patella, sequela 01/03/2020    Osteomyelitis hip ([facility]) 11/21/2019    Arthralgia of hip 11/21/2019    Vitamin D insufficiency 11/13/2019    AVN (avascular necrosis of bone) ([facility]) 11/12/2019    Status post total replacement of right hip 11/12/2019    Impaired mobility and ADLs 11/12/2019    Primary hypertension 11/12/2019    Hyponatremia 11/03/2019    Degenerative joint disease of right hip 11/03/2019       Allergies:Apap-fd&c red #40 [place]-oxycodone, Percocet [perloxx], Hydrocodone, Oxycodone, and Tramadol    Current Outpatient Medications   Medication Sig Dispense Refill    DULoxetine (CYMBALTA) 30 MG Cap DR Particles TAKE ONE CAPSULE BY MOUTH DAILY 30 Capsule 3    valsartan-hydrochlorothiazide (DIOVAN-HCT) 160-12.5 MG per tablet TAKE ONE TABLET BY MOUTH DAILY 90  "Tablet 1    docusate sodium 100 MG Cap Take 100 mg by mouth 2 times a day. 60 Capsule 0    traZODone (DESYREL) 50 MG Tab TAKE ONE TABLET BY MOUTH DAILY AS NEEDED FOR SLEEP 90 Tablet 2    omeprazole (PRILOSEC) 20 MG delayed-release capsule Take 1 Capsule by mouth every day. 30 Capsule 1    acetaminophen (TYLENOL) 325 MG Tab Take 650 mg by mouth every four hours as needed.       No current facility-administered medications for this visit.       Social History     Tobacco Use    Smoking status: Former     Packs/day: 1.00     Years: 30.00     Pack years: 30.00     Types: Cigarettes     Quit date:      Years since quittin.9    Smokeless tobacco: Never   Vaping Use    Vaping Use: Never used   Substance Use Topics    Alcohol use: Yes     Alcohol/week: 0.6 oz     Types: 1 Glasses of wine per week     Comment: 1 drink every day    Drug use: Never       Family History   Problem Relation Age of Onset    Alcohol abuse Mother     Heart Disease Mother     Heart Disease Brother     Alcohol abuse Brother     Cancer Maternal Aunt        Review of Systems   Constitutional:  Negative for chills, diaphoresis, fever, malaise/fatigue and weight loss.   HENT:  Negative for congestion, hearing loss, sinus pain and sore throat.    Eyes:  Negative for blurred vision and double vision.   Respiratory:  Negative for cough, hemoptysis, sputum production, shortness of breath and stridor.    Cardiovascular:  Negative for chest pain, palpitations, orthopnea and leg swelling.   Gastrointestinal:  Negative for abdominal pain, heartburn, nausea and vomiting.   Genitourinary:  Negative for dysuria, frequency and urgency.   Musculoskeletal:  Negative for myalgias.   Skin:  Negative for itching and rash.   Neurological:  Negative for dizziness, seizures, weakness and headaches.     Exam:  BP (!) 162/92 (BP Location: Right arm, Patient Position: Sitting, BP Cuff Size: Adult)   Pulse 68   Temp 36.5 °C (97.7 °F) (Temporal)   Ht 1.638 m (5' 4.5\") "   Wt 86.7 kg (191 lb 3.2 oz)   SpO2 93%  Body mass index is 32.31 kg/m².    Constitutional:  Not in acute distress, well appearing.  HEENT:   Normocephalic, atraumatic.  Cardiovascular: S1, S2; Regular rate and rhythm; No murmurs/rubs/gallops.  Lungs:   Clear to auscultation bilaterally; No wheezes/rhales/rhonchi; No respiratory distress.  Abdomen: Soft, nondistended, not tender to palpation; no guarding no rigidity; no masses.  Extremities:  No cyanosis/clubbing/edema; No obvious deformities.  Skin:  Warm and dry.  No visible rashes.  Neurologic: Alert Awake & Oriented x 3, CN II-XII grossly intact; strength and sensation grossly intact.  No focal deficits noted.  Psychiatric:  Affect normal, mood normal, judgment normal.    Assessment/Plan:   Mrs. Janessa Cain is our 87 year old female patient with a past medical history significant for essential hypertension, status post total right hip replacement, hyponatremia, anxiety who presents to clinic today joined by her  for routine follow up regarding her blood pressure.    Primary hypertension  -Continuing Valsartan-HCTX 160-12.5mg PO daily  -/75 at prior clinic visit in 7/22; /92 at today's visit  -Patient encouraged and advised to use blood pressure log sheet provided in office to record blood pressures at least 2 times daily, for at least 3 days per week; instructed to follow up next week  -Advised to sit quietly for 5-10 minutes prior to recording blood pressures, with feet flat on ground using arm cuff at heart height  -Patient's diet predominantly Mediterranean; emphasized to keep sodium <2000 mg/day, consider DASH diet, and incorporate cardio into her routine  -Will evaluate blood pressure trends and decide if appropriate to titrate blood pressure medications at next visit    Cough  -Previously had recurrent symptoms of URI which have since resolved while on Losartan-HCTZ 160-12.5mg  -Continue to monitor for symptoms, especially if  uptitrate dose  -Does not seem to be concordant with course of Losartan-HCTZ adherent at this time; continue to reevaluate as needed    Hyponatremia  -Most recent sodium from July 2022 134; patient asymptomatic    Primary insomnia  -Well controlled on Trazadone 50mg QHS     All imaging results and lab results and consult notes are reviewed at this visit.  Followup: Return in about 1 week (around 12/16/2022).    Kleber Adam MD  PGY-2 Internal Medicine Resident

## 2022-12-19 ENCOUNTER — OFFICE VISIT (OUTPATIENT)
Dept: INTERNAL MEDICINE | Facility: OTHER | Age: 87
End: 2022-12-19
Payer: MEDICARE

## 2022-12-19 VITALS
WEIGHT: 190.8 LBS | HEIGHT: 65 IN | TEMPERATURE: 98 F | DIASTOLIC BLOOD PRESSURE: 87 MMHG | BODY MASS INDEX: 31.79 KG/M2 | OXYGEN SATURATION: 93 % | SYSTOLIC BLOOD PRESSURE: 188 MMHG | HEART RATE: 86 BPM

## 2022-12-19 DIAGNOSIS — Z23 NEED FOR INFLUENZA VACCINATION: ICD-10-CM

## 2022-12-19 DIAGNOSIS — Z78.9 ALCOHOL USE: ICD-10-CM

## 2022-12-19 DIAGNOSIS — R41.3 MEMORY LOSS, SHORT TERM: ICD-10-CM

## 2022-12-19 DIAGNOSIS — I10 PRIMARY HYPERTENSION: ICD-10-CM

## 2022-12-19 DIAGNOSIS — E55.9 VITAMIN D INSUFFICIENCY: ICD-10-CM

## 2022-12-19 PROBLEM — G89.18 POST-OP PAIN: Status: RESOLVED | Noted: 2022-03-04 | Resolved: 2022-12-19

## 2022-12-19 PROBLEM — F10.90 ALCOHOL USE: Status: ACTIVE | Noted: 2022-12-19

## 2022-12-19 PROCEDURE — 90662 IIV NO PRSV INCREASED AG IM: CPT | Performed by: INTERNAL MEDICINE

## 2022-12-19 PROCEDURE — 99214 OFFICE O/P EST MOD 30 MIN: CPT | Mod: 25 | Performed by: INTERNAL MEDICINE

## 2022-12-19 PROCEDURE — G0008 ADMIN INFLUENZA VIRUS VAC: HCPCS | Performed by: INTERNAL MEDICINE

## 2022-12-19 RX ORDER — AMLODIPINE BESYLATE 5 MG/1
5 TABLET ORAL DAILY
Qty: 30 TABLET | Refills: 1 | Status: SHIPPED | OUTPATIENT
Start: 2022-12-19 | End: 2023-04-17

## 2022-12-19 ASSESSMENT — FIBROSIS 4 INDEX: FIB4 SCORE: 1.55

## 2022-12-19 NOTE — PATIENT INSTRUCTIONS
Pls quit driking alcohol.  Check BP daily and bring us log  Take new medicine , amlodipine daily also Valsartan-Hydrochlorothiazide for BP

## 2022-12-19 NOTE — PROGRESS NOTES
date  Chief Complaint   Patient presents with    Hypertension Follow-up       HISTORY OF PRESENT ILLNESS: Patient is a 87 y.o. female established patient who presents today along with her  for the following.      1. Memory loss, short term  2. Alcohol use  Per  she has been losing thought process and short term concerns mainly.  Patient also gets frustrated and so worried about it.  Upon further discussion patient has been drinking 2-3 shots of whiskey on daily basis.  And has been worse in the last 6 months.    3. Primary hypertension  Taking her blood pressure medication losartan hydrochlorothiazide on daily basis per patient but her last refill was more than 6 months ago.  She states that she has moved to a new home and trying to settle in and home BP AM is 170/ Evening -144/80-87 per their log.    Last BP med refill per pharm was 94 days ago ( we called today)    4. Vitamin D insufficiency  Take some vitamin D supplements    5. Need for influenza vaccination  Interested in flu shot today      Past Medical History:   Diagnosis Date    2019 novel coronavirus disease (COVID-19) 10/06/2021    COVID-19 virus infection 11/17/2021    Depression 6/2/2020 02/25/22 Resolved per problem list and pt.    Edema, lower extremity 11/19/2019    Hardware complicating wound infection (HCC) 2/24/2022    Hypertension     Hypoalbuminemia 11/11/2019    Multiple open wounds of lower leg 11/3/2019    Nocturia 6/25/2021    Pneumonia     Viral simon 30 yrs ago    Rheumatic fever 1943    Shortness of breath 11/17/2021    Urinary retention 11/13/2019     IMO load March 2020       Patient Active Problem List    Diagnosis Date Noted    Alcohol use 12/19/2022    S/P total knee arthroplasty, right 03/03/2022    Arthritis of knee, right 02/28/2022    Hormone replacement therapy 01/20/2022    Pulmonary hypertension (HCC) 11/17/2021    Cough 11/17/2021    Adjustment disorder with anxiety 10/07/2021    Unilateral  primary osteoarthritis, right knee 2021    Nocturia 2021    Asymptomatic menopausal state 2021    Seasonal allergic rhinitis 2020    Primary insomnia 2020    Muscle cramps 2020    Hot flashes due to menopause 2020    Other fracture of right patella, sequela 2020    Osteomyelitis hip ([facility]) 2019    Arthralgia of hip 2019    Vitamin D insufficiency 2019    AVN (avascular necrosis of bone) ([facility]) 2019    Status post total replacement of right hip 2019    Impaired mobility and ADLs 2019    Primary hypertension 2019    Hyponatremia 2019    Degenerative joint disease of right hip 2019       Allergies:Apap-fd&c red #40 [place]-oxycodone, Percocet [perloxx], Hydrocodone, Oxycodone, and Tramadol    Current Outpatient Medications   Medication Sig Dispense Refill    amLODIPine (NORVASC) 5 MG Tab Take 1 Tablet by mouth every day. 30 Tablet 1    DULoxetine (CYMBALTA) 30 MG Cap DR Particles TAKE ONE CAPSULE BY MOUTH DAILY 30 Capsule 3    valsartan-hydrochlorothiazide (DIOVAN-HCT) 160-12.5 MG per tablet TAKE ONE TABLET BY MOUTH DAILY 90 Tablet 1    traZODone (DESYREL) 50 MG Tab TAKE ONE TABLET BY MOUTH DAILY AS NEEDED FOR SLEEP 90 Tablet 2    omeprazole (PRILOSEC) 20 MG delayed-release capsule Take 1 Capsule by mouth every day. 30 Capsule 1    acetaminophen (TYLENOL) 325 MG Tab Take 650 mg by mouth every four hours as needed.      docusate sodium 100 MG Cap Take 100 mg by mouth 2 times a day. (Patient not taking: Reported on 2022) 60 Capsule 0     No current facility-administered medications for this visit.       Social History     Tobacco Use    Smoking status: Former     Packs/day: 1.00     Years: 30.00     Pack years: 30.00     Types: Cigarettes     Quit date:      Years since quittin.9    Smokeless tobacco: Never   Vaping Use    Vaping Use: Never used   Substance Use Topics    Alcohol use: Yes     Alcohol/week:  "0.6 oz     Types: 1 Glasses of wine per week     Comment: 1 drink every day    Drug use: Never       Family History   Problem Relation Age of Onset    Alcohol abuse Mother     Heart Disease Mother     Heart Disease Brother     Alcohol abuse Brother     Cancer Maternal Aunt          Review of Systems   Patient denies Fevers/chills/nausea/vomiting/chest pain/sob/blood in stools/black stools/blood in urine.all other systems are reviewed See HPI    Exam:  BP (!) 188/87 (BP Location: Left arm, Patient Position: Sitting, BP Cuff Size: Adult)   Pulse 86   Temp 36.7 °C (98 °F) (Temporal)   Ht 1.638 m (5' 4.5\")   Wt 86.5 kg (190 lb 12.8 oz)   SpO2 93%  Body mass index is 32.24 kg/m².  Constitutional:  NAD, well appearing.  HEENT:   NC/AT  Cardiovascular: RRR.   No m/r/g. No carotid bruits.       Lungs:   CTAB, no w/r/r, no respiratory distress.  Abdomen: Soft, NT/ND + BS, no masses, no suprapubic tenderness, no hepatomegaly.  Extremities:  2+ DP and radial pulses bilaterally.  No c/c/e.  Skin:  Warm and dry.    Neurologic: Alert & oriented x 3, CN II-XII grossly intact, strength and sensation grossly intact.  No focal deficits noted.  Psychiatric:  Affect normal, mood normal, judgment normal.    Assessment/Plan:     1. Memory loss, short term  2. Alcohol use  Discussed in length about memory concerns and need for further work-up.  We also discussed about quitting alcohol and she never had any withdrawal seizures willing to stop drinking alcohol by lowering the amount slowly.  - CBC WITH DIFFERENTIAL; Future  - Comp Metabolic Panel; Future  - TSH WITH REFLEX TO FT4; Future  - VITAMIN B12; Future  - VITAMIN B1; Future  - FOLATE; Future  - VITAMIN D,25 HYDROXY (DEFICIENCY); Future    3. Primary hypertension  Discussed about low-salt diet and exercise.  Quitting alcohol should help with improving blood pressure as well.  Adding amlodipine 5 mg once a day and continue valsartan HCTZ on daily basis.   to help out with " the pillbox    4 Vitamin D insufficiency  Continue vitamin D supplements and check level  - VITAMIN D,25 HYDROXY (DEFICIENCY); Future    5. Need for influenza vaccination  Flu shot given today in the office  - INFLUENZA VACCINE, HIGH DOSE (65+ ONLY)      All imaging results and lab results and consult notes are reviewed at this visit.  Followup: Return in about 3 weeks (around 1/9/2023).    Please note that this dictation was created using voice recognition software. I have made every reasonable attempt to correct obvious errors, but I expect that there are errors of grammar and possibly content that I did not discover before finalizing the note.

## 2023-01-10 ENCOUNTER — APPOINTMENT (OUTPATIENT)
Dept: INTERNAL MEDICINE | Facility: OTHER | Age: 88
End: 2023-01-10
Payer: MEDICARE

## 2023-01-12 NOTE — TELEPHONE ENCOUNTER
Received request via: Patient    Was the patient seen in the last year in this department? Yes    Does the patient have an active prescription (recently filled or refills available) for medication(s) requested? No    Does the patient have residential Plus and need 100 day supply (blood pressure, diabetes and cholesterol meds only)? Patient does not have SCP    She needs a refill on Zofran 0.5mg

## 2023-01-16 RX ORDER — DULOXETIN HYDROCHLORIDE 30 MG/1
30 CAPSULE, DELAYED RELEASE ORAL DAILY
Qty: 90 CAPSULE | Refills: 1 | Status: SHIPPED | OUTPATIENT
Start: 2023-01-16 | End: 2023-10-31

## 2023-01-18 RX ORDER — ESTRADIOL 0.05 MG/D
1 PATCH, EXTENDED RELEASE TRANSDERMAL
COMMUNITY
End: 2023-01-18 | Stop reason: CLARIF

## 2023-01-18 RX ORDER — ESTRADIOL 0.5 MG/1
0.5 TABLET ORAL DAILY
COMMUNITY
End: 2023-01-18 | Stop reason: SDUPTHER

## 2023-01-18 RX ORDER — ESTRADIOL 0.5 MG/1
0.5 TABLET ORAL DAILY
Qty: 90 TABLET | Refills: 1 | Status: SHIPPED | OUTPATIENT
Start: 2023-01-18 | End: 2023-10-03

## 2023-01-18 NOTE — TELEPHONE ENCOUNTER
Patient called and left message stating that she needs a refill on Estradiol 0.5MG tabs sent to the [address]. Please review and advise?    Received request via: Patient    Was the patient seen in the last year in this department? Yes    Does the patient have an active prescription (recently filled or refills available) for medication(s) requested? No    Does the patient have long term Plus and need 100 day supply (blood pressure, diabetes and cholesterol meds only)? Patient does not have SCP

## 2023-03-03 NOTE — TELEPHONE ENCOUNTER
Received request via: Patient    Was the patient seen in the last year in this department? Yes    Does the patient have an active prescription (recently filled or refills available) for medication(s) requested? No    Does the patient have [facility] Plus and need 100 day supply (blood pressure, diabetes and cholesterol meds only)? Patient does not have SCP

## 2023-03-06 RX ORDER — VALSARTAN AND HYDROCHLOROTHIAZIDE 160; 12.5 MG/1; MG/1
1 TABLET, FILM COATED ORAL DAILY
Qty: 90 TABLET | Refills: 1 | Status: SHIPPED | OUTPATIENT
Start: 2023-03-06 | End: 2023-06-12 | Stop reason: SDUPTHER

## 2023-04-17 RX ORDER — AMLODIPINE BESYLATE 5 MG/1
5 TABLET ORAL DAILY
Qty: 90 TABLET | Refills: 1 | Status: SHIPPED | OUTPATIENT
Start: 2023-04-17 | End: 2023-11-28

## 2023-04-24 ENCOUNTER — TELEPHONE (OUTPATIENT)
Dept: INTERNAL MEDICINE | Facility: OTHER | Age: 88
End: 2023-04-24
Payer: MEDICARE

## 2023-04-24 NOTE — TELEPHONE ENCOUNTER
She was given lab order in december. Has to get it done to know how kidneys are functioning and then will refill. Tylenol is safer as per her old labs with kidney issues

## 2023-04-24 NOTE — TELEPHONE ENCOUNTER
Patient is requesting Meloxicam 15mg     Received request via: Patient    Was the patient seen in the last year in this department? Yes    Does the patient have an active prescription (recently filled or refills available) for medication(s) requested? No    Does the patient have [facility] Plus and need 100 day supply (blood pressure, diabetes and cholesterol meds only)? Patient does not have SCP

## 2023-05-31 ENCOUNTER — HOSPITAL ENCOUNTER (OUTPATIENT)
Dept: LAB | Facility: MEDICAL CENTER | Age: 88
End: 2023-05-31
Attending: INTERNAL MEDICINE
Payer: MEDICARE

## 2023-05-31 DIAGNOSIS — E55.9 VITAMIN D INSUFFICIENCY: ICD-10-CM

## 2023-05-31 DIAGNOSIS — R41.3 MEMORY LOSS, SHORT TERM: ICD-10-CM

## 2023-05-31 LAB
25(OH)D3 SERPL-MCNC: 20 NG/ML (ref 30–100)
ALBUMIN SERPL BCP-MCNC: 4 G/DL (ref 3.2–4.9)
ALBUMIN/GLOB SERPL: 1.4 G/DL
ALP SERPL-CCNC: 101 U/L (ref 30–99)
ALT SERPL-CCNC: 22 U/L (ref 2–50)
ANION GAP SERPL CALC-SCNC: 12 MMOL/L (ref 7–16)
AST SERPL-CCNC: 27 U/L (ref 12–45)
BASOPHILS # BLD AUTO: 0.7 % (ref 0–1.8)
BASOPHILS # BLD: 0.03 K/UL (ref 0–0.12)
BILIRUB SERPL-MCNC: 1 MG/DL (ref 0.1–1.5)
BUN SERPL-MCNC: 14 MG/DL (ref 8–22)
CALCIUM ALBUM COR SERPL-MCNC: 9 MG/DL (ref 8.5–10.5)
CALCIUM SERPL-MCNC: 9 MG/DL (ref 8.5–10.5)
CHLORIDE SERPL-SCNC: 101 MMOL/L (ref 96–112)
CO2 SERPL-SCNC: 27 MMOL/L (ref 20–33)
CREAT SERPL-MCNC: 0.69 MG/DL (ref 0.5–1.4)
EOSINOPHIL # BLD AUTO: 0.13 K/UL (ref 0–0.51)
EOSINOPHIL NFR BLD: 3.1 % (ref 0–6.9)
ERYTHROCYTE [DISTWIDTH] IN BLOOD BY AUTOMATED COUNT: 49.4 FL (ref 35.9–50)
FOLATE SERPL-MCNC: 11.9 NG/ML
GFR SERPLBLD CREATININE-BSD FMLA CKD-EPI: 84 ML/MIN/1.73 M 2
GLOBULIN SER CALC-MCNC: 2.8 G/DL (ref 1.9–3.5)
GLUCOSE SERPL-MCNC: 107 MG/DL (ref 65–99)
HCT VFR BLD AUTO: 50.5 % (ref 37–47)
HGB BLD-MCNC: 16.4 G/DL (ref 12–16)
IMM GRANULOCYTES # BLD AUTO: 0.01 K/UL (ref 0–0.11)
IMM GRANULOCYTES NFR BLD AUTO: 0.2 % (ref 0–0.9)
LYMPHOCYTES # BLD AUTO: 0.85 K/UL (ref 1–4.8)
LYMPHOCYTES NFR BLD: 20 % (ref 22–41)
MCH RBC QN AUTO: 33.8 PG (ref 27–33)
MCHC RBC AUTO-ENTMCNC: 32.5 G/DL (ref 32.2–35.5)
MCV RBC AUTO: 104.1 FL (ref 81.4–97.8)
MONOCYTES # BLD AUTO: 0.49 K/UL (ref 0–0.85)
MONOCYTES NFR BLD AUTO: 11.5 % (ref 0–13.4)
NEUTROPHILS # BLD AUTO: 2.75 K/UL (ref 1.82–7.42)
NEUTROPHILS NFR BLD: 64.5 % (ref 44–72)
NRBC # BLD AUTO: 0 K/UL
NRBC BLD-RTO: 0 /100 WBC (ref 0–0.2)
PLATELET # BLD AUTO: 194 K/UL (ref 164–446)
PMV BLD AUTO: 11.1 FL (ref 9–12.9)
POTASSIUM SERPL-SCNC: 4 MMOL/L (ref 3.6–5.5)
PROT SERPL-MCNC: 6.8 G/DL (ref 6–8.2)
RBC # BLD AUTO: 4.85 M/UL (ref 4.2–5.4)
SODIUM SERPL-SCNC: 140 MMOL/L (ref 135–145)
TSH SERPL DL<=0.005 MIU/L-ACNC: 2.7 UIU/ML (ref 0.38–5.33)
VIT B12 SERPL-MCNC: 256 PG/ML (ref 211–911)
WBC # BLD AUTO: 4.3 K/UL (ref 4.8–10.8)

## 2023-05-31 PROCEDURE — 82746 ASSAY OF FOLIC ACID SERUM: CPT

## 2023-05-31 PROCEDURE — 82607 VITAMIN B-12: CPT

## 2023-05-31 PROCEDURE — 84443 ASSAY THYROID STIM HORMONE: CPT

## 2023-05-31 PROCEDURE — 80053 COMPREHEN METABOLIC PANEL: CPT

## 2023-05-31 PROCEDURE — 36415 COLL VENOUS BLD VENIPUNCTURE: CPT

## 2023-05-31 PROCEDURE — 84425 ASSAY OF VITAMIN B-1: CPT

## 2023-05-31 PROCEDURE — 82306 VITAMIN D 25 HYDROXY: CPT

## 2023-05-31 PROCEDURE — 85025 COMPLETE CBC W/AUTO DIFF WBC: CPT

## 2023-06-01 ENCOUNTER — TELEPHONE (OUTPATIENT)
Dept: INTERNAL MEDICINE | Facility: OTHER | Age: 88
End: 2023-06-01
Payer: MEDICARE

## 2023-06-01 NOTE — TELEPHONE ENCOUNTER
----- Message from Deandra Mitchell, Med Ass't sent at 6/1/2023  2:43 PM PDT -----    ----- Message -----  From: Janel Elise M.D.  Sent: 6/1/2023   1:01 PM PDT  To: Fredo Jeter Ma, Pls let Pt know , B 12 on the lower end and needs OTC  B12, 1000 mcg daily.

## 2023-06-02 LAB — VIT B1 BLD-MCNC: 113 NMOL/L (ref 70–180)

## 2023-06-12 ENCOUNTER — OFFICE VISIT (OUTPATIENT)
Dept: INTERNAL MEDICINE | Facility: OTHER | Age: 88
End: 2023-06-12
Payer: MEDICARE

## 2023-06-12 VITALS
OXYGEN SATURATION: 90 % | BODY MASS INDEX: 32.1 KG/M2 | DIASTOLIC BLOOD PRESSURE: 100 MMHG | WEIGHT: 188 LBS | HEART RATE: 84 BPM | HEIGHT: 64 IN | SYSTOLIC BLOOD PRESSURE: 169 MMHG | TEMPERATURE: 97.4 F

## 2023-06-12 DIAGNOSIS — Z91.81 AT RISK FOR FALLING: ICD-10-CM

## 2023-06-12 DIAGNOSIS — I10 PRIMARY HYPERTENSION: ICD-10-CM

## 2023-06-12 DIAGNOSIS — E55.9 VITAMIN D INSUFFICIENCY: ICD-10-CM

## 2023-06-12 DIAGNOSIS — M79.662 PAIN AND SWELLING OF LEFT LOWER LEG: ICD-10-CM

## 2023-06-12 DIAGNOSIS — M79.89 PAIN AND SWELLING OF LEFT LOWER LEG: ICD-10-CM

## 2023-06-12 PROCEDURE — 3077F SYST BP >= 140 MM HG: CPT | Performed by: HOSPITALIST

## 2023-06-12 PROCEDURE — 3080F DIAST BP >= 90 MM HG: CPT | Performed by: HOSPITALIST

## 2023-06-12 PROCEDURE — 99214 OFFICE O/P EST MOD 30 MIN: CPT | Mod: GC | Performed by: HOSPITALIST

## 2023-06-12 RX ORDER — VALSARTAN AND HYDROCHLOROTHIAZIDE 160; 12.5 MG/1; MG/1
1 TABLET, FILM COATED ORAL DAILY
Qty: 90 TABLET | Refills: 1 | Status: SHIPPED | OUTPATIENT
Start: 2023-06-12 | End: 2024-01-24 | Stop reason: SDUPTHER

## 2023-06-12 RX ORDER — VALSARTAN AND HYDROCHLOROTHIAZIDE 160; 12.5 MG/1; MG/1
1 TABLET, FILM COATED ORAL DAILY
Qty: 90 TABLET | Refills: 1 | Status: SHIPPED | OUTPATIENT
Start: 2023-06-12 | End: 2023-06-12

## 2023-06-12 ASSESSMENT — ENCOUNTER SYMPTOMS
CHILLS: 0
BLURRED VISION: 0
HEADACHES: 0
FEVER: 0
DOUBLE VISION: 0
FALLS: 0
DIZZINESS: 0

## 2023-06-12 ASSESSMENT — FIBROSIS 4 INDEX: FIB4 SCORE: 2.55

## 2023-06-12 ASSESSMENT — PATIENT HEALTH QUESTIONNAIRE - PHQ9: CLINICAL INTERPRETATION OF PHQ2 SCORE: 0

## 2023-06-12 NOTE — PATIENT INSTRUCTIONS
Thank you for allowing us to participate in your care today. Today we discussed your blood pressure, limiting your alcohol intake to one glass a day, taking vitamin D supplementation (between 800IU-1,000IU) and vitamin B12. Please check your blood pressure at home. Please follow up in 2-4 weeks.

## 2023-06-12 NOTE — PROGRESS NOTES
"Subjective     Janessa Cain is a 88 y.o. female who presents with Follow-Up (Acute Swollen ankle/foot L leg, no pain )            HPI  1. Primary hypertension  Patient with a history of primary hypertension.  She reports compliance only with the amlodipine; she reports she ran out of her Valartan-hydrochlorothiazide.  Patient reports that she has a blood pressure device at home but has not been checking her blood pressure.  Patient denies any changes in diet and reports that her physical activity level has been minimal secondary to motivation.  Of note patient also reports alcohol intake that consists of using at least 2 shots of whiskey a day.  Denies any anxiety or depression, and reports that she drinks alcohol in preparation to \"watch the news\".  Patient does have a bicycle and intends on returning to bike riding with her .  Patient denies any headaches, lightheadedness, dizziness, vision changes, chest pain, and shortness of breath.  Patient reports a diet that involves red meat only twice a week and primarily tuna, chicken, and vegetables during the day.    2. Pain and swelling of left lower leg  Patient reports atraumatic pain and swelling of the left leg of 5 days duration.  There is pain with palpation of the left lower leg.  Patient reports decreased ambulation and activity and reports that she is sedentary both days.    3. Vitamin D insufficiency  Labs from 5/31/2023 revealed a vitamin D level of 12.  Patient is currently not taking any vitamin D supplementation.    Review of Systems   Constitutional:  Negative for chills and fever.   Eyes:  Negative for blurred vision and double vision.   Cardiovascular:  Positive for leg swelling.   Musculoskeletal:  Negative for falls.   Neurological:  Negative for dizziness and headaches.     Review of systems as described in HPI and above.         Objective     BP (!) 169/100 (BP Location: Left arm, Patient Position: Sitting, BP Cuff Size: " "Adult)   Pulse 84   Temp 36.3 °C (97.4 °F) (Temporal)   Ht 1.626 m (5' 4\")   Wt 85.3 kg (188 lb)   SpO2 90% Comment: spO2 could not get a good read pt's fingers are crooked  BMI 32.27 kg/m²      Physical Exam  Constitutional:       General: She is not in acute distress.     Appearance: She is not ill-appearing or toxic-appearing.      Comments: Patient amelia, accompanied by  for today's office visit.   HENT:      Head: Normocephalic and atraumatic.      Nose: No congestion or rhinorrhea.   Eyes:      General: No scleral icterus.        Right eye: No discharge.         Left eye: No discharge.      Extraocular Movements: Extraocular movements intact.   Cardiovascular:      Rate and Rhythm: Normal rate.   Pulmonary:      Effort: No respiratory distress.      Breath sounds: Normal breath sounds.   Abdominal:      Tenderness: There is no guarding.   Musculoskeletal:         General: Swelling and tenderness present.      Cervical back: Normal range of motion. No rigidity.      Left lower leg: Edema present.      Comments: Pain, swelling, and pitting edema of left lower extremity with palpation   Skin:     General: Skin is warm and dry.      Coloration: Skin is not jaundiced.      Findings: Erythema (erythema bilaterally on upper legs) present.      Comments: Skin cold with touch on both feet bilaterally, warmer bilaterally at the level of the calf.   Neurological:      Mental Status: She is oriented to person, place, and time. Mental status is at baseline.      Gait: Gait abnormal (Requires a cane for ambulation.).   Psychiatric:         Mood and Affect: Mood normal.               Assessment & Plan        1. Primary hypertension  Patient with uncontrolled hypertension secondary to noncompliance to valsartan-hydrochlorothiazide as well as having least 2 shots of whiskey a day.  - valsartan-hydrochlorothiazide (DIOVAN-HCT) 160-12.5 MG per tablet; Take 1 Tablet by mouth every day.  Dispense: 90 Tablet; Refill: " 1  -Amlodipine as prescribed  -Home blood pressure monitoring:  - check your blood pressure every day and put it in a log  - pick a different time each day so we can see how it varies throughout the day  - when you check your blood pressure: make sure you sit quietly for 5-10 minutes beforehand, keep both feet flat on the ground, and make sure you use an arm cuff at heart height    Lifestyle factors to help manage blood pressure:  1) reduce stress with daily activity, consider yoga, breathing exercises (like 4-7-8 breathing technique)   2) reduce sodium to <2000 mg/day  3) DASH diet     4) 200-300 min/week cardio, which you can build up to.       2. Pain and swelling of left lower leg  Patient with atraumatic pain and swelling of 5 days duration of the left lower leg.  Pain with palpation but none with ambulation.  Findings concerning for lower extremity DVT versus an inflammatory reaction to a spider bite.  Will order lower extremity ultrasound for additional evaluation.    - US-EXTREMITY VENOUS LOWER UNILAT LEFT; Future    3. Vitamin D insufficiency  Labs from 5/31/2023 revealed vitamin D level of 20.  Patient advised of the importance of vitamin D and mood and metabolic function  -Daily vitamin D supplementation 800-1000 her national units daily    4. Fall risk  Patient is a fall risk concern as she reports unstable balance at times.  Patient currently using a cane for ambulation and is not interested in physical therapy at this time but will increase her level of activity with bicycle riding to improve her balance.  Patient is at high risk for falls-because of gait and balance abnormalities, history of falling, and history of paralysis.  -Consider exercise for muscle-strengthening activity  -Exercise to target strength, gait, and balance to prevent falls  -Recommend appropriate footwear and modifications as needed  -Recommend bicycle riding      Follow up: Please follow up in 2-4 weeks    Please note that this  dictation was created using voice recognition software. I have made every reasonable attempt to correct obvious errors, but I expect that there are errors of grammar and possibly content that I did not discover before finalizing the note.    Of note: Labs from 5/31/2023 revealed erythrocytosis with a Hemaquet of 50.5 and microcytosis with an MCV of 104.  Is possible that these findings are secondary to alcohol use, will repeat test in 4 to 6 weeks or at a later office visit.    Oralia Centeno MD MPH  PGY-2   [facility]

## 2023-06-13 ENCOUNTER — HOSPITAL ENCOUNTER (OUTPATIENT)
Dept: LAB | Facility: MEDICAL CENTER | Age: 88
End: 2023-06-13
Attending: HOSPITALIST
Payer: MEDICARE

## 2023-06-15 ENCOUNTER — TELEPHONE (OUTPATIENT)
Dept: INTERNAL MEDICINE | Facility: OTHER | Age: 88
End: 2023-06-15
Payer: MEDICARE

## 2023-06-15 ENCOUNTER — HOSPITAL ENCOUNTER (OUTPATIENT)
Dept: RADIOLOGY | Facility: MEDICAL CENTER | Age: 88
End: 2023-06-15
Attending: HOSPITALIST
Payer: MEDICARE

## 2023-06-15 DIAGNOSIS — M79.89 PAIN AND SWELLING OF LEFT LOWER LEG: ICD-10-CM

## 2023-06-15 DIAGNOSIS — M79.662 PAIN AND SWELLING OF LEFT LOWER LEG: ICD-10-CM

## 2023-06-15 PROCEDURE — 93971 EXTREMITY STUDY: CPT | Mod: LT

## 2023-06-15 NOTE — TELEPHONE ENCOUNTER
Ana Maria from [facility] called letting us know that Janessa has been reaching out because the swelling in her foot is getting worse.    Patient would like a call back because she isn't sure what to do.

## 2023-06-16 NOTE — TELEPHONE ENCOUNTER
Called patient LVM and advised to make sooner appointment to be seen. And to go to ER if symptoms get worse.

## 2023-09-05 ENCOUNTER — OFFICE VISIT (OUTPATIENT)
Dept: URGENT CARE | Facility: PHYSICIAN GROUP | Age: 88
End: 2023-09-05
Payer: MEDICARE

## 2023-09-05 ENCOUNTER — HOSPITAL ENCOUNTER (OUTPATIENT)
Facility: MEDICAL CENTER | Age: 88
End: 2023-09-05
Attending: STUDENT IN AN ORGANIZED HEALTH CARE EDUCATION/TRAINING PROGRAM
Payer: MEDICARE

## 2023-09-05 VITALS
TEMPERATURE: 97.5 F | RESPIRATION RATE: 16 BRPM | BODY MASS INDEX: 31.62 KG/M2 | WEIGHT: 185.2 LBS | HEIGHT: 64 IN | OXYGEN SATURATION: 91 % | HEART RATE: 90 BPM

## 2023-09-05 DIAGNOSIS — N30.01 ACUTE CYSTITIS WITH HEMATURIA: Primary | ICD-10-CM

## 2023-09-05 DIAGNOSIS — N30.01 ACUTE CYSTITIS WITH HEMATURIA: ICD-10-CM

## 2023-09-05 PROCEDURE — 87086 URINE CULTURE/COLONY COUNT: CPT

## 2023-09-05 PROCEDURE — 87186 SC STD MICRODIL/AGAR DIL: CPT | Mod: 91

## 2023-09-05 PROCEDURE — 87077 CULTURE AEROBIC IDENTIFY: CPT | Mod: 91

## 2023-09-05 PROCEDURE — 99214 OFFICE O/P EST MOD 30 MIN: CPT | Performed by: STUDENT IN AN ORGANIZED HEALTH CARE EDUCATION/TRAINING PROGRAM

## 2023-09-05 PROCEDURE — 81002 URINALYSIS NONAUTO W/O SCOPE: CPT | Performed by: STUDENT IN AN ORGANIZED HEALTH CARE EDUCATION/TRAINING PROGRAM

## 2023-09-05 RX ORDER — AMOXICILLIN 500 MG/1
TABLET, FILM COATED ORAL
COMMUNITY
Start: 2023-08-22 | End: 2023-09-05

## 2023-09-05 RX ORDER — CEPHALEXIN 500 MG/1
500 CAPSULE ORAL 2 TIMES DAILY
Qty: 9 CAPSULE | Refills: 0 | Status: SHIPPED | OUTPATIENT
Start: 2023-09-05 | End: 2023-09-10

## 2023-09-05 RX ORDER — CEPHALEXIN 250 MG/1
250 CAPSULE ORAL 4 TIMES DAILY
COMMUNITY
End: 2023-09-05

## 2023-09-05 ASSESSMENT — FIBROSIS 4 INDEX: FIB4 SCORE: 2.55

## 2023-09-05 NOTE — PROGRESS NOTES
Subjective:   CHIEF COMPLAINT  Chief Complaint   Patient presents with    UTI     Spasms, burning, frequency, onset yesterday morning        HPI  Janessa Cain is a 88 y.o. female who presents for evaluation of UTI.  Said 36 hours ago she developed bladder spasms and dysuria.  She had an old prescription for Keflex which she began last night which has helped and is feeling better today.  She has a history of UTIs and current symptoms are consistent with her previous infections.  Denies associated symptoms of hematuria.  No abnormal vaginal discharge, odor or pruritus.  No fevers.  No flank pain.    REVIEW OF SYSTEMS  General: no fever or chills  GI: no nausea or vomiting  See HPI for further details.    PAST MEDICAL HISTORY  Patient Active Problem List    Diagnosis Date Noted    Pain and swelling of left lower leg 06/12/2023    At risk for falling 06/12/2023    Alcohol use 12/19/2022    S/P total knee arthroplasty, right 03/03/2022    Arthritis of knee, right 02/28/2022    Hormone replacement therapy 01/20/2022    Pulmonary hypertension ([facility]) 11/17/2021    Cough 11/17/2021    Adjustment disorder with anxiety 10/07/2021    Unilateral primary osteoarthritis, right knee 08/24/2021    Nocturia 06/25/2021    Asymptomatic menopausal state 01/22/2021    Seasonal allergic rhinitis 08/20/2020    Primary insomnia 07/23/2020    Muscle cramps 06/18/2020    Hot flashes due to menopause 06/02/2020    Other fracture of right patella, sequela 01/03/2020    Osteomyelitis hip ([facility]) 11/21/2019    Arthralgia of hip 11/21/2019    Vitamin D insufficiency 11/13/2019    AVN (avascular necrosis of bone) ([facility]) 11/12/2019    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    ALLERGIES  Allergies   Allergen Reactions    Apap-Fd&C Red #40 Al Diamond-Oxycodone     Percocet [Perloxx]     Hydrocodone Vomiting    Oxycodone     Tramadol        CURRENT MEDICATIONS  Home Medications       Reviewed by William Blankenship D.O. (Physician) on 23 at 1505  Med List Status: <None>     Medication Last Dose Status   acetaminophen (TYLENOL) 325 MG Tab PRN Active   amLODIPine (NORVASC) 5 MG Tab Taking Active   cephALEXin (KEFLEX) 250 MG Cap Taking Active   DULoxetine (CYMBALTA) 30 MG Cap DR Particles Taking Active   estradiol (ESTRACE) 0.5 MG tablet Taking Active   omeprazole (PRILOSEC) 20 MG delayed-release capsule Taking Active   traZODone (DESYREL) 50 MG Tab Taking Active   valsartan-hydrochlorothiazide (DIOVAN-HCT) 160-12.5 MG per tablet Taking Active                    SOCIAL HISTORY  Social History     Tobacco Use    Smoking status: Former     Current packs/day: 0.00     Average packs/day: 1 pack/day for 30.0 years (30.0 ttl pk-yrs)     Types: Cigarettes     Start date:      Quit date:      Years since quittin.7    Smokeless tobacco: Never   Vaping Use    Vaping Use: Never used   Substance and Sexual Activity    Alcohol use: Yes     Alcohol/week: 0.6 oz     Types: 1 Glasses of wine per week     Comment: 1 drink every day    Drug use: Never    Sexual activity: Not on file       FAMILY HISTORY  Family History   Problem Relation Age of Onset    Alcohol abuse Mother     Heart Disease Mother     Heart Disease Brother     Alcohol abuse Brother     Cancer Maternal Aunt           Objective:   PHYSICAL EXAM  VITAL SIGNS: Pulse 90   Temp 36.4 °C (97.5 °F) (Temporal)   Resp 16   Ht 1.626 m (5' 4\")   Wt 84 kg (185 lb 3.2 oz)   SpO2 91%   BMI 31.79 kg/m²  BP: 160/78    Gen: no acute distress, normal voice  Skin: dry, intact, moist mucosal membranes  Eyes: No conjunctival injection " b/l  Neck: Normal range of motion. No meningeal signs.   Lungs: No increased work of breathing.  CTAB w/ symmetric expansion  CV: RRR w/o murmurs or clicks  : No CVAT bilaterally  Psych: normal affect, normal judgement, alert, awake    UA: Presence of RBCs, WBCs and nitrates.    Assessment/Plan:     1. Acute cystitis with hematuria  cephALEXin (KEFLEX) 500 MG Cap    URINE CULTURE(NEW)      1)History and UA consistent with acute cystitis  -Ordered Keflex  -Ordered urine culture  -2 L H2O daily  - Return to urgent care any new/worsening symptoms or further questions or concerns.  Patient understood everything discussed.  All questions were answered.    2) /78.  Chronic issue.  She will need to follow-up with primary care for reevaluation continue management.    Differential diagnosis and supportive care discussed. Follow-up as needed if symptoms worsen or fail to improve to PCP, Urgent care or Emergency Room.    Please note that this dictation was created using voice recognition software. I have made a reasonable attempt to correct obvious errors, but I expect that there are errors of grammar and possibly content that I did not discover before finalizing the note.

## 2023-09-09 LAB
BACTERIA UR CULT: ABNORMAL
SIGNIFICANT IND 70042: ABNORMAL
SITE SITE: ABNORMAL
SOURCE SOURCE: ABNORMAL

## 2023-09-16 ENCOUNTER — OFFICE VISIT (OUTPATIENT)
Dept: URGENT CARE | Facility: PHYSICIAN GROUP | Age: 88
End: 2023-09-16
Payer: MEDICARE

## 2023-09-16 ENCOUNTER — HOSPITAL ENCOUNTER (OUTPATIENT)
Facility: MEDICAL CENTER | Age: 88
End: 2023-09-16
Attending: PHYSICIAN ASSISTANT
Payer: MEDICARE

## 2023-09-16 VITALS
DIASTOLIC BLOOD PRESSURE: 86 MMHG | BODY MASS INDEX: 31.58 KG/M2 | HEART RATE: 91 BPM | OXYGEN SATURATION: 94 % | TEMPERATURE: 98.1 F | SYSTOLIC BLOOD PRESSURE: 150 MMHG | RESPIRATION RATE: 16 BRPM | WEIGHT: 185 LBS | HEIGHT: 64 IN

## 2023-09-16 DIAGNOSIS — R30.0 DYSURIA: ICD-10-CM

## 2023-09-16 DIAGNOSIS — N39.0 URINARY TRACT INFECTION WITHOUT HEMATURIA, SITE UNSPECIFIED: ICD-10-CM

## 2023-09-16 LAB
APPEARANCE UR: NORMAL
BILIRUB UR STRIP-MCNC: NEGATIVE MG/DL
COLOR UR AUTO: NORMAL
GLUCOSE UR STRIP.AUTO-MCNC: NEGATIVE MG/DL
KETONES UR STRIP.AUTO-MCNC: NEGATIVE MG/DL
LEUKOCYTE ESTERASE UR QL STRIP.AUTO: NORMAL
NITRITE UR QL STRIP.AUTO: POSITIVE
PH UR STRIP.AUTO: 6 [PH] (ref 5–8)
PROT UR QL STRIP: 100 MG/DL
RBC UR QL AUTO: NORMAL
SP GR UR STRIP.AUTO: 1.02
UROBILINOGEN UR STRIP-MCNC: 0.2 MG/DL

## 2023-09-16 PROCEDURE — 99213 OFFICE O/P EST LOW 20 MIN: CPT | Performed by: PHYSICIAN ASSISTANT

## 2023-09-16 PROCEDURE — 3079F DIAST BP 80-89 MM HG: CPT | Performed by: PHYSICIAN ASSISTANT

## 2023-09-16 PROCEDURE — 87077 CULTURE AEROBIC IDENTIFY: CPT

## 2023-09-16 PROCEDURE — 87186 SC STD MICRODIL/AGAR DIL: CPT

## 2023-09-16 PROCEDURE — 81002 URINALYSIS NONAUTO W/O SCOPE: CPT | Performed by: PHYSICIAN ASSISTANT

## 2023-09-16 PROCEDURE — 87086 URINE CULTURE/COLONY COUNT: CPT

## 2023-09-16 PROCEDURE — 3077F SYST BP >= 140 MM HG: CPT | Performed by: PHYSICIAN ASSISTANT

## 2023-09-16 RX ORDER — PHENAZOPYRIDINE HYDROCHLORIDE 100 MG/1
100 TABLET, FILM COATED ORAL 3 TIMES DAILY PRN
Qty: 6 TABLET | Refills: 0 | Status: SHIPPED | OUTPATIENT
Start: 2023-09-16 | End: 2023-10-03

## 2023-09-16 RX ORDER — CEFDINIR 300 MG/1
300 CAPSULE ORAL 2 TIMES DAILY
Qty: 20 CAPSULE | Refills: 0 | Status: SHIPPED | OUTPATIENT
Start: 2023-09-16 | End: 2023-09-26

## 2023-09-16 ASSESSMENT — ENCOUNTER SYMPTOMS
FEVER: 0
BACK PAIN: 1
NAUSEA: 0
FLANK PAIN: 0
EYE DISCHARGE: 0
EYE REDNESS: 0
VOMITING: 0

## 2023-09-16 ASSESSMENT — FIBROSIS 4 INDEX: FIB4 SCORE: 2.55

## 2023-09-16 NOTE — PROGRESS NOTES
Subjective     Janessa Cain is a 88 y.o. female who presents with UTI (X 2 days)        UTI  This is a new problem. Episode onset: x 2 days ago. The problem occurs constantly. The problem has been gradually worsening. Associated symptoms include urinary symptoms (The patient reports associated dysuria, urinary frequency, and urinary urgency.  The patient states she is also experiencing bladder spasms.  The patient reports no hematuria.  She also reports no flank pain.). Pertinent negatives include no fever, nausea or vomiting. She has tried acetaminophen for the symptoms.     The patient reports no prior history of kidney stones.  The patient states she has a remote history of kidney infections many many years ago.    The patient states she was recently seen in clinic on 9/5/2023 for a UTI.  The patient states she was prescribed antibiotics at that time.  The patient states she completed the antibiotics as prescribed.  The patient states her symptoms improved while taking the antibiotic, but states they did not fully resolved.  The patient developed recurrent UTI-like symptoms x2 days ago, which has been gradually worsening.    PMH:  has a past medical history of 2019 novel coronavirus disease (COVID-19) (10/06/2021), COVID-19 virus infection (11/17/2021), Depression (6/2/2020), Edema, lower extremity (11/19/2019), Hardware complicating wound infection (HCC) (2/24/2022), Hypertension, Hypoalbuminemia (11/11/2019), Multiple open wounds of lower leg (11/3/2019), Nocturia (6/25/2021), Pneumonia, Rheumatic fever (1943), Shortness of breath (11/17/2021), and Urinary retention (11/13/2019).  MEDS:   Current Outpatient Medications:     valsartan-hydrochlorothiazide (DIOVAN-HCT) 160-12.5 MG per tablet, Take 1 Tablet by mouth every day., Disp: 90 Tablet, Rfl: 1    amLODIPine (NORVASC) 5 MG Tab, TAKE 1 TABLET BY MOUTH EVERY DAY, Disp: 90 Tablet, Rfl: 1    estradiol (ESTRACE) 0.5 MG tablet, Take 1 Tablet by  "mouth every day., Disp: 90 Tablet, Rfl: 1    DULoxetine (CYMBALTA) 30 MG Cap DR Particles, Take 1 Capsule by mouth every day., Disp: 90 Capsule, Rfl: 1    traZODone (DESYREL) 50 MG Tab, TAKE ONE TABLET BY MOUTH DAILY AS NEEDED FOR SLEEP, Disp: 90 Tablet, Rfl: 2    omeprazole (PRILOSEC) 20 MG delayed-release capsule, Take 1 Capsule by mouth every day., Disp: 30 Capsule, Rfl: 1    acetaminophen (TYLENOL) 325 MG Tab, Take 650 mg by mouth every four hours as needed., Disp: , Rfl:   ALLERGIES:   Allergies   Allergen Reactions    Apap-Fd&C Red #40 Al Diamond-Oxycodone     Percocet [Perloxx]     Hydrocodone Vomiting    Oxycodone     Tramadol      SURGHX:   Past Surgical History:   Procedure Laterality Date    PB TOTAL KNEE ARTHROPLASTY Right 2/28/2022    Procedure: ARTHROPLASTY, KNEE, TOTAL;  Surgeon: Paluine Mo M.D.;  Location: SURGERY [facility];  Service: Orthopedics    CA TOTAL HIP ARTHROPLASTY Right 11/6/2019    Procedure: ARTHROPLASTY, HIP, TOTAL;  Surgeon: Pauline Mo M.D.;  Location: SURGERY [facility] ER;  Service: Orthopedics    HIP ARTHROPLASTY TOTAL Left     \"resurfacing\"    TONSILLECTOMY       SOCHX:  reports that she quit smoking about 39 years ago. Her smoking use included cigarettes. She started smoking about 69 years ago. She has a 30.0 pack-year smoking history. She has never used smokeless tobacco. She reports current alcohol use of about 0.6 oz of alcohol per week. She reports that she does not use drugs.  FH: Family history was reviewed, no pertinent findings to report      Review of Systems   Constitutional:  Negative for fever.   Eyes:  Negative for discharge and redness.   Gastrointestinal:  Negative for nausea and vomiting.   Genitourinary:  Positive for dysuria, frequency and urgency. Negative for flank pain and hematuria.   Musculoskeletal:  Positive for back pain.              Objective     BP (!) 150/86 (BP Location: Right arm, Patient Position: Sitting, BP Cuff Size: Adult)   Pulse " "91   Temp 36.7 °C (98.1 °F) (Temporal)   Resp 16   Ht 1.626 m (5' 4\")   Wt 83.9 kg (185 lb)   SpO2 94%   BMI 31.76 kg/m²      Physical Exam  Constitutional:       General: She is not in acute distress.     Appearance: Normal appearance. She is well-developed. She is not ill-appearing.   HENT:      Head: Normocephalic and atraumatic.      Right Ear: External ear normal.      Left Ear: External ear normal.   Eyes:      Extraocular Movements: Extraocular movements intact.      Conjunctiva/sclera: Conjunctivae normal.   Cardiovascular:      Rate and Rhythm: Normal rate and regular rhythm.      Heart sounds: Normal heart sounds.   Pulmonary:      Effort: Pulmonary effort is normal. No respiratory distress.      Breath sounds: Normal breath sounds. No wheezing.   Abdominal:      Palpations: Abdomen is soft.      Tenderness: There is no abdominal tenderness. There is no right CVA tenderness or left CVA tenderness.   Musculoskeletal:         General: Normal range of motion.      Cervical back: Normal range of motion and neck supple.   Skin:     General: Skin is warm and dry.   Neurological:      Mental Status: She is alert and oriented to person, place, and time.             Progress:  Results for orders placed or performed in visit on 09/16/23   POCT Urinalysis   Result Value Ref Range    POC Color Light Yellow Negative    POC Appearance cloudy Negative    POC Glucose negative Negative mg/dL    POC Bilirubin negative Negative mg/dL    POC Ketones negative Negative mg/dL    POC Specific Gravity 1.020 <1.005 - >1.030    POC Blood moderate Negative    POC Urine PH 6.0 5.0 - 8.0    POC Protein 100 Negative mg/dL    POC Urobiligen 0.2 Negative (0.2) mg/dL    POC Nitrites positive Negative    POC Leukocyte Esterase large Negative       Urine Culture - pending     Reviewed the patient's recent urine culture from 9/5/2023, which was positive for bacterial infections caused by the bacteria E. coli and Klebsiella " pneumoniae.         Assessment & Plan      1. Dysuria  - POCT Urinalysis  - URINE CULTURE(NEW); Future  - phenazopyridine (PYRIDIUM) 100 MG Tab; Take 1 Tablet by mouth 3 times a day as needed for Moderate Pain.  Dispense: 6 Tablet; Refill: 0    2. Urinary tract infection without hematuria, site unspecified  - cefdinir (OMNICEF) 300 MG Cap; Take 1 Capsule by mouth 2 times a day for 10 days.  Dispense: 20 Capsule; Refill: 0  - CBC WITH DIFFERENTIAL; Future  - Basic Metabolic Panel; Future    The patient's presenting symptoms and physical exam findings are consistent with dysuria likely secondary to an acute urinary tract infection.  The patient's physical exam today in clinic was normal.  No CVA tenderness was appreciated.  The patient is nontoxic and appears in no acute distress.  The patient's vital signs are stable and within normal limits.  She is afebrile today in clinic.  The patient's POCT urinalysis today in clinic showed large leukocyte esterase, moderate blood, and positive nitrites.  We will culture the patient's urine to identify a likely bacterial source.  Reviewed the patient's recent urine culture from 9/5/2023, which was positive for bacterial infection caused by the bacteria E. coli and Klebsiella pneumonia with sensitivity to third-generation cephalosporins.  We will prescribe the patient cefdinir for her acute urinary tract infection.  Will also prescribe the patient Pyridium for symptomatic leaf of her dysuria.  Based on patient's presenting symptoms and physical exam findings, I have low clinical suspicion for acute pyelonephritis.  The patient's  is concerned about the patient's laboratory values given her recent urinary tract infections.  We will order a CBC and BMP to evaluate the patient's current symptoms.  Based on the patient's presenting symptoms and physical exam findings, I have low clinical suspicion for acute urosepsis.  Advised patient to monitor for worsening signs and or  symptoms.  Recommend OTC medications and supportive care for symptomatic management.  Recommend the patient follow-up with her PCP as needed.  Discussed return precautions with the patient, and she verbalized understanding.    Differential diagnoses, supportive care, and indications for immediate follow-up discussed with patient.   Instructed to return to clinic or nearest emergency department for any change in condition, further concerns, or worsening of symptoms.    OTC Tylenol or Motrin for fever/discomfort.  Drink plenty of fluids  Follow-up with PCP  Return to clinic or go to the ED if symptoms worsen or fail to improve, or if the patient should develop worsening/increasing urinary symptoms, hematuria, flank pain, abdominal pain, nausea/vomiting, fever/chills, and/or any concerning symptoms.    Discussed plan with the patient, and she agrees to the above.     I personally reviewed prior external notes and test results pertinent to today's visit.  I have independently reviewed and interpreted all diagnostics ordered during this urgent care visit.     Please note that this dictation was created using voice recognition software. I have made every reasonable attempt to correct obvious errors, but I expect that there may be errors of grammar and possibly content that I did not discover before finalizing the note.     This note was electronically signed by Margarita Fleming PA-C

## 2023-09-17 DIAGNOSIS — R30.0 DYSURIA: ICD-10-CM

## 2023-09-19 LAB
APPEARANCE UR: NORMAL
BACTERIA UR CULT: ABNORMAL
BACTERIA UR CULT: ABNORMAL
BILIRUB UR STRIP-MCNC: NORMAL MG/DL
COLOR UR AUTO: NORMAL
GLUCOSE UR STRIP.AUTO-MCNC: NORMAL MG/DL
KETONES UR STRIP.AUTO-MCNC: NORMAL MG/DL
LEUKOCYTE ESTERASE UR QL STRIP.AUTO: NORMAL
NITRITE UR QL STRIP.AUTO: NORMAL
PH UR STRIP.AUTO: 6 [PH] (ref 5–8)
PROT UR QL STRIP: 30 MG/DL
RBC UR QL AUTO: NORMAL
SIGNIFICANT IND 70042: ABNORMAL
SITE SITE: ABNORMAL
SOURCE SOURCE: ABNORMAL
SP GR UR STRIP.AUTO: 1.01
UROBILINOGEN UR STRIP-MCNC: 1 MG/DL

## 2023-10-03 ENCOUNTER — OFFICE VISIT (OUTPATIENT)
Dept: INTERNAL MEDICINE | Facility: OTHER | Age: 88
End: 2023-10-03
Payer: MEDICARE

## 2023-10-03 VITALS
TEMPERATURE: 97.4 F | OXYGEN SATURATION: 94 % | HEIGHT: 64 IN | BODY MASS INDEX: 30.66 KG/M2 | DIASTOLIC BLOOD PRESSURE: 80 MMHG | WEIGHT: 179.6 LBS | SYSTOLIC BLOOD PRESSURE: 123 MMHG | HEART RATE: 89 BPM

## 2023-10-03 DIAGNOSIS — Z23 NEED FOR INFLUENZA VACCINATION: ICD-10-CM

## 2023-10-03 DIAGNOSIS — D75.89 MACROCYTOSIS WITHOUT ANEMIA: ICD-10-CM

## 2023-10-03 DIAGNOSIS — F43.22 ADJUSTMENT DISORDER WITH ANXIETY: ICD-10-CM

## 2023-10-03 DIAGNOSIS — M25.472 ANKLE SWELLING, LEFT: ICD-10-CM

## 2023-10-03 DIAGNOSIS — F10.90 ALCOHOL USE DISORDER: ICD-10-CM

## 2023-10-03 DIAGNOSIS — K21.9 GASTROESOPHAGEAL REFLUX DISEASE WITHOUT ESOPHAGITIS: ICD-10-CM

## 2023-10-03 DIAGNOSIS — I10 PRIMARY HYPERTENSION: ICD-10-CM

## 2023-10-03 PROBLEM — M79.662 PAIN AND SWELLING OF LEFT LOWER LEG: Status: RESOLVED | Noted: 2023-06-12 | Resolved: 2023-10-03

## 2023-10-03 PROBLEM — M79.89 PAIN AND SWELLING OF LEFT LOWER LEG: Status: RESOLVED | Noted: 2023-06-12 | Resolved: 2023-10-03

## 2023-10-03 PROBLEM — Z79.890 HORMONE REPLACEMENT THERAPY: Status: RESOLVED | Noted: 2022-01-20 | Resolved: 2023-10-03

## 2023-10-03 PROBLEM — M17.11 ARTHRITIS OF KNEE, RIGHT: Status: RESOLVED | Noted: 2022-02-28 | Resolved: 2023-10-03

## 2023-10-03 PROBLEM — E87.1 HYPONATREMIA: Status: RESOLVED | Noted: 2019-11-03 | Resolved: 2023-10-03

## 2023-10-03 PROBLEM — R25.2 MUSCLE CRAMPS: Status: RESOLVED | Noted: 2020-06-18 | Resolved: 2023-10-03

## 2023-10-03 PROBLEM — M25.559 ARTHRALGIA OF HIP: Status: RESOLVED | Noted: 2019-11-21 | Resolved: 2023-10-03

## 2023-10-03 PROBLEM — M86.9 OSTEOMYELITIS HIP (HCC): Status: RESOLVED | Noted: 2019-11-21 | Resolved: 2023-10-03

## 2023-10-03 PROBLEM — R35.1 NOCTURIA: Status: RESOLVED | Noted: 2021-06-25 | Resolved: 2023-10-03

## 2023-10-03 PROBLEM — F51.01 PRIMARY INSOMNIA: Status: RESOLVED | Noted: 2020-07-23 | Resolved: 2023-10-03

## 2023-10-03 PROBLEM — M17.11 UNILATERAL PRIMARY OSTEOARTHRITIS, RIGHT KNEE: Status: RESOLVED | Noted: 2021-08-24 | Resolved: 2023-10-03

## 2023-10-03 PROBLEM — N95.1 HOT FLASHES DUE TO MENOPAUSE: Status: RESOLVED | Noted: 2020-06-02 | Resolved: 2023-10-03

## 2023-10-03 PROBLEM — R05.9 COUGH: Status: RESOLVED | Noted: 2021-11-17 | Resolved: 2023-10-03

## 2023-10-03 PROCEDURE — 99214 OFFICE O/P EST MOD 30 MIN: CPT | Mod: 25 | Performed by: INTERNAL MEDICINE

## 2023-10-03 PROCEDURE — 3079F DIAST BP 80-89 MM HG: CPT | Performed by: INTERNAL MEDICINE

## 2023-10-03 PROCEDURE — 3074F SYST BP LT 130 MM HG: CPT | Performed by: INTERNAL MEDICINE

## 2023-10-03 PROCEDURE — G0008 ADMIN INFLUENZA VIRUS VAC: HCPCS | Performed by: INTERNAL MEDICINE

## 2023-10-03 PROCEDURE — 90662 IIV NO PRSV INCREASED AG IM: CPT | Performed by: INTERNAL MEDICINE

## 2023-10-03 ASSESSMENT — FIBROSIS 4 INDEX: FIB4 SCORE: 2.55

## 2023-10-03 NOTE — PROGRESS NOTES
date  Chief Complaint   Patient presents with    Follow-Up     Patient is going on a trip and would like to have medication for UTI, left foot and ankle are swollen      Medication Refill     On all meds        HISTORY OF PRESENT ILLNESS: Patient is a 88 y.o. female established patient who presents today for the following.  Recently had recurrent urinary tract infections and was given antibiotics and doing well.  She has been off of estradiol tablets and doing fine without any postmenopausal hot flashes.  Sexually active.    1. Need for influenza vaccination  Interested in getting flu shot today.    2. Alcohol use disorder  Continues to drink on average 2-3 whiskey shots on daily basis at night per .  Patient denies having any history of alcohol withdrawal seizures.  Denies having any trouble with balance.    3. Primary hypertension  Has been taking her amlodipine and valsartan HCTZ as prescribed.  Denies headaches or dizziness or blurry vision.  But does have some visual changes noted.  Of time and has an ophthalmologist.    4. Gastroesophageal reflux disease without esophagitis  Continues to take omeprazole on daily basis.  Denies having bloody stools or black stools.    5. Adjustment disorder with anxiety  Chronically on duloxetine 30 mg once a day.  Denies having any problems with worsening anxiety or depression.    6. Ankle swelling, left  Chronic issue with swelling in her left ankles and in the past had ultrasound and DVTs ruled out    7. Macrocytosis without anemia  Chronic issue with macrocytosis secondary to alcohol use.  Patient isno anemia      Past Medical History:   Diagnosis Date    2019 novel coronavirus disease (COVID-19) 10/06/2021    COVID-19 virus infection 11/17/2021    Depression 6/2/2020 02/25/22 Resolved per problem list and pt.    Edema, lower extremity 11/19/2019    Hardware complicating wound infection (HCC) 2/24/2022    Hypertension     Hypoalbuminemia 11/11/2019    Multiple  open wounds of lower leg 11/3/2019    Nocturia 2021    Pneumonia     Viral simon 30 yrs ago    Rheumatic fever 1943    Shortness of breath 2021    Urinary retention 2019     IMO load 2020       Patient Active Problem List    Diagnosis Date Noted    Gastroesophageal reflux disease without esophagitis 10/03/2023    At risk for falling 2023    Alcohol use disorder 2022    S/P total knee arthroplasty, right 2022    Pulmonary hypertension (HCC) 2021    Adjustment disorder with anxiety 10/07/2021    Asymptomatic menopausal state 2021    Seasonal allergic rhinitis 2020    Other fracture of right patella, sequela 2020    Vitamin D insufficiency 2019    AVN (avascular necrosis of bone) (HCC) 2019    Status post total replacement of right hip 2019    Impaired mobility and ADLs 2019    Primary hypertension 2019    Degenerative joint disease of right hip 2019       Allergies:Apap-fd&c red #40 [place]-oxycodone, Percocet [perloxx], Hydrocodone, Oxycodone, and Tramadol    Current Outpatient Medications   Medication Sig Dispense Refill    valsartan-hydrochlorothiazide (DIOVAN-HCT) 160-12.5 MG per tablet Take 1 Tablet by mouth every day. 90 Tablet 1    amLODIPine (NORVASC) 5 MG Tab TAKE 1 TABLET BY MOUTH EVERY DAY 90 Tablet 1    DULoxetine (CYMBALTA) 30 MG Cap DR Particles Take 1 Capsule by mouth every day. 90 Capsule 1    omeprazole (PRILOSEC) 20 MG delayed-release capsule Take 1 Capsule by mouth every day. 30 Capsule 1    acetaminophen (TYLENOL) 325 MG Tab Take 650 mg by mouth every four hours as needed.       No current facility-administered medications for this visit.       Social History     Tobacco Use    Smoking status: Former     Current packs/day: 0.00     Average packs/day: 1 pack/day for 30.0 years (30.0 ttl pk-yrs)     Types: Cigarettes     Start date:      Quit date:      Years since quittin.7    Smokeless  "tobacco: Never   Vaping Use    Vaping Use: Never used   Substance Use Topics    Alcohol use: Yes     Alcohol/week: 0.6 oz     Types: 1 Glasses of wine per week     Comment: 1 drink every day    Drug use: Never       Family History   Problem Relation Age of Onset    Alcohol abuse Mother     Heart Disease Mother     Heart Disease Brother     Alcohol abuse Brother     Cancer Maternal Aunt          Review of Systems    Patient denies Fevers/chills/nausea/vomiting/chest pain/sob/blood in stools/black stools/blood in urine.all other systems are reviewed See HPI    Exam:  /80 (BP Location: Left arm, Patient Position: Sitting, BP Cuff Size: Adult)   Pulse 89   Temp 36.3 °C (97.4 °F) (Temporal)   Ht 1.626 m (5' 4\")   Wt 81.5 kg (179 lb 9.6 oz)   SpO2 94%  Body mass index is 30.83 kg/m².  Constitutional:  NAD, well appearing.  HEENT:   NC/AT  Cardiovascular: RRR.   No m/r/g. No carotid bruits.       Lungs:   CTAB, no w/r/r, no respiratory distress.  Abdomen: Soft, NT/ND + BS, no masses, no suprapubic tenderness, no hepatomegaly.  Extremities:  2+ DP and radial pulses bilaterally.  No c/c/e.  Skin:  Warm and dry.    Neurologic: Alert & oriented x 3, CN II-XII grossly intact, strength and sensation grossly intact.  No focal deficits noted.  Psychiatric:  Affect normal, mood normal, judgment normal.    Assessment/Plan:     1. Need for influenza vaccination  Flu shot given today in the office  - INFLUENZA VACCINE, HIGH DOSE (65+ ONLY)    2. Alcohol use disorder  Discussed about need to cut down alcohol and advised to follow through on labs to check on her liver function as well as blood counts and vitamin levels.  - VITAMIN B1; Future  - VITAMIN B12; Future  - VITAMIN B6; Future  - CBC WITH DIFFERENTIAL; Future  - Comp Metabolic Panel; Future    3. Primary hypertension  Patient has been taking her valsartan HCTZ as well as amlodipine and doing well.  No changes in medication.  Low-salt diet and exercise advised    4. " Gastroesophageal reflux disease without esophagitis  Advised to cut down the use of omeprazole to alternate days and cut down alcohol intake.  Educational handout given in regards to food choices that increase acid reflux.  Check vitamin B12 level  - VITAMIN B12; Future  - VITAMIN B6; Future    5. Adjustment disorder with anxiety  Currently on duloxetine 30 mg on daily basis and will taper off of this medication when she comes back from her trip.    6. Ankle swelling, left  Currently no calf tenderness so advised to use compression stockings cut down salt elevate the leg to drain the fluid and appears from question of arthritis as well    7. Macrocytosis without anemia  Discussed about cutting down alcohol and staying away from it we will reassess with CBC  - CBC WITH DIFFERENTIAL; Future      All imaging results and lab results and consult notes are reviewed at this visit.  Followup: Return in about 3 months (around 1/3/2024).    Please note that this dictation was created using voice recognition software. I have made every reasonable attempt to correct obvious errors, but I expect that there are errors of grammar and possibly content that I did not discover before finalizing the note.

## 2023-10-31 RX ORDER — DULOXETIN HYDROCHLORIDE 30 MG/1
30 CAPSULE, DELAYED RELEASE ORAL DAILY
Qty: 90 CAPSULE | Refills: 1 | Status: SHIPPED | OUTPATIENT
Start: 2023-10-31 | End: 2024-01-24

## 2023-11-17 NOTE — DISCHARGE PLANNING
Anticipated Discharge Disposition: [facility]    Action: Discussed pt in morning rounds with charge RN Ritu. LSW explained that [facility] accepted and PT recommended outpt PT. DPA to follow up with [facility]. LSW to follow up with pt for d/c planning either home vs rehab.    Per DPA, [facility] will still take pt.    LSW spoke with pt at bedside. Pt requested to go to [facility]. LSW notified DPA.    Per DPA, [facility] will pick pt up at 1200. Per chart review, d/c summary in place by surgeon.     Barriers to Discharge: None    Plan: LSW to complete transfer packet.    1013: LSW messaged Dr. Claire for verbal consent for [facility]. LSW requested Dr. Claire place updated d/c summary as it has been more than 24hrs. (d/c summary placed 3/1 at 0604).     1100: LSW met with pt at bedside. Pt gave verbal consent to transfer. LSW placed completed transfer packet in pt's chart. LSW notified bedside RN and charge RN that pt is [facility] 1200 to [facility].      Chief Complaint   Patient presents with    Sore Throat    Cough     Covid test negative

## 2023-11-28 RX ORDER — AMLODIPINE BESYLATE 5 MG/1
5 TABLET ORAL DAILY
Qty: 90 TABLET | Refills: 0 | Status: SHIPPED | OUTPATIENT
Start: 2023-11-28 | End: 2024-01-24 | Stop reason: SINTOL

## 2023-11-28 NOTE — TELEPHONE ENCOUNTER
Received request via: Pharmacy    Was the patient seen in the last year in this department? Yes    Does the patient have an active prescription (recently filled or refills available) for medication(s) requested?  yes    Does the patient have residential Plus and need 100 day supply (blood pressure, diabetes and cholesterol meds only)? Patient does not have SCP

## 2023-11-29 ENCOUNTER — PATIENT MESSAGE (OUTPATIENT)
Dept: HEALTH INFORMATION MANAGEMENT | Facility: OTHER | Age: 88
End: 2023-11-29

## 2024-01-04 ENCOUNTER — TELEPHONE (OUTPATIENT)
Dept: INTERNAL MEDICINE | Facility: OTHER | Age: 89
End: 2024-01-04
Payer: MEDICARE

## 2024-01-05 NOTE — TELEPHONE ENCOUNTER
Patient left a voicemail stating that her amlodipine is making her feet and ankles swell. Patient asked if he could get a call back with next steps

## 2024-01-05 NOTE — TELEPHONE ENCOUNTER
Pls let pt know to stop amlodipine and also come for a follow up. We will increase HCTZ dose in her combo med

## 2024-01-05 NOTE — TELEPHONE ENCOUNTER
Phone Number Called: [phone] (home)      Call outcome: Did not leave a detailed message. Requested patient to call back.    Message: I called patient lvm needs to d/c medication Amlodipine. Dr Elise will  increase Hctz  needs an appointment to discuss new medication intake. I will route note to  to schedule patient.

## 2024-01-22 ENCOUNTER — TELEPHONE (OUTPATIENT)
Dept: HEALTH INFORMATION MANAGEMENT | Facility: OTHER | Age: 89
End: 2024-01-22
Payer: MEDICARE

## 2024-01-24 ENCOUNTER — OFFICE VISIT (OUTPATIENT)
Dept: INTERNAL MEDICINE | Facility: OTHER | Age: 89
End: 2024-01-24
Payer: MEDICARE

## 2024-01-24 VITALS
OXYGEN SATURATION: 91 % | TEMPERATURE: 97.7 F | HEIGHT: 64 IN | WEIGHT: 193.4 LBS | DIASTOLIC BLOOD PRESSURE: 103 MMHG | SYSTOLIC BLOOD PRESSURE: 196 MMHG | BODY MASS INDEX: 33.02 KG/M2 | HEART RATE: 97 BPM

## 2024-01-24 DIAGNOSIS — G47.30 SLEEP APNEA, UNSPECIFIED TYPE: ICD-10-CM

## 2024-01-24 DIAGNOSIS — R22.43 LOCALIZED SWELLING OF BOTH LOWER LEGS: ICD-10-CM

## 2024-01-24 DIAGNOSIS — I10 PRIMARY HYPERTENSION: ICD-10-CM

## 2024-01-24 PROCEDURE — 3077F SYST BP >= 140 MM HG: CPT

## 2024-01-24 PROCEDURE — 99214 OFFICE O/P EST MOD 30 MIN: CPT | Mod: GC

## 2024-01-24 PROCEDURE — 3080F DIAST BP >= 90 MM HG: CPT

## 2024-01-24 RX ORDER — VALSARTAN AND HYDROCHLOROTHIAZIDE 160; 12.5 MG/1; MG/1
1 TABLET, FILM COATED ORAL DAILY
Qty: 90 TABLET | Refills: 1 | Status: SHIPPED | OUTPATIENT
Start: 2024-01-24 | End: 2024-01-24

## 2024-01-24 RX ORDER — VALSARTAN 320 MG/1
320 TABLET ORAL DAILY
Qty: 30 TABLET | Refills: 3 | Status: SHIPPED | OUTPATIENT
Start: 2024-01-24 | End: 2024-01-31

## 2024-01-24 ASSESSMENT — PATIENT HEALTH QUESTIONNAIRE - PHQ9: CLINICAL INTERPRETATION OF PHQ2 SCORE: 0

## 2024-01-24 ASSESSMENT — FIBROSIS 4 INDEX: FIB4 SCORE: 2.55

## 2024-01-24 NOTE — PROGRESS NOTES
Established Patient    NIKOLAI DELCID is a 88 y.o. female who presents today with the following Chief Complaint(s): Follow up for Diagnoses of Localized swelling of both lower legs, Primary hypertension, and Sleep apnea, unspecified type were pertinent to this visit.    HPI:  Swollen leg:   Patient reported that both of her legs had become swollen about 8-9 days ago. At that time she called the office and was instructed to stop taking the Amlodipine. She stopped taking it but swelling in legs continued. Swelling located at both legs from feet up to knees. She denied any pain, any rashes/scrapes, fevers/chills, shortness of breath, etc. She said she is able to lay flat at night and only uses one pillow. She had tried elevating her legs with some relief and said that she had been trying some leg exercises as well.     Elevated Blood Pressure:   Patient hadn't checked home blood pressure since stopping amlodipine. She was no longer taking the Diovan and wasn't sure why it had been stopped. She does have a home BP cuff and was willing to start checking it at home again. She denied any chest pain, shortness of breath, vision changes, headaches-- other than bilateral swollen legs ROS negative.     Stops breathing when sleeping:   During ROS  reported that patient did stop breathing during sleep frequently, and more often lately than she used to. He said that she does snore and that her snoring would stop during the episodes before resuming a few seconds later. He said she doesn't wake up during episodes. Patient said she didn't know about episodes and said that she does feel refreshed when she wakes up in the morning.     ROS: As per HPI. Additional pertinent systems as noted below.  Constitutional: Negative for chills and fever.   Respiratory: Negative for cough, wheezing and shortness of breath.    Cardiovascular: Negative for chest pain, palpitations, nocturnal dyspnea, orthopnea. Positive for  "bilateral leg swelling.   Gastrointestinal: Negative for abdominal pain, constipation, diarrhea, heartburn, nausea and vomiting.   Genitourinary: Negative for dysuria, flank pain and hematuria.   Musculoskeletal: Negative for falls and myalgias.   Skin: Negative for itching and rash.   Neurological: Negative for dizziness, seizures, loss of consciousness and headaches.       Past Medical History:   Diagnosis Date     novel coronavirus disease (COVID-19) 10/06/2021    COVID-19 virus infection 2021    Depression 22 Resolved per problem list and pt.    Edema, lower extremity 2019    Hardware complicating wound infection (HCC) 2022    Hypertension     Hypoalbuminemia 2019    Multiple open wounds of lower leg 11/3/2019    Nocturia 2021    Pneumonia     Viral simon 30 yrs ago    Rheumatic fever 1943    Shortness of breath 2021    Urinary retention 2019     IMO load 2020     Social History     Tobacco Use    Smoking status: Former     Current packs/day: 0.00     Average packs/day: 1 pack/day for 30.0 years (30.0 ttl pk-yrs)     Types: Cigarettes     Start date:      Quit date:      Years since quittin.0    Smokeless tobacco: Never   Vaping Use    Vaping Use: Never used   Substance Use Topics    Alcohol use: Yes     Alcohol/week: 0.6 oz     Types: 1 Glasses of wine per week     Comment: 1 drink every day    Drug use: Never     Current Outpatient Medications   Medication Sig Dispense Refill    valsartan (DIOVAN) 320 MG tablet Take 1 Tablet by mouth every day. 30 Tablet 3    acetaminophen (TYLENOL) 325 MG Tab Take 650 mg by mouth every four hours as needed.       No current facility-administered medications for this visit.       Physical Exam  BP (!) 196/103 (BP Location: Left arm, Patient Position: Sitting, BP Cuff Size: Adult)   Pulse 97   Temp 36.5 °C (97.7 °F) (Temporal)   Ht 1.626 m (5' 4\")   Wt 87.7 kg (193 lb 6.4 oz)   SpO2 91%   BMI " 33.20 kg/m²   182/92,     General:  Alert and oriented, No apparent distress.    Lungs: Clear to auscultation. No wheezes, rales, or rhonchi.     Cardiovascular: Regular rate and rhythm. No murmurs, rubs or gallops.    Abdomen:  Regular bowel sounds, nontender, no rebound or guarding noted.    Extremities: No clubbing, cyanosis, edema. 2+ pitting edema bilat lower extremities up to knees with L>R (chronically L>R)     Neuro: Aox4, strength 5/5 bilat upper and lower extremities.       Assessment and Plan:   1. Localized swelling of both lower legs  Ddx suspect secondary to no longer taking Diovan in addition to possible side-effect of Amlodipine. However, have concern for kidney, liver, cardiac fx in setting of uncontrolled HTN.   -Less likely   Plan:   -Started valsartan (DIOVAN) 320 MG tablet; Take 1 Tablet by mouth every day.  Dispense: 30 Tablet; Refill: 3  -Recommended stop taking amlodipine   - Comp Metabolic Panel; Future to assess kidney/liver fx   - proBrain Natriuretic Peptide, NT; Future to assess cardiac fx   - URINE PROTEIN; Future to assess kidney fx     2. Primary hypertension  -Started valsartan (DIOVAN) 320 MG tablet; Take 1 Tablet by mouth every day.  Dispense: 30 Tablet; Refill: 3  -Advised to monitor and record pressures daily and follow up in one week for medication adjustments.   -Advised goal is 120s/70s.   -Risks associated with uncontrolled htn discussed and instructed patient to present to emergency department if any symptoms of chest pain, shortness of breath, vision changes, or headaches.   - Comp Metabolic Panel; Future  - proBrain Natriuretic Peptide, NT; Future  - URINE PROTEIN; Future  - valsartan (DIOVAN) 320 MG tablet; Take 1 Tablet by mouth every day.  Dispense: 30 Tablet; Refill: 3    3. Sleep apnea, unspecified type  -Per pt's  patient stops breathing at night. Concern for sleep apnea.   Plan:   - Referral to Pulmonary and Sleep Medicine      Follow up: 1 week     Dawson  MIA Messina. PGY 2  [facility]

## 2024-01-31 ENCOUNTER — OFFICE VISIT (OUTPATIENT)
Dept: INTERNAL MEDICINE | Facility: OTHER | Age: 89
End: 2024-01-31
Payer: MEDICARE

## 2024-01-31 VITALS
BODY MASS INDEX: 33.32 KG/M2 | WEIGHT: 195.2 LBS | DIASTOLIC BLOOD PRESSURE: 106 MMHG | TEMPERATURE: 97.3 F | HEIGHT: 64 IN | SYSTOLIC BLOOD PRESSURE: 181 MMHG | OXYGEN SATURATION: 90 % | HEART RATE: 98 BPM

## 2024-01-31 DIAGNOSIS — I10 PRIMARY HYPERTENSION: ICD-10-CM

## 2024-01-31 PROCEDURE — 3077F SYST BP >= 140 MM HG: CPT

## 2024-01-31 PROCEDURE — 99214 OFFICE O/P EST MOD 30 MIN: CPT | Mod: GC

## 2024-01-31 PROCEDURE — 3080F DIAST BP >= 90 MM HG: CPT

## 2024-01-31 RX ORDER — VALSARTAN AND HYDROCHLOROTHIAZIDE 320; 25 MG/1; MG/1
1 TABLET, FILM COATED ORAL DAILY
Qty: 30 TABLET | Refills: 3 | Status: SHIPPED | OUTPATIENT
Start: 2024-01-31

## 2024-01-31 ASSESSMENT — ENCOUNTER SYMPTOMS
COUGH: 0
HEADACHES: 0
EYES NEGATIVE: 1
DIZZINESS: 0
NAUSEA: 0
CONSTIPATION: 0
VOMITING: 0
PALPITATIONS: 0
DIARRHEA: 0
ABDOMINAL PAIN: 0
HEARTBURN: 0
GASTROINTESTINAL NEGATIVE: 1
SHORTNESS OF BREATH: 0

## 2024-01-31 ASSESSMENT — FIBROSIS 4 INDEX: FIB4 SCORE: 2.55

## 2024-01-31 NOTE — PATIENT INSTRUCTIONS
Get labs done today after lunch.  Repeat labwork next Tuesday/Wednesday (2/6 or 7) and return 2/8/24 to review labs and blood pressure.    Start valsartan 320-hydrochlorothiazide 25 daily  Continue to log blood pressures    See handout for symptoms that would require immediate attention.

## 2024-01-31 NOTE — PROGRESS NOTES
Established Patient    Patient Care Team:  Janel Elise M.D. as PCP - General (Internal Medicine)    HPI:  NIKOLAI DELCID is a 88 y.o. female with relevant past medical history of hypertension, chronic bilateral lower extremity edema, alcohol use, pulmonary hypertension who presents today who presents today to check her blood pressure.  At her last visit, amlodipine was stopped due to concerns of lower extremity edema and she has noticed an improvement from last week.  She is previously on valsartan hydrochlorothiazide 160/12.5 and amlodipine 5.  Over the last 5 days she is taking valsartan 320 on its own.  Home blood pressure log shows (from oldest to latest) 170/97, 163/86, 131/93, 166/79 (today).  She states that she takes these blood pressures in the morning after a cup of coffee and taking her valsartan.  At her last visit on the 24th, she was asked to get lab work to monitor her kidney function and electrolytes status.  Labs completed last year were normal.  She denies headache, visual changes, eye pain, chest pain, shortness of breath, or any other signs of acute damage from hypertension.  She still has 3+ bilateral lower extremity pitting edema up to her knee.  She continues to have 2-3 drinks of whiskey each night.  She understands that alcohol contributes to hypertension.    Additionally, patient will need an annual wellness visit after her blood pressure trends toward normal limits.    Review of Systems   Constitutional:  Negative for malaise/fatigue.   Eyes: Negative.    Respiratory:  Negative for cough and shortness of breath.    Cardiovascular:  Positive for leg swelling. Negative for chest pain and palpitations.   Gastrointestinal: Negative.  Negative for abdominal pain, constipation, diarrhea, heartburn, nausea and vomiting.   Genitourinary: Negative.  Negative for dysuria and urgency.   Neurological:  Negative for dizziness and headaches.   :    Past Medical History:   Diagnosis Date     "2019 novel coronavirus disease (COVID-19) 10/06/2021    COVID-19 virus infection 2021    Depression 22 Resolved per problem list and pt.    Edema, lower extremity 2019    Hardware complicating wound infection (HCC) 2022    Hypertension     Hypoalbuminemia 2019    Multiple open wounds of lower leg 11/3/2019    Nocturia 2021    Pneumonia     Viral simon 30 yrs ago    Rheumatic fever 1943    Shortness of breath 2021    Urinary retention 2019     IMO load 2020     Social History     Tobacco Use    Smoking status: Former     Current packs/day: 0.00     Average packs/day: 1 pack/day for 30.0 years (30.0 ttl pk-yrs)     Types: Cigarettes     Start date:      Quit date:      Years since quittin.1    Smokeless tobacco: Never   Vaping Use    Vaping Use: Never used   Substance Use Topics    Alcohol use: Yes     Alcohol/week: 0.6 oz     Types: 1 Glasses of wine per week     Comment: 1 drink every day    Drug use: Never     Current Outpatient Medications   Medication Sig Dispense Refill    valsartan (DIOVAN) 320 MG tablet Take 1 Tablet by mouth every day. 30 Tablet 3    acetaminophen (TYLENOL) 325 MG Tab Take 650 mg by mouth every four hours as needed.       No current facility-administered medications for this visit.       Physical Exam:  BP (!) 181/106 (BP Location: Left arm, Patient Position: Sitting, BP Cuff Size: Adult)   Pulse 98   Temp 36.3 °C (97.3 °F) (Temporal)   Ht 1.626 m (5' 4\")   Wt 88.5 kg (195 lb 3.2 oz)   SpO2 90%   BMI 33.51 kg/m²   Physical Exam  Vitals reviewed.   Constitutional:       General: She is not in acute distress.     Appearance: Normal appearance. She is normal weight.   HENT:      Head: Normocephalic and atraumatic.      Mouth/Throat:      Mouth: Mucous membranes are moist.      Pharynx: Oropharynx is clear. No posterior oropharyngeal erythema.   Eyes:      Extraocular Movements: Extraocular movements intact.      " Conjunctiva/sclera: Conjunctivae normal.      Pupils: Pupils are equal, round, and reactive to light.   Cardiovascular:      Rate and Rhythm: Normal rate and regular rhythm.      Pulses: Normal pulses.      Heart sounds: Normal heart sounds.   Pulmonary:      Effort: Pulmonary effort is normal.      Breath sounds: Normal breath sounds.   Abdominal:      General: Abdomen is flat. Bowel sounds are normal.      Palpations: Abdomen is soft.      Tenderness: There is no abdominal tenderness.   Musculoskeletal:         General: No tenderness. Normal range of motion.      Cervical back: Normal range of motion and neck supple. No tenderness.      Right lower leg: 3+ Edema present.      Left lower leg: 3+ Edema present.      Comments: Dry skin, red color patient reports unchanged from previous viisits   Lymphadenopathy:      Cervical: No cervical adenopathy.   Skin:     General: Skin is warm and dry.      Findings: No bruising or rash.   Neurological:      General: No focal deficit present.      Mental Status: She is alert and oriented to person, place, and time.      Sensory: No sensory deficit.      Motor: No weakness.   Psychiatric:         Mood and Affect: Mood normal.         Behavior: Behavior normal.         Thought Content: Thought content normal.         Judgment: Judgment normal.           Assessment and Plan:   #Hypertension, uncontrolled  Patient has had several changes to her medication regimen in part to mitigate some of the lower extremity edema.  She was placed on valsartan without hydrochlorothiazide last week.  Pitting edema bilateral lower extremities 3+ up to her knees.  - Switching to valsartan/hydrochlorothiazide 320/25  - Hoping that by adding back diuretic her pitting edema will improve in addition to her blood pressure  - She is to get labs done today after her appointment  - Continue to monitor blood pressure at home  - Obtain follow-up BMP on Tuesday or Wednesday of next week to monitor kidney  function with new medication.  Then follow-up for blood pressure check on Thursday.  - Patient has agreed to try drinking less, will try to drink no more than 1 whiskey a night  - Reviewed red flag symptoms of uncontrolled hypertension.    #Annual wellness visit  Patient is aware that she is due for an annual wellness visit. This will be scheduled once her blood pressure stabilizes.    Suzy Garcia M.D., PGY-1 Internal Medicine  [facility]    This note was created using voice recognition software.  While every attempt is made to ensure accuracy of transcription, occasionally errors occur.

## 2024-02-02 ENCOUNTER — TELEPHONE (OUTPATIENT)
Dept: INTERNAL MEDICINE | Facility: OTHER | Age: 89
End: 2024-02-02
Payer: MEDICARE

## 2024-02-02 NOTE — TELEPHONE ENCOUNTER
Janessa was supposed to get her labs done after our visit this week. No labs seen in her chart, and none found on [facility] and [facility] websites. Spoke with Janessa on the phone. She said she will complete them today.

## 2024-02-05 ENCOUNTER — HOSPITAL ENCOUNTER (OUTPATIENT)
Dept: LAB | Facility: MEDICAL CENTER | Age: 89
End: 2024-02-05
Attending: INTERNAL MEDICINE
Payer: MEDICARE

## 2024-02-05 ENCOUNTER — HOSPITAL ENCOUNTER (OUTPATIENT)
Dept: LAB | Facility: MEDICAL CENTER | Age: 89
End: 2024-02-05
Payer: MEDICARE

## 2024-02-05 DIAGNOSIS — K21.9 GASTROESOPHAGEAL REFLUX DISEASE WITHOUT ESOPHAGITIS: ICD-10-CM

## 2024-02-05 DIAGNOSIS — F10.90 ALCOHOL USE DISORDER: ICD-10-CM

## 2024-02-05 DIAGNOSIS — I10 PRIMARY HYPERTENSION: ICD-10-CM

## 2024-02-05 DIAGNOSIS — D75.89 MACROCYTOSIS WITHOUT ANEMIA: ICD-10-CM

## 2024-02-05 LAB
ALBUMIN SERPL BCP-MCNC: 4 G/DL (ref 3.2–4.9)
ALBUMIN SERPL BCP-MCNC: 4 G/DL (ref 3.2–4.9)
ALBUMIN/GLOB SERPL: 1.3 G/DL
ALBUMIN/GLOB SERPL: 1.4 G/DL
ALP SERPL-CCNC: 106 U/L (ref 30–99)
ALP SERPL-CCNC: 108 U/L (ref 30–99)
ALT SERPL-CCNC: 23 U/L (ref 2–50)
ALT SERPL-CCNC: 24 U/L (ref 2–50)
ANION GAP SERPL CALC-SCNC: 10 MMOL/L (ref 7–16)
ANION GAP SERPL CALC-SCNC: 13 MMOL/L (ref 7–16)
AST SERPL-CCNC: 26 U/L (ref 12–45)
AST SERPL-CCNC: 27 U/L (ref 12–45)
BASOPHILS # BLD AUTO: 0.9 % (ref 0–1.8)
BASOPHILS # BLD: 0.04 K/UL (ref 0–0.12)
BILIRUB SERPL-MCNC: 1.2 MG/DL (ref 0.1–1.5)
BILIRUB SERPL-MCNC: 1.2 MG/DL (ref 0.1–1.5)
BUN SERPL-MCNC: 15 MG/DL (ref 8–22)
BUN SERPL-MCNC: 15 MG/DL (ref 8–22)
CALCIUM ALBUM COR SERPL-MCNC: 9.3 MG/DL (ref 8.5–10.5)
CALCIUM ALBUM COR SERPL-MCNC: 9.4 MG/DL (ref 8.5–10.5)
CALCIUM SERPL-MCNC: 9.3 MG/DL (ref 8.5–10.5)
CALCIUM SERPL-MCNC: 9.4 MG/DL (ref 8.5–10.5)
CHLORIDE SERPL-SCNC: 101 MMOL/L (ref 96–112)
CHLORIDE SERPL-SCNC: 102 MMOL/L (ref 96–112)
CO2 SERPL-SCNC: 28 MMOL/L (ref 20–33)
CO2 SERPL-SCNC: 30 MMOL/L (ref 20–33)
CREAT SERPL-MCNC: 0.86 MG/DL (ref 0.5–1.4)
CREAT SERPL-MCNC: 0.88 MG/DL (ref 0.5–1.4)
EOSINOPHIL # BLD AUTO: 0.13 K/UL (ref 0–0.51)
EOSINOPHIL NFR BLD: 3 % (ref 0–6.9)
ERYTHROCYTE [DISTWIDTH] IN BLOOD BY AUTOMATED COUNT: 48 FL (ref 35.9–50)
GFR SERPLBLD CREATININE-BSD FMLA CKD-EPI: 64 ML/MIN/1.73 M 2
GFR SERPLBLD CREATININE-BSD FMLA CKD-EPI: 65 ML/MIN/1.73 M 2
GLOBULIN SER CALC-MCNC: 2.9 G/DL (ref 1.9–3.5)
GLOBULIN SER CALC-MCNC: 3 G/DL (ref 1.9–3.5)
GLUCOSE SERPL-MCNC: 100 MG/DL (ref 65–99)
GLUCOSE SERPL-MCNC: 100 MG/DL (ref 65–99)
HCT VFR BLD AUTO: 50.1 % (ref 37–47)
HGB BLD-MCNC: 16.7 G/DL (ref 12–16)
IMM GRANULOCYTES # BLD AUTO: 0.01 K/UL (ref 0–0.11)
IMM GRANULOCYTES NFR BLD AUTO: 0.2 % (ref 0–0.9)
LYMPHOCYTES # BLD AUTO: 0.95 K/UL (ref 1–4.8)
LYMPHOCYTES NFR BLD: 22.2 % (ref 22–41)
MCH RBC QN AUTO: 34.5 PG (ref 27–33)
MCHC RBC AUTO-ENTMCNC: 33.3 G/DL (ref 32.2–35.5)
MCV RBC AUTO: 103.5 FL (ref 81.4–97.8)
MONOCYTES # BLD AUTO: 0.46 K/UL (ref 0–0.85)
MONOCYTES NFR BLD AUTO: 10.7 % (ref 0–13.4)
NEUTROPHILS # BLD AUTO: 2.69 K/UL (ref 1.82–7.42)
NEUTROPHILS NFR BLD: 63 % (ref 44–72)
NRBC # BLD AUTO: 0 K/UL
NRBC BLD-RTO: 0 /100 WBC (ref 0–0.2)
NT-PROBNP SERPL IA-MCNC: 1168 PG/ML (ref 0–125)
PLATELET # BLD AUTO: 209 K/UL (ref 164–446)
PMV BLD AUTO: 11 FL (ref 9–12.9)
POTASSIUM SERPL-SCNC: 4.1 MMOL/L (ref 3.6–5.5)
POTASSIUM SERPL-SCNC: 4.1 MMOL/L (ref 3.6–5.5)
PROT SERPL-MCNC: 6.9 G/DL (ref 6–8.2)
PROT SERPL-MCNC: 7 G/DL (ref 6–8.2)
PROT UR-MCNC: 13 MG/DL (ref 0–15)
RBC # BLD AUTO: 4.84 M/UL (ref 4.2–5.4)
SODIUM SERPL-SCNC: 142 MMOL/L (ref 135–145)
SODIUM SERPL-SCNC: 142 MMOL/L (ref 135–145)
VIT B12 SERPL-MCNC: 292 PG/ML (ref 211–911)
WBC # BLD AUTO: 4.3 K/UL (ref 4.8–10.8)

## 2024-02-05 PROCEDURE — 36415 COLL VENOUS BLD VENIPUNCTURE: CPT

## 2024-02-05 PROCEDURE — 80053 COMPREHEN METABOLIC PANEL: CPT | Mod: 91

## 2024-02-05 PROCEDURE — 82607 VITAMIN B-12: CPT

## 2024-02-05 PROCEDURE — 84156 ASSAY OF PROTEIN URINE: CPT

## 2024-02-05 PROCEDURE — 80053 COMPREHEN METABOLIC PANEL: CPT

## 2024-02-05 PROCEDURE — 85025 COMPLETE CBC W/AUTO DIFF WBC: CPT

## 2024-02-05 PROCEDURE — 84425 ASSAY OF VITAMIN B-1: CPT

## 2024-02-05 PROCEDURE — 84207 ASSAY OF VITAMIN B-6: CPT

## 2024-02-05 PROCEDURE — 83880 ASSAY OF NATRIURETIC PEPTIDE: CPT

## 2024-02-07 LAB — VIT B6 SERPL-MCNC: 22.4 NMOL/L (ref 20–125)

## 2024-02-08 ENCOUNTER — OFFICE VISIT (OUTPATIENT)
Dept: INTERNAL MEDICINE | Facility: OTHER | Age: 89
End: 2024-02-08
Payer: MEDICARE

## 2024-02-08 ENCOUNTER — APPOINTMENT (OUTPATIENT)
Dept: INTERNAL MEDICINE | Facility: OTHER | Age: 89
End: 2024-02-08
Payer: MEDICARE

## 2024-02-08 VITALS — DIASTOLIC BLOOD PRESSURE: 93 MMHG | SYSTOLIC BLOOD PRESSURE: 177 MMHG | HEART RATE: 94 BPM | TEMPERATURE: 97.9 F

## 2024-02-08 DIAGNOSIS — R79.89 ELEVATED BRAIN NATRIURETIC PEPTIDE (BNP) LEVEL: ICD-10-CM

## 2024-02-08 DIAGNOSIS — I10 UNCONTROLLED HYPERTENSION: ICD-10-CM

## 2024-02-08 DIAGNOSIS — R60.9 PITTING EDEMA: ICD-10-CM

## 2024-02-08 LAB — VIT B1 BLD-MCNC: 88 NMOL/L (ref 70–180)

## 2024-02-08 PROCEDURE — 3077F SYST BP >= 140 MM HG: CPT

## 2024-02-08 PROCEDURE — 3080F DIAST BP >= 90 MM HG: CPT

## 2024-02-08 PROCEDURE — 99214 OFFICE O/P EST MOD 30 MIN: CPT | Mod: GC

## 2024-02-08 RX ORDER — SPIRONOLACTONE 25 MG/1
25 TABLET ORAL DAILY
Qty: 60 TABLET | Refills: 1 | Status: SHIPPED | OUTPATIENT
Start: 2024-02-08 | End: 2024-02-14

## 2024-02-08 ASSESSMENT — ENCOUNTER SYMPTOMS
EYE DISCHARGE: 0
FEVER: 0
LOSS OF CONSCIOUSNESS: 0
DIZZINESS: 0
DOUBLE VISION: 0
PHOTOPHOBIA: 0
SHORTNESS OF BREATH: 0
HEADACHES: 0
EYE REDNESS: 0
BLURRED VISION: 0
GASTROINTESTINAL NEGATIVE: 1
CLAUDICATION: 0
COUGH: 0
NEUROLOGICAL NEGATIVE: 1
ORTHOPNEA: 0
WHEEZING: 0
PND: 0
EYE PAIN: 0
MUSCULOSKELETAL NEGATIVE: 1
CHILLS: 0

## 2024-02-08 NOTE — PROGRESS NOTES
Established Patient    Patient Care Team:  Janel Elise M.D. as PCP - General (Internal Medicine)    HPI:  NIKOLAI DELCID is a 88 y.o. female here today for blood pressure check after adding 25 mg hydrochlorothiazide to her medication management.  Her blood pressure remains uncontrolled.  Today, blood pressure 170s/90s consistently (taking 3 times both arms used).  Patient denies headaches, dizziness, shortness of breath, chest pain, visual changes, changes in urinary habits.  Her blood pressure at the last visit was 180/100s.  Her lab work remarkable for leukopenia 4.3, hemoglobin 16.7, hematocrit 50.1, .5, alkaline phosphatase 108, BNP 1168.  Her BNP has been elevated the last 3 times that has been checked.  In November 2019 BNP was 167, then in November 2021 it was 420.  Her labs from 2/5/2024 showed marked increase in the BNP.  She denies orthopnea, dyspnea on exertion, PND but continues to have 3+ pitting edema in her lower extremities.    She continues to drink 2 whiskeys every night.  She has been recording her blood pressure daily but did not bring the records to this visit.  She cannot recall what readings she has been getting at home.      Review of Systems   Constitutional:  Negative for chills, fever and malaise/fatigue.   Eyes:  Negative for blurred vision, double vision, photophobia, pain, discharge and redness.   Respiratory:  Negative for cough, shortness of breath and wheezing.    Cardiovascular:  Positive for leg swelling. Negative for chest pain, orthopnea, claudication and PND.   Gastrointestinal: Negative.    Genitourinary: Negative.    Musculoskeletal: Negative.    Skin:  Negative for itching and rash.   Neurological: Negative.  Negative for dizziness, loss of consciousness and headaches.   :    Past Medical History:   Diagnosis Date    2019 novel coronavirus disease (COVID-19) 10/06/2021    COVID-19 virus infection 11/17/2021    Depression 6/2/2020 02/25/22 Resolved  per problem list and pt.    Edema, lower extremity 2019    Hardware complicating wound infection (HCC) 2022    Hypertension     Hypoalbuminemia 2019    Multiple open wounds of lower leg 11/3/2019    Nocturia 2021    Pneumonia     Viral ismon 30 yrs ago    Rheumatic fever 1943    Shortness of breath 2021    Urinary retention 2019     IMO load 2020     Social History     Tobacco Use    Smoking status: Former     Current packs/day: 0.00     Average packs/day: 1 pack/day for 30.0 years (30.0 ttl pk-yrs)     Types: Cigarettes     Start date:      Quit date:      Years since quittin.1    Smokeless tobacco: Never   Vaping Use    Vaping Use: Never used   Substance Use Topics    Alcohol use: Yes     Alcohol/week: 0.6 oz     Types: 1 Glasses of wine per week     Comment: 1 drink every day    Drug use: Never     Current Outpatient Medications   Medication Sig Dispense Refill    valsartan-hydrochlorothiazide (DIOVAN-HCT) 320-25 MG per tablet Take 1 Tablet by mouth every day. 30 Tablet 3    acetaminophen (TYLENOL) 325 MG Tab Take 650 mg by mouth every four hours as needed.       No current facility-administered medications for this visit.       Physical Exam:  There were no vitals taken for this visit.  Physical Exam  Constitutional:       Appearance: Normal appearance. She is obese.   Cardiovascular:      Rate and Rhythm: Normal rate and regular rhythm.      Pulses: Normal pulses.      Heart sounds: Normal heart sounds.   Pulmonary:      Effort: Pulmonary effort is normal. No respiratory distress.      Breath sounds: Normal breath sounds. No stridor. No wheezing, rhonchi or rales.   Musculoskeletal:      Right lower leg: Tenderness present. No deformity or lacerations. 3+ Pitting Edema present.      Left lower leg: Tenderness present. No deformity or lacerations. 4+ Pitting Edema present.      Right ankle: Swelling present.      Left ankle: Swelling present.      Right foot:  Swelling present.      Left foot: Swelling present.   Neurological:      General: No focal deficit present.      Mental Status: She is alert and oriented to person, place, and time.           Assessment and Plan:   #Hypertension, uncontrolled  #Elevated BNP  #Bilateral lower extremity pitting edema  Patient has been taking valsartan hydrochlorothiazide 320-25 daily.  No evidence of significant kidney injury with the addition of hydrochlorothiazide.  Her blood pressure today is 177/93.  Minimal improvement from 180s/100s at last visit last week.  She cannot recall what her home blood pressure readings have been but she does take it daily.  The significant elevation in her BNP above 1100 raises concern for heart failure.  Patient denies orthopnea, PND, dyspnea on exertion.  Significant lower extremity pitting edema bilaterally (4+ on left, 3+ on right).  - Recommended decreasing whiskey intake to no more than 1 drink at night  - Start spironolactone 25 mg daily (potassium within normal limits on recent labs)  - Continue valsartan-hydrochlorothiazide 320-25 daily  - Continue recording blood pressure daily.  Bring records to next visit  - Ordered echo  - Recommended compression stockings and keeping legs elevated    Patient is scheduled for her annual wellness visit on 2/14/2024 with Dr. Arik Garcia M.D., PGY-1 Internal Medicine  [facility]    This note was created using voice recognition software.  While every attempt is made to ensure accuracy of transcription, occasionally errors occur.

## 2024-02-08 NOTE — PATIENT INSTRUCTIONS
-Obtain compression socks to help with swelling  -Make sure you are cutting down on salt and alcohol. Cut down to no more than one drink of whiskey a night.  -Start spironolactone 25 mg. This is a medication that helps with blood pressure and swelling.  -Continue valsartan-hydrochlorothiazide 320-25 mg.

## 2024-02-14 ENCOUNTER — OFFICE VISIT (OUTPATIENT)
Dept: INTERNAL MEDICINE | Facility: OTHER | Age: 89
End: 2024-02-14
Payer: MEDICARE

## 2024-02-14 VITALS
OXYGEN SATURATION: 90 % | DIASTOLIC BLOOD PRESSURE: 90 MMHG | BODY MASS INDEX: 33.46 KG/M2 | WEIGHT: 196 LBS | HEIGHT: 64 IN | HEART RATE: 98 BPM | SYSTOLIC BLOOD PRESSURE: 162 MMHG | TEMPERATURE: 97.5 F

## 2024-02-14 DIAGNOSIS — R60.9 PITTING EDEMA: ICD-10-CM

## 2024-02-14 DIAGNOSIS — H35.30 MACULAR DEGENERATION OF BOTH EYES, UNSPECIFIED TYPE: ICD-10-CM

## 2024-02-14 DIAGNOSIS — I10 UNCONTROLLED HYPERTENSION: ICD-10-CM

## 2024-02-14 DIAGNOSIS — M87.00 AVN (AVASCULAR NECROSIS OF BONE) (HCC): ICD-10-CM

## 2024-02-14 DIAGNOSIS — E66.9 OBESITY (BMI 30-39.9): ICD-10-CM

## 2024-02-14 PROCEDURE — 3080F DIAST BP >= 90 MM HG: CPT | Performed by: INTERNAL MEDICINE

## 2024-02-14 PROCEDURE — 99214 OFFICE O/P EST MOD 30 MIN: CPT | Performed by: INTERNAL MEDICINE

## 2024-02-14 PROCEDURE — 3077F SYST BP >= 140 MM HG: CPT | Performed by: INTERNAL MEDICINE

## 2024-02-14 RX ORDER — SPIRONOLACTONE 50 MG/1
50 TABLET, FILM COATED ORAL DAILY
Qty: 30 TABLET | Refills: 1 | Status: SHIPPED | OUTPATIENT
Start: 2024-02-14 | End: 2024-02-14

## 2024-02-14 RX ORDER — SPIRONOLACTONE 50 MG/1
50 TABLET, FILM COATED ORAL DAILY
Qty: 100 TABLET | Refills: 1 | Status: SHIPPED | OUTPATIENT
Start: 2024-02-14

## 2024-02-14 ASSESSMENT — FIBROSIS 4 INDEX: FIB4 SCORE: 2.32

## 2024-02-14 ASSESSMENT — PAIN SCALES - GENERAL: PAINLEVEL: NO PAIN

## 2024-02-14 NOTE — TELEPHONE ENCOUNTER
Received request via: Pharmacy insurance request 100 qty    Was the patient seen in the last year in this department? Yes    Does the patient have an active prescription (recently filled or refills available) for medication(s) requested?  yes    Pharmacy Name: [facility]    Does the patient have snf Plus and need 100 day supply (blood pressure, diabetes and cholesterol meds only)? Yes, quantity updated to 100 days

## 2024-02-15 ENCOUNTER — TELEPHONE (OUTPATIENT)
Dept: HEALTH INFORMATION MANAGEMENT | Facility: OTHER | Age: 89
End: 2024-02-15

## 2024-02-15 NOTE — PROGRESS NOTES
date  Chief Complaint   Patient presents with    Hypertension     Patient here htn       HISTORY OF PRESENT ILLNESS: Patient is a 88 y.o. female established patient who presents today along with her  for the following.      1. Uncontrolled hypertension  2. Pitting edema  Patient continues to have elevated blood pressure but recently stopped drinking.  He denies having any tremors.  Taking her blood pressure medication as prescribed.  Continues to have swelling in her bilateral lower extremities for months.  She did gain weight in the last 4 months.  Also not very active or exercising.   helps out mostly.  Denies having headaches or dizziness or blurry vision    3. Macular degeneration of both eyes, unspecified type  Has been following up with eye doctor and has no new complaints    4. AVN (avascular necrosis of bone) ([facility])  Prior history status post surgery.    5. Obesity (BMI 30-39.9)  Not interested in nutrition consult at this time but trying to watch her diet and minimal exercise but planning to go to the gym.      Past Medical History:   Diagnosis Date    2019 novel coronavirus disease (COVID-19) 10/06/2021    COVID-19 virus infection 11/17/2021    Depression 6/2/2020 02/25/22 Resolved per problem list and pt.    Edema, lower extremity 11/19/2019    Hardware complicating wound infection (HCC) 2/24/2022    Hypertension     Hypoalbuminemia 11/11/2019    Multiple open wounds of lower leg 11/3/2019    Nocturia 6/25/2021    Pneumonia     Viral simon 30 yrs ago    Rheumatic fever 1943    Shortness of breath 11/17/2021    Urinary retention 11/13/2019     IMO load March 2020       Patient Active Problem List    Diagnosis Date Noted    Macular degeneration of both eyes 02/14/2024    Obesity (BMI 30-39.9) 02/14/2024    Elevated brain natriuretic peptide (BNP) level 02/08/2024    Gastroesophageal reflux disease without esophagitis 10/03/2023    At risk for falling 06/12/2023    Alcohol use disorder  2022    S/P total knee arthroplasty, right 2022    Pulmonary hypertension (HCC) 2021    Adjustment disorder with anxiety 10/07/2021    Asymptomatic menopausal state 2021    Seasonal allergic rhinitis 2020    Other fracture of right patella, sequela 2020    Vitamin D insufficiency 2019    AVN (avascular necrosis of bone) (HCC) 2019    Status post total replacement of right hip 2019    Impaired mobility and ADLs 2019    Primary hypertension 2019    Degenerative joint disease of right hip 2019       Allergies:Apap-fd&c red #40 [place] lake-oxycodone, Percocet [perloxx], Hydrocodone, Oxycodone, and Tramadol    Current Outpatient Medications   Medication Sig Dispense Refill    valsartan-hydrochlorothiazide (DIOVAN-HCT) 320-25 MG per tablet Take 1 Tablet by mouth every day. 30 Tablet 3    acetaminophen (TYLENOL) 325 MG Tab Take 650 mg by mouth every four hours as needed.      spironolactone (ALDACTONE) 50 MG Tab TAKE 1 TABLET BY MOUTH EVERY  Tablet 1     No current facility-administered medications for this visit.       Social History     Tobacco Use    Smoking status: Former     Current packs/day: 0.00     Average packs/day: 1 pack/day for 30.0 years (30.0 ttl pk-yrs)     Types: Cigarettes     Start date:      Quit date:      Years since quittin.1    Smokeless tobacco: Never   Vaping Use    Vaping Use: Never used   Substance Use Topics    Alcohol use: Yes     Alcohol/week: 0.6 oz     Types: 1 Glasses of wine per week     Comment: 1 drink every day    Drug use: Never       Family History   Problem Relation Age of Onset    Alcohol abuse Mother     Heart Disease Mother     Heart Disease Brother     Alcohol abuse Brother     Cancer Maternal Aunt          Review of Systems   Patient denies Fevers/chills/nausea/vomiting/chest pain/sob/blood in stools/black stools/blood in urine.all other systems are reviewed See HPI    Exam:  BP (!) 162/90  "(BP Location: Left arm, Patient Position: Sitting, BP Cuff Size: Adult long)   Pulse 98   Temp 36.4 °C (97.5 °F) (Temporal)   Ht 1.626 m (5' 4\")   Wt 88.9 kg (196 lb)   SpO2 90%  Body mass index is 33.64 kg/m².  Constitutional:  NAD, well appearing.  HEENT:   NC/AT  Cardiovascular: RRR.   No m/r/g. No carotid bruits.       Lungs:   CTAB, no w/r/r, no respiratory distress.  Abdomen: Soft, NT/ND + BS, no masses, no suprapubic tenderness, no hepatomegaly.  Extremities:  2+ DP and radial pulses bilaterally.  No c/c but 3+ pitting edema bilateral lower extremity   skin:  Warm and dry.    Neurologic: Alert & oriented x 3, CN II-XII grossly intact, strength and sensation grossly intact.  No focal deficits noted.  Psychiatric:  Affect normal, mood normal, judgment normal.    Assessment/Plan:     1. Uncontrolled hypertension  2. Pitting edema  We discussed about increasing Aldactone to 50 mg once a day and continue on her other prescriptions which is valsartan HCTZ along with low-salt diet and exercise.  Elevate legs when sitting.  Cut down salt completely in the diet.  Compression stockings advised  - Basic Metabolic Panel; Future    3. Macular degeneration of both eyes, unspecified type  Continue follow-up with eye doctor    4. AVN (avascular necrosis of bone) (HCC)  Patient advised to increase weightbearing exercises to improve bone strength.    5. Obesity (BMI 30-39.9)  Continue diet and exercise  - Patient identified as having weight management issue.  Appropriate orders and counseling given.      All imaging results and lab results and consult notes are reviewed at this visit.  Followup: Return in about 5 weeks (around 3/20/2024).    Please note that this dictation was created using voice recognition software. I have made every reasonable attempt to correct obvious errors, but I expect that there are errors of grammar and possibly content that I did not discover before finalizing the note.     "

## 2024-02-28 ENCOUNTER — TELEPHONE (OUTPATIENT)
Dept: INTERNAL MEDICINE | Facility: OTHER | Age: 89
End: 2024-02-28
Payer: MEDICARE

## 2024-02-28 NOTE — TELEPHONE ENCOUNTER
Pt left a vm on the front machine stating she Is being treated for extreme swelling in her legs and feet and its not getting better.  She asked to speak to an MA>

## 2024-02-29 ENCOUNTER — HOSPITAL ENCOUNTER (OUTPATIENT)
Dept: CARDIOLOGY | Facility: MEDICAL CENTER | Age: 89
End: 2024-02-29
Payer: MEDICARE

## 2024-02-29 DIAGNOSIS — R79.89 ELEVATED BRAIN NATRIURETIC PEPTIDE (BNP) LEVEL: ICD-10-CM

## 2024-02-29 DIAGNOSIS — I10 UNCONTROLLED HYPERTENSION: ICD-10-CM

## 2024-02-29 LAB
LV EJECT FRACT  99904: 60
LV EJECT FRACT MOD 2C 99903: 60
LV EJECT FRACT MOD 4C 99902: 58.99
LV EJECT FRACT MOD BP 99901: 60.64

## 2024-02-29 PROCEDURE — 93306 TTE W/DOPPLER COMPLETE: CPT | Mod: 26 | Performed by: INTERNAL MEDICINE

## 2024-02-29 PROCEDURE — 93306 TTE W/DOPPLER COMPLETE: CPT

## 2024-02-29 NOTE — TELEPHONE ENCOUNTER
Phone Number Called: There are no phone numbers on file.     Call outcome: Did not leave a detailed message. Requested patient to call back.    Message: I called patient lvm to call back.

## 2024-02-29 NOTE — TELEPHONE ENCOUNTER
Phone Number Called: There are no phone numbers on file.     Call outcome: Left detailed message for patient. Informed to call back with any additional questions.    Message: I called patient lvm to direct to Urgent care or ER. I will try to get her in clinic tomorrow. I will route this message to Dr Elise  and Dr Rascon for review.

## 2024-02-29 NOTE — TELEPHONE ENCOUNTER
Called patient in cross coverage, patient did not  the phone, left detailed voicemail specifying I had reviewed Dr. Elise's most recent note which indicated a treatment plan for leg swelling, advised any significant worsening of the swelling, particularly with other symptoms such as shortness of breath, chest pain, or trouble breathing when lying flat, patient should go to the emergency room or urgent care for evaluation.  Advised if swelling is similar to prior and no other symptoms would be reasonable to make an appointment tomorrow or soon to be evaluated in clinic, was unable to comment further as I have not seen the patient myself and I am unfamiliar with the patient's clinical situation.  I have CCed Dr. Elise for follow up when she returns to the office.

## 2024-03-06 ENCOUNTER — OFFICE VISIT (OUTPATIENT)
Dept: INTERNAL MEDICINE | Facility: OTHER | Age: 89
End: 2024-03-06
Payer: MEDICARE

## 2024-03-06 VITALS
BODY MASS INDEX: 32.27 KG/M2 | SYSTOLIC BLOOD PRESSURE: 149 MMHG | TEMPERATURE: 97.7 F | WEIGHT: 189 LBS | HEIGHT: 64 IN | HEART RATE: 85 BPM | DIASTOLIC BLOOD PRESSURE: 92 MMHG | OXYGEN SATURATION: 91 %

## 2024-03-06 DIAGNOSIS — I10 PRIMARY HYPERTENSION: ICD-10-CM

## 2024-03-06 DIAGNOSIS — R60.9 PITTING EDEMA: ICD-10-CM

## 2024-03-06 PROCEDURE — 3077F SYST BP >= 140 MM HG: CPT | Performed by: INTERNAL MEDICINE

## 2024-03-06 PROCEDURE — 99214 OFFICE O/P EST MOD 30 MIN: CPT | Performed by: INTERNAL MEDICINE

## 2024-03-06 PROCEDURE — 3079F DIAST BP 80-89 MM HG: CPT | Performed by: INTERNAL MEDICINE

## 2024-03-06 RX ORDER — AMLODIPINE BESYLATE 5 MG/1
5 TABLET ORAL DAILY
COMMUNITY
End: 2024-03-06

## 2024-03-06 RX ORDER — FUROSEMIDE 20 MG/1
20 TABLET ORAL
Qty: 30 TABLET | Refills: 1 | Status: SHIPPED | OUTPATIENT
Start: 2024-03-06

## 2024-03-06 RX ORDER — DULOXETIN HYDROCHLORIDE 30 MG/1
30 CAPSULE, DELAYED RELEASE ORAL DAILY
COMMUNITY

## 2024-03-06 ASSESSMENT — PAIN SCALES - GENERAL: PAINLEVEL: NO PAIN

## 2024-03-06 ASSESSMENT — FIBROSIS 4 INDEX
FIB4 SCORE: 2.32
FIB4 SCORE: 2.32

## 2024-03-06 NOTE — PATIENT INSTRUCTIONS
Stop Amlodipine and start Furosemide 20 mg once a day in the morning.  Compression stockings daily.

## 2024-03-07 NOTE — PROGRESS NOTES
date  Chief Complaint   Patient presents with    Edema     Patient here for edema-holter       HISTORY OF PRESENT ILLNESS: Patient is a 88 y.o. female established patient who presents today along with her  for the following.      1. Pitting edema  2.  Primary hypertension  She has tried using compression stockings first little time and noticed some pressure and so stopped using them.  States she has been elevating her legs when resting.  Has cut down alcohol use as well as salt.  The swelling went a little down but continues to be there and come back when she is dangling her legs.  She states she has been taking her blood pressure medications as prescribed which is amlodipine, spironolactone, valsartan HCTZ.  No other complaints at this time.  No orthopnea, PND, dyspnea on exertion but she does not exercise much either      Past Medical History:   Diagnosis Date    2019 novel coronavirus disease (COVID-19) 10/06/2021    COVID-19 virus infection 11/17/2021    Depression 6/2/2020 02/25/22 Resolved per problem list and pt.    Edema, lower extremity 11/19/2019    Hardware complicating wound infection (HCC) 2/24/2022    Hypertension     Hypoalbuminemia 11/11/2019    Multiple open wounds of lower leg 11/3/2019    Nocturia 6/25/2021    Pneumonia     Viral simon 30 yrs ago    Rheumatic fever 1943    Shortness of breath 11/17/2021    Urinary retention 11/13/2019     IMO load March 2020       Patient Active Problem List    Diagnosis Date Noted    Macular degeneration of both eyes 02/14/2024    Obesity (BMI 30-39.9) 02/14/2024    Elevated brain natriuretic peptide (BNP) level 02/08/2024    Gastroesophageal reflux disease without esophagitis 10/03/2023    At risk for falling 06/12/2023    Alcohol use disorder 12/19/2022    S/P total knee arthroplasty, right 03/03/2022    Pulmonary hypertension (HCC) 11/17/2021    Adjustment disorder with anxiety 10/07/2021    Asymptomatic menopausal state 01/22/2021    Seasonal allergic  rhinitis 2020    Other fracture of right patella, sequela 2020    Vitamin D insufficiency 2019    AVN (avascular necrosis of bone) (HCC) 2019    Status post total replacement of right hip 2019    Impaired mobility and ADLs 2019    Primary hypertension 2019    Degenerative joint disease of right hip 2019       Allergies:Apap-fd&c red #40 [place]-oxycodone, Percocet [perloxx], Hydrocodone, Oxycodone, and Tramadol    Current Outpatient Medications   Medication Sig Dispense Refill    DULoxetine (CYMBALTA) 30 MG Cap DR Particles Take 30 mg by mouth every day.      furosemide (LASIX) 20 MG Tab Take 1 Tablet by mouth every morning with breakfast. 30 Tablet 1    spironolactone (ALDACTONE) 50 MG Tab TAKE 1 TABLET BY MOUTH EVERY  Tablet 1    valsartan-hydrochlorothiazide (DIOVAN-HCT) 320-25 MG per tablet Take 1 Tablet by mouth every day. 30 Tablet 3    acetaminophen (TYLENOL) 325 MG Tab Take 650 mg by mouth every four hours as needed.       No current facility-administered medications for this visit.       Social History     Tobacco Use    Smoking status: Former     Current packs/day: 0.00     Average packs/day: 1 pack/day for 30.0 years (30.0 ttl pk-yrs)     Types: Cigarettes     Start date:      Quit date:      Years since quittin.2    Smokeless tobacco: Never   Vaping Use    Vaping Use: Never used   Substance Use Topics    Alcohol use: Yes     Alcohol/week: 0.6 oz     Types: 1 Glasses of wine per week     Comment: 1 drink every day    Drug use: Never       Family History   Problem Relation Age of Onset    Alcohol abuse Mother     Heart Disease Mother     Heart Disease Brother     Alcohol abuse Brother     Cancer Maternal Aunt          Review of Systems   Patient denies Fevers/chills/nausea/vomiting/chest pain/sob/blood in stools/black stools/blood in urine.all other systems are reviewed See HPI    Exam:  BP (!) 149/92 (BP Location: Right arm, Patient  "Position: Sitting, BP Cuff Size: Adult long)   Pulse 85   Temp 36.5 °C (97.7 °F) (Temporal)   Ht 1.626 m (5' 4\")   Wt 85.7 kg (189 lb)   SpO2 91%  Body mass index is 32.44 kg/m².  Constitutional:  NAD, well appearing.  HEENT:   NC/AT  Cardiovascular: RRR.   No m/r/g. No carotid bruits.       Lungs:   CTAB, no w/r/r, no respiratory distress.  Abdomen: Soft, NT/ND + BS, no masses, no suprapubic tenderness, no hepatomegaly.  Extremities:  2+ DP and radial pulses bilaterally.  Significant 3+ pitting pedal edema bilateral lower extremities with stasis dermatitis all the way up to the knee.  Skin:  Warm and dry.    Neurologic: Alert & oriented x 3, CN II-XII grossly intact, strength and sensation grossly intact.  No focal deficits noted.  Psychiatric:  Affect normal, mood normal, judgment normal.    Assessment/Plan:     1. Pitting edema  2.  Primary hypertension  Given her swelling in the legs,talked about stopping amlodipine completely and add furosemide 20 mg once a day in the morning.  She is already on Aldactone and valsartan but will check potassium level before next visit.  Continue low-salt diet and exercise and cut down alcohol intake.  She has Medicare annual wellness in 2 weeks so we will reassess at that time  - furosemide (LASIX) 20 MG Tab; Take 1 Tablet by mouth every morning with breakfast.  Dispense: 30 Tablet; Refill: 1  - Comp Metabolic Panel; Future      All imaging results and lab results and consult notes are reviewed at this visit.  Followup: Return if symptoms worsen or fail to improve.    Please note that this dictation was created using voice recognition software. I have made every reasonable attempt to correct obvious errors, but I expect that there are errors of grammar and possibly content that I did not discover before finalizing the note.      "

## 2024-03-27 ENCOUNTER — APPOINTMENT (OUTPATIENT)
Dept: INTERNAL MEDICINE | Facility: OTHER | Age: 89
End: 2024-03-27
Payer: MEDICARE

## 2024-05-15 ENCOUNTER — APPOINTMENT (OUTPATIENT)
Dept: INTERNAL MEDICINE | Facility: OTHER | Age: 89
End: 2024-05-15
Payer: MEDICARE

## 2024-05-23 NOTE — PROGRESS NOTES
Pt called and asked for Laura to call her please in regards to her mother. I tried to help her but she said she just wanted to speak to Laura. Thanks    Received report from AMARJIT Adan. Assumed care of patient. Patient is currently resting in bed with  at bedside. Patient states spasms to back at this time. See MAR for medication given. Patient's right foot visualized. Wound open to air and dry to the touch. Plan of care reviewed. VSS. No additional needs at this time. Hourly rounding in place. Bed locked and in lowest position with call light within reach. Patient instructed to call for assistance before attempting to mobilize on her own. Patient verbalized understanding.

## 2024-06-24 ENCOUNTER — APPOINTMENT (OUTPATIENT)
Dept: INTERNAL MEDICINE | Facility: OTHER | Age: 89
End: 2024-06-24
Payer: MEDICARE

## 2024-06-24 VITALS
BODY MASS INDEX: 30.9 KG/M2 | TEMPERATURE: 98.3 F | HEIGHT: 64 IN | OXYGEN SATURATION: 86 % | SYSTOLIC BLOOD PRESSURE: 111 MMHG | WEIGHT: 181 LBS | DIASTOLIC BLOOD PRESSURE: 65 MMHG | HEART RATE: 83 BPM

## 2024-06-24 DIAGNOSIS — L57.0 AK (ACTINIC KERATOSIS): ICD-10-CM

## 2024-06-24 DIAGNOSIS — M79.89 SWELLING OF LOWER EXTREMITY: ICD-10-CM

## 2024-06-24 DIAGNOSIS — Z00.01 ENCOUNTER FOR MEDICARE ANNUAL EXAMINATION WITH ABNORMAL FINDINGS: ICD-10-CM

## 2024-06-24 DIAGNOSIS — I10 PRIMARY HYPERTENSION: ICD-10-CM

## 2024-06-24 DIAGNOSIS — R73.9 ELEVATED BLOOD SUGAR: ICD-10-CM

## 2024-06-24 PROCEDURE — 3074F SYST BP LT 130 MM HG: CPT | Performed by: INTERNAL MEDICINE

## 2024-06-24 PROCEDURE — G0439 PPPS, SUBSEQ VISIT: HCPCS | Performed by: INTERNAL MEDICINE

## 2024-06-24 PROCEDURE — 3078F DIAST BP <80 MM HG: CPT | Performed by: INTERNAL MEDICINE

## 2024-06-24 RX ORDER — PREDNISOLONE ACETATE 10 MG/ML
SUSPENSION/ DROPS OPHTHALMIC
COMMUNITY
Start: 2024-04-17 | End: 2024-06-24

## 2024-06-24 ASSESSMENT — ENCOUNTER SYMPTOMS: GENERAL WELL-BEING: EXCELLENT

## 2024-06-24 ASSESSMENT — FIBROSIS 4 INDEX: FIB4 SCORE: 2.34

## 2024-06-24 ASSESSMENT — PATIENT HEALTH QUESTIONNAIRE - PHQ9: CLINICAL INTERPRETATION OF PHQ2 SCORE: 0

## 2024-06-24 ASSESSMENT — ACTIVITIES OF DAILY LIVING (ADL): BATHING_REQUIRES_ASSISTANCE: 0

## 2024-06-24 NOTE — PROGRESS NOTES
Chief Complaint   Patient presents with    Annual Exam       HPI:  Janessa Cain is a 89 y.o. here for Medicare Annual Wellness Visit.  Lives with her . 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   Likes to cook, manages finances with her .   drives for the last few yrs, mainly bcos of the macular degeneration. And sees eye doc.    Patient Active Problem List    Diagnosis Date Noted    AK (actinic keratosis) 06/24/2024    Swelling of lower extremity 06/24/2024    Macular degeneration of both eyes 02/14/2024    Obesity (BMI 30-39.9) 02/14/2024    Elevated brain natriuretic peptide (BNP) level 02/08/2024    Gastroesophageal reflux disease without esophagitis 10/03/2023    At risk for falling 06/12/2023    Alcohol use disorder 12/19/2022    S/P total knee arthroplasty, right 03/03/2022    Pulmonary hypertension (HCC) 11/17/2021    Adjustment disorder with anxiety 10/07/2021    Asymptomatic menopausal state 01/22/2021    Seasonal allergic rhinitis 08/20/2020    Other fracture of right patella, sequela 01/03/2020    Vitamin D insufficiency 11/13/2019    AVN (avascular necrosis of bone) (HCC) 11/12/2019    Status post total replacement of right hip 11/12/2019    Impaired mobility and ADLs 11/12/2019    Primary hypertension 11/12/2019    Degenerative joint disease of right hip 11/03/2019       Current Outpatient Medications   Medication Sig Dispense Refill    furosemide (LASIX) 20 MG Tab Take 1 Tablet by mouth every morning with breakfast. 30 Tablet 1    spironolactone (ALDACTONE) 50 MG Tab TAKE 1 TABLET BY MOUTH EVERY  Tablet 1    valsartan-hydrochlorothiazide (DIOVAN-HCT) 320-25 MG per tablet Take 1 Tablet by mouth every day. 30 Tablet 3    acetaminophen (TYLENOL) 325 MG Tab Take 650 mg by mouth every four hours as needed.       No current facility-administered medications for this visit.          Current supplements as per medication list.     Allergies: Apap-fd&c red #40 al  lake-oxycodone, Percocet [perloxx], Hydrocodone, Oxycodone, and Tramadol    Current social contact/activities: travels with her spouse often, movies. Has daughter and family close by, (and they live in their mother in law quarters ) grand kids.    She  reports that she quit smoking about 40 years ago. Her smoking use included cigarettes. She started smoking about 70 years ago. She has a 30 pack-year smoking history. She has never used smokeless tobacco. She reports current alcohol use of about 0.6 oz of alcohol per week. She reports that she does not use drugs.  Counseling given: Not Answered      ROS:    Gait: Uses a cane and walker sometimes   Ostomy: No  Other tubes: No  Amputations: No  Chronic oxygen use: No  Last eye exam: May 2024  Wears hearing aids: No   : Denies any urinary leakage during the last 6 months    Screening:    Depression Screening  Little interest or pleasure in doing things?  0 - not at all  Feeling down, depressed , or hopeless? 0 - not at all  Patient Health Questionnaire Score: 0     If depressive symptoms identified deferred to follow up visit unless specifically addressed in assessment and plan.    Interpretation of PHQ-9 Total Score   Score Severity   1-4 No Depression   5-9 Mild Depression   10-14 Moderate Depression   15-19 Moderately Severe Depression   20-27 Severe Depression    Screening for Cognitive Impairment  Do you or any of your friends or family members have any concern about your memory? No  Three Minute Recall (Leader, Season, Table) 1/3    Rayshawn clock face with all 12 numbers and set the hands to show 10 minutes after 11.  Yes    Cognitive concerns identified deferred for follow up unless specifically addressed in assessment and plan.    Fall Risk Assessment  Has the patient had two or more falls in the last year or any fall with injury in the last year?  Yes    Safety Assessment  Do you always wear your seatbelt?  Yes  Any changes to home needed to function safely?  No  Difficulty hearing.  No  Patient counseled about all safety risks that were identified.    Functional Assessment ADLs  Are there any barriers preventing you from cooking for yourself or meeting nutritional needs?  No.    Are there any barriers preventing you from driving safely or obtaining transportation?  No.    Are there any barriers preventing you from using a telephone or calling for help?  No    Are there any barriers preventing you from shopping?  No.    Are there any barriers preventing you from taking care of your own finances?  No    Are there any barriers preventing you from managing your medications?  No    Are there any barriers preventing you from showering, bathing or dressing yourself? No    Are there any barriers preventing you from doing housework or laundry? No  Are there any barriers preventing you from using the toilet?No  Are you currently engaging in any exercise or physical activity?  Yes.      Self-Assessment of Health  What is your perception of your health? Excellent  Do you sleep more than six hours a night? Yes  In the past 7 days, how much did pain keep you from doing your normal work? None  Do you spend quality time with family or friends (virtually or in person)? Yes  Do you usually eat a heart healthy diet that constists of a variety of fruits, vegetables, whole grains and fiber? Yes  Do you eat foods high in fat and/or Fast Food more than three times per week? No    Advance Care Planning  Do you have an Advance Directive, Living Will, Durable Power of , or POLST? Yes  Advance Directive       is on file      Health Maintenance Summary            Overdue - Zoster (Shingles) Vaccines (2 of 3) Overdue since 1/8/2018 11/13/2017  Imm Admin: Zoster Vaccine Live (ZVL) (Zostavax) - HISTORICAL DATA              Overdue - COVID-19 Vaccine (4 - 2023-24 season) Overdue since 9/1/2023 12/20/2021  Imm Admin: PFIZER PURPLE CAP SARS-COV-2 VACCINATION (12+)    03/06/2021  Imm  Admin: PFIZER PURPLE CAP SARS-COV-2 VACCINATION (12+)    02/16/2021  Imm Admin: PFIZER PURPLE CAP SARS-COV-2 VACCINATION (12+)              Annual Wellness Visit (Yearly) Next due on 6/24/2025 06/24/2024  Level of Service: ANNUAL WELLNESS VISIT-INCLUDES PPPS SUBSEQUE*              IMM DTaP/Tdap/Td Vaccine (2 - Td or Tdap) Next due on 1/7/2026 01/07/2016  Imm Admin: Tdap Vaccine              Bone Density Scan (Every 5 Years) Tentatively due on 4/12/2026 04/12/2021  DS-BONE DENSITY STUDY (DEXA)              Pneumococcal Vaccine: 65+ Years (Series Information) Completed      10/26/2016  Imm Admin: Pneumococcal Conjugate Vaccine (Prevnar/PCV-13)    02/07/2012  Imm Admin: Pneumococcal polysaccharide vaccine (PPSV-23)              Influenza Vaccine (Series Information) Completed      10/03/2023  Imm Admin: Influenza Vaccine Adult HD    12/19/2022  Imm Admin: Influenza Vaccine Adult HD    10/08/2021  Imm Admin: Influenza Vaccine Adult HD    11/23/2020  Imm Admin: Influenza Vaccine Adult HD    10/04/2018  Imm Admin: Influenza, Unspecified - HISTORICAL DATA    Only the first 5 history entries have been loaded, but more history exists.              Hepatitis A Vaccine (Hep A) (Series Information) Aged Out      No completion history exists for this topic.              Hepatitis B Vaccine (Hep B) (Series Information) Aged Out      No completion history exists for this topic.              HPV Vaccines (Series Information) Aged Out      No completion history exists for this topic.              Polio Vaccine (Inactivated Polio) (Series Information) Aged Out      No completion history exists for this topic.              Meningococcal Immunization (Series Information) Aged Out      No completion history exists for this topic.                    Patient Care Team:  Janel Elise M.D. as PCP - General (Internal Medicine)        Social History     Tobacco Use    Smoking status: Former     Current packs/day: 0.00      "Average packs/day: 1 pack/day for 30.0 years (30.0 ttl pk-yrs)     Types: Cigarettes     Start date:      Quit date:      Years since quittin.5    Smokeless tobacco: Never   Vaping Use    Vaping status: Never Used   Substance Use Topics    Alcohol use: Yes     Alcohol/week: 0.6 oz     Types: 1 Glasses of wine per week     Comment: 1 drink every day    Drug use: Never     Family History   Problem Relation Age of Onset    Alcohol abuse Mother     Heart Disease Mother     Heart Disease Brother     Alcohol abuse Brother     Cancer Maternal Aunt      She  has a past medical history of 2019 novel coronavirus disease (COVID-19) (10/06/2021), COVID-19 virus infection (2021), Depression (2020), Edema, lower extremity (2019), Hardware complicating wound infection (HCC) (2022), Hypertension, Hypoalbuminemia (2019), Multiple open wounds of lower leg (11/3/2019), Nocturia (2021), Pneumonia, Rheumatic fever (1943), Shortness of breath (2021), and Urinary retention (2019).   Past Surgical History:   Procedure Laterality Date    PB TOTAL KNEE ARTHROPLASTY Right 2022    Procedure: ARTHROPLASTY, KNEE, TOTAL;  Surgeon: Pauline Mo M.D.;  Location: [facility];  Service: Orthopedics    SD TOTAL HIP ARTHROPLASTY Right 2019    Procedure: ARTHROPLASTY, HIP, TOTAL;  Surgeon: Pauline Mo M.D.;  Location: [facility];  Service: Orthopedics    HIP ARTHROPLASTY TOTAL Left     \"resurfacing\"    TONSILLECTOMY         Exam:   /65 (BP Location: Left arm, Patient Position: Sitting, BP Cuff Size: Adult)   Pulse 83   Temp 36.8 °C (98.3 °F) (Temporal)   Ht 1.626 m (5' 4\")   Wt 82.1 kg (181 lb)   SpO2 (!) 86%  Body mass index is 31.07 kg/m².    Hearing excellent.    Dentition   Alert, oriented in no acute distress.  Eye contact is good, speech goal directed, affect calm    Assessment and Plan. The following treatment and monitoring plan is " recommended:  Recommended PT but pt refused. Using her cane and wants to continue her stationary bike.  1. Encounter for Medicare annual examination with abnormal findings    2. Primary hypertension  - Comp Metabolic Panel; Future    3. AK (actinic keratosis)  - Referral to Dermatology    4. Elevated blood sugar  - HEMOGLOBIN A1C; Future    5. Swelling of lower extremity  Chronic and watching salt and taking her diuretics. Advised to elevate legs when resting and compression stockings.    Services suggested: No services needed at this time  Health Care Screening: Age-appropriate preventive services recommended by USPTF and ACIP covered by Medicare were discussed today. Services ordered if indicated and agreed upon by the patient.  Referrals offered: Community-based lifestyle interventions to reduce health risks and promote self-management and wellness, fall prevention, nutrition, physical activity, tobacco-use cessation, weight loss, and mental health services as per orders if indicated.    Discussion today about general wellness and lifestyle habits:    Prevent falls and reduce trip hazards; Cautioned about securing or removing rugs.  Have a working fire alarm and carbon monoxide detector;   Engage in regular physical activity and social activities     Follow-up: Return in about 3 months (around 9/24/2024).

## 2024-07-18 ENCOUNTER — APPOINTMENT (RX ONLY)
Dept: URBAN - METROPOLITAN AREA CLINIC 4 | Facility: CLINIC | Age: 89
Setting detail: DERMATOLOGY
End: 2024-07-18

## 2024-07-18 DIAGNOSIS — T07XXXA INSECT BITE, NONVENOMOUS, OF OTHER, MULTIPLE, AND UNSPECIFIED SITES, WITHOUT MENTION OF INFECTION: ICD-10-CM

## 2024-07-18 DIAGNOSIS — Z71.89 OTHER SPECIFIED COUNSELING: ICD-10-CM

## 2024-07-18 DIAGNOSIS — D485 NEOPLASM OF UNCERTAIN BEHAVIOR OF SKIN: ICD-10-CM

## 2024-07-18 PROBLEM — D48.5 NEOPLASM OF UNCERTAIN BEHAVIOR OF SKIN: Status: ACTIVE | Noted: 2024-07-18

## 2024-07-18 PROBLEM — S10.86XA INSECT BITE OF OTHER SPECIFIED PART OF NECK, INITIAL ENCOUNTER: Status: ACTIVE | Noted: 2024-07-18

## 2024-07-18 PROCEDURE — 99202 OFFICE O/P NEW SF 15 MIN: CPT | Mod: 25

## 2024-07-18 PROCEDURE — 11102 TANGNTL BX SKIN SINGLE LES: CPT

## 2024-07-18 PROCEDURE — ? ADDITIONAL NOTES

## 2024-07-18 PROCEDURE — ? BIOPSY BY SHAVE METHOD

## 2024-07-18 PROCEDURE — ? SUNSCREEN RECOMMENDATIONS

## 2024-07-18 PROCEDURE — ? COUNSELING

## 2024-07-18 ASSESSMENT — LOCATION ZONE DERM
LOCATION ZONE: NECK
LOCATION ZONE: NOSE

## 2024-07-18 ASSESSMENT — LOCATION DETAILED DESCRIPTION DERM
LOCATION DETAILED: RIGHT MEDIAL TRAPEZIAL NECK
LOCATION DETAILED: NASAL ROOT

## 2024-07-18 ASSESSMENT — LOCATION SIMPLE DESCRIPTION DERM
LOCATION SIMPLE: POSTERIOR NECK
LOCATION SIMPLE: NOSE

## 2024-07-18 NOTE — PROCEDURE: ADDITIONAL NOTES
Additional Notes: Patient stated the tolu occasionally itches but it is going down in both size and discomfort.
Detail Level: Simple
Render Risk Assessment In Note?: no

## 2024-08-20 ENCOUNTER — APPOINTMENT (RX ONLY)
Dept: URBAN - METROPOLITAN AREA CLINIC 4 | Facility: CLINIC | Age: 89
Setting detail: DERMATOLOGY
End: 2024-08-20

## 2024-08-20 DIAGNOSIS — L82.1 OTHER SEBORRHEIC KERATOSIS: ICD-10-CM

## 2024-08-20 DIAGNOSIS — L57.0 ACTINIC KERATOSIS: ICD-10-CM

## 2024-08-20 PROCEDURE — ? COUNSELING

## 2024-08-20 PROCEDURE — ? LIQUID NITROGEN

## 2024-08-20 PROCEDURE — ? ADDITIONAL NOTES

## 2024-08-20 PROCEDURE — 17000 DESTRUCT PREMALG LESION: CPT

## 2024-08-20 PROCEDURE — 99212 OFFICE O/P EST SF 10 MIN: CPT | Mod: 25

## 2024-08-20 ASSESSMENT — LOCATION SIMPLE DESCRIPTION DERM
LOCATION SIMPLE: NOSE
LOCATION SIMPLE: LEFT CHEEK

## 2024-08-20 ASSESSMENT — LOCATION ZONE DERM
LOCATION ZONE: NOSE
LOCATION ZONE: FACE

## 2024-08-20 ASSESSMENT — LOCATION DETAILED DESCRIPTION DERM
LOCATION DETAILED: NASAL DORSUM
LOCATION DETAILED: LEFT SUPERIOR LATERAL BUCCAL CHEEK

## 2024-08-20 NOTE — PROCEDURE: ADDITIONAL NOTES
Additional Notes: Discussed with patient doing another, slightly deeper biopsy due to the previous biopsy being transected the base. Also discussed doing a round of LN2 and following up to see if the spot resolves. Patient elects today that she would like to try LN2 and follow up. If the spot has not resolved at follow up she will have the biopsy done then.
Detail Level: Simple
Render Risk Assessment In Note?: no

## 2024-09-04 ENCOUNTER — PATIENT OUTREACH (OUTPATIENT)
Dept: HEALTH INFORMATION MANAGEMENT | Facility: OTHER | Age: 89
End: 2024-09-04
Payer: MEDICARE

## 2024-09-04 ENCOUNTER — PATIENT MESSAGE (OUTPATIENT)
Dept: HEALTH INFORMATION MANAGEMENT | Facility: OTHER | Age: 89
End: 2024-09-04

## 2024-09-04 DIAGNOSIS — I10 PRIMARY HYPERTENSION: ICD-10-CM

## 2024-09-17 DIAGNOSIS — R60.9 PITTING EDEMA: ICD-10-CM

## 2024-09-17 NOTE — TELEPHONE ENCOUNTER
Received request via: Pharmacy    Was the patient seen in the last year in this department? Yes    Does the patient have an active prescription (recently filled or refills available) for medication(s) requested? No    Pharmacy Name: [id number] - [place], [place] - [address] AT [facility] [[id number]]     Does the patient have nursing home Plus and need 100-day supply? (This applies to ALL medications) Yes, quantity updated to 100 days

## 2024-09-18 RX ORDER — FUROSEMIDE 20 MG
20 TABLET ORAL
Qty: 100 TABLET | Refills: 0 | Status: SHIPPED | OUTPATIENT
Start: 2024-09-18

## 2024-09-23 PROBLEM — M87.00 AVN (AVASCULAR NECROSIS OF BONE) (HCC): Status: RESOLVED | Noted: 2019-11-12 | Resolved: 2024-09-23

## 2024-09-23 NOTE — PROGRESS NOTES
Pt identified for PCM enrollment. 3 attempts total were made to contact the patient via telephone. Unable to reach patient. This RN left  encouraging pt to call [phone] if she is interested in pursuing enrollment. [email or web address] message has been sent including PCM program details and contact information.

## 2024-10-15 ENCOUNTER — APPOINTMENT (RX ONLY)
Dept: URBAN - METROPOLITAN AREA CLINIC 4 | Facility: CLINIC | Age: 89
Setting detail: DERMATOLOGY
End: 2024-10-15

## 2024-10-15 DIAGNOSIS — L57.0 ACTINIC KERATOSIS: ICD-10-CM

## 2024-10-15 PROCEDURE — ? SEPARATE AND IDENTIFIABLE DOCUMENTATION

## 2024-10-15 PROCEDURE — ? COUNSELING

## 2024-10-15 PROCEDURE — 99213 OFFICE O/P EST LOW 20 MIN: CPT | Mod: 25

## 2024-10-15 PROCEDURE — ? MEDICATION COUNSELING

## 2024-10-15 PROCEDURE — ? PRESCRIPTION

## 2024-10-15 PROCEDURE — 17000 DESTRUCT PREMALG LESION: CPT

## 2024-10-15 PROCEDURE — ? LIQUID NITROGEN

## 2024-10-15 ASSESSMENT — LOCATION SIMPLE DESCRIPTION DERM: LOCATION SIMPLE: RIGHT NOSE

## 2024-10-15 ASSESSMENT — LOCATION ZONE DERM: LOCATION ZONE: NOSE

## 2024-10-15 ASSESSMENT — LOCATION DETAILED DESCRIPTION DERM: LOCATION DETAILED: RIGHT NASAL SIDEWALL

## 2024-10-15 NOTE — PROCEDURE: LIQUID NITROGEN
Application Tool (Optional): Liquid Nitrogen Sprayer
Render Note In Bullet Format When Appropriate: No
Duration Of Freeze Thaw-Cycle (Seconds): 4
Post-Care Instructions: I reviewed with the patient in detail post-care instructions. Patient is to wear sunprotection, and avoid picking at any of the treated lesions. Pt may apply Vaseline to crusted or scabbing areas.
Number Of Freeze-Thaw Cycles: 1 freeze-thaw cycle
Consent: The patient's consent was obtained including but not limited to risks of crusting, scabbing, blistering, scarring, darker or lighter pigmentary change, recurrence, incomplete removal and infection.
Detail Level: Detailed
Show Aperture Variable?: Yes

## 2024-10-15 NOTE — PROCEDURE: MEDICATION COUNSELING
VTAMA Counseling: I discussed with the patient that VTAMA is not for use in the eyes, mouth or mouth. They should call the office if they develop any signs of allergic reactions to VTAMA. The patient verbalized understanding of the proper use and possible adverse effects of VTAMA.  All of the patient's questions and concerns were addressed.
Cimzia Counseling:  I discussed with the patient the risks of Cimzia including but not limited to immunosuppression, allergic reactions and infections.  The patient understands that monitoring is required including a PPD at baseline and must alert us or the primary physician if symptoms of infection or other concerning signs are noted.
Olanzapine Counseling- I discussed with the patient the common side effects of olanzapine including but are not limited to: lack of energy, dry mouth, increased appetite, sleepiness, tremor, constipation, dizziness, changes in behavior, or restlessness.  Explained that teenagers are more likely to experience headaches, abdominal pain, pain in the arms or legs, tiredness, and sleepiness.  Serious side effects include but are not limited: increased risk of death in elderly patients who are confused, have memory loss, or dementia-related psychosis; hyperglycemia; increased cholesterol and triglycerides; and weight gain.
Methotrexate Counseling:  Patient counseled regarding adverse effects of methotrexate including but not limited to nausea, vomiting, abnormalities in liver function tests. Patients may develop mouth sores, rash, diarrhea, and abnormalities in blood counts. The patient understands that monitoring is required including LFT's and blood counts.  There is a rare possibility of scarring of the liver and lung problems that can occur when taking methotrexate. Persistent nausea, loss of appetite, pale stools, dark urine, cough, and shortness of breath should be reported immediately. Patient advised to discontinue methotrexate treatment at least three months before attempting to become pregnant.  I discussed the need for folate supplements while taking methotrexate.  These supplements can decrease side effects during methotrexate treatment. The patient verbalized understanding of the proper use and possible adverse effects of methotrexate.  All of the patient's questions and concerns were addressed.
Topical Metronidazole Pregnancy And Lactation Text: This medication is Pregnancy Category B and considered safe during pregnancy.  It is also considered safe to use while breastfeeding.
Topical Retinoid counseling:  Patient advised to apply a pea-sized amount only at bedtime and wait 30 minutes after washing their face before applying.  If too drying, patient may add a non-comedogenic moisturizer. The patient verbalized understanding of the proper use and possible adverse effects of retinoids.  All of the patient's questions and concerns were addressed.
Acitretin Pregnancy And Lactation Text: This medication is Pregnancy Category X and should not be given to women who are pregnant or may become pregnant in the future. This medication is excreted in breast milk.
Taltz Counseling: I discussed with the patient the risks of ixekizumab including but not limited to immunosuppression, serious infections, worsening of inflammatory bowel disease and drug reactions.  The patient understands that monitoring is required including a PPD at baseline and must alert us or the primary physician if symptoms of infection or other concerning signs are noted.
Minoxidil Counseling: Minoxidil is a topical medication which can increase blood flow where it is applied. It is uncertain how this medication increases hair growth. Side effects are uncommon and include stinging and allergic reactions.
Odomzo Pregnancy And Lactation Text: This medication is Pregnancy Category X and is absolutely contraindicated during pregnancy. It is unknown if it is excreted in breast milk.
Protopic Pregnancy And Lactation Text: This medication is Pregnancy Category C. It is unknown if this medication is excreted in breast milk when applied topically.
Aklief counseling:  Patient advised to apply a pea-sized amount only at bedtime and wait 30 minutes after washing their face before applying.  If too drying, patient may add a non-comedogenic moisturizer.  The most commonly reported side effects including irritation, redness, scaling, dryness, stinging, burning, itching, and increased risk of sunburn.  The patient verbalized understanding of the proper use and possible adverse effects of retinoids.  All of the patient's questions and concerns were addressed.
Eucrisa Pregnancy And Lactation Text: This medication has not been assigned a Pregnancy Risk Category but animal studies failed to show danger with the topical medication. It is unknown if the medication is excreted in breast milk.
Propranolol Pregnancy And Lactation Text: This medication is Pregnancy Category C and it isn't known if it is safe during pregnancy. It is excreted in breast milk.
Ketoconazole Pregnancy And Lactation Text: This medication is Pregnancy Category C and it isn't know if it is safe during pregnancy. It is also excreted in breast milk and breast feeding isn't recommended.
Use Enhanced Medication Counseling?: No
Fluconazole Pregnancy And Lactation Text: This medication is Pregnancy Category C and it isn't know if it is safe during pregnancy. It is also excreted in breast milk.
Olumiant Pregnancy And Lactation Text: Based on animal studies, Olumiant may cause embryo-fetal harm when administered to pregnant women.  The medication should not be used in pregnancy.  Breastfeeding is not recommended during treatment.
Aklief Pregnancy And Lactation Text: It is unknown if this medication is safe to use during pregnancy.  It is unknown if this medication is excreted in breast milk.  Breastfeeding women should use the topical cream on the smallest area of the skin for the shortest time needed while breastfeeding.  Do not apply to nipple and areola.
Hyrimoz Counseling:  I discussed with the patient the risks of adalimumab including but not limited to myelosuppression, immunosuppression, autoimmune hepatitis, demyelinating diseases, lymphoma, and serious infections.  The patient understands that monitoring is required including a PPD at baseline and must alert us or the primary physician if symptoms of infection or other concerning signs are noted.
Cantharidin Counseling:  I discussed with the patient the risks of Cantharidin including but not limited to pain, redness, burning, itching, and blistering.
Albendazole Counseling:  I discussed with the patient the risks of albendazole including but not limited to cytopenia, kidney damage, nausea/vomiting and severe allergy.  The patient understands that this medication is being used in an off-label manner.
Hydroquinone Counseling:  Patient advised that medication may result in skin irritation, lightening (hypopigmentation), dryness, and burning.  In the event of skin irritation, the patient was advised to reduce the amount of the drug applied or use it less frequently.  Rarely, spots that are treated with hydroquinone can become darker (pseudoochronosis).  Should this occur, patient instructed to stop medication and call the office. The patient verbalized understanding of the proper use and possible adverse effects of hydroquinone.  All of the patient's questions and concerns were addressed.
Quinolones Counseling:  I discussed with the patient the risks of fluoroquinolones including but not limited to GI upset, allergic reaction, drug rash, diarrhea, dizziness, photosensitivity, yeast infections, liver function test abnormalities, tendonitis/tendon rupture.
Gabapentin Pregnancy And Lactation Text: This medication is Pregnancy Category C and isn't considered safe during pregnancy. It is excreted in breast milk.
Clindamycin Pregnancy And Lactation Text: This medication can be used in pregnancy if certain situations. Clindamycin is also present in breast milk.
Arava Counseling:  Patient counseled regarding adverse effects of Arava including but not limited to nausea, vomiting, abnormalities in liver function tests. Patients may develop mouth sores, rash, diarrhea, and abnormalities in blood counts. The patient understands that monitoring is required including LFTs and blood counts.  There is a rare possibility of scarring of the liver and lung problems that can occur when taking methotrexate. Persistent nausea, loss of appetite, pale stools, dark urine, cough, and shortness of breath should be reported immediately. Patient advised to discontinue Arava treatment and consult with a physician prior to attempting conception. The patient will have to undergo a treatment to eliminate Arava from the body prior to conception.
Siliq Pregnancy And Lactation Text: The risk during pregnancy and breastfeeding is uncertain with this medication.
Topical Steroids Counseling: I discussed with the patient that prolonged use of topical steroids can result in the increased appearance of superficial blood vessels (telangiectasias), lightening (hypopigmentation) and thinning of the skin (atrophy).  Patient understands to avoid using high potency steroids in skin folds, the groin or the face.  The patient verbalized understanding of the proper use and possible adverse effects of topical steroids.  All of the patient's questions and concerns were addressed.
Vtama Pregnancy And Lactation Text: It is unknown if this medication can cause problems during pregnancy and breastfeeding.
Topical Retinoid Pregnancy And Lactation Text: This medication is Pregnancy Category C. It is unknown if this medication is excreted in breast milk.
Doxycycline Counseling:  Patient counseled regarding possible photosensitivity and increased risk for sunburn.  Patient instructed to avoid sunlight, if possible.  When exposed to sunlight, patients should wear protective clothing, sunglasses, and sunscreen.  The patient was instructed to call the office immediately if the following severe adverse effects occur:  hearing changes, easy bruising/bleeding, severe headache, or vision changes.  The patient verbalized understanding of the proper use and possible adverse effects of doxycycline.  All of the patient's questions and concerns were addressed.
Cantharidin Pregnancy And Lactation Text: This medication has not been proven safe during pregnancy. It is unknown if this medication is excreted in breast milk.
Glycopyrrolate Counseling:  I discussed with the patient the risks of glycopyrrolate including but not limited to skin rash, drowsiness, dry mouth, difficulty urinating, and blurred vision.
Simponi Counseling:  I discussed with the patient the risks of golimumab including but not limited to myelosuppression, immunosuppression, autoimmune hepatitis, demyelinating diseases, lymphoma, and serious infections.  The patient understands that monitoring is required including a PPD at baseline and must alert us or the primary physician if symptoms of infection or other concerning signs are noted.
Terbinafine Counseling: Patient counseling regarding adverse effects of terbinafine including but not limited to headache, diarrhea, rash, upset stomach, liver function test abnormalities, itching, taste/smell disturbance, nausea, abdominal pain, and flatulence.  There is a rare possibility of liver failure that can occur when taking terbinafine.  The patient understands that a baseline LFT and kidney function test may be required. The patient verbalized understanding of the proper use and possible adverse effects of terbinafine.  All of the patient's questions and concerns were addressed.
Azathioprine Counseling:  I discussed with the patient the risks of azathioprine including but not limited to myelosuppression, immunosuppression, hepatotoxicity, lymphoma, and infections.  The patient understands that monitoring is required including baseline LFTs, Creatinine, possible TPMP genotyping and weekly CBCs for the first month and then every 2 weeks thereafter.  The patient verbalized understanding of the proper use and possible adverse effects of azathioprine.  All of the patient's questions and concerns were addressed.
Olanzapine Pregnancy And Lactation Text: This medication is pregnancy category C.   There are no adequate and well controlled trials with olanzapine in pregnant females.  Olanzapine should be used during pregnancy only if the potential benefit justifies the potential risk to the fetus.   In a study in lactating healthy women, olanzapine was excreted in breast milk.  It is recommended that women taking olanzapine should not breast feed.
SSKI Counseling:  I discussed with the patient the risks of SSKI including but not limited to thyroid abnormalities, metallic taste, GI upset, fever, headache, acne, arthralgias, paraesthesias, lymphadenopathy, easy bleeding, arrhythmias, and allergic reaction.
Methotrexate Pregnancy And Lactation Text: This medication is Pregnancy Category X and is known to cause fetal harm. This medication is excreted in breast milk.
Qbrexza Counseling:  I discussed with the patient the risks of Qbrexza including but not limited to headache, mydriasis, blurred vision, dry eyes, nasal dryness, dry mouth, dry throat, dry skin, urinary hesitation, and constipation.  Local skin reactions including erythema, burning, stinging, and itching can also occur.
Bexarotene Counseling:  I discussed with the patient the risks of bexarotene including but not limited to hair loss, dry lips/skin/eyes, liver abnormalities, hyperlipidemia, pancreatitis, depression/suicidal ideation, photosensitivity, drug rash/allergic reactions, hypothyroidism, anemia, leukopenia, infection, cataracts, and teratogenicity.  Patient understands that they will need regular blood tests to check lipid profile, liver function tests, white blood cell count, thyroid function tests and pregnancy test if applicable.
Cimzia Pregnancy And Lactation Text: This medication crosses the placenta but can be considered safe in certain situations. Cimzia may be excreted in breast milk.
Dutasteride Male Counseling: Dustasteride Counseling:  I discussed with the patient the risks of use of dutasteride including but not limited to decreased libido, decreased ejaculate volume, and gynecomastia. Women who can become pregnant should not handle medication.  All of the patient's questions and concerns were addressed.
Griseofulvin Counseling:  I discussed with the patient the risks of griseofulvin including but not limited to photosensitivity, cytopenia, liver damage, nausea/vomiting and severe allergy.  The patient understands that this medication is best absorbed when taken with a fatty meal (e.g., ice cream or french fries).
Cosentyx Counseling:  I discussed with the patient the risks of Cosentyx including but not limited to worsening of Crohn's disease, immunosuppression, allergic reactions and infections.  The patient understands that monitoring is required including a PPD at baseline and must alert us or the primary physician if symptoms of infection or other concerning signs are noted.
Azathioprine Pregnancy And Lactation Text: This medication is Pregnancy Category D and isn't considered safe during pregnancy. It is unknown if this medication is excreted in breast milk.
Terbinafine Pregnancy And Lactation Text: This medication is Pregnancy Category B and is considered safe during pregnancy. It is also excreted in breast milk and breast feeding isn't recommended.
Oral Minoxidil Counseling- I discussed with the patient the risks of oral minoxidil including but not limited to shortness of breath, swelling of the feet or ankles, dizziness, lightheadedness, unwanted hair growth and allergic reaction.  The patient verbalized understanding of the proper use and possible adverse effects of oral minoxidil.  All of the patient's questions and concerns were addressed.
Prednisone Counseling:  I discussed with the patient the risks of prolonged use of prednisone including but not limited to weight gain, insomnia, osteoporosis, mood changes, diabetes, susceptibility to infection, glaucoma and high blood pressure.  In cases where prednisone use is prolonged, patients should be monitored with blood pressure checks, serum glucose levels and an eye exam.  Additionally, the patient may need to be placed on GI prophylaxis, PCP prophylaxis, and calcium and vitamin D supplementation and/or a bisphosphonate.  The patient verbalized understanding of the proper use and the possible adverse effects of prednisone.  All of the patient's questions and concerns were addressed.
Rinvoq Counseling: I discussed with the patient the risks of Rinvoq therapy including but not limited to upper respiratory tract infections, shingles, cold sores, bronchitis, nausea, cough, fever, acne, and headache. Live vaccines should be avoided.  This medication has been linked to serious infections; higher rate of mortality; malignancy and lymphoproliferative disorders; major adverse cardiovascular events; thrombosis; thrombocytopenia, anemia, and neutropenia; lipid elevations; liver enzyme elevations; and gastrointestinal perforations.
Albendazole Pregnancy And Lactation Text: This medication is Pregnancy Category C and it isn't known if it is safe during pregnancy. It is also excreted in breast milk.
5-Fu Counseling: 5-Fluorouracil Counseling:  I discussed with the patient the risks of 5-fluorouracil including but not limited to erythema, scaling, itching, weeping, crusting, and pain.
Hyrimoz Pregnancy And Lactation Text: This medication is Pregnancy Category B and is considered safe during pregnancy. It is unknown if this medication is excreted in breast milk.
Azelaic Acid Counseling: Patient counseled that medicine may cause skin irritation and to avoid applying near the eyes.  In the event of skin irritation, the patient was advised to reduce the amount of the drug applied or use it less frequently.   The patient verbalized understanding of the proper use and possible adverse effects of azelaic acid.  All of the patient's questions and concerns were addressed.
Doxycycline Pregnancy And Lactation Text: This medication is Pregnancy Category D and not consider safe during pregnancy. It is also excreted in breast milk but is considered safe for shorter treatment courses.
Azithromycin Counseling:  I discussed with the patient the risks of azithromycin including but not limited to GI upset, allergic reaction, drug rash, diarrhea, and yeast infections.
Tazorac Counseling:  Patient advised that medication is irritating and drying.  Patient may need to apply sparingly and wash off after an hour before eventually leaving it on overnight.  The patient verbalized understanding of the proper use and possible adverse effects of tazorac.  All of the patient's questions and concerns were addressed.
Glycopyrrolate Pregnancy And Lactation Text: This medication is Pregnancy Category B and is considered safe during pregnancy. It is unknown if it is excreted breast milk.
Rinvoq Pregnancy And Lactation Text: Based on animal studies, Rinvoq may cause embryo-fetal harm when administered to pregnant women.  The medication should not be used in pregnancy.  Breastfeeding is not recommended during treatment and for 6 days after the last dose.
Qbrexza Pregnancy And Lactation Text: There is no available data on Qbrexza use in pregnant women.  There is no available data on Qbrexza use in lactation.
Sski Pregnancy And Lactation Text: This medication is Pregnancy Category D and isn't considered safe during pregnancy. It is excreted in breast milk.
Xolair Counseling:  Patient informed of potential adverse effects including but not limited to fever, muscle aches, rash and allergic reactions.  The patient verbalized understanding of the proper use and possible adverse effects of Xolair.  All of the patient's questions and concerns were addressed.
Bexarotene Pregnancy And Lactation Text: This medication is Pregnancy Category X and should not be given to women who are pregnant or may become pregnant. This medication should not be used if you are breast feeding.
Clofazimine Counseling:  I discussed with the patient the risks of clofazimine including but not limited to skin and eye pigmentation, liver damage, nausea/vomiting, gastrointestinal bleeding and allergy.
Zoryve Counseling:  I discussed with the patient that Zoryve is not for use in the eyes, mouth or vagina. The most commonly reported side effects include diarrhea, headache, insomnia, application site pain, upper respiratory tract infections, and urinary tract infections.  All of the patient's questions and concerns were addressed.
Mirvaso Counseling: Mirvaso is a topical medication which can decrease superficial blood flow where applied. Side effects are uncommon and include stinging, redness and allergic reactions.
Topical Steroids Applications Pregnancy And Lactation Text: Most topical steroids are considered safe to use during pregnancy and lactation.  Any topical steroid applied to the breast or nipple should be washed off before breastfeeding.
Rifampin Counseling: I discussed with the patient the risks of rifampin including but not limited to liver damage, kidney damage, red-orange body fluids, nausea/vomiting and severe allergy.
Tremfya Counseling: I discussed with the patient the risks of guselkumab including but not limited to immunosuppression, serious infections, and drug reactions.  The patient understands that monitoring is required including a PPD at baseline and must alert us or the primary physician if symptoms of infection or other concerning signs are noted.
Imiquimod Counseling:  I discussed with the patient the risks of imiquimod including but not limited to erythema, scaling, itching, weeping, crusting, and pain.  Patient understands that the inflammatory response to imiquimod is variable from person to person and was educated regarded proper titration schedule.  If flu-like symptoms develop, patient knows to discontinue the medication and contact us.
Xolair Pregnancy And Lactation Text: This medication is Pregnancy Category B and is considered safe during pregnancy. This medication is excreted in breast milk.
Cellcept Counseling:  I discussed with the patient the risks of mycophenolate mofetil including but not limited to infection/immunosuppression, GI upset, hypokalemia, hypercholesterolemia, bone marrow suppression, lymphoproliferative disorders, malignancy, GI ulceration/bleed/perforation, colitis, interstitial lung disease, kidney failure, progressive multifocal leukoencephalopathy, and birth defects.  The patient understands that monitoring is required including a baseline creatinine and regular CBC testing. In addition, patient must alert us immediately if symptoms of infection or other concerning signs are noted.
5-Fu Pregnancy And Lactation Text: This medication is Pregnancy Category X and contraindicated in pregnancy and in women who may become pregnant. It is unknown if this medication is excreted in breast milk.
Thalidomide Counseling: I discussed with the patient the risks of thalidomide including but not limited to birth defects, anxiety, weakness, chest pain, dizziness, cough and severe allergy.
Topical Sulfur Applications Counseling: Topical Sulfur Counseling: Patient counseled that this medication may cause skin irritation or allergic reactions.  In the event of skin irritation, the patient was advised to reduce the amount of the drug applied or use it less frequently.   The patient verbalized understanding of the proper use and possible adverse effects of topical sulfur application.  All of the patient's questions and concerns were addressed.
Oral Minoxidil Pregnancy And Lactation Text: This medication should only be used when clearly needed if you are pregnant, attempting to become pregnant or breast feeding.
Ivermectin Counseling:  Patient instructed to take medication on an empty stomach with a full glass of water.  Patient informed of potential adverse effects including but not limited to nausea, diarrhea, dizziness, itching, and swelling of the extremities or lymph nodes.  The patient verbalized understanding of the proper use and possible adverse effects of ivermectin.  All of the patient's questions and concerns were addressed.
Griseofulvin Pregnancy And Lactation Text: This medication is Pregnancy Category X and is known to cause serious birth defects. It is unknown if this medication is excreted in breast milk but breast feeding should be avoided.
Azelaic Acid Pregnancy And Lactation Text: This medication is considered safe during pregnancy and breast feeding.
Dutasteride Female Counseling: Dutasteride Counseling:  I discussed with the patient the risks of use of dutasteride including but not limited to decreased libido and sexual dysfunction. Explained the teratogenic nature of the medication and stressed the importance of not getting pregnant during treatment. All of the patient's questions and concerns were addressed.
Ilumya Counseling: I discussed with the patient the risks of tildrakizumab including but not limited to immunosuppression, malignancy, posterior leukoencephalopathy syndrome, and serious infections.  The patient understands that monitoring is required including a PPD at baseline and must alert us or the primary physician if symptoms of infection or other concerning signs are noted.
Skyrizi Counseling: I discussed with the patient the risks of risankizumab-rzaa including but not limited to immunosuppression, and serious infections.  The patient understands that monitoring is required including a PPD at baseline and must alert us or the primary physician if symptoms of infection or other concerning signs are noted.
Hydroxychloroquine Counseling:  I discussed with the patient that a baseline ophthalmologic exam is needed at the start of therapy and every year thereafter while on therapy. A CBC may also be warranted for monitoring.  The side effects of this medication were discussed with the patient, including but not limited to agranulocytosis, aplastic anemia, seizures, rashes, retinopathy, and liver toxicity. Patient instructed to call the office should any adverse effect occur.  The patient verbalized understanding of the proper use and possible adverse effects of Plaquenil.  All the patient's questions and concerns were addressed.
Sotyktu Counseling:  I discussed the most common side effects of Sotyktu including: common cold, sore throat, sinus infections, cold sores, canker sores, folliculitis, and acne.  I also discussed more serious side effects of Sotyktu including but not limited to: serious allergic reactions; increased risk for infections such as TB; cancers such as lymphomas; rhabdomyolysis and elevated CPK; and elevated triglycerides and liver enzymes. 
Itraconazole Counseling:  I discussed with the patient the risks of itraconazole including but not limited to liver damage, nausea/vomiting, neuropathy, and severe allergy.  The patient understands that this medication is best absorbed when taken with acidic beverages such as non-diet cola or ginger ale.  The patient understands that monitoring is required including baseline LFTs and repeat LFTs at intervals.  The patient understands that they are to contact us or the primary physician if concerning signs are noted.
Prednisone Pregnancy And Lactation Text: This medication is Pregnancy Category C and it isn't know if it is safe during pregnancy. This medication is excreted in breast milk.
Rhofade Counseling: Rhofade is a topical medication which can decrease superficial blood flow where applied. Side effects are uncommon and include stinging, redness and allergic reactions.
Isotretinoin Counseling: Patient should get monthly blood tests, not donate blood, not drive at night if vision affected, not share medication, and not undergo elective surgery for 6 months after tx completed. Side effects reviewed, pt to contact office should one occur.
Mirvaso Pregnancy And Lactation Text: This medication has not been assigned a Pregnancy Risk Category. It is unknown if the medication is excreted in breast milk.
Erythromycin Counseling:  I discussed with the patient the risks of erythromycin including but not limited to GI upset, allergic reaction, drug rash, diarrhea, increase in liver enzymes, and yeast infections.
Azithromycin Pregnancy And Lactation Text: This medication is considered safe during pregnancy and is also secreted in breast milk.
Cimetidine Counseling:  I discussed with the patient the risks of Cimetidine including but not limited to gynecomastia, headache, diarrhea, nausea, drowsiness, arrhythmias, pancreatitis, skin rashes, psychosis, bone marrow suppression and kidney toxicity.
Rifampin Pregnancy And Lactation Text: This medication is Pregnancy Category C and it isn't know if it is safe during pregnancy. It is also excreted in breast milk and should not be used if you are breast feeding.
Tazorac Pregnancy And Lactation Text: This medication is not safe during pregnancy. It is unknown if this medication is excreted in breast milk.
Zyclara Counseling:  I discussed with the patient the risks of imiquimod including but not limited to erythema, scaling, itching, weeping, crusting, and pain.  Patient understands that the inflammatory response to imiquimod is variable from person to person and was educated regarded proper titration schedule.  If flu-like symptoms develop, patient knows to discontinue the medication and contact us.
Otezla Counseling: The side effects of Otezla were discussed with the patient, including but not limited to worsening or new depression, weight loss, diarrhea, nausea, upper respiratory tract infection, and headache. Patient instructed to call the office should any adverse effect occur.  The patient verbalized understanding of the proper use and possible adverse effects of Otezla.  All the patient's questions and concerns were addressed.
Topical Clindamycin Counseling: Patient counseled that this medication may cause skin irritation or allergic reactions.  In the event of skin irritation, the patient was advised to reduce the amount of the drug applied or use it less frequently.   The patient verbalized understanding of the proper use and possible adverse effects of clindamycin.  All of the patient's questions and concerns were addressed.
Isotretinoin Pregnancy And Lactation Text: This medication is Pregnancy Category X and is considered extremely dangerous during pregnancy. It is unknown if it is excreted in breast milk.
Opzelura Counseling:  I discussed with the patient the risks of Opzelura including but not limited to nasopharngitis, bronchitis, ear infection, eosinophila, hives, diarrhea, folliculitis, tonsillitis, and rhinorrhea.  Taken orally, this medication has been linked to serious infections; higher rate of mortality; malignancy and lymphoproliferative disorders; major adverse cardiovascular events; thrombosis; thrombocytopenia, anemia, and neutropenia; and lipid elevations.
Benzoyl Peroxide Counseling: Patient counseled that medicine may cause skin irritation and bleach clothing.  In the event of skin irritation, the patient was advised to reduce the amount of the drug applied or use it less frequently.   The patient verbalized understanding of the proper use and possible adverse effects of benzoyl peroxide.  All of the patient's questions and concerns were addressed.
Dutasteride Pregnancy And Lactation Text: This medication is absolutely contraindicated in women, especially during pregnancy and breast feeding. Feminization of male fetuses is possible if taking while pregnant.
Drysol Counseling:  I discussed with the patient the risks of drysol/aluminum chloride including but not limited to skin rash, itching, irritation, burning.
Dupixent Counseling: I discussed with the patient the risks of dupilumab including but not limited to eye inflammation and irritation, cold sores, injection site reactions, allergic reactions and increased risk of parasitic infection. The patient understands that monitoring is required and they must alert us or the primary physician if symptoms of infection or other concerning signs are noted.
Cibinqo Counseling: I discussed with the patient the risks of Cibinqo therapy including but not limited to common cold, nausea, headache, cold sores, increased blood CPK levels, dizziness, UTIs, fatigue, acne, and vomitting. Live vaccines should be avoided.  This medication has been linked to serious infections; higher rate of mortality; malignancy and lymphoproliferative disorders; major adverse cardiovascular events; thrombosis; thrombocytopenia and lymphopenia; lipid elevations; and retinal detachment.
Sotyktu Pregnancy And Lactation Text: There is insufficient data to evaluate whether or not Sotyktu is safe to use during pregnancy.   It is not known if Sotyktu passes into breast milk and whether or not it is safe to use when breastfeeding.  
Infliximab Counseling:  I discussed with the patient the risks of infliximab including but not limited to myelosuppression, immunosuppression, autoimmune hepatitis, demyelinating diseases, lymphoma, and serious infections.  The patient understands that monitoring is required including a PPD at baseline and must alert us or the primary physician if symptoms of infection or other concerning signs are noted.
Finasteride Male Counseling: Finasteride Counseling:  I discussed with the patient the risks of use of finasteride including but not limited to decreased libido, decreased ejaculate volume, gynecomastia, and depression. Women should not handle medication.  All of the patient's questions and concerns were addressed.
Dupixent Pregnancy And Lactation Text: This medication likely crosses the placenta but the risk for the fetus is uncertain. This medication is excreted in breast milk.
Benzoyl Peroxide Pregnancy And Lactation Text: This medication is Pregnancy Category C. It is unknown if benzoyl peroxide is excreted in breast milk.
Klisyri Counseling:  I discussed with the patient the risks of Klisyri including but not limited to erythema, scaling, itching, weeping, crusting, and pain.
Tranexamic Acid Counseling:  Patient advised of the small risk of bleeding problems with tranexamic acid. They were also instructed to call if they developed any nausea, vomiting or diarrhea. All of the patient's questions and concerns were addressed.
Sarecycline Counseling: Patient advised regarding possible photosensitivity and discoloration of the teeth, skin, lips, tongue and gums.  Patient instructed to avoid sunlight, if possible.  When exposed to sunlight, patients should wear protective clothing, sunglasses, and sunscreen.  The patient was instructed to call the office immediately if the following severe adverse effects occur:  hearing changes, easy bruising/bleeding, severe headache, or vision changes.  The patient verbalized understanding of the proper use and possible adverse effects of sarecycline.  All of the patient's questions and concerns were addressed.
Hydroxychloroquine Pregnancy And Lactation Text: This medication has been shown to cause fetal harm but it isn't assigned a Pregnancy Risk Category. There are small amounts excreted in breast milk.
Erythromycin Pregnancy And Lactation Text: This medication is Pregnancy Category B and is considered safe during pregnancy. It is also excreted in breast milk.
Erivedge Counseling- I discussed with the patient the risks of Erivedge including but not limited to nausea, vomiting, diarrhea, constipation, weight loss, changes in the sense of taste, decreased appetite, muscle spasms, and hair loss.  The patient verbalized understanding of the proper use and possible adverse effects of Erivedge.  All of the patient's questions and concerns were addressed.
Colchicine Counseling:  Patient counseled regarding adverse effects including but not limited to stomach upset (nausea, vomiting, stomach pain, or diarrhea).  Patient instructed to limit alcohol consumption while taking this medication.  Colchicine may reduce blood counts especially with prolonged use.  The patient understands that monitoring of kidney function and blood counts may be required, especially at baseline. The patient verbalized understanding of the proper use and possible adverse effects of colchicine.  All of the patient's questions and concerns were addressed.
Topical Sulfur Applications Pregnancy And Lactation Text: This medication is considered safe during pregnancy and breast feeding secondary to limited systemic absorption.
Bactrim Counseling:  I discussed with the patient the risks of sulfa antibiotics including but not limited to GI upset, allergic reaction, drug rash, diarrhea, dizziness, photosensitivity, and yeast infections.  Rarely, more serious reactions can occur including but not limited to aplastic anemia, agranulocytosis, methemoglobinemia, blood dyscrasias, liver or kidney failure, lung infiltrates or desquamative/blistering drug rashes.
Finasteride Pregnancy And Lactation Text: This medication is absolutely contraindicated during pregnancy. It is unknown if it is excreted in breast milk.
Adbry Counseling: I discussed with the patient the risks of tralokinumab including but not limited to eye infection and irritation, cold sores, injection site reactions, worsening of asthma, allergic reactions and increased risk of parasitic infection.  Live vaccines should be avoided while taking tralokinumab. The patient understands that monitoring is required and they must alert us or the primary physician if symptoms of infection or other concerning signs are noted.
Wartpeel Counseling:  I discussed with the patient the risks of Wartpeel including but not limited to erythema, scaling, itching, weeping, crusting, and pain.
High Dose Vitamin A Counseling: Side effects reviewed, pt to contact office should one occur.
Solaraze Counseling:  I discussed with the patient the risks of Solaraze including but not limited to erythema, scaling, itching, weeping, crusting, and pain.
Topical Clindamycin Pregnancy And Lactation Text: This medication is Pregnancy Category B and is considered safe during pregnancy. It is unknown if it is excreted in breast milk.
Spevigo Counseling: I discussed with the patient the risks of Spevigo including but not limited to fatigue, nasuea, vomiting, headache, pruritus, urinary tract infection, an infusion related reactions.  The patient understands that monitoring is required including screening for tuberculosis at baseline and yearly screening thereafter while continuing Spevigo therapy. They should contact us if symptoms of infection or other concerning signs are noted.
Doxepin Counseling:  Patient advised that the medication is sedating and not to drive a car after taking this medication. Patient informed of potential adverse effects including but not limited to dry mouth, urinary retention, and blurry vision.  The patient verbalized understanding of the proper use and possible adverse effects of doxepin.  All of the patient's questions and concerns were addressed.
Sarecycline Pregnancy And Lactation Text: This medication is Pregnancy Category D and not consider safe during pregnancy. It is also excreted in breast milk.
Bactrim Pregnancy And Lactation Text: This medication is Pregnancy Category D and is known to cause fetal risk.  It is also excreted in breast milk.
Opzelura Pregnancy And Lactation Text: There is insufficient data to evaluate drug-associated risk for major birth defects, miscarriage, or other adverse maternal or fetal outcomes.  There is a pregnancy registry that monitors pregnancy outcomes in pregnant persons exposed to the medication during pregnancy.  It is unknown if this medication is excreted in breast milk.  Do not breastfeed during treatment and for about 4 weeks after the last dose.
Otezla Pregnancy And Lactation Text: This medication is Pregnancy Category C and it isn't known if it is safe during pregnancy. It is unknown if it is excreted in breast milk.
Cyclophosphamide Counseling:  I discussed with the patient the risks of cyclophosphamide including but not limited to hair loss, hormonal abnormalities, decreased fertility, abdominal pain, diarrhea, nausea and vomiting, bone marrow suppression and infection. The patient understands that monitoring is required while taking this medication.
Finasteride Female Counseling: Finasteride Counseling:  I discussed with the patient the risks of use of finasteride including but not limited to decreased libido and sexual dysfunction. Explained the teratogenic nature of the medication and stressed the importance of not getting pregnant during treatment. All of the patient's questions and concerns were addressed.
Cibinqo Pregnancy And Lactation Text: It is unknown if this medication will adversely affect pregnancy or breast feeding.  You should not take this medication if you are currently pregnant or planning a pregnancy or while breastfeeding.
Enbrel Counseling:  I discussed with the patient the risks of etanercept including but not limited to myelosuppression, immunosuppression, autoimmune hepatitis, demyelinating diseases, lymphoma, and infections.  The patient understands that monitoring is required including a PPD at baseline and must alert us or the primary physician if symptoms of infection or other concerning signs are noted.
Ketoconazole Counseling:   Patient counseled regarding improving absorption with orange juice.  Adverse effects include but are not limited to breast enlargement, headache, diarrhea, nausea, upset stomach, liver function test abnormalities, taste disturbance, and stomach pain.  There is a rare possibility of liver failure that can occur when taking ketoconazole. The patient understands that monitoring of LFTs may be required, especially at baseline. The patient verbalized understanding of the proper use and possible adverse effects of ketoconazole.  All of the patient's questions and concerns were addressed.
Xeljanz Counseling: I discussed with the patient the risks of Xeljanz therapy including increased risk of infection, liver issues, headache, diarrhea, or cold symptoms. Live vaccines should be avoided. They were instructed to call if they have any problems.
Cyclophosphamide Pregnancy And Lactation Text: This medication is Pregnancy Category D and it isn't considered safe during pregnancy. This medication is excreted in breast milk.
Oxybutynin Counseling:  I discussed with the patient the risks of oxybutynin including but not limited to skin rash, drowsiness, dry mouth, difficulty urinating, and blurred vision.
Elidel Counseling: Patient may experience a mild burning sensation during topical application. Elidel is not approved in children less than 2 years of age. There have been case reports of hematologic and skin malignancies in patients using topical calcineurin inhibitors although causality is questionable.
Birth Control Pills Counseling: Birth Control Pill Counseling: I discussed with the patient the potential side effects of OCPs including but not limited to increased risk of stroke, heart attack, thrombophlebitis, deep venous thrombosis, hepatic adenomas, breast changes, GI upset, headaches, and depression.  The patient verbalized understanding of the proper use and possible adverse effects of OCPs. All of the patient's questions and concerns were addressed.
Metronidazole Counseling:  I discussed with the patient the risks of metronidazole including but not limited to seizures, nausea/vomiting, a metallic taste in the mouth, nausea/vomiting and severe allergy.
Carac Counseling:  I discussed with the patient the risks of Carac including but not limited to erythema, scaling, itching, weeping, crusting, and pain.
Low Dose Naltrexone Counseling- I discussed with the patient the potential risks and side effects of low dose naltrexone including but not limited to: more vivid dreams, headaches, nausea, vomiting, abdominal pain, fatigue, dizziness, and anxiety.
Topical Ketoconazole Counseling: Patient counseled that this medication may cause skin irritation or allergic reactions.  In the event of skin irritation, the patient was advised to reduce the amount of the drug applied or use it less frequently.   The patient verbalized understanding of the proper use and possible adverse effects of ketoconazole.  All of the patient's questions and concerns were addressed.
Spevigo Pregnancy And Lactation Text: The risk during pregnancy and breastfeeding is uncertain with this medication. This medication does cross the placenta. It is unknown if this medication is found in breast milk.
High Dose Vitamin A Pregnancy And Lactation Text: High dose vitamin A therapy is contraindicated during pregnancy and breast feeding.
Metronidazole Pregnancy And Lactation Text: This medication is Pregnancy Category B and considered safe during pregnancy.  It is also excreted in breast milk.
Solaraze Pregnancy And Lactation Text: This medication is Pregnancy Category B and is considered safe. There is some data to suggest avoiding during the third trimester. It is unknown if this medication is excreted in breast milk.
Cephalexin Counseling: I counseled the patient regarding use of cephalexin as an antibiotic for prophylactic and/or therapeutic purposes. Cephalexin (commonly prescribed under brand name Keflex) is a cephalosporin antibiotic which is active against numerous classes of bacteria, including most skin bacteria. Side effects may include nausea, diarrhea, gastrointestinal upset, rash, hives, yeast infections, and in rare cases, hepatitis, kidney disease, seizures, fever, confusion, neurologic symptoms, and others. Patients with severe allergies to penicillin medications are cautioned that there is about a 10% incidence of cross-reactivity with cephalosporins. When possible, patients with penicillin allergies should use alternatives to cephalosporins for antibiotic therapy.
Doxepin Pregnancy And Lactation Text: This medication is Pregnancy Category C and it isn't known if it is safe during pregnancy. It is also excreted in breast milk and breast feeding isn't recommended.
Low Dose Naltrexone Pregnancy And Lactation Text: Naltrexone is pregnancy category C.  There have been no adequate and well-controlled studies in pregnant women.  It should be used in pregnancy only if the potential benefit justifies the potential risk to the fetus.   Limited data indicates that naltrexone is minimally excreted into breastmilk.
Rituxan Counseling:  I discussed with the patient the risks of Rituxan infusions. Side effects can include infusion reactions, severe drug rashes including mucocutaneous reactions, reactivation of latent hepatitis and other infections and rarely progressive multifocal leukoencephalopathy.  All of the patient's questions and concerns were addressed.
Dapsone Counseling: I discussed with the patient the risks of dapsone including but not limited to hemolytic anemia, agranulocytosis, rashes, methemoglobinemia, kidney failure, peripheral neuropathy, headaches, GI upset, and liver toxicity.  Patients who start dapsone require monitoring including baseline LFTs and weekly CBCs for the first month, then every month thereafter.  The patient verbalized understanding of the proper use and possible adverse effects of dapsone.  All of the patient's questions and concerns were addressed.
Libtayo Counseling- I discussed with the patient the risks of Libtayo including but not limited to nausea, vomiting, diarrhea, and bone or muscle pain.  The patient verbalized understanding of the proper use and possible adverse effects of Libtayo.  All of the patient's questions and concerns were addressed.
Tranexamic Acid Pregnancy And Lactation Text: It is unknown if this medication is safe during pregnancy or breast feeding.
Picato Counseling:  I discussed with the patient the risks of Picato including but not limited to erythema, scaling, itching, weeping, crusting, and pain.
Adbry Pregnancy And Lactation Text: It is unknown if this medication will adversely affect pregnancy or breast feeding.
Tetracycline Counseling: Patient counseled regarding possible photosensitivity and increased risk for sunburn.  Patient instructed to avoid sunlight, if possible.  When exposed to sunlight, patients should wear protective clothing, sunglasses, and sunscreen.  The patient was instructed to call the office immediately if the following severe adverse effects occur:  hearing changes, easy bruising/bleeding, severe headache, or vision changes.  The patient verbalized understanding of the proper use and possible adverse effects of tetracycline.  All of the patient's questions and concerns were addressed. Patient understands to avoid pregnancy while on therapy due to potential birth defects.
Valtrex Counseling: I discussed with the patient the risks of valacyclovir including but not limited to kidney damage, nausea, vomiting and severe allergy.  The patient understands that if the infection seems to be worsening or is not improving, they are to call.
Opioid Counseling: I discussed with the patient the potential side effects of opioids including but not limited to addiction, altered mental status, and depression. I stressed avoiding alcohol, benzodiazepines, muscle relaxants and sleep aids unless specifically okayed by a physician. The patient verbalized understanding of the proper use and possible adverse effects of opioids. All of the patient's questions and concerns were addressed. They were instructed to flush the remaining pills down the toilet if they did not need them for pain.
Bimzelx Counseling:  I discussed with the patient the risks of Bimzelx including but not limited to depression, immunosuppression, allergic reactions and infections.  The patient understands that monitoring is required including a PPD at baseline and must alert us or the primary physician if symptoms of infection or other concerning signs are noted.
Nsaids Counseling: NSAID Counseling: I discussed with the patient that NSAIDs should be taken with food. Prolonged use of NSAIDs can result in the development of stomach ulcers.  Patient advised to stop taking NSAIDs if abdominal pain occurs.  The patient verbalized understanding of the proper use and possible adverse effects of NSAIDs.  All of the patient's questions and concerns were addressed.
Cyclosporine Counseling:  I discussed with the patient the risks of cyclosporine including but not limited to hypertension, gingival hyperplasia,myelosuppression, immunosuppression, liver damage, kidney damage, neurotoxicity, lymphoma, and serious infections. The patient understands that monitoring is required including baseline blood pressure, CBC, CMP, lipid panel and uric acid, and then 1-2 times monthly CMP and blood pressure.
Birth Control Pills Pregnancy And Lactation Text: This medication should be avoided if pregnant and for the first 30 days post-partum.
Litfulo Counseling: I discussed with the patient the risks of Litfulo therapy including but not limited to upper respiratory tract infections, shingles, cold sores, and nausea. Live vaccines should be avoided.  This medication has been linked to serious infections; higher rate of mortality; malignancy and lymphoproliferative disorders; major adverse cardiovascular events; thrombosis; gastrointestinal perforations; neutropenia; lymphopenia; anemia; liver enzyme elevations; and lipid elevations.
Xelgabriellez Pregnancy And Lactation Text: This medication is Pregnancy Category D and is not considered safe during pregnancy.  The risk during breast feeding is also uncertain.
Detail Level: Simple
Niacinamide Counseling: I recommended taking niacin or niacinamide, also know as vitamin B3, twice daily. Recent evidence suggests that taking vitamin B3 (500 mg twice daily) can reduce the risk of actinic keratoses and non-melanoma skin cancers. Side effects of vitamin B3 include flushing and headache.
Spironolactone Counseling: Patient advised regarding risks of diarrhea, abdominal pain, hyperkalemia, birth defects (for female patients), liver toxicity and renal toxicity. The patient may need blood work to monitor liver and kidney function and potassium levels while on therapy. The patient verbalized understanding of the proper use and possible adverse effects of spironolactone.  All of the patient's questions and concerns were addressed.
Dapsone Pregnancy And Lactation Text: This medication is Pregnancy Category C and is not considered safe during pregnancy or breast feeding.
Rituxan Pregnancy And Lactation Text: This medication is Pregnancy Category C and it isn't know if it is safe during pregnancy. It is unknown if this medication is excreted in breast milk but similar antibodies are known to be excreted.
Libtayo Pregnancy And Lactation Text: This medication is contraindicated in pregnancy and when breast feeding.
Litfulo Pregnancy And Lactation Text: Based on animal studies, Lifulo may cause embryo-fetal harm when administered to pregnant women.  The medication should not be used in pregnancy.  Breastfeeding is not recommended during treatment.
Soolantra Counseling: I discussed with the patients the risks of topial Soolantra. This is a medicine which decreases the number of mites and inflammation in the skin. You experience burning, stinging, eye irritation or allergic reactions.  Please call our office if you develop any problems from using this medication.
Cephalexin Pregnancy And Lactation Text: This medication is Pregnancy Category B and considered safe during pregnancy.  It is also excreted in breast milk but can be used safely for shorter doses.
Hydroxyzine Counseling: Patient advised that the medication is sedating and not to drive a car after taking this medication.  Patient informed of potential adverse effects including but not limited to dry mouth, urinary retention, and blurry vision.  The patient verbalized understanding of the proper use and possible adverse effects of hydroxyzine.  All of the patient's questions and concerns were addressed.
Winlevi Counseling:  I discussed with the patient the risks of topical clascoterone including but not limited to erythema, scaling, itching, and stinging. Patient voiced their understanding.
Minocycline Counseling: Patient advised regarding possible photosensitivity and discoloration of the teeth, skin, lips, tongue and gums.  Patient instructed to avoid sunlight, if possible.  When exposed to sunlight, patients should wear protective clothing, sunglasses, and sunscreen.  The patient was instructed to call the office immediately if the following severe adverse effects occur:  hearing changes, easy bruising/bleeding, severe headache, or vision changes.  The patient verbalized understanding of the proper use and possible adverse effects of minocycline.  All of the patient's questions and concerns were addressed.
Stelara Counseling:  I discussed with the patient the risks of ustekinumab including but not limited to immunosuppression, malignancy, posterior leukoencephalopathy syndrome, and serious infections.  The patient understands that monitoring is required including a PPD at baseline and must alert us or the primary physician if symptoms of infection or other concerning signs are noted.
Propranolol Counseling:  I discussed with the patient the risks of propranolol including but not limited to low heart rate, low blood pressure, low blood sugar, restlessness and increased cold sensitivity. They should call the office if they experience any of these side effects.
Bimzelx Pregnancy And Lactation Text: This medication crosses the placenta and the safety is uncertain during pregnancy. It is unknown if this medication is present in breast milk.
Hydroxyzine Pregnancy And Lactation Text: This medication is not safe during pregnancy and should not be taken. It is also excreted in breast milk and breast feeding isn't recommended.
Topical Metronidazole Counseling: Metronidazole is a topical antibiotic medication. You may experience burning, stinging, redness, or allergic reactions.  Please call our office if you develop any problems from using this medication.
Protopic Counseling: Patient may experience a mild burning sensation during topical application. Protopic is not approved in children less than 2 years of age. There have been case reports of hematologic and skin malignancies in patients using topical calcineurin inhibitors although causality is questionable.
Soolantra Pregnancy And Lactation Text: This medication is Pregnancy Category C. This medication is considered safe during breast feeding.
Nsaids Pregnancy And Lactation Text: These medications are considered safe up to 30 weeks gestation. It is excreted in breast milk.
Calcipotriene Counseling:  I discussed with the patient the risks of calcipotriene including but not limited to erythema, scaling, itching, and irritation.
Eucrisa Counseling: Patient may experience a mild burning sensation during topical application. Eucrisa is not approved in children less than 3 months of age.
Valtrex Pregnancy And Lactation Text: this medication is Pregnancy Category B and is considered safe during pregnancy. This medication is not directly found in breast milk but it's metabolite acyclovir is present.
Humira Counseling:  I discussed with the patient the risks of adalimumab including but not limited to myelosuppression, immunosuppression, autoimmune hepatitis, demyelinating diseases, lymphoma, and serious infections.  The patient understands that monitoring is required including a PPD at baseline and must alert us or the primary physician if symptoms of infection or other concerning signs are noted.
Opioid Pregnancy And Lactation Text: These medications can lead to premature delivery and should be avoided during pregnancy. These medications are also present in breast milk in small amounts.
Olumiant Counseling: I discussed with the patient the risks of Olumiant therapy including but not limited to upper respiratory tract infections, shingles, cold sores, and nausea. Live vaccines should be avoided.  This medication has been linked to serious infections; higher rate of mortality; malignancy and lymphoproliferative disorders; major adverse cardiovascular events; thrombosis; gastrointestinal perforations; neutropenia; lymphopenia; anemia; liver enzyme elevations; and lipid elevations.
Siliq Counseling:  I discussed with the patient the risks of Siliq including but not limited to new or worsening depression, suicidal thoughts and behavior, immunosuppression, malignancy, posterior leukoencephalopathy syndrome, and serious infections.  The patient understands that monitoring is required including a PPD at baseline and must alert us or the primary physician if symptoms of infection or other concerning signs are noted. There is also a special program designed to monitor depression which is required with Siliq.
Gabapentin Counseling: I discussed with the patient the risks of gabapentin including but not limited to dizziness, somnolence, fatigue and ataxia.
Calcipotriene Pregnancy And Lactation Text: The use of this medication during pregnancy or lactation is not recommended as there is insufficient data.
Fluconazole Counseling:  Patient counseled regarding adverse effects of fluconazole including but not limited to headache, diarrhea, nausea, upset stomach, liver function test abnormalities, taste disturbance, and stomach pain.  There is a rare possibility of liver failure that can occur when taking fluconazole.  The patient understands that monitoring of LFTs and kidney function test may be required, especially at baseline. The patient verbalized understanding of the proper use and possible adverse effects of fluconazole.  All of the patient's questions and concerns were addressed.
Odomzo Counseling- I discussed with the patient the risks of Odomzo including but not limited to nausea, vomiting, diarrhea, constipation, weight loss, changes in the sense of taste, decreased appetite, muscle spasms, and hair loss.  The patient verbalized understanding of the proper use and possible adverse effects of Odomzo.  All of the patient's questions and concerns were addressed.
Winlevi Pregnancy And Lactation Text: This medication is considered safe during pregnancy and breastfeeding.
Klisyri Pregnancy And Lactation Text: It is unknown if this medication can harm a developing fetus or if it is excreted in breast milk.
Acitretin Counseling:  I discussed with the patient the risks of acitretin including but not limited to hair loss, dry lips/skin/eyes, liver damage, hyperlipidemia, depression/suicidal ideation, photosensitivity.  Serious rare side effects can include but are not limited to pancreatitis, pseudotumor cerebri, bony changes, clot formation/stroke/heart attack.  Patient understands that alcohol is contraindicated since it can result in liver toxicity and significantly prolong the elimination of the drug by many years.
Clindamycin Counseling: I counseled the patient regarding use of clindamycin as an antibiotic for prophylactic and/or therapeutic purposes. Clindamycin is active against numerous classes of bacteria, including skin bacteria. Side effects may include nausea, diarrhea, gastrointestinal upset, rash, hives, yeast infections, and in rare cases, colitis.
Spironolactone Pregnancy And Lactation Text: This medication can cause feminization of the male fetus and should be avoided during pregnancy. The active metabolite is also found in breast milk.
Niacinamide Pregnancy And Lactation Text: These medications are considered safe during pregnancy.

## 2024-10-21 ENCOUNTER — HOSPITAL ENCOUNTER (OUTPATIENT)
Dept: LAB | Facility: MEDICAL CENTER | Age: 89
End: 2024-10-21
Attending: INTERNAL MEDICINE
Payer: MEDICARE

## 2024-10-21 DIAGNOSIS — R73.9 ELEVATED BLOOD SUGAR: ICD-10-CM

## 2024-10-21 DIAGNOSIS — I10 PRIMARY HYPERTENSION: ICD-10-CM

## 2024-10-21 LAB
ALBUMIN SERPL BCP-MCNC: 4.1 G/DL (ref 3.2–4.9)
ALBUMIN/GLOB SERPL: 1.3 G/DL
ALP SERPL-CCNC: 112 U/L (ref 30–99)
ALT SERPL-CCNC: 19 U/L (ref 2–50)
ANION GAP SERPL CALC-SCNC: 13 MMOL/L (ref 7–16)
AST SERPL-CCNC: 25 U/L (ref 12–45)
BILIRUB SERPL-MCNC: 0.7 MG/DL (ref 0.1–1.5)
BUN SERPL-MCNC: 22 MG/DL (ref 8–22)
CALCIUM ALBUM COR SERPL-MCNC: 9.3 MG/DL (ref 8.5–10.5)
CALCIUM SERPL-MCNC: 9.4 MG/DL (ref 8.5–10.5)
CHLORIDE SERPL-SCNC: 102 MMOL/L (ref 96–112)
CO2 SERPL-SCNC: 26 MMOL/L (ref 20–33)
CREAT SERPL-MCNC: 0.95 MG/DL (ref 0.5–1.4)
EST. AVERAGE GLUCOSE BLD GHB EST-MCNC: 120 MG/DL
GFR SERPLBLD CREATININE-BSD FMLA CKD-EPI: 58 ML/MIN/1.73 M 2
GLOBULIN SER CALC-MCNC: 3.2 G/DL (ref 1.9–3.5)
GLUCOSE SERPL-MCNC: 88 MG/DL (ref 65–99)
HBA1C MFR BLD: 5.8 % (ref 4–5.6)
POTASSIUM SERPL-SCNC: 4.5 MMOL/L (ref 3.6–5.5)
PROT SERPL-MCNC: 7.3 G/DL (ref 6–8.2)
SODIUM SERPL-SCNC: 141 MMOL/L (ref 135–145)

## 2024-10-21 PROCEDURE — 36415 COLL VENOUS BLD VENIPUNCTURE: CPT

## 2024-10-21 PROCEDURE — 83036 HEMOGLOBIN GLYCOSYLATED A1C: CPT

## 2024-10-21 PROCEDURE — 80053 COMPREHEN METABOLIC PANEL: CPT

## 2024-11-06 ENCOUNTER — OFFICE VISIT (OUTPATIENT)
Dept: INTERNAL MEDICINE | Facility: OTHER | Age: 89
End: 2024-11-06
Payer: MEDICARE

## 2024-11-06 VITALS
DIASTOLIC BLOOD PRESSURE: 110 MMHG | SYSTOLIC BLOOD PRESSURE: 182 MMHG | TEMPERATURE: 97.7 F | BODY MASS INDEX: 32.03 KG/M2 | HEIGHT: 64 IN | HEART RATE: 87 BPM | WEIGHT: 187.6 LBS | OXYGEN SATURATION: 86 %

## 2024-11-06 DIAGNOSIS — Z23 NEED FOR VACCINATION: ICD-10-CM

## 2024-11-06 DIAGNOSIS — F10.90 ALCOHOL USE DISORDER: ICD-10-CM

## 2024-11-06 DIAGNOSIS — I10 PRIMARY HYPERTENSION: ICD-10-CM

## 2024-11-06 DIAGNOSIS — M79.89 SWELLING OF LOWER EXTREMITY: ICD-10-CM

## 2024-11-06 PROCEDURE — G0008 ADMIN INFLUENZA VIRUS VAC: HCPCS | Performed by: INTERNAL MEDICINE

## 2024-11-06 PROCEDURE — 3080F DIAST BP >= 90 MM HG: CPT | Performed by: INTERNAL MEDICINE

## 2024-11-06 PROCEDURE — 90662 IIV NO PRSV INCREASED AG IM: CPT | Performed by: INTERNAL MEDICINE

## 2024-11-06 PROCEDURE — 99214 OFFICE O/P EST MOD 30 MIN: CPT | Mod: 25 | Performed by: INTERNAL MEDICINE

## 2024-11-06 PROCEDURE — 3077F SYST BP >= 140 MM HG: CPT | Performed by: INTERNAL MEDICINE

## 2024-11-06 RX ORDER — VALSARTAN AND HYDROCHLOROTHIAZIDE 320; 25 MG/1; MG/1
1 TABLET, FILM COATED ORAL DAILY
Qty: 100 TABLET | Refills: 1 | Status: SHIPPED | OUTPATIENT
Start: 2024-11-06

## 2024-11-06 ASSESSMENT — FIBROSIS 4 INDEX: FIB4 SCORE: 2.39

## 2024-11-06 NOTE — PATIENT INSTRUCTIONS
Pls check BP daily and bring me the log.  Call us with all the meds you are taking .  Compression socks daily and low salt diet plus elevate legs when you are sitting.

## 2024-11-07 NOTE — PROGRESS NOTES
date  Chief Complaint   Patient presents with    Follow-Up     Reports nothing new since last time       HISTORY OF PRESENT ILLNESS: Patient is a 89 y.o. female established patient who presents today for the following.  Patient had labs done and here with her  for follow-up.  She does not remember the medication she is taking and not aware of what is off her med list    1. Primary hypertension  Blood pressure really high today in the office on 2 different checkups.  Upon review of medication she has not been taking her losartan/HCTZ because she has not filled it for a while and continues drinking whiskey on daily basis.    2. Swelling of lower extremity  Complained of swelling in both legs but she thinks they are doing okay and no pain as such    3. Alcohol use disorder  The whiskey problem is a chronic issue for the last few years and patient not willing to quit.  She will says she will decrease the quantity and every office visit but has been concerned about her health overall and her alcohol use    4. Need for vaccination  Agreed for flu shot today      Past Medical History:   Diagnosis Date    2019 novel coronavirus disease (COVID-19) 10/06/2021    AVN (avascular necrosis of bone) ([facility]) 11/12/2019    S/p Rt Hip replacement 2019      COVID-19 virus infection 11/17/2021    Depression 06/02/2020 02/25/22 Resolved per problem list and pt.    Edema, lower extremity 11/19/2019    Hardware complicating wound infection ([facility]) 02/24/2022    Hypertension     Hypoalbuminemia 11/11/2019    Multiple open wounds of lower leg 11/03/2019    Nocturia 06/25/2021    Pneumonia     Viral simon 30 yrs ago    Rheumatic fever 1943    Shortness of breath 11/17/2021    Urinary retention 11/13/2019     IMO load March 2020       Patient Active Problem List    Diagnosis Date Noted    AK (actinic keratosis) 06/24/2024    Swelling of lower extremity 06/24/2024    Macular degeneration of both eyes 02/14/2024    Obesity (BMI 30-39.9)  2024    Elevated brain natriuretic peptide (BNP) level 2024    Gastroesophageal reflux disease without esophagitis 10/03/2023    At risk for falling 2023    Alcohol use disorder 2022    S/P total knee arthroplasty, right 2022    Pulmonary hypertension (HCC) 2021    Adjustment disorder with anxiety 10/07/2021    Asymptomatic menopausal state 2021    Seasonal allergic rhinitis 2020    Other fracture of right patella, sequela 2020    Vitamin D insufficiency 2019    Status post total replacement of right hip 2019    Impaired mobility and ADLs 2019    Primary hypertension 2019    Degenerative joint disease of right hip 2019       Allergies:Apap-fd&c red #40 [place]-oxycodone, Percocet [perloxx], Hydrocodone, Oxycodone, and Tramadol    Current Outpatient Medications   Medication Sig Dispense Refill    valsartan-hydrochlorothiazide (DIOVAN-HCT) 320-25 MG per tablet Take 1 Tablet by mouth every day. 100 Tablet 1    furosemide (LASIX) 20 MG Tab TAKE 1 TABLET BY MOUTH EVERY MORNING WITH BREAKFAST 100 Tablet 0    spironolactone (ALDACTONE) 50 MG Tab TAKE 1 TABLET BY MOUTH EVERY  Tablet 1    acetaminophen (TYLENOL) 325 MG Tab Take 650 mg by mouth every four hours as needed.       No current facility-administered medications for this visit.       Social History     Tobacco Use    Smoking status: Former     Current packs/day: 0.00     Average packs/day: 1 pack/day for 30.0 years (30.0 ttl pk-yrs)     Types: Cigarettes     Start date:      Quit date:      Years since quittin.8    Smokeless tobacco: Never   Vaping Use    Vaping status: Never Used   Substance Use Topics    Alcohol use: Yes     Alcohol/week: 0.6 oz     Types: 1 Glasses of wine per week     Comment: 1 drink every day    Drug use: Never       Family History   Problem Relation Age of Onset    Alcohol abuse Mother     Heart Disease Mother     Heart Disease Brother      "Alcohol abuse Brother     Cancer Maternal Aunt          Review of Systems   Patient denies Fevers/chills/nausea/vomiting/chest pain/sob/blood in stools/black stools/blood in urine.all other systems are reviewed See HPI    Exam:  BP (!) 182/110 (BP Location: Right arm, Patient Position: Sitting, BP Cuff Size: Adult)   Pulse 87   Temp 36.5 °C (97.7 °F) (Temporal)   Ht 1.626 m (5' 4\")   Wt 85.1 kg (187 lb 9.6 oz)   SpO2 (!) 86%  Body mass index is 32.2 kg/m².  Constitutional:  NAD, well appearing.  HEENT:   NC/AT  Cardiovascular: RRR.   No m/r/g. No carotid bruits.       Lungs:   CTAB, no w/r/r, no respiratory distress.  Abdomen: Soft, NT/ND + BS, no masses, no suprapubic tenderness, no hepatomegaly.  Extremities:  2+ DP and radial pulses bilaterally.  Significant 3+ pitting edema bilateral lower extremities  Skin:  Warm and dry.    Neurologic: Alert & oriented x 3, CN II-XII grossly intact, strength and sensation grossly intact.  No focal deficits noted.  Psychiatric:  Affect normal, mood normal, judgment normal.    Assessment/Plan:     1. Primary hypertension  2. Swelling of lower extremity  Blood pressure really high but no signs of emergency.  Discussed about picking up valsartan HCTZ right away and start taking it today along with her Aldactone as well as Lasix that were prescribed for pedal edema.  Patient advised again to keep her feet elevated at heart level when she is sitting and resting and also use compression socks and low-salt diet which she has not been compliant with.  Advised to first call us with her medication list so we know exactly what she is taking including her Aldactone and Lasix  - valsartan-hydrochlorothiazide (DIOVAN-HCT) 320-25 MG per tablet; Take 1 Tablet by mouth every day.  Dispense: 100 Tablet; Refill: 1    3. Alcohol use disorder  Discussed about less alcohol use but patient not ready to quit.  Will prescribe naltrexone once she is ready    4. Need for vaccination  Flu shot given " today in the office  - Influenza Vaccine, High Dose (65+ Only)      All imaging results and lab results and consult notes are reviewed at this visit.  Followup: Return in about 1 week (around 11/13/2024).    Please note that this dictation was created using voice recognition software. I have made every reasonable attempt to correct obvious errors, but I expect that there are errors of grammar and possibly content that I did not discover before finalizing the note.

## 2024-11-11 ENCOUNTER — TELEPHONE (OUTPATIENT)
Dept: INTERNAL MEDICINE | Facility: OTHER | Age: 89
End: 2024-11-11
Payer: MEDICARE

## 2024-11-12 ENCOUNTER — OFFICE VISIT (OUTPATIENT)
Dept: INTERNAL MEDICINE | Facility: OTHER | Age: 89
End: 2024-11-12
Payer: MEDICARE

## 2024-11-12 ENCOUNTER — TELEPHONE (OUTPATIENT)
Dept: INTERNAL MEDICINE | Facility: OTHER | Age: 89
End: 2024-11-12

## 2024-11-12 VITALS
TEMPERATURE: 98.3 F | HEART RATE: 94 BPM | SYSTOLIC BLOOD PRESSURE: 182 MMHG | HEIGHT: 64 IN | DIASTOLIC BLOOD PRESSURE: 91 MMHG | WEIGHT: 184.6 LBS | BODY MASS INDEX: 31.51 KG/M2 | OXYGEN SATURATION: 92 %

## 2024-11-12 DIAGNOSIS — M79.89 SWELLING OF LOWER EXTREMITY: ICD-10-CM

## 2024-11-12 DIAGNOSIS — I10 PRIMARY HYPERTENSION: ICD-10-CM

## 2024-11-12 PROCEDURE — 99214 OFFICE O/P EST MOD 30 MIN: CPT | Mod: GC

## 2024-11-12 PROCEDURE — 3077F SYST BP >= 140 MM HG: CPT | Mod: GC

## 2024-11-12 PROCEDURE — 3080F DIAST BP >= 90 MM HG: CPT | Mod: GC

## 2024-11-12 RX ORDER — METOPROLOL SUCCINATE 25 MG/1
25 TABLET, EXTENDED RELEASE ORAL DAILY
Qty: 30 TABLET | Refills: 1 | Status: SHIPPED | OUTPATIENT
Start: 2024-11-12

## 2024-11-12 RX ORDER — NEBIVOLOL 5 MG/1
5 TABLET ORAL DAILY
Qty: 90 TABLET | Refills: 0 | Status: SHIPPED | OUTPATIENT
Start: 2024-11-12 | End: 2024-11-12 | Stop reason: CLARIF

## 2024-11-12 ASSESSMENT — ENCOUNTER SYMPTOMS
DIARRHEA: 0
WEAKNESS: 0
DIZZINESS: 0
HEARTBURN: 0
SHORTNESS OF BREATH: 0
DEPRESSION: 0
FEVER: 0
WHEEZING: 0
BLURRED VISION: 0
VOMITING: 0
NAUSEA: 0
NERVOUS/ANXIOUS: 0
INSOMNIA: 0
ABDOMINAL PAIN: 0
HEADACHES: 0
CHILLS: 0
CLAUDICATION: 0
PHOTOPHOBIA: 0
SORE THROAT: 0
PALPITATIONS: 0
CONSTIPATION: 0
EYE PAIN: 0
COUGH: 0
DIAPHORESIS: 0

## 2024-11-12 ASSESSMENT — FIBROSIS 4 INDEX: FIB4 SCORE: 2.39

## 2024-11-12 NOTE — ASSESSMENT & PLAN NOTE
Notable asymmetry of LE edema on exam. Likely as a result of underlying venous insufficiency. This is also likely the cause for her recent clear fluid ooze with some wounds found on exam. Afebrile, minimal warmth to touch makes cellulitis less likely.   -Continue Lasix as prescribed  -Continue compression stockings, elevating legs to manage symptoms

## 2024-11-12 NOTE — TELEPHONE ENCOUNTER
Phone Number Called: [phone]    Call outcome: Spoke to patient regarding message below.    Message: Informed patient that I received their message.   Patient is seeing Dr. Diaz tomorrow, and we agreed that we would address this issue tomorrow.   Routing to both Dr. Elise and Dr. Diaz so that they are aware.   Added note to visit note for tomorrow.

## 2024-11-12 NOTE — ASSESSMENT & PLAN NOTE
Significantly elevated today at 189/94 and 182/91 on recheck. Unsure if patient has been taking Spironolactone given that her last supply should have ended in August 2024. Did not take medications this morning.  -Will start Nebivolol 5 mg once daily  -Continue Valsartan-HCTZ, Spironolactone, Lasix as prescribed  -Advised to keep a BP log and to check BP at home at least 4 times a week

## 2024-11-12 NOTE — PROGRESS NOTES
OFFICE VISIT: INITIAL ENCOUNTER    Janessa Cain is a 89 y.o. female who presents today with the following:    Chief Complaint: Follow up HTN, LLE clear fluid oozing    History of Present Illness:     Accompanied with .    Patient reports she has had clear fluid oozing out of the left shin. This started two days ago and has resolved today. She states there was a significant amount of fluid that drenched her sweat pants and bed sheets. Denies fever, chills, pain, recent injuries. Patient denies pain with ambulation. Patient continues to have swollen legs more so on the left. She infrequently uses her compression stockings to manage leg swelling. Roughly 3 times a week. States it has not been helpful. Elevating her legs has not been helpful either. She is compliant with her medications but did not bring the bottles today.     Her home BP readings have been ranging 170-177 systolic the past week. Denies SOB, chest pain.     Review of Systems   Constitutional:  Negative for chills, diaphoresis, fever and malaise/fatigue.   HENT:  Negative for congestion, ear pain, hearing loss and sore throat.    Eyes:  Negative for blurred vision, photophobia and pain.   Respiratory:  Negative for cough, shortness of breath and wheezing.    Cardiovascular:  Positive for leg swelling (L>R). Negative for chest pain, palpitations and claudication.   Gastrointestinal:  Negative for abdominal pain, constipation, diarrhea, heartburn, nausea and vomiting.   Genitourinary:  Negative for dysuria and frequency.   Skin:  Negative for rash.   Neurological:  Negative for dizziness, weakness and headaches.   Psychiatric/Behavioral:  Negative for depression. The patient is not nervous/anxious and does not have insomnia.         Past Medical History:   Past Medical History:   Diagnosis Date    2019 novel coronavirus disease (COVID-19) 10/06/2021    AVN (avascular necrosis of bone) ([facility]) 11/12/2019    S/p Rt Hip  "replacement 2019      COVID-19 virus infection 11/17/2021    Depression 06/02/2020 02/25/22 Resolved per problem list and pt.    Edema, lower extremity 11/19/2019    Hardware complicating wound infection (HCC) 02/24/2022    Hypertension     Hypoalbuminemia 11/11/2019    Multiple open wounds of lower leg 11/03/2019    Nocturia 06/25/2021    Pneumonia     Viral simon 30 yrs ago    Rheumatic fever 1943    Shortness of breath 11/17/2021    Urinary retention 11/13/2019     IMO load March 2020       Patient Active Problem List    Diagnosis Date Noted    AK (actinic keratosis) 06/24/2024    Swelling of lower extremity 06/24/2024    Macular degeneration of both eyes 02/14/2024    Obesity (BMI 30-39.9) 02/14/2024    Elevated brain natriuretic peptide (BNP) level 02/08/2024    Gastroesophageal reflux disease without esophagitis 10/03/2023    At risk for falling 06/12/2023    Alcohol use disorder 12/19/2022    S/P total knee arthroplasty, right 03/03/2022    Pulmonary hypertension (HCC) 11/17/2021    Adjustment disorder with anxiety 10/07/2021    Asymptomatic menopausal state 01/22/2021    Seasonal allergic rhinitis 08/20/2020    Other fracture of right patella, sequela 01/03/2020    Vitamin D insufficiency 11/13/2019    Status post total replacement of right hip 11/12/2019    Impaired mobility and ADLs 11/12/2019    Primary hypertension 11/12/2019    Degenerative joint disease of right hip 11/03/2019       Past Surgical History:   Procedure Laterality Date    PB TOTAL KNEE ARTHROPLASTY Right 2/28/2022    Procedure: ARTHROPLASTY, KNEE, TOTAL;  Surgeon: Pauline Mo M.D.;  Location: SURGERY [facility];  Service: Orthopedics    OK TOTAL HIP ARTHROPLASTY Right 11/6/2019    Procedure: ARTHROPLASTY, HIP, TOTAL;  Surgeon: Pauline Mo M.D.;  Location: [facility];  Service: Orthopedics    HIP ARTHROPLASTY TOTAL Left     \"resurfacing\"    TONSILLECTOMY          Allergies:  Apap-fd&c red #40 [place]-oxycodone, " "Percocet [perloxx], Hydrocodone, Oxycodone, and Tramadol    Medications:     Current Outpatient Medications:     nebivolol, 5 mg, Oral, DAILY, Taking    valsartan-hydrochlorothiazide, 1 Tablet, Oral, DAILY, Taking    furosemide, 20 mg, Oral, QDAY with Breakfast, Taking    spironolactone, 50 mg, Oral, DAILY, Taking    acetaminophen, 650 mg, Oral, Q4HRS PRN, PRN     Social History:   Social History     Tobacco Use    Smoking status: Former     Current packs/day: 0.00     Average packs/day: 1 pack/day for 30.0 years (30.0 ttl pk-yrs)     Types: Cigarettes     Start date:      Quit date:      Years since quittin.8    Smokeless tobacco: Never   Vaping Use    Vaping status: Never Used   Substance Use Topics    Alcohol use: Yes     Alcohol/week: 0.6 oz     Types: 1 Glasses of wine per week     Comment: 1 drink every day    Drug use: Never       Family History:   Family History   Problem Relation Age of Onset    Alcohol abuse Mother     Heart Disease Mother     Heart Disease Brother     Alcohol abuse Brother     Cancer Maternal Aunt        Vitals:   BP (!) 182/91 (BP Location: Left arm, Patient Position: Sitting, BP Cuff Size: Adult)   Pulse 94   Temp 36.8 °C (98.3 °F) (Temporal)   Ht 1.626 m (5' 4\")   Wt 83.7 kg (184 lb 9.6 oz)   SpO2 92%  Body mass index is 31.69 kg/m².    Physical Exam  Vitals and nursing note reviewed.   Constitutional:       General: She is not in acute distress.     Appearance: She is not diaphoretic.   HENT:      Head: Normocephalic and atraumatic.      Mouth/Throat:      Mouth: Mucous membranes are moist.      Pharynx: Oropharynx is clear. No oropharyngeal exudate or posterior oropharyngeal erythema.   Cardiovascular:      Rate and Rhythm: Normal rate and regular rhythm.      Pulses: Normal pulses.      Heart sounds: Normal heart sounds. No murmur heard.     No gallop.   Pulmonary:      Effort: Pulmonary effort is normal. No respiratory distress.      Breath sounds: Rales (b/l) " present. No wheezing or rhonchi.   Abdominal:      General: There is no distension.      Palpations: Abdomen is soft.      Tenderness: There is no abdominal tenderness.   Musculoskeletal:      Right lower leg: Edema (+1 pitting up to the midshin) present.      Left lower leg: Edema (+1 pitting edema up to the knee) present.   Skin:     General: Skin is warm.      Findings: No rash.      Comments: Venous stasis changes bilaterally, more so on the left with notable wounds on the anterior aspect of the mid tibia with surrounding swelling.   Neurological:      General: No focal deficit present.      Mental Status: She is alert and oriented to person, place, and time.   Psychiatric:         Mood and Affect: Mood normal.          Labs:   CMP 10/21/24   Latest Reference Range & Units 10/21/24 09:39   Sodium 135 - 145 mmol/L 141   Potassium 3.6 - 5.5 mmol/L 4.5   Chloride 96 - 112 mmol/L 102   Co2 20 - 33 mmol/L 26   Anion Gap 7.0 - 16.0  13.0   Glucose 65 - 99 mg/dL 88   Bun 8 - 22 mg/dL 22   Creatinine 0.50 - 1.40 mg/dL 0.95   GFR (CKD-EPI) >60 mL/min/1.73 m 2 58 !   Calcium 8.5 - 10.5 mg/dL 9.4   Correct Calcium 8.5 - 10.5 mg/dL 9.3   AST(SGOT) 12 - 45 U/L 25   ALT(SGPT) 2 - 50 U/L 19   Alkaline Phosphatase 30 - 99 U/L 112 (H)   Total Bilirubin 0.1 - 1.5 mg/dL 0.7   Albumin 3.2 - 4.9 g/dL 4.1   Total Protein 6.0 - 8.2 g/dL 7.3   Globulin 1.9 - 3.5 g/dL 3.2   A-G Ratio g/dL 1.3   Glycohemoglobin 4.0 - 5.6 % 5.8 (H)   Estim. Avg Glu mg/dL 120   !: Data is abnormal  (H): Data is abnormally high  Imaging:   N/A  Assessment and Plan    Primary hypertension  Significantly elevated today at 189/94 and 182/91 on recheck. Unsure if patient has been taking Spironolactone given that her last supply should have ended in August 2024. Did not take medications this morning.  -Will start Nebivolol 5 mg once daily  -Continue Valsartan-HCTZ, Spironolactone, Lasix as prescribed  -Advised to keep a BP log and to check BP at home at least 4  times a week    Swelling of lower extremity  Notable asymmetry of LE edema on exam. Likely as a result of underlying venous insufficiency. This is also likely the cause for her recent clear fluid ooze with some wounds found on exam. Afebrile, minimal warmth to touch makes cellulitis less likely.   -Continue Lasix as prescribed  -Continue compression stockings, elevating legs to manage symptoms       Orders Placed This Encounter    nebivolol (BYSTOLIC) 5 MG Tab tablet       Return in about 5 weeks (around 12/17/2024) for with Dr. Diaz.      Please note that this dictation was created using voice recognition software. I have made every reasonable attempt to correct obvious errors, but I expect that there are errors of grammar and possibly content that I did not discover before finalizing the note.    Patient case was seen/ assessed/ discussed with Dr. Elise    Signed by:    Keith Diaz DO    PGY-1 Internal Medicine Resident

## 2024-11-12 NOTE — PATIENT INSTRUCTIONS
Thank you for visiting [facility]!    Medications changes include adding Nebivolol 5 mg once daily.    We talked about your blood pressure. Please try to reduce as much salt from your diet as possible.     Please keep elevating your leg and using your compression stockings.     Please do not hesitate to contact me on MyChart should you need anything else!

## 2024-11-12 NOTE — TELEPHONE ENCOUNTER
Received a fax stating that the Nebivolol is not covered by insurance.     Preferred alternative: Atenolol, Betapace, Coreg, Metoprolol Tartrate, Propanolol HCL.   Dr. Diaz, are you able to prescribe one of the alternatives?

## 2024-11-12 NOTE — TELEPHONE ENCOUNTER
VOICEMAIL  1. Caller Name: Amber [Almas, pt's spouse]          Call Back Number: [phone]    2. Message: Wanted to inform me that Janessa is having fluid emitting from her calf area.   Also wanted to let Dr. Elise know that patient is taking Valsartan 320mg daily, Spironolactone 50mg daily, Furosemide 20mg daily, and Duloxetine 30mg daily.

## 2024-12-17 ENCOUNTER — OFFICE VISIT (OUTPATIENT)
Dept: INTERNAL MEDICINE | Facility: OTHER | Age: 89
End: 2024-12-17
Payer: MEDICARE

## 2024-12-17 ENCOUNTER — APPOINTMENT (OUTPATIENT)
Dept: URBAN - METROPOLITAN AREA CLINIC 4 | Facility: CLINIC | Age: 89
Setting detail: DERMATOLOGY
End: 2024-12-17

## 2024-12-17 VITALS
DIASTOLIC BLOOD PRESSURE: 91 MMHG | HEIGHT: 65 IN | WEIGHT: 180 LBS | BODY MASS INDEX: 29.99 KG/M2 | OXYGEN SATURATION: 97 % | TEMPERATURE: 96.9 F | SYSTOLIC BLOOD PRESSURE: 159 MMHG | HEART RATE: 91 BPM

## 2024-12-17 DIAGNOSIS — I10 PRIMARY HYPERTENSION: ICD-10-CM

## 2024-12-17 DIAGNOSIS — F10.90 ALCOHOL USE DISORDER: ICD-10-CM

## 2024-12-17 DIAGNOSIS — I10 UNCONTROLLED HYPERTENSION: ICD-10-CM

## 2024-12-17 DIAGNOSIS — L82.1 OTHER SEBORRHEIC KERATOSIS: ICD-10-CM

## 2024-12-17 DIAGNOSIS — L24.4 IRRITANT CONTACT DERMATITIS DUE TO DRUGS IN CONTACT WITH SKIN: ICD-10-CM

## 2024-12-17 DIAGNOSIS — R60.9 PITTING EDEMA: ICD-10-CM

## 2024-12-17 DIAGNOSIS — M79.89 SWELLING OF LOWER EXTREMITY: ICD-10-CM

## 2024-12-17 PROCEDURE — ? MEDICATION COUNSELING

## 2024-12-17 PROCEDURE — 3077F SYST BP >= 140 MM HG: CPT | Mod: GC

## 2024-12-17 PROCEDURE — 3079F DIAST BP 80-89 MM HG: CPT | Mod: GC

## 2024-12-17 PROCEDURE — ? PRESCRIPTION

## 2024-12-17 PROCEDURE — ? COUNSELING

## 2024-12-17 PROCEDURE — 99213 OFFICE O/P EST LOW 20 MIN: CPT

## 2024-12-17 PROCEDURE — 99214 OFFICE O/P EST MOD 30 MIN: CPT | Mod: GC

## 2024-12-17 RX ORDER — TRIAMCINOLONE ACETONIDE 0.25 MG/G
OINTMENT TOPICAL
Qty: 15 | Refills: 0 | Status: ERX | COMMUNITY
Start: 2024-12-17

## 2024-12-17 RX ORDER — SPIRONOLACTONE 50 MG/1
50 TABLET, FILM COATED ORAL DAILY
Qty: 100 TABLET | Refills: 1 | Status: SHIPPED | OUTPATIENT
Start: 2024-12-17

## 2024-12-17 RX ADMIN — TRIAMCINOLONE ACETONIDE: 0.25 OINTMENT TOPICAL at 00:00

## 2024-12-17 ASSESSMENT — ENCOUNTER SYMPTOMS
ABDOMINAL PAIN: 0
EYE PAIN: 0
SHORTNESS OF BREATH: 0
DIAPHORESIS: 0
DIZZINESS: 0
WEAKNESS: 0
NAUSEA: 0
CONSTIPATION: 0
SORE THROAT: 0
CHILLS: 0
INSOMNIA: 0
WHEEZING: 0
VOMITING: 0
COUGH: 0
NERVOUS/ANXIOUS: 0
PHOTOPHOBIA: 0
DEPRESSION: 0
FEVER: 0
BLURRED VISION: 0
PALPITATIONS: 0
DIARRHEA: 0
HEARTBURN: 0
HEADACHES: 0

## 2024-12-17 ASSESSMENT — LOCATION DETAILED DESCRIPTION DERM
LOCATION DETAILED: NASAL DORSUM
LOCATION DETAILED: LEFT INFERIOR CENTRAL MALAR CHEEK

## 2024-12-17 ASSESSMENT — LOCATION SIMPLE DESCRIPTION DERM
LOCATION SIMPLE: NOSE
LOCATION SIMPLE: LEFT CHEEK

## 2024-12-17 ASSESSMENT — LOCATION ZONE DERM
LOCATION ZONE: NOSE
LOCATION ZONE: FACE

## 2024-12-17 ASSESSMENT — FIBROSIS 4 INDEX: FIB4 SCORE: 2.39

## 2024-12-17 NOTE — ASSESSMENT & PLAN NOTE
BP much improved today in office, now at 150/85 and 159/91 on recheck. She states she is on all the medications on her chart but cannot name them.   -Continue Valsartan-HCTZ, Lasix, Metoprolol as prescribed  -Refilled Spironolactone  -Advised patient to call office with current prescription medications to confirm our records  -Advised to keep a BP log and to check BP at home at least 4 times a week and to bring it to her next appointment  -Recommended decreasing alcohol intake  -Encouraged to continue to limit salt intake

## 2024-12-17 NOTE — PROGRESS NOTES
"    OFFICE VISIT    Janessa Cain is a 89 y.o. female who presents today with the following:    Reason for visit: Follow up regarding HTN    HPI:    Accompanied with her .    He notes that the patient's home BP readings have been ranging between 120-130s systolic. Highest reading she has gotten was 158 systolic. Denies SOB, chest pain. She notes she has been taking her medications as prescribed but states she takes 2 medications daily while her  disputes and says she takes 4. Patient notes she has tolerated the Metoprolol since her last visit. Denies lightheadedness, dizziness, syncope since starting on it. They did not bring her BP log or medications as requested.     She has been cutting down on her salt consumption but still drinks 1 shot of whiskey daily. Her leg swelling have improved since her last visit. She notes she is urinating often while on her water pills.      Review of Systems   Constitutional:  Negative for chills, diaphoresis, fever and malaise/fatigue.   HENT:  Negative for congestion, ear pain, hearing loss and sore throat.    Eyes:  Negative for blurred vision, photophobia and pain.   Respiratory:  Negative for cough, shortness of breath and wheezing.    Cardiovascular:  Positive for leg swelling (improved). Negative for chest pain and palpitations.   Gastrointestinal:  Negative for abdominal pain, constipation, diarrhea, heartburn, nausea and vomiting.   Genitourinary:  Negative for dysuria and frequency.   Skin:  Negative for rash.   Neurological:  Negative for dizziness, weakness and headaches.   Psychiatric/Behavioral:  Negative for depression. The patient is not nervous/anxious and does not have insomnia.        Vitals:   BP (!) 159/91 (BP Location: Left arm, Patient Position: Sitting, BP Cuff Size: Adult) Comment: 2nd attempt  Pulse 91   Temp 36.1 °C (96.9 °F) (Temporal)   Ht 1.651 m (5' 5\")   Wt 81.6 kg (180 lb)   SpO2 97%   BMI 29.95 kg/m²     Physical " -  [facility])    [facility] Ray Vicente, Jayme Kapadia MD    Office Visit    2 weeks ago Uncontrolled type 2 diabetes mellitus with hyperglycemia [facility])    [facility] Ray Vicente, [address] [place] [address] Aracelis Jain, JJ    Office Visit    3 weeks ago Uncontrolled type 2 diabetes mellitus with hyperglycemia [facility])    [facility] Ray Vicente, JJ Cooper    Office Visit    7 months ago Gastroesophageal reflux disease without esophagitis    [facility], [address] [place] [address], [facility]    Office Visit    1 year ago COVID-19    [facility] Ray Vicente, Jayme Kapadia MD    Office Visit            Future Appointments         Provider Department Appt Notes    Tomorrow Man Vega,  [facility] 1:1 Japanese speaking    In 2 months Caitlyn Muñiz, [address] [facility] Endocrinology Uncontrolled type 2 diabetes mellitus with hyperglycemia (HCC)/appt scheduled by wife     In 2 months Carmelita Azul MD [facility] Ray Vicente, Deric 3m f/u Exam  Vitals and nursing note reviewed.   Constitutional:       General: She is not in acute distress.     Appearance: She is not diaphoretic.   HENT:      Head: Normocephalic and atraumatic.      Mouth/Throat:      Mouth: Mucous membranes are moist.      Pharynx: Oropharynx is clear. No oropharyngeal exudate or posterior oropharyngeal erythema.   Cardiovascular:      Rate and Rhythm: Normal rate and regular rhythm.      Pulses: Normal pulses.      Heart sounds: Normal heart sounds. No murmur heard.     No gallop.   Pulmonary:      Effort: Pulmonary effort is normal. No respiratory distress.      Breath sounds: Normal breath sounds. No wheezing, rhonchi or rales.   Abdominal:      General: There is no distension.      Palpations: Abdomen is soft.      Tenderness: There is no abdominal tenderness.   Musculoskeletal:      Right lower leg: Edema (trace) present.      Left lower leg: Edema (trace) present.   Skin:     General: Skin is warm.      Findings: No rash.      Comments: Venous stasis changes at bilateral lower extremities   Neurological:      General: No focal deficit present.      Mental Status: She is alert and oriented to person, place, and time.   Psychiatric:         Mood and Affect: Mood normal.         Assessment and Plan:     Primary hypertension  BP much improved today in office, now at 150/85 and 159/91 on recheck. She states she is on all the medications on her chart but cannot name them.   -Continue Valsartan-HCTZ, Lasix, Metoprolol as prescribed  -Refilled Spironolactone  -Advised patient to call office with current prescription medications to confirm our records  -Advised to keep a BP log and to check BP at home at least 4 times a week and to bring it to her next appointment  -Recommended decreasing alcohol intake  -Encouraged to continue to limit salt intake    Swelling of lower extremity  Trace edema on exam. Much improved since her last visit on Lasix, Spironolactone, HCTZ.   -Continue  compression stockings, elevating legs to manage symptoms   -Continue Lasix, Spironolactone, HCTZ as prescribed    Alcohol use disorder  Currently drinking 1 shot of whiskey daily which has not changed recently. Discussed the effects of alcohol on her HTN and the downstream consequences. Patient understood and will try to decrease everyday consumption of alcohol.      Orders Placed This Encounter    spironolactone (ALDACTONE) 50 MG Tab       Return in about 6 weeks (around 1/28/2025) for w/ Me.      Patient case was seen/ assessed/ discussed with Dr. Elise      Please note that this dictation was created using voice recognition software. I have made every reasonable attempt to correct obvious errors, but I expect that there are errors of grammar and possibly content that I did not discover before finalizing the note.       Signed by:    Keith Diaz DO    PGY-1 Internal Medicine Resident

## 2024-12-17 NOTE — ASSESSMENT & PLAN NOTE
Trace edema on exam. Much improved since her last visit on Lasix, Spironolactone, HCTZ.   -Continue compression stockings, elevating legs to manage symptoms   -Continue Lasix, Spironolactone, HCTZ as prescribed

## 2024-12-17 NOTE — ASSESSMENT & PLAN NOTE
Currently drinking 1 shot of whiskey daily which has not changed recently. Discussed the effects of alcohol on her HTN and the downstream consequences. Patient understood and will try to decrease everyday consumption of alcohol.

## 2024-12-17 NOTE — PROCEDURE: MEDICATION COUNSELING
Olanzapine Pregnancy And Lactation Text: This medication is pregnancy category C.   There are no adequate and well controlled trials with olanzapine in pregnant females.  Olanzapine should be used during pregnancy only if the potential benefit justifies the potential risk to the fetus.   In a study in lactating healthy women, olanzapine was excreted in breast milk.  It is recommended that women taking olanzapine should not breast feed.
Zoryve Counseling:  I discussed with the patient that Zoryve is not for use in the eyes, mouth or vagina. The most commonly reported side effects include diarrhea, headache, insomnia, application site pain, upper respiratory tract infections, and urinary tract infections.  All of the patient's questions and concerns were addressed.
Azelaic Acid Pregnancy And Lactation Text: This medication is considered safe during pregnancy and breast feeding.
Tazorac Counseling:  Patient advised that medication is irritating and drying.  Patient may need to apply sparingly and wash off after an hour before eventually leaving it on overnight.  The patient verbalized understanding of the proper use and possible adverse effects of tazorac.  All of the patient's questions and concerns were addressed.
Cimzia Pregnancy And Lactation Text: This medication crosses the placenta but can be considered safe in certain situations. Cimzia may be excreted in breast milk.
Xolair Counseling:  Patient informed of potential adverse effects including but not limited to fever, muscle aches, rash and allergic reactions.  The patient verbalized understanding of the proper use and possible adverse effects of Xolair.  All of the patient's questions and concerns were addressed.
Mirvaso Counseling: Mirvaso is a topical medication which can decrease superficial blood flow where applied. Side effects are uncommon and include stinging, redness and allergic reactions.
Clofazimine Counseling:  I discussed with the patient the risks of clofazimine including but not limited to skin and eye pigmentation, liver damage, nausea/vomiting, gastrointestinal bleeding and allergy.
Azathioprine Pregnancy And Lactation Text: This medication is Pregnancy Category D and isn't considered safe during pregnancy. It is unknown if this medication is excreted in breast milk.
Cellcept Counseling:  I discussed with the patient the risks of mycophenolate mofetil including but not limited to infection/immunosuppression, GI upset, hypokalemia, hypercholesterolemia, bone marrow suppression, lymphoproliferative disorders, malignancy, GI ulceration/bleed/perforation, colitis, interstitial lung disease, kidney failure, progressive multifocal leukoencephalopathy, and birth defects.  The patient understands that monitoring is required including a baseline creatinine and regular CBC testing. In addition, patient must alert us immediately if symptoms of infection or other concerning signs are noted.
Arava Pregnancy And Lactation Text: This medication is Pregnancy Category X and is absolutely contraindicated during pregnancy. It is unknown if it is excreted in breast milk.
Albendazole Pregnancy And Lactation Text: This medication is Pregnancy Category C and it isn't known if it is safe during pregnancy. It is also excreted in breast milk.
Doxycycline Pregnancy And Lactation Text: This medication is Pregnancy Category D and not consider safe during pregnancy. It is also excreted in breast milk but is considered safe for shorter treatment courses.
Rituxan Counseling:  I discussed with the patient the risks of Rituxan infusions. Side effects can include infusion reactions, severe drug rashes including mucocutaneous reactions, reactivation of latent hepatitis and other infections and rarely progressive multifocal leukoencephalopathy.  All of the patient's questions and concerns were addressed.
Oral Minoxidil Counseling- I discussed with the patient the risks of oral minoxidil including but not limited to shortness of breath, swelling of the feet or ankles, dizziness, lightheadedness, unwanted hair growth and allergic reaction.  The patient verbalized understanding of the proper use and possible adverse effects of oral minoxidil.  All of the patient's questions and concerns were addressed.
Zoryve Pregnancy And Lactation Text: It is unknown if this medication can cause problems during pregnancy and breastfeeding.
Benzoyl Peroxide Counseling: Patient counseled that medicine may cause skin irritation and bleach clothing.  In the event of skin irritation, the patient was advised to reduce the amount of the drug applied or use it less frequently.   The patient verbalized understanding of the proper use and possible adverse effects of benzoyl peroxide.  All of the patient's questions and concerns were addressed.
Clofazimine Pregnancy And Lactation Text: This medication is Pregnancy Category C and isn't considered safe during pregnancy. It is excreted in breast milk.
Tazorac Pregnancy And Lactation Text: This medication is not safe during pregnancy. It is unknown if this medication is excreted in breast milk.
Xolair Pregnancy And Lactation Text: This medication is Pregnancy Category B and is considered safe during pregnancy. This medication is excreted in breast milk.
Cosentyx Counseling:  I discussed with the patient the risks of Cosentyx including but not limited to worsening of Crohn's disease, immunosuppression, allergic reactions and infections.  The patient understands that monitoring is required including a PPD at baseline and must alert us or the primary physician if symptoms of infection or other concerning signs are noted.
Mirvaso Pregnancy And Lactation Text: This medication has not been assigned a Pregnancy Risk Category. It is unknown if the medication is excreted in breast milk.
Topical Clindamycin Counseling: Patient counseled that this medication may cause skin irritation or allergic reactions.  In the event of skin irritation, the patient was advised to reduce the amount of the drug applied or use it less frequently.   The patient verbalized understanding of the proper use and possible adverse effects of clindamycin.  All of the patient's questions and concerns were addressed.
Cosentyx Pregnancy And Lactation Text: This medication is Pregnancy Category B and is considered safe during pregnancy. It is unknown if this medication is excreted in breast milk.
Opzelura Counseling:  I discussed with the patient the risks of Opzelura including but not limited to nasopharngitis, bronchitis, ear infection, eosinophila, hives, diarrhea, folliculitis, tonsillitis, and rhinorrhea.  Taken orally, this medication has been linked to serious infections; higher rate of mortality; malignancy and lymphoproliferative disorders; major adverse cardiovascular events; thrombosis; thrombocytopenia, anemia, and neutropenia; and lipid elevations.
Ivermectin Counseling:  Patient instructed to take medication on an empty stomach with a full glass of water.  Patient informed of potential adverse effects including but not limited to nausea, diarrhea, dizziness, itching, and swelling of the extremities or lymph nodes.  The patient verbalized understanding of the proper use and possible adverse effects of ivermectin.  All of the patient's questions and concerns were addressed.
Rituxan Pregnancy And Lactation Text: This medication is Pregnancy Category C and it isn't know if it is safe during pregnancy. It is unknown if this medication is excreted in breast milk but similar antibodies are known to be excreted.
Erythromycin Counseling:  I discussed with the patient the risks of erythromycin including but not limited to GI upset, allergic reaction, drug rash, diarrhea, increase in liver enzymes, and yeast infections.
Siliq Counseling:  I discussed with the patient the risks of Siliq including but not limited to new or worsening depression, suicidal thoughts and behavior, immunosuppression, malignancy, posterior leukoencephalopathy syndrome, and serious infections.  The patient understands that monitoring is required including a PPD at baseline and must alert us or the primary physician if symptoms of infection or other concerning signs are noted. There is also a special program designed to monitor depression which is required with Siliq.
Erythromycin Pregnancy And Lactation Text: This medication is Pregnancy Category B and is considered safe during pregnancy. It is also excreted in breast milk.
Colchicine Counseling:  Patient counseled regarding adverse effects including but not limited to stomach upset (nausea, vomiting, stomach pain, or diarrhea).  Patient instructed to limit alcohol consumption while taking this medication.  Colchicine may reduce blood counts especially with prolonged use.  The patient understands that monitoring of kidney function and blood counts may be required, especially at baseline. The patient verbalized understanding of the proper use and possible adverse effects of colchicine.  All of the patient's questions and concerns were addressed.
Finasteride Pregnancy And Lactation Text: This medication is absolutely contraindicated during pregnancy. It is unknown if it is excreted in breast milk.
Benzoyl Peroxide Pregnancy And Lactation Text: This medication is Pregnancy Category C. It is unknown if benzoyl peroxide is excreted in breast milk.
Oral Minoxidil Pregnancy And Lactation Text: This medication should only be used when clearly needed if you are pregnant, attempting to become pregnant or breast feeding.
Cibinqo Counseling: I discussed with the patient the risks of Cibinqo therapy including but not limited to common cold, nausea, headache, cold sores, increased blood CPK levels, dizziness, UTIs, fatigue, acne, and vomitting. Live vaccines should be avoided.  This medication has been linked to serious infections; higher rate of mortality; malignancy and lymphoproliferative disorders; major adverse cardiovascular events; thrombosis; thrombocytopenia and lymphopenia; lipid elevations; and retinal detachment.
Opzelura Pregnancy And Lactation Text: There is insufficient data to evaluate drug-associated risk for major birth defects, miscarriage, or other adverse maternal or fetal outcomes.  There is a pregnancy registry that monitors pregnancy outcomes in pregnant persons exposed to the medication during pregnancy.  It is unknown if this medication is excreted in breast milk.  Do not breastfeed during treatment and for about 4 weeks after the last dose.
Cibinqo Pregnancy And Lactation Text: It is unknown if this medication will adversely affect pregnancy or breast feeding.  You should not take this medication if you are currently pregnant or planning a pregnancy or while breastfeeding.
Cyclophosphamide Counseling:  I discussed with the patient the risks of cyclophosphamide including but not limited to hair loss, hormonal abnormalities, decreased fertility, abdominal pain, diarrhea, nausea and vomiting, bone marrow suppression and infection. The patient understands that monitoring is required while taking this medication.
Metronidazole Counseling:  I discussed with the patient the risks of metronidazole including but not limited to seizures, nausea/vomiting, a metallic taste in the mouth, nausea/vomiting and severe allergy.
Siliq Pregnancy And Lactation Text: The risk during pregnancy and breastfeeding is uncertain with this medication.
Birth Control Pills Counseling: Birth Control Pill Counseling: I discussed with the patient the potential side effects of OCPs including but not limited to increased risk of stroke, heart attack, thrombophlebitis, deep venous thrombosis, hepatic adenomas, breast changes, GI upset, headaches, and depression.  The patient verbalized understanding of the proper use and possible adverse effects of OCPs. All of the patient's questions and concerns were addressed.
Otezla Counseling: The side effects of Otezla were discussed with the patient, including but not limited to worsening or new depression, weight loss, diarrhea, nausea, upper respiratory tract infection, and headache. Patient instructed to call the office should any adverse effect occur.  The patient verbalized understanding of the proper use and possible adverse effects of Otezla.  All the patient's questions and concerns were addressed.
Dupixent Counseling: I discussed with the patient the risks of dupilumab including but not limited to eye inflammation and irritation, cold sores, injection site reactions, allergic reactions and increased risk of parasitic infection. The patient understands that monitoring is required and they must alert us or the primary physician if symptoms of infection or other concerning signs are noted.
Carac Counseling:  I discussed with the patient the risks of Carac including but not limited to erythema, scaling, itching, weeping, crusting, and pain.
Topical Clindamycin Pregnancy And Lactation Text: This medication is Pregnancy Category B and is considered safe during pregnancy. It is unknown if it is excreted in breast milk.
Carac Pregnancy And Lactation Text: This medication is Pregnancy Category X and contraindicated in pregnancy and in women who may become pregnant. It is unknown if this medication is excreted in breast milk.
Topical Ketoconazole Counseling: Patient counseled that this medication may cause skin irritation or allergic reactions.  In the event of skin irritation, the patient was advised to reduce the amount of the drug applied or use it less frequently.   The patient verbalized understanding of the proper use and possible adverse effects of ketoconazole.  All of the patient's questions and concerns were addressed.
Dupixent Pregnancy And Lactation Text: This medication likely crosses the placenta but the risk for the fetus is uncertain. This medication is excreted in breast milk.
Litfulo Counseling: I discussed with the patient the risks of Litfulo therapy including but not limited to upper respiratory tract infections, shingles, cold sores, and nausea. Live vaccines should be avoided.  This medication has been linked to serious infections; higher rate of mortality; malignancy and lymphoproliferative disorders; major adverse cardiovascular events; thrombosis; gastrointestinal perforations; neutropenia; lymphopenia; anemia; liver enzyme elevations; and lipid elevations.
Picato Counseling:  I discussed with the patient the risks of Picato including but not limited to erythema, scaling, itching, weeping, crusting, and pain.
Cyclophosphamide Pregnancy And Lactation Text: This medication is Pregnancy Category D and it isn't considered safe during pregnancy. This medication is excreted in breast milk.
Tetracycline Counseling: Patient counseled regarding possible photosensitivity and increased risk for sunburn.  Patient instructed to avoid sunlight, if possible.  When exposed to sunlight, patients should wear protective clothing, sunglasses, and sunscreen.  The patient was instructed to call the office immediately if the following severe adverse effects occur:  hearing changes, easy bruising/bleeding, severe headache, or vision changes.  The patient verbalized understanding of the proper use and possible adverse effects of tetracycline.  All of the patient's questions and concerns were addressed. Patient understands to avoid pregnancy while on therapy due to potential birth defects.
Cephalexin Counseling: I counseled the patient regarding use of cephalexin as an antibiotic for prophylactic and/or therapeutic purposes. Cephalexin (commonly prescribed under brand name Keflex) is a cephalosporin antibiotic which is active against numerous classes of bacteria, including most skin bacteria. Side effects may include nausea, diarrhea, gastrointestinal upset, rash, hives, yeast infections, and in rare cases, hepatitis, kidney disease, seizures, fever, confusion, neurologic symptoms, and others. Patients with severe allergies to penicillin medications are cautioned that there is about a 10% incidence of cross-reactivity with cephalosporins. When possible, patients with penicillin allergies should use alternatives to cephalosporins for antibiotic therapy.
Zepbound Pregnancy And Lactation Text: The fetal risk of this medication is unknown and taking while pregnant is not recommended. It is unknown if this medication is present in breast milk.
Winlevi Counseling:  I discussed with the patient the risks of topical clascoterone including but not limited to erythema, scaling, itching, and stinging. Patient voiced their understanding.
Ketoconazole Pregnancy And Lactation Text: This medication is Pregnancy Category C and it isn't know if it is safe during pregnancy. It is also excreted in breast milk and breast feeding isn't recommended.
Adbry Pregnancy And Lactation Text: It is unknown if this medication will adversely affect pregnancy or breast feeding.
Taltz Counseling: I discussed with the patient the risks of ixekizumab including but not limited to immunosuppression, serious infections, worsening of inflammatory bowel disease and drug reactions.  The patient understands that monitoring is required including a PPD at baseline and must alert us or the primary physician if symptoms of infection or other concerning signs are noted.
Tranexamic Acid Pregnancy And Lactation Text: It is unknown if this medication is safe during pregnancy or breast feeding.
Niacinamide Pregnancy And Lactation Text: These medications are considered safe during pregnancy.
Libtayo Counseling- I discussed with the patient the risks of Libtayo including but not limited to nausea, vomiting, diarrhea, and bone or muscle pain.  The patient verbalized understanding of the proper use and possible adverse effects of Libtayo.  All of the patient's questions and concerns were addressed.
Klisyri Counseling:  I discussed with the patient the risks of Klisyri including but not limited to erythema, scaling, itching, weeping, crusting, and pain.
Soolantra Counseling: I discussed with the patients the risks of topial Soolantra. This is a medicine which decreases the number of mites and inflammation in the skin. You experience burning, stinging, eye irritation or allergic reactions.  Please call our office if you develop any problems from using this medication.
Soolantra Pregnancy And Lactation Text: This medication is Pregnancy Category C. This medication is considered safe during breast feeding.
Infliximab Counseling:  I discussed with the patient the risks of infliximab including but not limited to myelosuppression, immunosuppression, autoimmune hepatitis, demyelinating diseases, lymphoma, and serious infections.  The patient understands that monitoring is required including a PPD at baseline and must alert us or the primary physician if symptoms of infection or other concerning signs are noted.
Tetracycline Pregnancy And Lactation Text: This medication is Pregnancy Category D and not consider safe during pregnancy. It is also excreted in breast milk.
Cephalexin Pregnancy And Lactation Text: This medication is Pregnancy Category B and considered safe during pregnancy.  It is also excreted in breast milk but can be used safely for shorter doses.
Odomzo Counseling- I discussed with the patient the risks of Odomzo including but not limited to nausea, vomiting, diarrhea, constipation, weight loss, changes in the sense of taste, decreased appetite, muscle spasms, and hair loss.  The patient verbalized understanding of the proper use and possible adverse effects of Odomzo.  All of the patient's questions and concerns were addressed.
Libtayo Pregnancy And Lactation Text: This medication is contraindicated in pregnancy and when breast feeding.
Aklief counseling:  Patient advised to apply a pea-sized amount only at bedtime and wait 30 minutes after washing their face before applying.  If too drying, patient may add a non-comedogenic moisturizer.  The most commonly reported side effects including irritation, redness, scaling, dryness, stinging, burning, itching, and increased risk of sunburn.  The patient verbalized understanding of the proper use and possible adverse effects of retinoids.  All of the patient's questions and concerns were addressed.
Terbinafine Counseling: Patient counseling regarding adverse effects of terbinafine including but not limited to headache, diarrhea, rash, upset stomach, liver function test abnormalities, itching, taste/smell disturbance, nausea, abdominal pain, and flatulence.  There is a rare possibility of liver failure that can occur when taking terbinafine.  The patient understands that a baseline LFT and kidney function test may be required. The patient verbalized understanding of the proper use and possible adverse effects of terbinafine.  All of the patient's questions and concerns were addressed.
Klisyri Pregnancy And Lactation Text: It is unknown if this medication can harm a developing fetus or if it is excreted in breast milk.
Winlevi Pregnancy And Lactation Text: This medication is considered safe during pregnancy and breastfeeding.
Bimzelx Counseling:  I discussed with the patient the risks of Bimzelx including but not limited to depression, immunosuppression, allergic reactions and infections.  The patient understands that monitoring is required including a PPD at baseline and must alert us or the primary physician if symptoms of infection or other concerning signs are noted.
Nsaids Counseling: NSAID Counseling: I discussed with the patient that NSAIDs should be taken with food. Prolonged use of NSAIDs can result in the development of stomach ulcers.  Patient advised to stop taking NSAIDs if abdominal pain occurs.  The patient verbalized understanding of the proper use and possible adverse effects of NSAIDs.  All of the patient's questions and concerns were addressed.
Valtrex Counseling: I discussed with the patient the risks of valacyclovir including but not limited to kidney damage, nausea, vomiting and severe allergy.  The patient understands that if the infection seems to be worsening or is not improving, they are to call.
Topical Retinoid counseling:  Patient advised to apply a pea-sized amount only at bedtime and wait 30 minutes after washing their face before applying.  If too drying, patient may add a non-comedogenic moisturizer. The patient verbalized understanding of the proper use and possible adverse effects of retinoids.  All of the patient's questions and concerns were addressed.
Tremfya Counseling: I discussed with the patient the risks of guselkumab including but not limited to immunosuppression, serious infections, and drug reactions.  The patient understands that monitoring is required including a PPD at baseline and must alert us or the primary physician if symptoms of infection or other concerning signs are noted.
Valtrex Pregnancy And Lactation Text: this medication is Pregnancy Category B and is considered safe during pregnancy. This medication is not directly found in breast milk but it's metabolite acyclovir is present.
Clindamycin Counseling: I counseled the patient regarding use of clindamycin as an antibiotic for prophylactic and/or therapeutic purposes. Clindamycin is active against numerous classes of bacteria, including skin bacteria. Side effects may include nausea, diarrhea, gastrointestinal upset, rash, hives, yeast infections, and in rare cases, colitis.
Cantharidin Pregnancy And Lactation Text: This medication has not been proven safe during pregnancy. It is unknown if this medication is excreted in breast milk.
Nemluvio Counseling: I discussed with the patient the risks of nemolizumab including but not limited to headache, gastrointestinal complaints, nasopharyngitis, musculoskeletal complaints, injection site reactions, and allergic reactions. The patient understands that monitoring is required and they must alert us or the primary physician if any side effects are noted.
Clindamycin Pregnancy And Lactation Text: This medication can be used in pregnancy if certain situations. Clindamycin is also present in breast milk.
VTAMA Counseling: I discussed with the patient that VTAMA is not for use in the eyes, mouth or mouth. They should call the office if they develop any signs of allergic reactions to VTAMA. The patient verbalized understanding of the proper use and possible adverse effects of VTAMA.  All of the patient's questions and concerns were addressed.
Aklief Pregnancy And Lactation Text: It is unknown if this medication is safe to use during pregnancy.  It is unknown if this medication is excreted in breast milk.  Breastfeeding women should use the topical cream on the smallest area of the skin for the shortest time needed while breastfeeding.  Do not apply to nipple and areola.
Bimzelx Pregnancy And Lactation Text: This medication crosses the placenta and the safety is uncertain during pregnancy. It is unknown if this medication is present in breast milk.
Minoxidil Counseling: Minoxidil is a topical medication which can increase blood flow where it is applied. It is uncertain how this medication increases hair growth. Side effects are uncommon and include stinging and allergic reactions.
Terbinafine Pregnancy And Lactation Text: This medication is Pregnancy Category B and is considered safe during pregnancy. It is also excreted in breast milk and breast feeding isn't recommended.
Nsaids Pregnancy And Lactation Text: These medications are considered safe up to 30 weeks gestation. It is excreted in breast milk.
Cimzia Counseling:  I discussed with the patient the risks of Cimzia including but not limited to immunosuppression, allergic reactions and infections.  The patient understands that monitoring is required including a PPD at baseline and must alert us or the primary physician if symptoms of infection or other concerning signs are noted.
Minoxidil Pregnancy And Lactation Text: This medication has not been assigned a Pregnancy Risk Category but animal studies failed to show danger with the topical medication. It is unknown if the medication is excreted in breast milk.
Use Enhanced Medication Counseling?: No
Azathioprine Counseling:  I discussed with the patient the risks of azathioprine including but not limited to myelosuppression, immunosuppression, hepatotoxicity, lymphoma, and infections.  The patient understands that monitoring is required including baseline LFTs, Creatinine, possible TPMP genotyping and weekly CBCs for the first month and then every 2 weeks thereafter.  The patient verbalized understanding of the proper use and possible adverse effects of azathioprine.  All of the patient's questions and concerns were addressed.
Topical Retinoid Pregnancy And Lactation Text: This medication is Pregnancy Category C. It is unknown if this medication is excreted in breast milk.
Arava Counseling:  Patient counseled regarding adverse effects of Arava including but not limited to nausea, vomiting, abnormalities in liver function tests. Patients may develop mouth sores, rash, diarrhea, and abnormalities in blood counts. The patient understands that monitoring is required including LFTs and blood counts.  There is a rare possibility of scarring of the liver and lung problems that can occur when taking methotrexate. Persistent nausea, loss of appetite, pale stools, dark urine, cough, and shortness of breath should be reported immediately. Patient advised to discontinue Arava treatment and consult with a physician prior to attempting conception. The patient will have to undergo a treatment to eliminate Arava from the body prior to conception.
Albendazole Counseling:  I discussed with the patient the risks of albendazole including but not limited to cytopenia, kidney damage, nausea/vomiting and severe allergy.  The patient understands that this medication is being used in an off-label manner.
Nemluvio Pregnancy And Lactation Text: It is not known if Nemluvio causes fetal harm or is present in breast milk. Please proceed with caution if patients who are pregnant or breastfeeding.
Doxycycline Counseling:  Patient counseled regarding possible photosensitivity and increased risk for sunburn.  Patient instructed to avoid sunlight, if possible.  When exposed to sunlight, patients should wear protective clothing, sunglasses, and sunscreen.  The patient was instructed to call the office immediately if the following severe adverse effects occur:  hearing changes, easy bruising/bleeding, severe headache, or vision changes.  The patient verbalized understanding of the proper use and possible adverse effects of doxycycline.  All of the patient's questions and concerns were addressed.
Olanzapine Counseling- I discussed with the patient the common side effects of olanzapine including but are not limited to: lack of energy, dry mouth, increased appetite, sleepiness, tremor, constipation, dizziness, changes in behavior, or restlessness.  Explained that teenagers are more likely to experience headaches, abdominal pain, pain in the arms or legs, tiredness, and sleepiness.  Serious side effects include but are not limited: increased risk of death in elderly patients who are confused, have memory loss, or dementia-related psychosis; hyperglycemia; increased cholesterol and triglycerides; and weight gain.
Azelaic Acid Counseling: Patient counseled that medicine may cause skin irritation and to avoid applying near the eyes.  In the event of skin irritation, the patient was advised to reduce the amount of the drug applied or use it less frequently.   The patient verbalized understanding of the proper use and possible adverse effects of azelaic acid.  All of the patient's questions and concerns were addressed.
SSKI Counseling:  I discussed with the patient the risks of SSKI including but not limited to thyroid abnormalities, metallic taste, GI upset, fever, headache, acne, arthralgias, paraesthesias, lymphadenopathy, easy bleeding, arrhythmias, and allergic reaction.
Griseofulvin Counseling:  I discussed with the patient the risks of griseofulvin including but not limited to photosensitivity, cytopenia, liver damage, nausea/vomiting and severe allergy.  The patient understands that this medication is best absorbed when taken with a fatty meal (e.g., ice cream or french fries).
Topical Steroids Applications Pregnancy And Lactation Text: Most topical steroids are considered safe to use during pregnancy and lactation.  Any topical steroid applied to the breast or nipple should be washed off before breastfeeding.
Isotretinoin Counseling: Patient should get monthly blood tests, not donate blood, not drive at night if vision affected, not share medication, and not undergo elective surgery for 6 months after tx completed. Side effects reviewed, pt to contact office should one occur.
Hydroxychloroquine Counseling:  I discussed with the patient that a baseline ophthalmologic exam is needed at the start of therapy and every year thereafter while on therapy. A CBC may also be warranted for monitoring.  The side effects of this medication were discussed with the patient, including but not limited to agranulocytosis, aplastic anemia, seizures, rashes, retinopathy, and liver toxicity. Patient instructed to call the office should any adverse effect occur.  The patient verbalized understanding of the proper use and possible adverse effects of Plaquenil.  All the patient's questions and concerns were addressed.
Humira Counseling:  I discussed with the patient the risks of adalimumab including but not limited to myelosuppression, immunosuppression, autoimmune hepatitis, demyelinating diseases, lymphoma, and serious infections.  The patient understands that monitoring is required including a PPD at baseline and must alert us or the primary physician if symptoms of infection or other concerning signs are noted.
Rinvoq Pregnancy And Lactation Text: Based on animal studies, Rinvoq may cause embryo-fetal harm when administered to pregnant women.  The medication should not be used in pregnancy.  Breastfeeding is not recommended during treatment and for 6 days after the last dose.
Qbrexza Pregnancy And Lactation Text: There is no available data on Qbrexza use in pregnant women.  There is no available data on Qbrexza use in lactation.
Cimetidine Counseling:  I discussed with the patient the risks of Cimetidine including but not limited to gynecomastia, headache, diarrhea, nausea, drowsiness, arrhythmias, pancreatitis, skin rashes, psychosis, bone marrow suppression and kidney toxicity.
Prednisone Pregnancy And Lactation Text: This medication is Pregnancy Category C and it isn't know if it is safe during pregnancy. This medication is excreted in breast milk.
Rifampin Counseling: I discussed with the patient the risks of rifampin including but not limited to liver damage, kidney damage, red-orange body fluids, nausea/vomiting and severe allergy.
Azithromycin Counseling:  I discussed with the patient the risks of azithromycin including but not limited to GI upset, allergic reaction, drug rash, diarrhea, and yeast infections.
Hydroquinone Counseling:  Patient advised that medication may result in skin irritation, lightening (hypopigmentation), dryness, and burning.  In the event of skin irritation, the patient was advised to reduce the amount of the drug applied or use it less frequently.  Rarely, spots that are treated with hydroquinone can become darker (pseudoochronosis).  Should this occur, patient instructed to stop medication and call the office. The patient verbalized understanding of the proper use and possible adverse effects of hydroquinone.  All of the patient's questions and concerns were addressed.
Spevigo Counseling: I discussed with the patient the risks of Spevigo including but not limited to fatigue, nasuea, vomiting, headache, pruritus, urinary tract infection, an infusion related reactions.  The patient understands that monitoring is required including screening for tuberculosis at baseline and yearly screening thereafter while continuing Spevigo therapy. They should contact us if symptoms of infection or other concerning signs are noted.
Isotretinoin Pregnancy And Lactation Text: This medication is Pregnancy Category X and is considered extremely dangerous during pregnancy. It is unknown if it is excreted in breast milk.
Hydroxychloroquine Pregnancy And Lactation Text: This medication has been shown to cause fetal harm but it isn't assigned a Pregnancy Risk Category. There are small amounts excreted in breast milk.
Griseofulvin Pregnancy And Lactation Text: This medication is Pregnancy Category X and is known to cause serious birth defects. It is unknown if this medication is excreted in breast milk but breast feeding should be avoided.
Topical Sulfur Applications Counseling: Topical Sulfur Counseling: Patient counseled that this medication may cause skin irritation or allergic reactions.  In the event of skin irritation, the patient was advised to reduce the amount of the drug applied or use it less frequently.   The patient verbalized understanding of the proper use and possible adverse effects of topical sulfur application.  All of the patient's questions and concerns were addressed.
Sski Pregnancy And Lactation Text: This medication is Pregnancy Category D and isn't considered safe during pregnancy. It is excreted in breast milk.
Dutasteride Pregnancy And Lactation Text: This medication is absolutely contraindicated in women, especially during pregnancy and breast feeding. Feminization of male fetuses is possible if taking while pregnant.
Rhofade Counseling: Rhofade is a topical medication which can decrease superficial blood flow where applied. Side effects are uncommon and include stinging, redness and allergic reactions.
Sotyktu Counseling:  I discussed the most common side effects of Sotyktu including: common cold, sore throat, sinus infections, cold sores, canker sores, folliculitis, and acne.  I also discussed more serious side effects of Sotyktu including but not limited to: serious allergic reactions; increased risk for infections such as TB; cancers such as lymphomas; rhabdomyolysis and elevated CPK; and elevated triglycerides and liver enzymes. 
Dutasteride Female Counseling: Dutasteride Counseling:  I discussed with the patient the risks of use of dutasteride including but not limited to decreased libido and sexual dysfunction. Explained the teratogenic nature of the medication and stressed the importance of not getting pregnant during treatment. All of the patient's questions and concerns were addressed.
Doxepin Counseling:  Patient advised that the medication is sedating and not to drive a car after taking this medication. Patient informed of potential adverse effects including but not limited to dry mouth, urinary retention, and blurry vision.  The patient verbalized understanding of the proper use and possible adverse effects of doxepin.  All of the patient's questions and concerns were addressed.
Hyrimoz Counseling:  I discussed with the patient the risks of adalimumab including but not limited to myelosuppression, immunosuppression, autoimmune hepatitis, demyelinating diseases, lymphoma, and serious infections.  The patient understands that monitoring is required including a PPD at baseline and must alert us or the primary physician if symptoms of infection or other concerning signs are noted.
Sotyktu Pregnancy And Lactation Text: There is insufficient data to evaluate whether or not Sotyktu is safe to use during pregnancy.   It is not known if Sotyktu passes into breast milk and whether or not it is safe to use when breastfeeding.  
Azithromycin Pregnancy And Lactation Text: This medication is considered safe during pregnancy and is also secreted in breast milk.
Rifampin Pregnancy And Lactation Text: This medication is Pregnancy Category C and it isn't know if it is safe during pregnancy. It is also excreted in breast milk and should not be used if you are breast feeding.
Wegovy Counseling: I reviewed the possible side effects including: thyroid tumors, kidney disease, gallbladder disease, abdominal pain, constipation, diarrhea, nausea, vomiting and pancreatitis. Do not take this medication if you have a history or family history of multiple endocrine neoplasia syndrome type 2. Side effects reviewed, pt to contact office should one occur.
Erivedge Counseling- I discussed with the patient the risks of Erivedge including but not limited to nausea, vomiting, diarrhea, constipation, weight loss, changes in the sense of taste, decreased appetite, muscle spasms, and hair loss.  The patient verbalized understanding of the proper use and possible adverse effects of Erivedge.  All of the patient's questions and concerns were addressed.
Sarecycline Counseling: Patient advised regarding possible photosensitivity and discoloration of the teeth, skin, lips, tongue and gums.  Patient instructed to avoid sunlight, if possible.  When exposed to sunlight, patients should wear protective clothing, sunglasses, and sunscreen.  The patient was instructed to call the office immediately if the following severe adverse effects occur:  hearing changes, easy bruising/bleeding, severe headache, or vision changes.  The patient verbalized understanding of the proper use and possible adverse effects of sarecycline.  All of the patient's questions and concerns were addressed.
Finasteride Male Counseling: Finasteride Counseling:  I discussed with the patient the risks of use of finasteride including but not limited to decreased libido, decreased ejaculate volume, gynecomastia, and depression. Women should not handle medication.  All of the patient's questions and concerns were addressed.
Zyclara Counseling:  I discussed with the patient the risks of imiquimod including but not limited to erythema, scaling, itching, weeping, crusting, and pain.  Patient understands that the inflammatory response to imiquimod is variable from person to person and was educated regarded proper titration schedule.  If flu-like symptoms develop, patient knows to discontinue the medication and contact us.
Itraconazole Counseling:  I discussed with the patient the risks of itraconazole including but not limited to liver damage, nausea/vomiting, neuropathy, and severe allergy.  The patient understands that this medication is best absorbed when taken with acidic beverages such as non-diet cola or ginger ale.  The patient understands that monitoring is required including baseline LFTs and repeat LFTs at intervals.  The patient understands that they are to contact us or the primary physician if concerning signs are noted.
Bactrim Counseling:  I discussed with the patient the risks of sulfa antibiotics including but not limited to GI upset, allergic reaction, drug rash, diarrhea, dizziness, photosensitivity, and yeast infections.  Rarely, more serious reactions can occur including but not limited to aplastic anemia, agranulocytosis, methemoglobinemia, blood dyscrasias, liver or kidney failure, lung infiltrates or desquamative/blistering drug rashes.
Drysol Counseling:  I discussed with the patient the risks of drysol/aluminum chloride including but not limited to skin rash, itching, irritation, burning.
Topical Sulfur Applications Pregnancy And Lactation Text: This medication is considered safe during pregnancy and breast feeding secondary to limited systemic absorption.
Spevigo Pregnancy And Lactation Text: The risk during pregnancy and breastfeeding is uncertain with this medication. This medication does cross the placenta. It is unknown if this medication is found in breast milk.
Low Dose Naltrexone Counseling- I discussed with the patient the potential risks and side effects of low dose naltrexone including but not limited to: more vivid dreams, headaches, nausea, vomiting, abdominal pain, fatigue, dizziness, and anxiety.
High Dose Vitamin A Counseling: Side effects reviewed, pt to contact office should one occur.
Thalidomide Counseling: I discussed with the patient the risks of thalidomide including but not limited to birth defects, anxiety, weakness, chest pain, dizziness, cough and severe allergy.
High Dose Vitamin A Pregnancy And Lactation Text: High dose vitamin A therapy is contraindicated during pregnancy and breast feeding.
Solaraze Counseling:  I discussed with the patient the risks of Solaraze including but not limited to erythema, scaling, itching, weeping, crusting, and pain.
Xeljanz Counseling: I discussed with the patient the risks of Xeljanz therapy including increased risk of infection, liver issues, headache, diarrhea, or cold symptoms. Live vaccines should be avoided. They were instructed to call if they have any problems.
Ilumya Counseling: I discussed with the patient the risks of tildrakizumab including but not limited to immunosuppression, malignancy, posterior leukoencephalopathy syndrome, and serious infections.  The patient understands that monitoring is required including a PPD at baseline and must alert us or the primary physician if symptoms of infection or other concerning signs are noted.
Bactrim Pregnancy And Lactation Text: This medication is Pregnancy Category D and is known to cause fetal risk.  It is also excreted in breast milk.
Zepbound Counseling: I reviewed the possible side effects including: thyroid tumors, kidney disease, gallbladder disease, abdominal pain, constipation, diarrhea, nausea, vomiting and pancreatitis. Do not take this medication if you have a history or family history of multiple endocrine neoplasia syndrome type 2. Side effects reviewed, pt to contact office should one occur.
Stelara Counseling:  I discussed with the patient the risks of ustekinumab including but not limited to immunosuppression, malignancy, posterior leukoencephalopathy syndrome, and serious infections.  The patient understands that monitoring is required including a PPD at baseline and must alert us or the primary physician if symptoms of infection or other concerning signs are noted.
Imiquimod Counseling:  I discussed with the patient the risks of imiquimod including but not limited to erythema, scaling, itching, weeping, crusting, and pain.  Patient understands that the inflammatory response to imiquimod is variable from person to person and was educated regarded proper titration schedule.  If flu-like symptoms develop, patient knows to discontinue the medication and contact us.
Itraconazole Pregnancy And Lactation Text: This medication is Pregnancy Category C and it isn't know if it is safe during pregnancy. It is also excreted in breast milk.
Low Dose Naltrexone Pregnancy And Lactation Text: Naltrexone is pregnancy category C.  There have been no adequate and well-controlled studies in pregnant women.  It should be used in pregnancy only if the potential benefit justifies the potential risk to the fetus.   Limited data indicates that naltrexone is minimally excreted into breastmilk.
Wartpeel Counseling:  I discussed with the patient the risks of Wartpeel including but not limited to erythema, scaling, itching, weeping, crusting, and pain.
Tranexamic Acid Counseling:  Patient advised of the small risk of bleeding problems with tranexamic acid. They were also instructed to call if they developed any nausea, vomiting or diarrhea. All of the patient's questions and concerns were addressed.
Adbry Counseling: I discussed with the patient the risks of tralokinumab including but not limited to eye infection and irritation, cold sores, injection site reactions, worsening of asthma, allergic reactions and increased risk of parasitic infection.  Live vaccines should be avoided while taking tralokinumab. The patient understands that monitoring is required and they must alert us or the primary physician if symptoms of infection or other concerning signs are noted.
Ketoconazole Counseling:   Patient counseled regarding improving absorption with orange juice.  Adverse effects include but are not limited to breast enlargement, headache, diarrhea, nausea, upset stomach, liver function test abnormalities, taste disturbance, and stomach pain.  There is a rare possibility of liver failure that can occur when taking ketoconazole. The patient understands that monitoring of LFTs may be required, especially at baseline. The patient verbalized understanding of the proper use and possible adverse effects of ketoconazole.  All of the patient's questions and concerns were addressed.
Niacinamide Counseling: I recommended taking niacin or niacinamide, also know as vitamin B3, twice daily. Recent evidence suggests that taking vitamin B3 (500 mg twice daily) can reduce the risk of actinic keratoses and non-melanoma skin cancers. Side effects of vitamin B3 include flushing and headache.
Solaraze Pregnancy And Lactation Text: This medication is Pregnancy Category B and is considered safe. There is some data to suggest avoiding during the third trimester. It is unknown if this medication is excreted in breast milk.
Xelgabriellez Pregnancy And Lactation Text: This medication is Pregnancy Category D and is not considered safe during pregnancy.  The risk during breast feeding is also uncertain.
Finasteride Female Counseling: Finasteride Counseling:  I discussed with the patient the risks of use of finasteride including but not limited to decreased libido and sexual dysfunction. Explained the teratogenic nature of the medication and stressed the importance of not getting pregnant during treatment. All of the patient's questions and concerns were addressed.
Cyclosporine Counseling:  I discussed with the patient the risks of cyclosporine including but not limited to hypertension, gingival hyperplasia,myelosuppression, immunosuppression, liver damage, kidney damage, neurotoxicity, lymphoma, and serious infections. The patient understands that monitoring is required including baseline blood pressure, CBC, CMP, lipid panel and uric acid, and then 1-2 times monthly CMP and blood pressure.
Dapsone Counseling: I discussed with the patient the risks of dapsone including but not limited to hemolytic anemia, agranulocytosis, rashes, methemoglobinemia, kidney failure, peripheral neuropathy, headaches, GI upset, and liver toxicity.  Patients who start dapsone require monitoring including baseline LFTs and weekly CBCs for the first month, then every month thereafter.  The patient verbalized understanding of the proper use and possible adverse effects of dapsone.  All of the patient's questions and concerns were addressed.
Birth Control Pills Pregnancy And Lactation Text: This medication should be avoided if pregnant and for the first 30 days post-partum.
Metronidazole Pregnancy And Lactation Text: This medication is Pregnancy Category B and considered safe during pregnancy.  It is also excreted in breast milk.
Simlandi Counseling:  I discussed with the patient the risks of adalimumab including but not limited to myelosuppression, immunosuppression, autoimmune hepatitis, demyelinating diseases, lymphoma, and serious infections.  The patient understands that monitoring is required including a PPD at baseline and must alert us or the primary physician if symptoms of infection or other concerning signs are noted.
Ozempic Counseling: I reviewed the possible side effects including: thyroid tumors, kidney disease, gallbladder disease, abdominal pain, constipation, diarrhea, nausea, vomiting and pancreatitis. Do not take this medication if you have a history or family history of multiple endocrine neoplasia syndrome type 2. Side effects reviewed, pt to contact office should one occur.
Opioid Counseling: I discussed with the patient the potential side effects of opioids including but not limited to addiction, altered mental status, and depression. I stressed avoiding alcohol, benzodiazepines, muscle relaxants and sleep aids unless specifically okayed by a physician. The patient verbalized understanding of the proper use and possible adverse effects of opioids. All of the patient's questions and concerns were addressed. They were instructed to flush the remaining pills down the toilet if they did not need them for pain.
Otezla Pregnancy And Lactation Text: This medication is Pregnancy Category C and it isn't known if it is safe during pregnancy. It is unknown if it is excreted in breast milk.
Oxybutynin Counseling:  I discussed with the patient the risks of oxybutynin including but not limited to skin rash, drowsiness, dry mouth, difficulty urinating, and blurred vision.
Detail Level: Simple
Spironolactone Counseling: Patient advised regarding risks of diarrhea, abdominal pain, hyperkalemia, birth defects (for female patients), liver toxicity and renal toxicity. The patient may need blood work to monitor liver and kidney function and potassium levels while on therapy. The patient verbalized understanding of the proper use and possible adverse effects of spironolactone.  All of the patient's questions and concerns were addressed.
Acitretin Counseling:  I discussed with the patient the risks of acitretin including but not limited to hair loss, dry lips/skin/eyes, liver damage, hyperlipidemia, depression/suicidal ideation, photosensitivity.  Serious rare side effects can include but are not limited to pancreatitis, pseudotumor cerebri, bony changes, clot formation/stroke/heart attack.  Patient understands that alcohol is contraindicated since it can result in liver toxicity and significantly prolong the elimination of the drug by many years.
Doxepin Pregnancy And Lactation Text: This medication is Pregnancy Category C and it isn't known if it is safe during pregnancy. It is also excreted in breast milk and breast feeding isn't recommended.
Litfulo Pregnancy And Lactation Text: Based on animal studies, Lifulo may cause embryo-fetal harm when administered to pregnant women.  The medication should not be used in pregnancy.  Breastfeeding is not recommended during treatment.
Calcipotriene Counseling:  I discussed with the patient the risks of calcipotriene including but not limited to erythema, scaling, itching, and irritation.
Gabapentin Counseling: I discussed with the patient the risks of gabapentin including but not limited to dizziness, somnolence, fatigue and ataxia.
Ebglyss Counseling: I discussed with the patient the risks of lebrikizumab including but not limited to eye inflammation and irritation, cold sores, injection site reactions, allergic reactions and increased risk of parasitic infection. The patient understands that monitoring is required and they must alert us or the primary physician if symptoms of infection or other concerning signs are noted.
Ebglyss Pregnancy And Lactation Text: This medication likely crosses the placenta but the risk for the fetus is uncertain. It is unknown if this medication is excreted in breast milk.
Protopic Counseling: Patient may experience a mild burning sensation during topical application. Protopic is not approved in children less than 2 years of age. There have been case reports of hematologic and skin malignancies in patients using topical calcineurin inhibitors although causality is questionable.
Opioid Pregnancy And Lactation Text: These medications can lead to premature delivery and should be avoided during pregnancy. These medications are also present in breast milk in small amounts.
Minocycline Counseling: Patient advised regarding possible photosensitivity and discoloration of the teeth, skin, lips, tongue and gums.  Patient instructed to avoid sunlight, if possible.  When exposed to sunlight, patients should wear protective clothing, sunglasses, and sunscreen.  The patient was instructed to call the office immediately if the following severe adverse effects occur:  hearing changes, easy bruising/bleeding, severe headache, or vision changes.  The patient verbalized understanding of the proper use and possible adverse effects of minocycline.  All of the patient's questions and concerns were addressed.
Elidel Counseling: Patient may experience a mild burning sensation during topical application. Elidel is not approved in children less than 2 years of age. There have been case reports of hematologic and skin malignancies in patients using topical calcineurin inhibitors although causality is questionable.
Acitretin Pregnancy And Lactation Text: This medication is Pregnancy Category X and should not be given to women who are pregnant or may become pregnant in the future. This medication is excreted in breast milk.
Spironolactone Pregnancy And Lactation Text: This medication can cause feminization of the male fetus and should be avoided during pregnancy. The active metabolite is also found in breast milk.
Calcipotriene Pregnancy And Lactation Text: The use of this medication during pregnancy or lactation is not recommended as there is insufficient data.
Topical Metronidazole Counseling: Metronidazole is a topical antibiotic medication. You may experience burning, stinging, redness, or allergic reactions.  Please call our office if you develop any problems from using this medication.
Dapsone Pregnancy And Lactation Text: This medication is Pregnancy Category C and is not considered safe during pregnancy or breast feeding.
Hydroxyzine Counseling: Patient advised that the medication is sedating and not to drive a car after taking this medication.  Patient informed of potential adverse effects including but not limited to dry mouth, urinary retention, and blurry vision.  The patient verbalized understanding of the proper use and possible adverse effects of hydroxyzine.  All of the patient's questions and concerns were addressed.
Olumiant Counseling: I discussed with the patient the risks of Olumiant therapy including but not limited to upper respiratory tract infections, shingles, cold sores, and nausea. Live vaccines should be avoided.  This medication has been linked to serious infections; higher rate of mortality; malignancy and lymphoproliferative disorders; major adverse cardiovascular events; thrombosis; gastrointestinal perforations; neutropenia; lymphopenia; anemia; liver enzyme elevations; and lipid elevations.
Protopic Pregnancy And Lactation Text: This medication is Pregnancy Category C. It is unknown if this medication is excreted in breast milk when applied topically.
Glycopyrrolate Counseling:  I discussed with the patient the risks of glycopyrrolate including but not limited to skin rash, drowsiness, dry mouth, difficulty urinating, and blurred vision.
Enbrel Counseling:  I discussed with the patient the risks of etanercept including but not limited to myelosuppression, immunosuppression, autoimmune hepatitis, demyelinating diseases, lymphoma, and infections.  The patient understands that monitoring is required including a PPD at baseline and must alert us or the primary physician if symptoms of infection or other concerning signs are noted.
Olumiant Pregnancy And Lactation Text: Based on animal studies, Olumiant may cause embryo-fetal harm when administered to pregnant women.  The medication should not be used in pregnancy.  Breastfeeding is not recommended during treatment.
Saxenda Counseling: I reviewed the possible side effects including: thyroid tumors, kidney disease, gallbladder disease, abdominal pain, constipation, diarrhea, nausea, vomiting and pancreatitis. Do not take this medication if you have a history or family history of multiple endocrine neoplasia syndrome type 2. Side effects reviewed, pt to contact office should one occur.
Methotrexate Counseling:  Patient counseled regarding adverse effects of methotrexate including but not limited to nausea, vomiting, abnormalities in liver function tests. Patients may develop mouth sores, rash, diarrhea, and abnormalities in blood counts. The patient understands that monitoring is required including LFT's and blood counts.  There is a rare possibility of scarring of the liver and lung problems that can occur when taking methotrexate. Persistent nausea, loss of appetite, pale stools, dark urine, cough, and shortness of breath should be reported immediately. Patient advised to discontinue methotrexate treatment at least three months before attempting to become pregnant.  I discussed the need for folate supplements while taking methotrexate.  These supplements can decrease side effects during methotrexate treatment. The patient verbalized understanding of the proper use and possible adverse effects of methotrexate.  All of the patient's questions and concerns were addressed.
Simponi Counseling:  I discussed with the patient the risks of golimumab including but not limited to myelosuppression, immunosuppression, autoimmune hepatitis, demyelinating diseases, lymphoma, and serious infections.  The patient understands that monitoring is required including a PPD at baseline and must alert us or the primary physician if symptoms of infection or other concerning signs are noted.
Methotrexate Pregnancy And Lactation Text: This medication is Pregnancy Category X and is known to cause fetal harm. This medication is excreted in breast milk.
Quinolones Counseling:  I discussed with the patient the risks of fluoroquinolones including but not limited to GI upset, allergic reaction, drug rash, diarrhea, dizziness, photosensitivity, yeast infections, liver function test abnormalities, tendonitis/tendon rupture.
Propranolol Counseling:  I discussed with the patient the risks of propranolol including but not limited to low heart rate, low blood pressure, low blood sugar, restlessness and increased cold sensitivity. They should call the office if they experience any of these side effects.
Hydroxyzine Pregnancy And Lactation Text: This medication is not safe during pregnancy and should not be taken. It is also excreted in breast milk and breast feeding isn't recommended.
Bexarotene Counseling:  I discussed with the patient the risks of bexarotene including but not limited to hair loss, dry lips/skin/eyes, liver abnormalities, hyperlipidemia, pancreatitis, depression/suicidal ideation, photosensitivity, drug rash/allergic reactions, hypothyroidism, anemia, leukopenia, infection, cataracts, and teratogenicity.  Patient understands that they will need regular blood tests to check lipid profile, liver function tests, white blood cell count, thyroid function tests and pregnancy test if applicable.
Topical Metronidazole Pregnancy And Lactation Text: This medication is Pregnancy Category B and considered safe during pregnancy.  It is also considered safe to use while breastfeeding.
Fluconazole Counseling:  Patient counseled regarding adverse effects of fluconazole including but not limited to headache, diarrhea, nausea, upset stomach, liver function test abnormalities, taste disturbance, and stomach pain.  There is a rare possibility of liver failure that can occur when taking fluconazole.  The patient understands that monitoring of LFTs and kidney function test may be required, especially at baseline. The patient verbalized understanding of the proper use and possible adverse effects of fluconazole.  All of the patient's questions and concerns were addressed.
Cantharidin Counseling:  I discussed with the patient the risks of Cantharidin including but not limited to pain, redness, burning, itching, and blistering.
Bexarotene Pregnancy And Lactation Text: This medication is Pregnancy Category X and should not be given to women who are pregnant or may become pregnant. This medication should not be used if you are breast feeding.
5-Fu Counseling: 5-Fluorouracil Counseling:  I discussed with the patient the risks of 5-fluorouracil including but not limited to erythema, scaling, itching, weeping, crusting, and pain.
Qbrexza Counseling:  I discussed with the patient the risks of Qbrexza including but not limited to headache, mydriasis, blurred vision, dry eyes, nasal dryness, dry mouth, dry throat, dry skin, urinary hesitation, and constipation.  Local skin reactions including erythema, burning, stinging, and itching can also occur.
Rinvoq Counseling: I discussed with the patient the risks of Rinvoq therapy including but not limited to upper respiratory tract infections, shingles, cold sores, bronchitis, nausea, cough, fever, acne, and headache. Live vaccines should be avoided.  This medication has been linked to serious infections; higher rate of mortality; malignancy and lymphoproliferative disorders; major adverse cardiovascular events; thrombosis; thrombocytopenia, anemia, and neutropenia; lipid elevations; liver enzyme elevations; and gastrointestinal perforations.
Dutasteride Male Counseling: Dustasteride Counseling:  I discussed with the patient the risks of use of dutasteride including but not limited to decreased libido, decreased ejaculate volume, and gynecomastia. Women who can become pregnant should not handle medication.  All of the patient's questions and concerns were addressed.
Prednisone Counseling:  I discussed with the patient the risks of prolonged use of prednisone including but not limited to weight gain, insomnia, osteoporosis, mood changes, diabetes, susceptibility to infection, glaucoma and high blood pressure.  In cases where prednisone use is prolonged, patients should be monitored with blood pressure checks, serum glucose levels and an eye exam.  Additionally, the patient may need to be placed on GI prophylaxis, PCP prophylaxis, and calcium and vitamin D supplementation and/or a bisphosphonate.  The patient verbalized understanding of the proper use and the possible adverse effects of prednisone.  All of the patient's questions and concerns were addressed.
Skyrizi Counseling: I discussed with the patient the risks of risankizumab-rzaa including but not limited to immunosuppression, and serious infections.  The patient understands that monitoring is required including a PPD at baseline and must alert us or the primary physician if symptoms of infection or other concerning signs are noted.
Semaglutide Counseling: I reviewed the possible side effects including: thyroid tumors, kidney disease, gallbladder disease, abdominal pain, constipation, diarrhea, nausea, vomiting and pancreatitis. Do not take this medication if you have a history or family history of multiple endocrine neoplasia syndrome type 2. Side effects reviewed, pt to contact office should one occur.
Eucrisa Counseling: Patient may experience a mild burning sensation during topical application. Eucrisa is not approved in children less than 3 months of age.
Glycopyrrolate Pregnancy And Lactation Text: This medication is Pregnancy Category B and is considered safe during pregnancy. It is unknown if it is excreted breast milk.
Topical Steroids Counseling: I discussed with the patient that prolonged use of topical steroids can result in the increased appearance of superficial blood vessels (telangiectasias), lightening (hypopigmentation) and thinning of the skin (atrophy).  Patient understands to avoid using high potency steroids in skin folds, the groin or the face.  The patient verbalized understanding of the proper use and possible adverse effects of topical steroids.  All of the patient's questions and concerns were addressed.
Propranolol Pregnancy And Lactation Text: This medication is Pregnancy Category C and it isn't known if it is safe during pregnancy. It is excreted in breast milk.

## 2024-12-17 NOTE — PATIENT INSTRUCTIONS
Thank you for visiting [facility]!    Medications refilled includes Spironolactone     We talked about your blood pressure which has significantly improved and it seems the new medication is working.     Please let us know if you start feeling lightheaded or dizzy or if you were to faint while on these medications. Or go to the emergency department.    Continue to check your blood pressure at home and write them down. Please bring the numbers to your next appointment.     Please do not hesitate to contact me on MyChart should you need anything else!

## 2024-12-30 ENCOUNTER — TELEPHONE (OUTPATIENT)
Dept: INTERNAL MEDICINE | Facility: OTHER | Age: 89
End: 2024-12-30
Payer: MEDICARE

## 2024-12-30 DIAGNOSIS — R60.9 PITTING EDEMA: ICD-10-CM

## 2024-12-30 DIAGNOSIS — N17.8 OTHER ACUTE KIDNEY FAILURE (HCC): ICD-10-CM

## 2024-12-30 RX ORDER — FUROSEMIDE 20 MG/1
20 TABLET ORAL
Qty: 60 TABLET | Refills: 0 | Status: SHIPPED | OUTPATIENT
Start: 2024-12-30

## 2024-12-30 NOTE — TELEPHONE ENCOUNTER
Received request via: Pharmacy    Was the patient seen in the last year in this department? Yes    Does the patient have an active prescription (recently filled or refills available) for medication(s) requested? No    Pharmacy Name: [facility] #[id number] - [place], [place] - [address] AT [facility] [[id number]]     Does the patient have senior living Plus and need 100-day supply? (This applies to ALL medications) Yes, quantity updated to 100 days

## 2024-12-30 NOTE — PROGRESS NOTES
Cross covering physician note, most recent note reviewed where Lasix 20 mg daily was indicated for edema.  Recent BMP with mildly elevated creatinine, no recent magnesium.  Refill for prescription given to bridge to follow-up, also placed repeat lab work for monitoring with magnesium/BMP.

## 2025-01-01 NOTE — PROGRESS NOTES
Called patient, but she did not answer, and her mailbox was full.   Will attempt to call her on Thursday after the holiday. [employer] status is inactive.

## 2025-01-14 DIAGNOSIS — I10 PRIMARY HYPERTENSION: ICD-10-CM

## 2025-01-14 RX ORDER — METOPROLOL SUCCINATE 25 MG/1
25 TABLET, EXTENDED RELEASE ORAL DAILY
Qty: 100 TABLET | Refills: 2 | Status: SHIPPED | OUTPATIENT
Start: 2025-01-14

## 2025-01-14 NOTE — TELEPHONE ENCOUNTER
Received request via: Pharmacy    Was the patient seen in the last year in this department? Yes    Does the patient have an active prescription (recently filled or refills available) for medication(s) requested? No    Pharmacy Name:   To be filled at: [id number] - [place], [place] - [address] AT [facility]          Does the patient have [facility] Plus and need 100-day supply? (This applies to ALL medications) Yes, quantity updated to 100 days

## 2025-01-28 ENCOUNTER — TELEPHONE (OUTPATIENT)
Dept: INTERNAL MEDICINE | Facility: OTHER | Age: 89
End: 2025-01-28
Payer: MEDICARE

## 2025-01-29 NOTE — TELEPHONE ENCOUNTER
Phone Number Called: [phone]    Call outcome: Spoke to patient regarding message below.    Message: Returned patients call, and she already was helped - she needed assistance booking an appointment, but she is booked for 1/31/25 with Dr. Diaz.   Closing encounter.

## 2025-01-31 ENCOUNTER — APPOINTMENT (OUTPATIENT)
Dept: INTERNAL MEDICINE | Facility: OTHER | Age: 89
End: 2025-01-31
Payer: MEDICARE

## 2025-02-05 ENCOUNTER — HOSPITAL ENCOUNTER (OUTPATIENT)
Dept: LAB | Facility: MEDICAL CENTER | Age: 89
End: 2025-02-05
Attending: INTERNAL MEDICINE
Payer: MEDICARE

## 2025-02-05 DIAGNOSIS — R60.9 PITTING EDEMA: ICD-10-CM

## 2025-02-05 DIAGNOSIS — N17.8 OTHER ACUTE KIDNEY FAILURE (HCC): ICD-10-CM

## 2025-02-05 LAB
ALBUMIN SERPL BCP-MCNC: 4 G/DL (ref 3.2–4.9)
ALBUMIN/GLOB SERPL: 1.3 G/DL
ALP SERPL-CCNC: 100 U/L (ref 30–99)
ALT SERPL-CCNC: 15 U/L (ref 2–50)
ANION GAP SERPL CALC-SCNC: 11 MMOL/L (ref 7–16)
AST SERPL-CCNC: 24 U/L (ref 12–45)
BILIRUB SERPL-MCNC: 1.2 MG/DL (ref 0.1–1.5)
BUN SERPL-MCNC: 25 MG/DL (ref 8–22)
CALCIUM ALBUM COR SERPL-MCNC: 9.1 MG/DL (ref 8.5–10.5)
CALCIUM SERPL-MCNC: 9.1 MG/DL (ref 8.5–10.5)
CHLORIDE SERPL-SCNC: 103 MMOL/L (ref 96–112)
CO2 SERPL-SCNC: 27 MMOL/L (ref 20–33)
CREAT SERPL-MCNC: 1.11 MG/DL (ref 0.5–1.4)
GFR SERPLBLD CREATININE-BSD FMLA CKD-EPI: 48 ML/MIN/1.73 M 2
GLOBULIN SER CALC-MCNC: 3 G/DL (ref 1.9–3.5)
GLUCOSE SERPL-MCNC: 97 MG/DL (ref 65–99)
MAGNESIUM SERPL-MCNC: 2 MG/DL (ref 1.5–2.5)
POTASSIUM SERPL-SCNC: 4.4 MMOL/L (ref 3.6–5.5)
PROT SERPL-MCNC: 7 G/DL (ref 6–8.2)
SODIUM SERPL-SCNC: 141 MMOL/L (ref 135–145)

## 2025-02-05 PROCEDURE — 83735 ASSAY OF MAGNESIUM: CPT

## 2025-02-05 PROCEDURE — 36415 COLL VENOUS BLD VENIPUNCTURE: CPT

## 2025-02-05 PROCEDURE — 80053 COMPREHEN METABOLIC PANEL: CPT

## 2025-02-06 ENCOUNTER — APPOINTMENT (OUTPATIENT)
Dept: INTERNAL MEDICINE | Facility: OTHER | Age: 89
End: 2025-02-06
Payer: MEDICARE

## 2025-02-06 VITALS
DIASTOLIC BLOOD PRESSURE: 86 MMHG | BODY MASS INDEX: 30.62 KG/M2 | HEART RATE: 94 BPM | SYSTOLIC BLOOD PRESSURE: 170 MMHG | WEIGHT: 183.8 LBS | HEIGHT: 65 IN | TEMPERATURE: 98 F | OXYGEN SATURATION: 93 %

## 2025-02-06 DIAGNOSIS — N18.31 CKD STAGE 3A, GFR 45-59 ML/MIN: ICD-10-CM

## 2025-02-06 DIAGNOSIS — I10 PRIMARY HYPERTENSION: ICD-10-CM

## 2025-02-06 DIAGNOSIS — M79.89 SWELLING OF LOWER EXTREMITY: ICD-10-CM

## 2025-02-06 DIAGNOSIS — F10.90 ALCOHOL USE DISORDER: ICD-10-CM

## 2025-02-06 PROCEDURE — 3077F SYST BP >= 140 MM HG: CPT | Mod: GC

## 2025-02-06 PROCEDURE — 99214 OFFICE O/P EST MOD 30 MIN: CPT | Mod: GC

## 2025-02-06 PROCEDURE — 3079F DIAST BP 80-89 MM HG: CPT | Mod: GC

## 2025-02-06 RX ORDER — TRIAMCINOLONE ACETONIDE 0.25 MG/G
OINTMENT TOPICAL
COMMUNITY
Start: 2024-12-17 | End: 2025-02-06

## 2025-02-06 ASSESSMENT — ENCOUNTER SYMPTOMS
EYE PAIN: 0
WEAKNESS: 0
HEADACHES: 0
BLURRED VISION: 0
DEPRESSION: 0
ABDOMINAL PAIN: 0
DIARRHEA: 0
WHEEZING: 0
NAUSEA: 0
FEVER: 0
SORE THROAT: 0
NERVOUS/ANXIOUS: 0
CONSTIPATION: 0
PHOTOPHOBIA: 0
COUGH: 0
INSOMNIA: 0
CHILLS: 0
VOMITING: 0
PALPITATIONS: 0
HEARTBURN: 0
DIZZINESS: 0
DIAPHORESIS: 0
SHORTNESS OF BREATH: 0

## 2025-02-06 ASSESSMENT — PATIENT HEALTH QUESTIONNAIRE - PHQ9: CLINICAL INTERPRETATION OF PHQ2 SCORE: 0

## 2025-02-06 ASSESSMENT — FIBROSIS 4 INDEX: FIB4 SCORE: 2.58

## 2025-02-06 NOTE — PATIENT INSTRUCTIONS
Thank you for visiting [facility]!    No changes made on your medications. Please take your blood pressure medications everyday.     We talked about your blood pressure. Please decrease your salt intake to help improve your blood pressure.     Please elevate your legs whenever your sit down to help with your swelling.    Please check your blood pressure 2-3 times a week and write them down on a log and bring it to your next appointment.    Please drink half of what you have been drinking for alcohol. This will help your blood pressure.    Please do not hesitate to contact me on MyChart should you need anything else!

## 2025-02-06 NOTE — ASSESSMENT & PLAN NOTE
Patient's GFR have been downtrending over recent months. Likely secondary to uncontrolled HTN. Denied use of NSAIDs. Will focus on controlling HTN.

## 2025-02-06 NOTE — ASSESSMENT & PLAN NOTE
+1 pitting edema of the LLE on exam but trace on RLE.   -Continue compression stockings, elevating legs to manage symptoms   -Continue Lasix, Spironolactone, HCTZ as prescribed

## 2025-02-06 NOTE — ASSESSMENT & PLAN NOTE
Patient is still drinking 2 shots of Whiskey almost daily. I discussed the harmful effects of alcohol and how it can contribute to HTN. CAGE 0/4.  -Advised strongly to consider alcohol cessation or to decrease by half

## 2025-02-06 NOTE — ASSESSMENT & PLAN NOTE
Blood pressure was elevated today in office at 177 systolic and 170 on repeat. Patient did not take her medications this morning. Reports compliance with her medications. Reviewed labs which showed a reduction in GFR from 58 to now 48. Likely secondary to uncontrolled HTN. Alcohol is also a major contributor as well. No reported use of NSAIDs.  -Will continue Spironolactone, Diovan, Metoprolol as prescribed  -Advised to keep a BP log and to check BP at home at least 4 times a week and to bring it to her next appointment  -Advised to decrease alcohol intake  -Encouraged to continue to limit salt intake

## 2025-02-06 NOTE — PROGRESS NOTES
"    OFFICE VISIT    Janessa Cain is a 89 y.o. female who presents today with the following:    Reason for visit: Follow up regarding HTN    HPI:    Patient reports she has been compliant with her blood pressure medications. Did not take her blood pressure medication this morning. She has not been checking her blood pressure at home regularly and does not have a log at this appointment. Denies chest pain, SOB. She states she eats a fairly low salt diet but can do better. Patient continues to drink 2 shots of whiskey almost daily. Leg swelling have been well managed with Lasix. She does not like using the compression stockings but does elevate her legs when she thinks of it.     Review of Systems   Constitutional:  Negative for chills, diaphoresis, fever and malaise/fatigue.   HENT:  Negative for congestion, ear pain, hearing loss and sore throat.    Eyes:  Negative for blurred vision, photophobia and pain.   Respiratory:  Negative for cough, shortness of breath and wheezing.    Cardiovascular:  Positive for leg swelling (b/l, L>R). Negative for chest pain and palpitations.   Gastrointestinal:  Negative for abdominal pain, constipation, diarrhea, heartburn, nausea and vomiting.   Genitourinary:  Negative for dysuria and frequency.   Skin:  Negative for rash.   Neurological:  Negative for dizziness, weakness and headaches.   Psychiatric/Behavioral:  Negative for depression. The patient is not nervous/anxious and does not have insomnia.        Vitals:   BP (!) 170/86 (BP Location: Left arm, Patient Position: Sitting, BP Cuff Size: Adult)   Pulse 94   Temp 36.7 °C (98 °F) (Temporal)   Ht 1.651 m (5' 5\")   Wt 83.4 kg (183 lb 12.8 oz)   SpO2 93%   BMI 30.59 kg/m²     Physical Exam  Vitals and nursing note reviewed.   Constitutional:       General: She is not in acute distress.     Appearance: She is not diaphoretic.   HENT:      Head: Normocephalic and atraumatic.      Mouth/Throat:      Mouth: Mucous " membranes are moist.      Pharynx: Oropharynx is clear. No oropharyngeal exudate or posterior oropharyngeal erythema.   Cardiovascular:      Rate and Rhythm: Normal rate and regular rhythm.      Pulses: Normal pulses.      Heart sounds: Normal heart sounds. No murmur heard.     No gallop.   Pulmonary:      Effort: Pulmonary effort is normal. No respiratory distress.      Breath sounds: Normal breath sounds. No wheezing, rhonchi or rales.   Abdominal:      General: There is no distension.      Palpations: Abdomen is soft.      Tenderness: There is no abdominal tenderness.   Musculoskeletal:      Right lower leg: Edema (trace up to midshin) present.      Left lower leg: Edema (+1 pitting up to the midshin) present.   Skin:     General: Skin is warm.      Comments: Venous stasis changes of b/l LE   Neurological:      General: No focal deficit present.      Mental Status: She is alert and oriented to person, place, and time.   Psychiatric:         Mood and Affect: Mood normal.         Assessment and Plan:     Primary hypertension  Blood pressure was elevated today in office at 177 systolic and 170 on repeat. Patient did not take her medications this morning. Reports compliance with her medications. Reviewed labs which showed a reduction in GFR from 58 to now 48. Likely secondary to uncontrolled HTN. Alcohol is also a major contributor as well. No reported use of NSAIDs.  -Will continue Spironolactone, Diovan, Metoprolol as prescribed  -Advised to keep a BP log and to check BP at home at least 4 times a week and to bring it to her next appointment  -Advised to decrease alcohol intake  -Encouraged to continue to limit salt intake    CKD stage 3a, GFR 45-59 ml/min  Patient's GFR have been downtrending over recent months. Likely secondary to uncontrolled HTN. Denied use of NSAIDs. Will focus on controlling HTN.     Alcohol use disorder  Patient is still drinking 2 shots of Whiskey almost daily. I discussed the harmful  effects of alcohol and how it can contribute to HTN. CAGE 0/4.  -Advised strongly to consider alcohol cessation or to decrease by half    Swelling of lower extremity  +1 pitting edema of the LLE on exam but trace on RLE.   -Continue compression stockings, elevating legs to manage symptoms   -Continue Lasix, Spironolactone, HCTZ as prescribed      Orders Placed This Encounter    DISCONTD: triamcinolone (KENALOG) 0.025 % ointment       Return in about 5 weeks (around 3/13/2025).      Patient case was seen/ assessed/ discussed with Dr. Elise      Please note that this dictation was created using voice recognition software. I have made every reasonable attempt to correct obvious errors, but I expect that there are errors of grammar and possibly content that I did not discover before finalizing the note.       Signed by:    Keith Diaz DO    PGY-1 Internal Medicine Resident

## 2025-03-11 ENCOUNTER — APPOINTMENT (OUTPATIENT)
Dept: INTERNAL MEDICINE | Facility: OTHER | Age: 89
End: 2025-03-11
Payer: MEDICARE

## 2025-03-11 DIAGNOSIS — R60.9 PITTING EDEMA: ICD-10-CM

## 2025-03-11 RX ORDER — FUROSEMIDE 20 MG/1
20 TABLET ORAL
Qty: 100 TABLET | Refills: 2 | Status: SHIPPED | OUTPATIENT
Start: 2025-03-11

## 2025-03-11 NOTE — TELEPHONE ENCOUNTER
Received request via: Pharmacy    Was the patient seen in the last year in this department? Yes    Does the patient have an active prescription (recently filled or refills available) for medication(s) requested? No    Pharmacy Name:   To be filled at: [id number] - [place], [place] - [address] AT [facility]          Does the patient have shelter Plus and need 100-day supply? (This applies to ALL medications) Yes, quantity updated to 100 days

## 2025-03-18 ENCOUNTER — APPOINTMENT (OUTPATIENT)
Dept: INTERNAL MEDICINE | Facility: OTHER | Age: 89
End: 2025-03-18
Payer: MEDICARE

## 2025-03-20 ENCOUNTER — PATIENT MESSAGE (OUTPATIENT)
Dept: HEALTH INFORMATION MANAGEMENT | Facility: OTHER | Age: 89
End: 2025-03-20

## 2025-03-21 ENCOUNTER — OFFICE VISIT (OUTPATIENT)
Dept: INTERNAL MEDICINE | Facility: OTHER | Age: 89
End: 2025-03-21
Payer: MEDICARE

## 2025-03-21 VITALS
HEART RATE: 93 BPM | OXYGEN SATURATION: 92 % | HEIGHT: 65 IN | SYSTOLIC BLOOD PRESSURE: 160 MMHG | RESPIRATION RATE: 14 BRPM | TEMPERATURE: 98.7 F | WEIGHT: 182 LBS | DIASTOLIC BLOOD PRESSURE: 91 MMHG | BODY MASS INDEX: 30.32 KG/M2

## 2025-03-21 DIAGNOSIS — N18.31 CKD STAGE 3A, GFR 45-59 ML/MIN: ICD-10-CM

## 2025-03-21 DIAGNOSIS — R73.03 PREDIABETES: ICD-10-CM

## 2025-03-21 DIAGNOSIS — F10.90 ALCOHOL USE DISORDER: ICD-10-CM

## 2025-03-21 DIAGNOSIS — I10 PRIMARY HYPERTENSION: ICD-10-CM

## 2025-03-21 PROCEDURE — 3080F DIAST BP >= 90 MM HG: CPT | Mod: GC

## 2025-03-21 PROCEDURE — 99214 OFFICE O/P EST MOD 30 MIN: CPT | Mod: GC

## 2025-03-21 PROCEDURE — 3077F SYST BP >= 140 MM HG: CPT | Mod: GC

## 2025-03-21 ASSESSMENT — ENCOUNTER SYMPTOMS
VOMITING: 0
COUGH: 0
ABDOMINAL PAIN: 0
INSOMNIA: 0
SORE THROAT: 0
NERVOUS/ANXIOUS: 0
EYE PAIN: 0
PALPITATIONS: 0
WEAKNESS: 0
PHOTOPHOBIA: 0
WHEEZING: 0
HEARTBURN: 0
DEPRESSION: 0
CONSTIPATION: 0
DIAPHORESIS: 0
HEADACHES: 0
FEVER: 0
BLURRED VISION: 0
DIARRHEA: 0
SHORTNESS OF BREATH: 0
DIZZINESS: 0
CHILLS: 0
NAUSEA: 0

## 2025-03-21 ASSESSMENT — FIBROSIS 4 INDEX: FIB4 SCORE: 2.58

## 2025-03-21 NOTE — PATIENT INSTRUCTIONS
Thank you for visiting [facility]!    No changes in medications.    I ordered some blood work to be drawn prior to your next appointment.    Please continue to check your blood pressure at home and keep a log.    Please avoid use of alcohol and decreasing salt intake and continue to exercise regularly.     Please do not hesitate to contact me on MyChart should you need anything else!

## 2025-03-21 NOTE — ASSESSMENT & PLAN NOTE
Last A1c was 5.8 placing the patient in the prediabetes range. Will recheck prior to her next appointment. Encourage to improve diet and exercise to work on weight loss.

## 2025-03-21 NOTE — PROGRESS NOTES
"    OFFICE VISIT    Janessa Cain is a 89 y.o. female who presents today with the following:    Reason for visit: Follow up regarding HTN    HPI:    Patient ate something a few days ago that did not settle with her stomach well. This made her cancel her appointment a few days ago. She had diarrhea, abdominal pain that has now resolved. Denied fever, chills, nausea, vomiting. She is not wearing compression stockings but is elevating her legs when she is sitting down. She is reportedly compliant with using Lasix and her blood pressure meds. Her home BP readings have been ranging 120-140s systolic. Denies chest pain, SOB. She does continue to drink 1 shot of whiskey every evening.     Review of Systems   Constitutional:  Negative for chills, diaphoresis, fever and malaise/fatigue.   HENT:  Negative for congestion, ear pain, hearing loss and sore throat.    Eyes:  Negative for blurred vision, photophobia and pain.   Respiratory:  Negative for cough, shortness of breath and wheezing.    Cardiovascular:  Negative for chest pain, palpitations and leg swelling.   Gastrointestinal:  Negative for abdominal pain, constipation, diarrhea, heartburn, nausea and vomiting.   Genitourinary:  Negative for dysuria and frequency.   Skin:  Negative for rash.   Neurological:  Negative for dizziness, weakness and headaches.   Psychiatric/Behavioral:  Negative for depression. The patient is not nervous/anxious and does not have insomnia.        Vitals:   BP (!) 160/91   Pulse 93   Temp 37.1 °C (98.7 °F) (Temporal)   Resp 14   Ht 1.651 m (5' 5\")   Wt 82.6 kg (182 lb)   SpO2 92%   BMI 30.29 kg/m²     Physical Exam  Vitals and nursing note reviewed.   Constitutional:       General: She is not in acute distress.     Appearance: She is not diaphoretic.   HENT:      Head: Normocephalic and atraumatic.      Mouth/Throat:      Mouth: Mucous membranes are moist.      Pharynx: Oropharynx is clear. No oropharyngeal exudate or " posterior oropharyngeal erythema.   Cardiovascular:      Rate and Rhythm: Normal rate and regular rhythm.      Pulses: Normal pulses.      Heart sounds: Normal heart sounds. No murmur heard.     No gallop.   Pulmonary:      Effort: Pulmonary effort is normal. No respiratory distress.      Breath sounds: Normal breath sounds. No wheezing, rhonchi or rales.   Abdominal:      General: There is no distension.      Palpations: Abdomen is soft.      Tenderness: There is no abdominal tenderness.   Musculoskeletal:      Right lower leg: Edema (trace up to midshin) present.      Left lower leg: Edema (+1 pitting up to midshin) present.   Skin:     General: Skin is warm.      Findings: No rash.   Neurological:      General: No focal deficit present.      Mental Status: She is alert and oriented to person, place, and time.   Psychiatric:         Mood and Affect: Mood normal.         Assessment and Plan:     Primary hypertension  Blood pressure was elevated today in office at 160 systolic. Patient reports compliance with her medications. Reviewed labs which showed a reduction in GFR from 58 to now 48. Likely secondary to uncontrolled HTN. Alcohol is also a major contributor as well. No reported use of NSAIDs.  -Will continue Spironolactone, Diovan, Metoprolol as prescribed  -Advised to keep a BP log and to check BP at home at least 4 times a week and to bring it to her next appointment  -Advised to decrease alcohol intake  -Encouraged to continue to limit salt intake    Alcohol use disorder  Patient is still drinking 1 shot of Whiskey almost daily. I discussed the harmful effects of alcohol and how it can contribute to HTN. CAGE 0/4.  -Advised strongly to consider alcohol cessation or to decrease by half    CKD stage 3a, GFR 45-59 ml/min  Patient's GFR have been downtrending over recent months. Likely secondary to uncontrolled HTN. Denied use of NSAIDs. Will focus on controlling HTN.     Prediabetes  Last A1c was 5.8 placing  the patient in the prediabetes range. Will recheck prior to her next appointment. Encourage to improve diet and exercise to work on weight loss.      Orders Placed This Encounter    Comp Metabolic Panel    HEMOGLOBIN A1C       Return in about 4 weeks (around 4/18/2025) for w/ PCP.      Patient case was seen/ assessed/ discussed with Dr. Elise      Please note that this dictation was created using voice recognition software. I have made every reasonable attempt to correct obvious errors, but I expect that there are errors of grammar and possibly content that I did not discover before finalizing the note.       Signed by:    Keith Diaz DO    PGY-1 Internal Medicine Resident

## 2025-03-21 NOTE — ASSESSMENT & PLAN NOTE
Patient is still drinking 1 shot of Whiskey almost daily. I discussed the harmful effects of alcohol and how it can contribute to HTN. CAGE 0/4.  -Advised strongly to consider alcohol cessation or to decrease by half

## 2025-03-21 NOTE — ASSESSMENT & PLAN NOTE
Blood pressure was elevated today in office at 160 systolic. Patient reports compliance with her medications. Reviewed labs which showed a reduction in GFR from 58 to now 48. Likely secondary to uncontrolled HTN. Alcohol is also a major contributor as well. No reported use of NSAIDs.  -Will continue Spironolactone, Diovan, Metoprolol as prescribed  -Advised to keep a BP log and to check BP at home at least 4 times a week and to bring it to her next appointment  -Advised to decrease alcohol intake  -Encouraged to continue to limit salt intake   abnormal/expand...

## 2025-03-26 ENCOUNTER — PATIENT OUTREACH (OUTPATIENT)
Dept: HEALTH INFORMATION MANAGEMENT | Facility: OTHER | Age: 89
End: 2025-03-26
Payer: MEDICARE

## 2025-03-26 DIAGNOSIS — N18.31 CKD STAGE 3A, GFR 45-59 ML/MIN: ICD-10-CM

## 2025-03-26 DIAGNOSIS — I10 PRIMARY HYPERTENSION: ICD-10-CM

## 2025-03-26 NOTE — PROGRESS NOTES
Called pt for initial outreach and interest. No answer, pt VM is full and cannot leave a message. I will try back at a later date    Called pt for a second time, tried both numbers, no answer, was able to leave a VM on the second number but I will try once more later on this week    Still unable to reach pt, will close this encounter

## 2025-04-14 ENCOUNTER — TELEPHONE (OUTPATIENT)
Dept: INTERNAL MEDICINE | Facility: OTHER | Age: 89
End: 2025-04-14
Payer: MEDICARE

## 2025-04-14 NOTE — TELEPHONE ENCOUNTER
Received one voicemail at 3pm, and a second voicemail at 4pm from patient's .   They would like a call back - they have a couple questions about medications.     Routing to Malka, as I am leaving for the day.

## 2025-04-15 NOTE — TELEPHONE ENCOUNTER
Phone Number Called: [phone] (pt's , Almas)     Call outcome: Spoke to patient regarding message below.    Message: Returned pt's call.     Almas just wanted to review the medications that Janessa is supposed to be taking.   I reviewed the four medications prescribed for her, as well as OTC Tylenol as needed.   Almas confirmed that Janessa has all of those medications.   Closing encounter now.

## 2025-04-23 ENCOUNTER — OFFICE VISIT (OUTPATIENT)
Dept: INTERNAL MEDICINE | Facility: OTHER | Age: 89
End: 2025-04-23
Payer: MEDICARE

## 2025-04-23 VITALS
DIASTOLIC BLOOD PRESSURE: 74 MMHG | HEART RATE: 93 BPM | SYSTOLIC BLOOD PRESSURE: 126 MMHG | WEIGHT: 178.4 LBS | OXYGEN SATURATION: 95 % | BODY MASS INDEX: 29.72 KG/M2 | HEIGHT: 65 IN | TEMPERATURE: 97.6 F

## 2025-04-23 DIAGNOSIS — I10 PRIMARY HYPERTENSION: ICD-10-CM

## 2025-04-23 DIAGNOSIS — F10.90 ALCOHOL USE DISORDER: ICD-10-CM

## 2025-04-23 DIAGNOSIS — R73.03 PREDIABETES: ICD-10-CM

## 2025-04-23 ASSESSMENT — FIBROSIS 4 INDEX: FIB4 SCORE: 2.61

## 2025-04-24 NOTE — PROGRESS NOTES
date  Chief Complaint   Patient presents with    Follow-Up     No new complaints        HISTORY OF PRESENT ILLNESS: Patient is a 89 y.o. female established patient who presents today along with her  for the following.      1. Primary hypertension  Has been taking her blood pressure medication but brought me her complete list today.  Home blood pressures also have been very stable and she is pretty happy about it along with her     2. Alcohol use disorder  Continues to drink 1-2 shots of whiskey daily at night and states she states she does not have any withdrawal symptoms or tremors .  Not willing to take any medications to help quit drinking    3. Prediabetes  Does not exercise much but she does a little bit in the house what ever she can and not motivated to lose weight      Past Medical History:   Diagnosis Date    2019 novel coronavirus disease (COVID-19) 10/06/2021    AVN (avascular necrosis of bone) ([facility]) 11/12/2019    S/p Rt Hip replacement 2019      COVID-19 virus infection 11/17/2021    Depression 06/02/2020 02/25/22 Resolved per problem list and pt.    Edema, lower extremity 11/19/2019    Hardware complicating wound infection ([facility]) 02/24/2022    Hypertension     Hypoalbuminemia 11/11/2019    Multiple open wounds of lower leg 11/03/2019    Nocturia 06/25/2021    Pneumonia     Viral simon 30 yrs ago    Rheumatic fever 1943    Shortness of breath 11/17/2021    Urinary retention 11/13/2019     IMO load March 2020       Patient Active Problem List    Diagnosis Date Noted    Prediabetes 03/21/2025    CKD stage 3a, GFR 45-59 ml/min 02/06/2025    AK (actinic keratosis) 06/24/2024    Swelling of lower extremity 06/24/2024    Macular degeneration of both eyes 02/14/2024    Obesity (BMI 30-39.9) 02/14/2024    Elevated brain natriuretic peptide (BNP) level 02/08/2024    Gastroesophageal reflux disease without esophagitis 10/03/2023    At risk for falling 06/12/2023    Alcohol use disorder 12/19/2022     S/P total knee arthroplasty, right 2022    Pulmonary hypertension (HCC) 2021    Adjustment disorder with anxiety 10/07/2021    Asymptomatic menopausal state 2021    Seasonal allergic rhinitis 2020    Other fracture of right patella, sequela 2020    Vitamin D insufficiency 2019    Status post total replacement of right hip 2019    Impaired mobility and ADLs 2019    Primary hypertension 2019    Degenerative joint disease of right hip 2019       Allergies:Apap-fd&c red #40 [place]-oxycodone, Percocet [perloxx], Hydrocodone, Oxycodone, and Tramadol    Current Outpatient Medications   Medication Sig Dispense Refill    furosemide (LASIX) 20 MG Tab TAKE 1 TABLET BY MOUTH EVERY MORNING WITH BREAKFAST 100 Tablet 2    metoprolol SR (TOPROL XL) 25 MG TABLET SR 24 HR TAKE 1 TABLET BY MOUTH EVERY  Tablet 2    spironolactone (ALDACTONE) 50 MG Tab Take 1 Tablet by mouth every day. 100 Tablet 1    valsartan-hydrochlorothiazide (DIOVAN-HCT) 320-25 MG per tablet Take 1 Tablet by mouth every day. 100 Tablet 1    acetaminophen (TYLENOL) 325 MG Tab Take 650 mg by mouth every four hours as needed.       No current facility-administered medications for this visit.       Social History     Tobacco Use    Smoking status: Former     Current packs/day: 0.00     Average packs/day: 1 pack/day for 30.0 years (30.0 ttl pk-yrs)     Types: Cigarettes     Start date:      Quit date:      Years since quittin.3    Smokeless tobacco: Never   Vaping Use    Vaping status: Never Used   Substance Use Topics    Alcohol use: Yes     Alcohol/week: 0.6 oz     Types: 1 Glasses of wine per week     Comment: 1 drink every day    Drug use: Never       Family History   Problem Relation Age of Onset    Alcohol abuse Mother     Heart Disease Mother     Heart Disease Brother     Alcohol abuse Brother     Cancer Maternal Aunt          Review of Systems   Patient denies  "Fevers/chills/nausea/vomiting/chest pain/sob/blood in stools/black stools/blood in urine.all other systems are reviewed See HPI    Exam:  /74 (BP Location: Left arm, Patient Position: Sitting, BP Cuff Size: Adult)   Pulse 93   Temp 36.4 °C (97.6 °F) (Temporal)   Ht 1.651 m (5' 5\")   Wt 80.9 kg (178 lb 6.4 oz)   SpO2 95%  Body mass index is 29.69 kg/m².  Constitutional:  NAD, well appearing.  HEENT:   NC/AT  Cardiovascular: RRR.   No m/r/g. No carotid bruits.       Lungs:   CTAB, no w/r/r, no respiratory distress.  Abdomen: Soft, NT/ND + BS, no masses, no suprapubic tenderness, no hepatomegaly.  Extremities:  2+ DP and radial pulses bilaterally.  No c/c/e.  Skin:  Warm and dry.    Neurologic: Alert & oriented x 3, CN II-XII grossly intact, strength and sensation grossly intact.  No focal deficits noted.  Psychiatric:  Affect normal, mood normal, judgment normal.    Assessment/Plan:     1. Primary hypertension  Blood pressure is very stable on her current regimen so continue the same doses without any changes.  Did encourage to cut down alcohol use    2. Alcohol use disorder  Spent some time discussing about cutting alcohol use but patient is not ready.  She will say yes to any of my recommendations but not clearly following    3. Prediabetes  A1c less than 6 and advised to continue diet exercise and follow-up      All imaging results and lab results and consult notes are reviewed at this visit.  Followup: Return in about 9 weeks (around 6/25/2025) for MEDICARE Annual Wellness.    Please note that this dictation was created using voice recognition software. I have made every reasonable attempt to correct obvious errors, but I expect that there are errors of grammar and possibly content that I did not discover before finalizing the note.      "

## 2025-06-19 ENCOUNTER — APPOINTMENT (OUTPATIENT)
Dept: URBAN - METROPOLITAN AREA CLINIC 4 | Facility: CLINIC | Age: 89
Setting detail: DERMATOLOGY
End: 2025-06-19

## 2025-06-19 DIAGNOSIS — Z87.2 PERSONAL HISTORY OF DISEASES OF THE SKIN AND SUBCUTANEOUS TISSUE: ICD-10-CM

## 2025-06-19 DIAGNOSIS — L57.0 ACTINIC KERATOSIS: ICD-10-CM

## 2025-06-19 PROCEDURE — ? LIQUID NITROGEN

## 2025-06-19 PROCEDURE — ? COUNSELING

## 2025-06-19 ASSESSMENT — LOCATION SIMPLE DESCRIPTION DERM: LOCATION SIMPLE: NOSE

## 2025-06-19 ASSESSMENT — LOCATION ZONE DERM: LOCATION ZONE: NOSE

## 2025-06-19 ASSESSMENT — LOCATION DETAILED DESCRIPTION DERM: LOCATION DETAILED: NASAL DORSUM

## 2025-06-19 NOTE — PROCEDURE: LIQUID NITROGEN
Application Tool (Optional): Liquid Nitrogen Sprayer
Render Post-Care Instructions In Note?: no
Consent: The patient's consent was obtained including but not limited to risks of crusting, scabbing, blistering, scarring, darker or lighter pigmentary change, recurrence, incomplete removal and infection.
Post-Care Instructions: I reviewed with the patient in detail post-care instructions. Patient is to wear sunprotection, and avoid picking at any of the treated lesions. Pt may apply Vaseline to crusted or scabbing areas.
Number Of Freeze-Thaw Cycles: 1 freeze-thaw cycle
Show Applicator Variable?: Yes
Detail Level: Detailed
Duration Of Freeze Thaw-Cycle (Seconds): 4

## 2025-07-16 ENCOUNTER — HOME HEALTH ADMISSION (OUTPATIENT)
Dept: HOME HEALTH SERVICES | Facility: HOME HEALTHCARE | Age: 89
End: 2025-07-16
Payer: MEDICARE

## 2025-07-16 ENCOUNTER — OFFICE VISIT (OUTPATIENT)
Dept: INTERNAL MEDICINE | Facility: OTHER | Age: 89
End: 2025-07-16
Payer: MEDICARE

## 2025-07-16 VITALS
WEIGHT: 181.4 LBS | BODY MASS INDEX: 30.22 KG/M2 | HEART RATE: 87 BPM | SYSTOLIC BLOOD PRESSURE: 187 MMHG | TEMPERATURE: 98 F | DIASTOLIC BLOOD PRESSURE: 93 MMHG | HEIGHT: 65 IN | OXYGEN SATURATION: 91 %

## 2025-07-16 DIAGNOSIS — I10 PRIMARY HYPERTENSION: ICD-10-CM

## 2025-07-16 DIAGNOSIS — N18.31 CKD STAGE 3A, GFR 45-59 ML/MIN: Primary | ICD-10-CM

## 2025-07-16 DIAGNOSIS — M79.89 SWELLING OF LOWER EXTREMITY: ICD-10-CM

## 2025-07-16 PROCEDURE — 3077F SYST BP >= 140 MM HG: CPT | Mod: GC

## 2025-07-16 PROCEDURE — 3080F DIAST BP >= 90 MM HG: CPT | Mod: GC

## 2025-07-16 PROCEDURE — 99214 OFFICE O/P EST MOD 30 MIN: CPT | Mod: GC

## 2025-07-16 ASSESSMENT — ENCOUNTER SYMPTOMS
BLURRED VISION: 0
DEPRESSION: 0
ABDOMINAL PAIN: 0
PALPITATIONS: 0
WHEEZING: 0
EYE PAIN: 0
ROS SKIN COMMENTS: LLE WOUND
NERVOUS/ANXIOUS: 0
CHILLS: 0
DIARRHEA: 0
DIZZINESS: 0
FEVER: 0
CONSTIPATION: 0
VOMITING: 0
NAUSEA: 0
SORE THROAT: 0
DIAPHORESIS: 0
INSOMNIA: 0
HEADACHES: 0
WEAKNESS: 0
PHOTOPHOBIA: 0
COUGH: 0
HEARTBURN: 0
SHORTNESS OF BREATH: 0

## 2025-07-16 ASSESSMENT — FIBROSIS 4 INDEX: FIB4 SCORE: 2.61

## 2025-07-16 NOTE — PROGRESS NOTES
Teaching Physician Attestation      Level of Participation    I have personally interviewed and examined the patient.  In addition, I discussed with the resident physician the patient's history, exam, assessment and plan in detail.  Topics listed in my addendum were the focus of the visit.  Healthcare maintenance was not addressed this visit unless listed as a topic in my addendum.  I agree with the plan as written along with the following additions/modifications:    Hypertensive urgency  - No symptoms, per report this is a chronic issue where patient intermittently has high readings in office.  She reports medication adherence although it is possible that she has had some nonadherence after discussion with Dr. Diaz.  - No changes to medications, home BP log, follow-up in 1 week.  Would review alarm precautions    Lower extremity wound in the setting of likely chronic venous insufficiency superimposed on likely lymmphedema  -Patient has bilateral trace pitting edema which may be superimposed on nonpitting edema.  She has a very small wound on the anterior left shin that is not erythematous and is weeping clear fluid, per report this occurred after patient scratched her shin last night.  Patient denies fevers, purulence, pain.  -Instructed to cover with gauze, monitor closely for any worsening.  -Given that there is concern for medication nonadherence and patient is on 3 different diuretic medications, which should theoretically address her lower extremity swelling, we will not adjust Lasix further yet, particularly in the setting of most recent creatinine being uptrending (prior 0.86, now 1.11) and no recent labs for 5 months.  Check CMP, magnesium.  Will refer to home health to hopefully coordinate VNA nurse to come and help with medications including potentially setting up a pillbox to help with adherence.  Once kidney function is known and adherence is clear, could uptitrate Lasix to optimize if needed.  Does  appear to be potentially venous stasis as patient has hyperpigmentation consistent with hemosiderin deposits on bilateral legs.  Echo 1 year ago with normal left LV function, CMP and urine micro into creatinine today will assess for other causes of non-pitting edema as well.  May also have a lymphedema component in the setting of BMI 30 given edema was not completely pitting.    Return to clinic 1 week for close follow-up.  PCR

## 2025-07-16 NOTE — ASSESSMENT & PLAN NOTE
Given exam findings and history, she likely caused a wound thru her scratching. Her thin skin from her swelling likely broke down easily. No signs of infection such as cellulitis. Suspect medication non-adherence as her legs are more swollen than before despite being on 3 diuretics.  -Reinforced importance of medication adherence  -Recommended compression stockings  -Recommended elevating legs when sedentary  -Continue Lasix as prescribed for now

## 2025-07-16 NOTE — PATIENT INSTRUCTIONS
Thank you for visiting [facility]!    No medications changes. Please take all your blood pressure medications as prescribed.    Please check your blood pressure at home 2-3 times a week and keep a log.      If you develop new fever, pus drainage from the wound, please get evaluated by someone.     Please do not hesitate to contact me on MyChart should you need anything else!

## 2025-07-16 NOTE — ASSESSMENT & PLAN NOTE
Notable uptrend on Cr over the past several months. Will recheck with CMP. Worsening swelling raises concern for nephrotic syndrome, will screen with microalbumin/cr ratio

## 2025-07-16 NOTE — PROGRESS NOTES
"    OFFICE VISIT    Janessa Cain is a 89 y.o. female who presents today with the following:    Reason for visit: Chief complaint of LLE wound    HPI:    Janessa Storm is a 89 y/o female with a past medical history of CKD stage 3a, HTN, GERD, prediabetes who presents with a chief complaint of LLE wound.     Patient reports she was scratching her LLE last night when she noticed that her LLE was weeping and wet. She discovered a small wound on her LLE. Denies purulence, fever, chills. It is not painful to touch. She notes that her leg swelling on both legs have been worse than it has been before. Patient states she has been compliant with her medications but has not been checking her BP at home. Denies lightheadedness, dizziness, SOB, chest pain.    Review of Systems   Constitutional:  Negative for chills, diaphoresis, fever and malaise/fatigue.   HENT:  Negative for congestion, ear pain, hearing loss and sore throat.    Eyes:  Negative for blurred vision, photophobia and pain.   Respiratory:  Negative for cough, shortness of breath and wheezing.    Cardiovascular:  Positive for leg swelling (b/l, L>R). Negative for chest pain and palpitations.   Gastrointestinal:  Negative for abdominal pain, constipation, diarrhea, heartburn, nausea and vomiting.   Genitourinary:  Negative for dysuria and frequency.   Skin:  Negative for rash.        LLE wound   Neurological:  Negative for dizziness, weakness and headaches.   Psychiatric/Behavioral:  Negative for depression. The patient is not nervous/anxious and does not have insomnia.        Vitals:   BP (!) 187/93 (BP Location: Right arm, Patient Position: Sitting, BP Cuff Size: Adult)   Pulse 87   Temp 36.7 °C (98 °F) (Temporal)   Ht 1.651 m (5' 5\")   Wt 82.3 kg (181 lb 6.4 oz)   SpO2 91%   BMI 30.19 kg/m²     Physical Exam  Vitals and nursing note reviewed.   Constitutional:       General: She is not in acute distress.     Appearance: She is not " diaphoretic.   HENT:      Head: Normocephalic and atraumatic.      Mouth/Throat:      Mouth: Mucous membranes are moist.      Pharynx: Oropharynx is clear. No oropharyngeal exudate or posterior oropharyngeal erythema.   Cardiovascular:      Rate and Rhythm: Normal rate and regular rhythm.      Pulses: Normal pulses.      Heart sounds: Normal heart sounds. No murmur heard.     No gallop.   Pulmonary:      Effort: Pulmonary effort is normal. No respiratory distress.      Breath sounds: Normal breath sounds. No wheezing, rhonchi or rales.   Abdominal:      General: There is no distension.      Palpations: Abdomen is soft.      Tenderness: There is no abdominal tenderness.   Musculoskeletal:      Right lower leg: Edema (+1-2 pitting edema up to the knee) present.      Left lower leg: Edema (+2 pitting up to the knee) present.   Skin:     General: Skin is warm.      Findings: No rash.      Comments: There is a 0.5 cm sized weeping wound with surrounding erythema, warmth, or purulence.  Venous stasis changes at b/l LE   Neurological:      General: No focal deficit present.      Mental Status: She is alert and oriented to person, place, and time.   Psychiatric:         Mood and Affect: Mood normal.         Assessment and Plan:     Swelling of lower extremity  Given exam findings and history, she likely caused a wound thru her scratching. Her thin skin from her swelling likely broke down easily. No signs of infection such as cellulitis. Suspect medication non-adherence as her legs are more swollen than before despite being on 3 diuretics.  -Reinforced importance of medication adherence  -Recommended compression stockings  -Recommended elevating legs when sedentary  -Continue Lasix as prescribed for now    CKD stage 3a, GFR 45-59 ml/min  Notable uptrend on Cr over the past several months. Will recheck with CMP. Worsening swelling raises concern for nephrotic syndrome, will screen with microalbumin/cr ratio    Primary  hypertension  Severely elevated at 187/93. I suspect medication non-adherence given her history. No symptoms concerning for hypertensive emergency or CVA.   -Continue Spironolactone, Diovan, Metoprolol  -Advised to check BP regularly and to keep a log  -Referral to home health to help with medication adherence      Orders Placed This Encounter    Comp Metabolic Panel    MAGNESIUM    MICROALBUMIN CREAT RATIO URINE    Referral to Home Health       Return in about 1 week (around 7/23/2025) for with anyone available.      Patient case was seen/ assessed/ discussed with Dr. Rascon      Please note that this dictation was created using voice recognition software. I have made every reasonable attempt to correct obvious errors, but I expect that there are errors of grammar and possibly content that I did not discover before finalizing the note.       Signed by:    Keith Diaz DO    PGY-2 Internal Medicine Resident

## 2025-07-16 NOTE — ASSESSMENT & PLAN NOTE
Severely elevated at 187/93. I suspect medication non-adherence given her history. No symptoms concerning for hypertensive emergency or CVA.   -Continue Spironolactone, Diovan, Metoprolol  -Advised to check BP regularly and to keep a log  -Referral to home health to help with medication adherence

## 2025-07-17 ENCOUNTER — HOME CARE VISIT (OUTPATIENT)
Dept: HOME HEALTH SERVICES | Facility: HOME HEALTHCARE | Age: 89
End: 2025-07-17

## 2025-07-17 ENCOUNTER — TELEPHONE (OUTPATIENT)
Dept: HOME HEALTH SERVICES | Facility: HOME HEALTHCARE | Age: 89
End: 2025-07-17
Payer: MEDICARE

## 2025-07-22 ENCOUNTER — TELEPHONE (OUTPATIENT)
Dept: INTERNAL MEDICINE | Facility: OTHER | Age: 89
End: 2025-07-22
Payer: MEDICARE

## 2025-07-22 NOTE — TELEPHONE ENCOUNTER
Received a VM from Watson, who works with [employer]. [phone].    He wanted to confirm whether Janessa should get her labs done with [email or web address], or if he needs to go the lab.

## 2025-07-23 ENCOUNTER — OFFICE VISIT (OUTPATIENT)
Dept: INTERNAL MEDICINE | Facility: OTHER | Age: 89
End: 2025-07-23
Payer: MEDICARE

## 2025-07-23 ENCOUNTER — HOSPITAL ENCOUNTER (OUTPATIENT)
Dept: LAB | Facility: MEDICAL CENTER | Age: 89
End: 2025-07-23
Payer: MEDICARE

## 2025-07-23 VITALS
HEART RATE: 90 BPM | OXYGEN SATURATION: 90 % | DIASTOLIC BLOOD PRESSURE: 77 MMHG | HEIGHT: 65 IN | BODY MASS INDEX: 30.82 KG/M2 | TEMPERATURE: 97.2 F | SYSTOLIC BLOOD PRESSURE: 173 MMHG | WEIGHT: 185 LBS

## 2025-07-23 DIAGNOSIS — M79.89 SWELLING OF LOWER EXTREMITY: ICD-10-CM

## 2025-07-23 DIAGNOSIS — I10 PRIMARY HYPERTENSION: ICD-10-CM
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
## 2025-07-23 DIAGNOSIS — R73.03 PREDIABETES: ICD-10-CM

## 2025-07-23 DIAGNOSIS — N18.31 CKD STAGE 3A, GFR 45-59 ML/MIN: ICD-10-CM

## 2025-07-23 DIAGNOSIS — I10 PRIMARY HYPERTENSION: Primary | ICD-10-CM

## 2025-07-23 LAB
ALBUMIN SERPL BCP-MCNC: 4 G/DL (ref 3.2–4.9)
ALBUMIN/GLOB SERPL: 1.3 G/DL
ALP SERPL-CCNC: 97 U/L (ref 30–99)
ALT SERPL-CCNC: 24 U/L (ref 2–50)
ANION GAP SERPL CALC-SCNC: 10 MMOL/L (ref 7–16)
AST SERPL-CCNC: 35 U/L (ref 12–45)
BILIRUB SERPL-MCNC: 1.2 MG/DL (ref 0.1–1.5)
BUN SERPL-MCNC: 21 MG/DL (ref 8–22)
CALCIUM ALBUM COR SERPL-MCNC: 8.8 MG/DL (ref 8.5–10.5)
CALCIUM SERPL-MCNC: 8.8 MG/DL (ref 8.5–10.5)
CHLORIDE SERPL-SCNC: 105 MMOL/L (ref 96–112)
CO2 SERPL-SCNC: 25 MMOL/L (ref 20–33)
CREAT SERPL-MCNC: 1.22 MG/DL (ref 0.5–1.4)
EST. AVERAGE GLUCOSE BLD GHB EST-MCNC: 111 MG/DL
GFR SERPLBLD CREATININE-BSD FMLA CKD-EPI: 43 ML/MIN/1.73 M 2
GLOBULIN SER CALC-MCNC: 3 G/DL (ref 1.9–3.5)
GLUCOSE SERPL-MCNC: 89 MG/DL (ref 65–99)
HBA1C MFR BLD: 5.5 % (ref 4–5.6)
MAGNESIUM SERPL-MCNC: 2 MG/DL (ref 1.5–2.5)
POTASSIUM SERPL-SCNC: 4.2 MMOL/L (ref 3.6–5.5)
PROT SERPL-MCNC: 7 G/DL (ref 6–8.2)
SODIUM SERPL-SCNC: 140 MMOL/L (ref 135–145)

## 2025-07-23 PROCEDURE — 80053 COMPREHEN METABOLIC PANEL: CPT

## 2025-07-23 PROCEDURE — 36415 COLL VENOUS BLD VENIPUNCTURE: CPT

## 2025-07-23 PROCEDURE — 83036 HEMOGLOBIN GLYCOSYLATED A1C: CPT

## 2025-07-23 PROCEDURE — 3078F DIAST BP <80 MM HG: CPT | Mod: GC

## 2025-07-23 PROCEDURE — 3077F SYST BP >= 140 MM HG: CPT | Mod: GC

## 2025-07-23 PROCEDURE — 83735 ASSAY OF MAGNESIUM: CPT

## 2025-07-23 PROCEDURE — 99214 OFFICE O/P EST MOD 30 MIN: CPT | Mod: GC

## 2025-07-23 ASSESSMENT — FIBROSIS 4 INDEX: FIB4 SCORE: 3.01

## 2025-07-23 ASSESSMENT — ENCOUNTER SYMPTOMS
VOMITING: 0
DIARRHEA: 0
DIAPHORESIS: 0
HEARTBURN: 0
CHILLS: 0
EYE PAIN: 0
DIZZINESS: 0
BLURRED VISION: 0
CONSTIPATION: 0
DEPRESSION: 0
NAUSEA: 0
HEADACHES: 0
WHEEZING: 0
PALPITATIONS: 0
WEAKNESS: 0
FEVER: 0
SORE THROAT: 0
ABDOMINAL PAIN: 0
SHORTNESS OF BREATH: 0
NERVOUS/ANXIOUS: 0
INSOMNIA: 0
COUGH: 0
PHOTOPHOBIA: 0

## 2025-07-23 NOTE — PATIENT INSTRUCTIONS
Thank you for visiting [facility]!    No medications changes.    Please check your blood pressure at home and keep a log. Please bring this log to your next appointment.    Please do not hesitate to contact me on MyChart should you need anything else!

## 2025-07-23 NOTE — TELEPHONE ENCOUNTER
Pt got labs drawn today, and are currently processing.   Pt scheduled to see Dr. Diaz this afternoon.   Closing encounter.

## 2025-07-24 NOTE — ASSESSMENT & PLAN NOTE
Severely elevated at 187/93. I suspect medication non-adherence given her history. No symptoms concerning for hypertensive emergency or CVA. Discussed GOC and various options to further work up resistant HTN including seeing vascular medicine, ambulatory BP monitoring. We decided to proceed with home BP logs for now and should it continue to be elevated than we can pursue with further eval.  -Continue Spironolactone, Diovan, Metoprolol  -Advised to check BP regularly and to keep a log  -Referral to home health to help with medication adherence

## 2025-07-24 NOTE — ASSESSMENT & PLAN NOTE
Wound appears well healed with signs of infection. No longer weeping.   -Reinforced importance of medication adherence  -Recommended compression stockings  -Recommended elevating legs when sedentary  -Continue Lasix as prescribed for now

## 2025-07-24 NOTE — PROGRESS NOTES
"    OFFICE VISIT    Janessa Cain is a 89 y.o. female who presents today with the following:    Reason for visit: Follow up regarding HTN, peripheral LE edema    HPI:    Patient reports resolution of her weeping left leg wound since her last visit. She continues to elevate her legs but refuses to use compression stockings as they are too painful.     She had a mechanical fall 2 days when she caught her leg on some clothes as she was trying to stand up from her bed. Patient ended up landed into a dresser with her left shoulder. Denies numbness, tingling, pain out of proportion. There is a large bruise now on the shoulder. Tylenol has been helpful. No limitations with movement of her left arm. Denies LOC, head injury.     Patient has not checked her BP at home but states she continues to be compliant with her BP medications. Denies lightheadedness, dizziness, chest pain, SOB.     Review of Systems   Constitutional:  Negative for chills, diaphoresis, fever and malaise/fatigue.   HENT:  Negative for congestion, ear pain, hearing loss and sore throat.    Eyes:  Negative for blurred vision, photophobia and pain.   Respiratory:  Negative for cough, shortness of breath and wheezing.    Cardiovascular:  Positive for leg swelling (b/l). Negative for chest pain and palpitations.   Gastrointestinal:  Negative for abdominal pain, constipation, diarrhea, heartburn, nausea and vomiting.   Genitourinary:  Negative for dysuria and frequency.   Musculoskeletal:  Positive for joint pain (left shoulder).   Skin:  Negative for rash.   Neurological:  Negative for dizziness, weakness and headaches.   Psychiatric/Behavioral:  Negative for depression. The patient is not nervous/anxious and does not have insomnia.        Vitals:   BP (!) 173/77 (BP Location: Right arm, Patient Position: Sitting, BP Cuff Size: Adult) Comment: 2nd reading  Pulse 90   Temp 36.2 °C (97.2 °F) (Temporal)   Ht 1.651 m (5' 5\")   Wt 83.9 kg (185 lb) "   SpO2 90%   BMI 30.79 kg/m²     Physical Exam  Vitals and nursing note reviewed.   Constitutional:       General: She is not in acute distress.     Appearance: She is not diaphoretic.   HENT:      Head: Normocephalic and atraumatic.      Mouth/Throat:      Mouth: Mucous membranes are moist.      Pharynx: Oropharynx is clear. No oropharyngeal exudate or posterior oropharyngeal erythema.   Cardiovascular:      Rate and Rhythm: Normal rate and regular rhythm.      Pulses: Normal pulses.      Heart sounds: Normal heart sounds. No murmur heard.     No gallop.   Pulmonary:      Effort: Pulmonary effort is normal. No respiratory distress.      Breath sounds: Normal breath sounds. No wheezing, rhonchi or rales.   Abdominal:      General: There is no distension.      Palpations: Abdomen is soft.      Tenderness: There is no abdominal tenderness.   Musculoskeletal:      Right lower leg: Edema (+2 pitting up to mid shin) present.      Left lower leg: Edema (+2 pitting up to midshin) present.   Skin:     General: Skin is warm.      Findings: Bruising (large ecchymoses of the left shoulder) present. No rash.      Comments: Venous stasis changes L>R   Neurological:      General: No focal deficit present.      Mental Status: She is alert and oriented to person, place, and time.   Psychiatric:         Mood and Affect: Mood normal.         Assessment and Plan:     Primary hypertension  Severely elevated at 187/93. I suspect medication non-adherence given her history. No symptoms concerning for hypertensive emergency or CVA. Discussed GOC and various options to further work up resistant HTN including seeing vascular medicine, ambulatory BP monitoring. We decided to proceed with home BP logs for now and should it continue to be elevated than we can pursue with further eval.  -Continue Spironolactone, Diovan, Metoprolol  -Advised to check BP regularly and to keep a log  -Referral to home health to help with medication  adherence    Swelling of lower extremity  Wound appears well healed with signs of infection. No longer weeping.   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 Will continue monitor and treat her HTN  -Avoid nephrotoxins such as NSAIDs  -Renally dose all medications  -Renal diet    Prediabetes  A1c was 5.5 today. No longer prediabetic. Will continue to monitor.    Accident due to mechanical fall without injury  Mechanical fall after tripping on clothes. No reports of LOC, head trauma, lightheadedness, dizziness. There is notable ecchymoses on the left shoulder. Full ROM with L shoulder. No suspicion for fractures. Will hold off XR for now.      No orders of the defined types were placed in this encounter.      Return in about 4 weeks (around 8/20/2025) for w/ anyone available, PCP if possible.      Patient case was seen/ assessed/ discussed with Dr. Purdy      Please note that this dictation was created using voice recognition software. I have made every reasonable attempt to correct obvious errors, but I expect that there are errors of grammar and possibly content that I did not discover before finalizing the note.       Signed by:    Keith Diaz DO    PGY-2 Internal Medicine Resident

## 2025-07-24 NOTE — ASSESSMENT & PLAN NOTE
Mechanical fall after tripping on clothes. No reports of LOC, head trauma, lightheadedness, dizziness. There is notable ecchymoses on the left shoulder. Full ROM with L shoulder. No suspicion for fractures. Will hold off XR for now.

## 2025-07-24 NOTE — ASSESSMENT & PLAN NOTE
Her kidney disease continues to slowly progress likely secondary to resistant HTN. Will continue monitor and treat her HTN  -Avoid nephrotoxins such as NSAIDs  -Renally dose all medications  -Renal diet

## 2025-07-28 ENCOUNTER — TELEPHONE (OUTPATIENT)
Dept: INTERNAL MEDICINE | Facility: OTHER | Age: 89
End: 2025-07-28
Payer: MEDICARE

## 2025-07-28 DIAGNOSIS — M16.7 OTHER SECONDARY OSTEOARTHRITIS OF RIGHT HIP: ICD-10-CM

## 2025-07-28 DIAGNOSIS — Z78.9 IMPAIRED MOBILITY AND ADLS: Primary | ICD-10-CM

## 2025-07-28 DIAGNOSIS — Z74.09 IMPAIRED MOBILITY AND ADLS: Primary | ICD-10-CM

## 2025-07-28 DIAGNOSIS — F10.90 ALCOHOL USE DISORDER: ICD-10-CM

## 2025-07-28 NOTE — TELEPHONE ENCOUNTER
Spoke to pt's  - [facility] Pt recently saw Dr. Diaz and Dr. Diaz placed referral, however they denied it at the time. Bc they denied it, a new referral needs to be placed per Home Health dept.    Routing to PCP and Dr. Diaz

## 2025-07-30 ENCOUNTER — TELEPHONE (OUTPATIENT)
Dept: HOME HEALTH SERVICES | Facility: HOME HEALTHCARE | Age: 89
End: 2025-07-30
Payer: MEDICARE

## 2025-07-31 ENCOUNTER — TELEPHONE (OUTPATIENT)
Dept: HOME HEALTH SERVICES | Facility: HOME HEALTHCARE | Age: 89
End: 2025-07-31
Payer: MEDICARE

## 2025-07-31 NOTE — TELEPHONE ENCOUNTER
Called patient's mobile and left VM regarding HH scheduling. Called patient's spouse and left VM regarding HH scheduling. Later start of care for 8/1 for no contact.

## 2025-08-01 ENCOUNTER — TELEPHONE (OUTPATIENT)
Dept: HOME HEALTH SERVICES | Facility: HOME HEALTHCARE | Age: 89
End: 2025-08-01
Payer: MEDICARE

## 2025-08-05 ENCOUNTER — TELEPHONE (OUTPATIENT)
Dept: FAMILY PLANNING/WOMEN'S HEALTH CLINIC | Facility: PHYSICIAN GROUP | Age: 89
End: 2025-08-05
Payer: MEDICARE

## 2025-08-06 ENCOUNTER — TELEPHONE (OUTPATIENT)
Dept: HOME HEALTH SERVICES | Facility: HOME HEALTHCARE | Age: 89
End: 2025-08-06
Payer: MEDICARE

## 2025-08-07 ENCOUNTER — TELEPHONE (OUTPATIENT)
Dept: HOME HEALTH SERVICES | Facility: HOME HEALTHCARE | Age: 89
End: 2025-08-07
Payer: MEDICARE

## 2025-08-08 ENCOUNTER — HOME CARE VISIT (OUTPATIENT)
Dept: HOME HEALTH SERVICES | Facility: HOME HEALTHCARE | Age: 89
End: 2025-08-08
Payer: MEDICARE

## 2025-08-08 VITALS
SYSTOLIC BLOOD PRESSURE: 148 MMHG | OXYGEN SATURATION: 92 % | BODY MASS INDEX: 29.99 KG/M2 | WEIGHT: 180 LBS | HEIGHT: 65 IN | TEMPERATURE: 97.6 F | HEART RATE: 96 BPM | DIASTOLIC BLOOD PRESSURE: 78 MMHG

## 2025-08-08 PROCEDURE — 665998 HH PPS REVENUE CREDIT

## 2025-08-08 PROCEDURE — 665001 SOC-HOME HEALTH

## 2025-08-08 PROCEDURE — G0299 HHS/HOSPICE OF RN EA 15 MIN: HCPCS

## 2025-08-08 PROCEDURE — 665999 HH PPS REVENUE DEBIT

## 2025-08-08 ASSESSMENT — ENCOUNTER SYMPTOMS
DRY SKIN: 1
LAST BOWEL MOVEMENT: 67425
DIFFICULTY THINKING: 1
DENIES PAIN: 1
HYPERTENSION: 1
LOWER EXTREMITY EDEMA: 1
FORGETFULNESS: 1
NAUSEA: DENIES
STOOL FREQUENCY: DAILY
DESCRIPTION OF MEMORY LOSS: IMMEDIATE
VOMITING: DENIES
BOWEL PATTERN NORMAL: 1
DESCRIPTION OF MEMORY LOSS: SHORT TERM

## 2025-08-08 ASSESSMENT — FIBROSIS 4 INDEX: FIB4 SCORE: 3.01

## 2025-08-09 PROCEDURE — 665998 HH PPS REVENUE CREDIT

## 2025-08-09 PROCEDURE — 665999 HH PPS REVENUE DEBIT

## 2025-08-10 PROCEDURE — 665998 HH PPS REVENUE CREDIT

## 2025-08-10 PROCEDURE — 665999 HH PPS REVENUE DEBIT

## 2025-08-11 ENCOUNTER — DOCUMENTATION (OUTPATIENT)
Dept: MEDICAL GROUP | Facility: PHYSICIAN GROUP | Age: 89
End: 2025-08-11
Payer: MEDICARE

## 2025-08-11 PROCEDURE — G0180 MD CERTIFICATION HHA PATIENT: HCPCS | Performed by: INTERNAL MEDICINE

## 2025-08-11 PROCEDURE — 665998 HH PPS REVENUE CREDIT

## 2025-08-11 PROCEDURE — 665999 HH PPS REVENUE DEBIT

## 2025-08-12 ENCOUNTER — HOME CARE VISIT (OUTPATIENT)
Dept: HOME HEALTH SERVICES | Facility: HOME HEALTHCARE | Age: 89
End: 2025-08-12
Payer: MEDICARE

## 2025-08-12 PROCEDURE — 665998 HH PPS REVENUE CREDIT

## 2025-08-12 PROCEDURE — 665999 HH PPS REVENUE DEBIT

## 2025-08-13 PROCEDURE — 665999 HH PPS REVENUE DEBIT

## 2025-08-13 PROCEDURE — 665998 HH PPS REVENUE CREDIT

## 2025-08-14 ENCOUNTER — HOME CARE VISIT (OUTPATIENT)
Dept: HOME HEALTH SERVICES | Facility: HOME HEALTHCARE | Age: 89
End: 2025-08-14
Payer: MEDICARE

## 2025-08-14 PROCEDURE — 665998 HH PPS REVENUE CREDIT

## 2025-08-14 PROCEDURE — 665999 HH PPS REVENUE DEBIT

## 2025-08-15 ENCOUNTER — HOME CARE VISIT (OUTPATIENT)
Dept: HOME HEALTH SERVICES | Facility: HOME HEALTHCARE | Age: 89
End: 2025-08-15
Payer: MEDICARE

## 2025-08-15 VITALS
OXYGEN SATURATION: 93 % | SYSTOLIC BLOOD PRESSURE: 150 MMHG | RESPIRATION RATE: 18 BRPM | DIASTOLIC BLOOD PRESSURE: 78 MMHG | HEART RATE: 88 BPM | TEMPERATURE: 97.7 F

## 2025-08-15 PROCEDURE — G0299 HHS/HOSPICE OF RN EA 15 MIN: HCPCS

## 2025-08-15 PROCEDURE — 665999 HH PPS REVENUE DEBIT

## 2025-08-15 PROCEDURE — 665998 HH PPS REVENUE CREDIT

## 2025-08-15 ASSESSMENT — ENCOUNTER SYMPTOMS
DENIES PAIN: 1
STOOL FREQUENCY: DAILY
BOWEL PATTERN NORMAL: 1
MUSCLE WEAKNESS: 1
LIMITED RANGE OF MOTION: 1
SKIN LESIONS: 1
NAUSEA: DENIES
VOMITING: DENIES
LAST BOWEL MOVEMENT: 67432
LOWER EXTREMITY EDEMA: 1

## 2025-08-15 ASSESSMENT — PATIENT HEALTH QUESTIONNAIRE - PHQ9: CLINICAL INTERPRETATION OF PHQ2 SCORE: 0

## 2025-08-16 PROCEDURE — 665998 HH PPS REVENUE CREDIT

## 2025-08-16 PROCEDURE — 665999 HH PPS REVENUE DEBIT

## 2025-08-17 ENCOUNTER — HOME CARE VISIT (OUTPATIENT)
Dept: HOME HEALTH SERVICES | Facility: HOME HEALTHCARE | Age: 89
End: 2025-08-17
Payer: MEDICARE

## 2025-08-17 PROCEDURE — 665998 HH PPS REVENUE CREDIT

## 2025-08-17 PROCEDURE — 665999 HH PPS REVENUE DEBIT

## 2025-08-18 ENCOUNTER — HOME CARE VISIT (OUTPATIENT)
Dept: HOME HEALTH SERVICES | Facility: HOME HEALTHCARE | Age: 89
End: 2025-08-18
Payer: MEDICARE

## 2025-08-18 PROCEDURE — 665999 HH PPS REVENUE DEBIT

## 2025-08-18 PROCEDURE — G0155 HHCP-SVS OF CSW,EA 15 MIN: HCPCS

## 2025-08-18 PROCEDURE — 665998 HH PPS REVENUE CREDIT

## 2025-08-18 ASSESSMENT — ENCOUNTER SYMPTOMS
PERSON REPORTING PAIN: PATIENT
PAIN: PATIENT DENIED HAVING ANY PAIN AT VISIT
DENIES PAIN: 1

## 2025-08-18 ASSESSMENT — ACTIVITIES OF DAILY LIVING (ADL)
SHOPPING_REQUIRES_ASSISTANCE: 1
LAUNDRY_REQUIRES_ASSISTANCE: 1

## 2025-08-18 ASSESSMENT — PATIENT HEALTH QUESTIONNAIRE - PHQ9: CLINICAL INTERPRETATION OF PHQ2 SCORE: 0

## 2025-08-19 ENCOUNTER — HOME CARE VISIT (OUTPATIENT)
Dept: HOME HEALTH SERVICES | Facility: HOME HEALTHCARE | Age: 89
End: 2025-08-19
Payer: MEDICARE

## 2025-08-19 PROCEDURE — 665999 HH PPS REVENUE DEBIT

## 2025-08-19 PROCEDURE — G0299 HHS/HOSPICE OF RN EA 15 MIN: HCPCS

## 2025-08-19 PROCEDURE — 665998 HH PPS REVENUE CREDIT

## 2025-08-20 ENCOUNTER — OFFICE VISIT (OUTPATIENT)
Dept: INTERNAL MEDICINE | Facility: OTHER | Age: 89
End: 2025-08-20
Payer: MEDICARE

## 2025-08-20 VITALS
SYSTOLIC BLOOD PRESSURE: 162 MMHG | HEART RATE: 68 BPM | DIASTOLIC BLOOD PRESSURE: 106 MMHG | WEIGHT: 178 LBS | HEIGHT: 65 IN | OXYGEN SATURATION: 90 % | BODY MASS INDEX: 29.66 KG/M2 | TEMPERATURE: 97.7 F

## 2025-08-20 VITALS
DIASTOLIC BLOOD PRESSURE: 68 MMHG | OXYGEN SATURATION: 98 % | HEART RATE: 86 BPM | SYSTOLIC BLOOD PRESSURE: 130 MMHG | RESPIRATION RATE: 18 BRPM | TEMPERATURE: 98 F

## 2025-08-20 DIAGNOSIS — I10 PRIMARY HYPERTENSION: Primary | ICD-10-CM

## 2025-08-20 DIAGNOSIS — F10.90 ALCOHOL USE DISORDER: ICD-10-CM

## 2025-08-20 DIAGNOSIS — N18.32 STAGE 3B CHRONIC KIDNEY DISEASE: ICD-10-CM

## 2025-08-20 DIAGNOSIS — M79.89 SWELLING OF LOWER EXTREMITY: ICD-10-CM

## 2025-08-20 PROCEDURE — 3077F SYST BP >= 140 MM HG: CPT | Performed by: INTERNAL MEDICINE

## 2025-08-20 PROCEDURE — 3080F DIAST BP >= 90 MM HG: CPT | Performed by: INTERNAL MEDICINE

## 2025-08-20 PROCEDURE — 665999 HH PPS REVENUE DEBIT

## 2025-08-20 PROCEDURE — 99214 OFFICE O/P EST MOD 30 MIN: CPT | Performed by: INTERNAL MEDICINE

## 2025-08-20 PROCEDURE — 665998 HH PPS REVENUE CREDIT

## 2025-08-20 ASSESSMENT — ENCOUNTER SYMPTOMS
STOOL FREQUENCY: DAILY
LAST BOWEL MOVEMENT: 67436
VOMITING: DENIES
DENIES PAIN: 1
BOWEL PATTERN NORMAL: 1
MUSCLE WEAKNESS: 1
NAUSEA: DENIES
SKIN LESIONS: 1
LOWER EXTREMITY EDEMA: 1
LIMITED RANGE OF MOTION: 1

## 2025-08-20 ASSESSMENT — FIBROSIS 4 INDEX: FIB4 SCORE: 3.01

## 2025-08-20 ASSESSMENT — PATIENT HEALTH QUESTIONNAIRE - PHQ9: CLINICAL INTERPRETATION OF PHQ2 SCORE: 0

## 2025-08-20 ASSESSMENT — ACTIVITIES OF DAILY LIVING (ADL): OASIS_M1830: 03

## 2025-08-21 PROCEDURE — 665998 HH PPS REVENUE CREDIT

## 2025-08-21 PROCEDURE — 665999 HH PPS REVENUE DEBIT

## 2025-08-22 ENCOUNTER — HOME CARE VISIT (OUTPATIENT)
Dept: HOME HEALTH SERVICES | Facility: HOME HEALTHCARE | Age: 89
End: 2025-08-22
Payer: MEDICARE

## 2025-08-22 VITALS
TEMPERATURE: 97.8 F | RESPIRATION RATE: 18 BRPM | OXYGEN SATURATION: 91 % | HEART RATE: 88 BPM | SYSTOLIC BLOOD PRESSURE: 138 MMHG | DIASTOLIC BLOOD PRESSURE: 84 MMHG

## 2025-08-22 PROCEDURE — G0299 HHS/HOSPICE OF RN EA 15 MIN: HCPCS

## 2025-08-22 PROCEDURE — 665999 HH PPS REVENUE DEBIT

## 2025-08-22 PROCEDURE — 665998 HH PPS REVENUE CREDIT

## 2025-08-22 ASSESSMENT — ENCOUNTER SYMPTOMS
BOWEL PATTERN NORMAL: 1
LAST BOWEL MOVEMENT: 67439
STOOL FREQUENCY: DAILY
VOMITING: DENIES
SKIN LESIONS: 1
LIMITED RANGE OF MOTION: 1
MUSCLE WEAKNESS: 1
NAUSEA: DENIES
DENIES PAIN: 1
LOWER EXTREMITY EDEMA: 1

## 2025-08-22 ASSESSMENT — PATIENT HEALTH QUESTIONNAIRE - PHQ9: CLINICAL INTERPRETATION OF PHQ2 SCORE: 0

## 2025-08-23 PROCEDURE — 665998 HH PPS REVENUE CREDIT

## 2025-08-23 PROCEDURE — 665999 HH PPS REVENUE DEBIT

## 2025-08-24 PROCEDURE — 665998 HH PPS REVENUE CREDIT

## 2025-08-24 PROCEDURE — 665999 HH PPS REVENUE DEBIT

## 2025-08-25 ENCOUNTER — HOME CARE VISIT (OUTPATIENT)
Dept: HOME HEALTH SERVICES | Facility: HOME HEALTHCARE | Age: 89
End: 2025-08-25
Payer: MEDICARE

## 2025-08-25 VITALS
RESPIRATION RATE: 16 BRPM | HEART RATE: 69 BPM | OXYGEN SATURATION: 90 % | DIASTOLIC BLOOD PRESSURE: 80 MMHG | TEMPERATURE: 97.6 F | SYSTOLIC BLOOD PRESSURE: 134 MMHG

## 2025-08-25 PROCEDURE — 665998 HH PPS REVENUE CREDIT

## 2025-08-25 PROCEDURE — G0299 HHS/HOSPICE OF RN EA 15 MIN: HCPCS

## 2025-08-25 PROCEDURE — 665999 HH PPS REVENUE DEBIT

## 2025-08-25 ASSESSMENT — ENCOUNTER SYMPTOMS
LIMITED RANGE OF MOTION: 1
MUSCLE WEAKNESS: 1
STOOL FREQUENCY: DAILY
FORGETFULNESS: 1
DENIES PAIN: 1
LAST BOWEL MOVEMENT: 67442
BOWEL PATTERN NORMAL: 1
DRY SKIN: 1

## 2025-08-25 ASSESSMENT — ACTIVITIES OF DAILY LIVING (ADL)
AMBULATION ASSISTANCE: STAND BY ASSIST
TRANSPORTATION COMMENTS: PATIENT REQUIRES ASSISTANCE OF ANOTHER PERSON TO LEAVE THE HOME
CURRENT_FUNCTION: STAND BY ASSIST

## 2025-08-25 ASSESSMENT — PATIENT HEALTH QUESTIONNAIRE - PHQ9: CLINICAL INTERPRETATION OF PHQ2 SCORE: 0

## 2025-08-26 ENCOUNTER — TELEPHONE (OUTPATIENT)
Dept: INTERNAL MEDICINE | Facility: OTHER | Age: 89
End: 2025-08-26
Payer: MEDICARE

## 2025-08-26 DIAGNOSIS — R06.09 DOE (DYSPNEA ON EXERTION): Primary | ICD-10-CM

## 2025-08-26 DIAGNOSIS — R09.02 HYPOXEMIA: ICD-10-CM

## 2025-08-26 PROCEDURE — 665998 HH PPS REVENUE CREDIT

## 2025-08-26 PROCEDURE — 665999 HH PPS REVENUE DEBIT

## 2025-08-27 ENCOUNTER — HOME CARE VISIT (OUTPATIENT)
Dept: HOME HEALTH SERVICES | Facility: HOME HEALTHCARE | Age: 89
End: 2025-08-27
Payer: MEDICARE

## 2025-08-27 PROCEDURE — 665999 HH PPS REVENUE DEBIT

## 2025-08-27 PROCEDURE — G0299 HHS/HOSPICE OF RN EA 15 MIN: HCPCS

## 2025-08-27 PROCEDURE — 665998 HH PPS REVENUE CREDIT

## 2025-08-28 ENCOUNTER — HOME CARE VISIT (OUTPATIENT)
Dept: HOME HEALTH SERVICES | Facility: HOME HEALTHCARE | Age: 89
End: 2025-08-28
Payer: MEDICARE

## 2025-08-28 VITALS
HEART RATE: 84 BPM | DIASTOLIC BLOOD PRESSURE: 88 MMHG | OXYGEN SATURATION: 92 % | RESPIRATION RATE: 16 BRPM | SYSTOLIC BLOOD PRESSURE: 142 MMHG | TEMPERATURE: 98.5 F

## 2025-08-28 PROCEDURE — 665998 HH PPS REVENUE CREDIT

## 2025-08-28 PROCEDURE — 665999 HH PPS REVENUE DEBIT

## 2025-08-28 ASSESSMENT — ENCOUNTER SYMPTOMS
LAST BOWEL MOVEMENT: 67444
VOMITING: DENIES
LIMITED RANGE OF MOTION: 1
SKIN LESIONS: 1
NAUSEA: DENIES
STOOL FREQUENCY: DAILY
MUSCLE WEAKNESS: 1
DENIES PAIN: 1
BOWEL PATTERN NORMAL: 1
LOWER EXTREMITY EDEMA: 1

## 2025-08-28 ASSESSMENT — PATIENT HEALTH QUESTIONNAIRE - PHQ9: CLINICAL INTERPRETATION OF PHQ2 SCORE: 0

## 2025-08-29 PROCEDURE — 665999 HH PPS REVENUE DEBIT

## 2025-08-29 PROCEDURE — 665998 HH PPS REVENUE CREDIT

## 2025-08-30 PROCEDURE — 665998 HH PPS REVENUE CREDIT

## 2025-08-30 PROCEDURE — 665999 HH PPS REVENUE DEBIT

## (undated) DEVICE — LACTATED RINGERS INJ 1000 ML - (14EA/CA 60CA/PF)

## (undated) DEVICE — DISPOSABLE WOUND VAC PICO 10 X 30 CM - WOUND CARE (3/CA)

## (undated) DEVICE — GLOVE BIOGEL SZ 7.5 SURGICAL PF LTX - (50PR/BX 4BX/CA)

## (undated) DEVICE — SUCTION INSTRUMENT YANKAUER BULBOUS TIP W/O VENT (50EA/CA)

## (undated) DEVICE — GOWN WARMING STANDARD FLEX - (30/CA)

## (undated) DEVICE — HUMID-VENT HEAT AND MOISTURE EXCHANGE- (50/BX)

## (undated) DEVICE — SODIUM CHL IRRIGATION 0.9% 1000ML (12EA/CA)

## (undated) DEVICE — DRESSING STERILE BURN ACTICOAT FLEX 7 (50EA/CA)

## (undated) DEVICE — Device

## (undated) DEVICE — SODIUM CHL. IRRIGATION 0.9% 3000ML (4EA/CA 65CA/PF)

## (undated) DEVICE — ELECTRODE 850 FOAM ADHESIVE - HYDROGEL RADIOTRNSPRNT (50/PK)

## (undated) DEVICE — CANISTER SUCTION RIGID RED 1500CC (40EA/CA)

## (undated) DEVICE — PROTECTOR ULNA NERVE - (36PR/CA)

## (undated) DEVICE — MASK ANESTHESIA ADULT  - (100/CA)

## (undated) DEVICE — BLADE SAGITTAL 34MM

## (undated) DEVICE — STOCKINETTE IMPERVIOUS 12X48 - STERILELF (10/CA)"

## (undated) DEVICE — KIT ANESTHESIA W/CIRCUIT & 3/LT BAG W/FILTER (20EA/CA)

## (undated) DEVICE — TUBE CONNECTING SUCTION - CLEAR PLASTIC STERILE 72 IN (50EA/CA)

## (undated) DEVICE — SENSOR SPO2 NEO LNCS ADHESIVE (20/BX) SEE USER NOTES

## (undated) DEVICE — HANDPIECE 10FT INTPLS SCT PLS IRRIGATION HAND CONTROL SET (6/PK)

## (undated) DEVICE — PAD UNIVERSAL MULTI USE (1/EA)

## (undated) DEVICE — SUTURE 1 VICRYL PLUS CTX - 8 X 18 INCH (12/BX)

## (undated) DEVICE — SUTURE 3-0 MONOCRYL PLUS PS-1 - 27 INCH (36/BX)

## (undated) DEVICE — SUTURE GENERAL

## (undated) DEVICE — CHLORAPREP 26 ML APPLICATOR - ORANGE TINT(25/CA)

## (undated) DEVICE — BLADE SAGITTAL SAW DUAL CUT 75.0 X 25.0MM (1/EA)

## (undated) DEVICE — BANDAGE ELASTIC STERILE VELCRO 6 X 5 YDS (25EA/CA)

## (undated) DEVICE — BLADE SAGITTAL SYSTEM 18MM

## (undated) DEVICE — HEAD HOLDER JUNIOR/ADULT

## (undated) DEVICE — WATER IRRIGATION STERILE 1000ML (12EA/CA)

## (undated) DEVICE — BAG SPONGE COUNT 10.25 X 32 - BLUE (250/CA)

## (undated) DEVICE — GLOVE BIOGEL INDICATOR SZ 7.5 SURGICAL PF LTX - (50PR/BX 4BX/CA)

## (undated) DEVICE — GLOVE BIOGEL PI INDICATOR SZ 7.5 SURGICAL PF LF -(50/BX 4BX/CA)

## (undated) DEVICE — GLOVE BIOGEL SZ 8 SURGICAL PF LTX - (50PR/BX 4BX/CA)

## (undated) DEVICE — PACK TOTAL KNEE  (1/CA)

## (undated) DEVICE — TOWEL STOP TIMEOUT SAFETY FLAG (40EA/CA)

## (undated) DEVICE — GLOVE, LITE (PAIR)

## (undated) DEVICE — STAPLER SKIN DISP - (6/BX 10BX/CA) VISISTAT

## (undated) DEVICE — LENS/HOOD FOR SPACESUIT - (32/PK) PEEL AWAY FACE

## (undated) DEVICE — ELECTRODE DUAL RETURN W/ CORD - (50/PK)

## (undated) DEVICE — PAD BABY LAP 4X18 W/O - RINGS PREWASHED 5/PK 40PK/CS

## (undated) DEVICE — SUTURE 1 PDS-2 PLUS CTX - (24/BX)

## (undated) DEVICE — MIXER BONE CEMENT REVOLUTION - W/FEMORAL PRESSURIZER (6/CA)

## (undated) DEVICE — PACK TOTAL HIP - (1/CA)

## (undated) DEVICE — KIT ROOM DECONTAMINATION

## (undated) DEVICE — SUTURE 2-0 VICRYL PLUS CT-1 - 8 X 18 INCH(12/BX)

## (undated) DEVICE — NEPTUNE 4 PORT MANIFOLD - (20/PK)

## (undated) DEVICE — TIP INTPLS HFLO ML ORFC BTRY - (12/CS)  FOR SURGILAV

## (undated) DEVICE — SYSTEM PREVENA INCISION MNGM - (1/EA)

## (undated) DEVICE — SUTURE 5 ETHIBOND V-37 (12PK/BX)